# Patient Record
Sex: FEMALE | Race: BLACK OR AFRICAN AMERICAN | NOT HISPANIC OR LATINO | URBAN - METROPOLITAN AREA
[De-identification: names, ages, dates, MRNs, and addresses within clinical notes are randomized per-mention and may not be internally consistent; named-entity substitution may affect disease eponyms.]

---

## 2021-09-21 ENCOUNTER — EMERGENCY (EMERGENCY)
Facility: HOSPITAL | Age: 61
LOS: 0 days | Discharge: HOME | End: 2021-09-21
Attending: STUDENT IN AN ORGANIZED HEALTH CARE EDUCATION/TRAINING PROGRAM | Admitting: STUDENT IN AN ORGANIZED HEALTH CARE EDUCATION/TRAINING PROGRAM
Payer: MEDICARE

## 2021-09-21 VITALS
HEART RATE: 98 BPM | TEMPERATURE: 98 F | RESPIRATION RATE: 18 BRPM | SYSTOLIC BLOOD PRESSURE: 123 MMHG | OXYGEN SATURATION: 100 % | DIASTOLIC BLOOD PRESSURE: 85 MMHG

## 2021-09-21 VITALS
WEIGHT: 151.9 LBS | TEMPERATURE: 98 F | HEART RATE: 110 BPM | SYSTOLIC BLOOD PRESSURE: 113 MMHG | DIASTOLIC BLOOD PRESSURE: 74 MMHG | OXYGEN SATURATION: 100 % | RESPIRATION RATE: 18 BRPM

## 2021-09-21 DIAGNOSIS — M25.561 PAIN IN RIGHT KNEE: ICD-10-CM
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
## 2021-09-21 DIAGNOSIS — R32 UNSPECIFIED URINARY INCONTINENCE: ICD-10-CM
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
## 2021-09-21 DIAGNOSIS — N39.0 URINARY TRACT INFECTION, SITE NOT SPECIFIED: ICD-10-CM

## 2021-09-21 LAB
ALBUMIN SERPL ELPH-MCNC: 3.5 G/DL — SIGNIFICANT CHANGE UP (ref 3.5–5.2)
ALP SERPL-CCNC: 101 U/L — SIGNIFICANT CHANGE UP (ref 30–115)
ALT FLD-CCNC: 19 U/L — SIGNIFICANT CHANGE UP (ref 0–41)
ANION GAP SERPL CALC-SCNC: 13 MMOL/L — SIGNIFICANT CHANGE UP (ref 7–14)
APPEARANCE UR: ABNORMAL
AST SERPL-CCNC: 37 U/L — SIGNIFICANT CHANGE UP (ref 0–41)
BACTERIA # UR AUTO: ABNORMAL
BASOPHILS # BLD AUTO: 0.04 K/UL — SIGNIFICANT CHANGE UP (ref 0–0.2)
BASOPHILS NFR BLD AUTO: 0.9 % — SIGNIFICANT CHANGE UP (ref 0–1)
BILIRUB SERPL-MCNC: 0.7 MG/DL — SIGNIFICANT CHANGE UP (ref 0.2–1.2)
BILIRUB UR-MCNC: NEGATIVE — SIGNIFICANT CHANGE UP
BUN SERPL-MCNC: 5 MG/DL — LOW (ref 10–20)
CALCIUM SERPL-MCNC: 9.4 MG/DL — SIGNIFICANT CHANGE UP (ref 8.5–10.1)
CHLORIDE SERPL-SCNC: 95 MMOL/L — LOW (ref 98–110)
CO2 SERPL-SCNC: 22 MMOL/L — SIGNIFICANT CHANGE UP (ref 17–32)
COLOR SPEC: YELLOW — SIGNIFICANT CHANGE UP
CREAT SERPL-MCNC: 0.8 MG/DL — SIGNIFICANT CHANGE UP (ref 0.7–1.5)
DIFF PNL FLD: ABNORMAL
EOSINOPHIL # BLD AUTO: 0.07 K/UL — SIGNIFICANT CHANGE UP (ref 0–0.7)
EOSINOPHIL NFR BLD AUTO: 1.6 % — SIGNIFICANT CHANGE UP (ref 0–8)
EPI CELLS # UR: 1 /HPF — SIGNIFICANT CHANGE UP (ref 0–5)
GLUCOSE SERPL-MCNC: 124 MG/DL — HIGH (ref 70–99)
GLUCOSE UR QL: NEGATIVE — SIGNIFICANT CHANGE UP
HCT VFR BLD CALC: 37.3 % — SIGNIFICANT CHANGE UP (ref 37–47)
HGB BLD-MCNC: 12.8 G/DL — SIGNIFICANT CHANGE UP (ref 12–16)
HYALINE CASTS # UR AUTO: 3 /LPF — SIGNIFICANT CHANGE UP (ref 0–7)
IMM GRANULOCYTES NFR BLD AUTO: 0.9 % — HIGH (ref 0.1–0.3)
KETONES UR-MCNC: NEGATIVE — SIGNIFICANT CHANGE UP
LEUKOCYTE ESTERASE UR-ACNC: ABNORMAL
LIDOCAIN IGE QN: 32 U/L — SIGNIFICANT CHANGE UP (ref 7–60)
LYMPHOCYTES # BLD AUTO: 0.93 K/UL — LOW (ref 1.2–3.4)
LYMPHOCYTES # BLD AUTO: 21.2 % — SIGNIFICANT CHANGE UP (ref 20.5–51.1)
MCHC RBC-ENTMCNC: 32.1 PG — HIGH (ref 27–31)
MCHC RBC-ENTMCNC: 34.3 G/DL — SIGNIFICANT CHANGE UP (ref 32–37)
MCV RBC AUTO: 93.5 FL — SIGNIFICANT CHANGE UP (ref 81–99)
MONOCYTES # BLD AUTO: 0.34 K/UL — SIGNIFICANT CHANGE UP (ref 0.1–0.6)
MONOCYTES NFR BLD AUTO: 7.8 % — SIGNIFICANT CHANGE UP (ref 1.7–9.3)
NEUTROPHILS # BLD AUTO: 2.96 K/UL — SIGNIFICANT CHANGE UP (ref 1.4–6.5)
NEUTROPHILS NFR BLD AUTO: 67.6 % — SIGNIFICANT CHANGE UP (ref 42.2–75.2)
NITRITE UR-MCNC: NEGATIVE — SIGNIFICANT CHANGE UP
NRBC # BLD: 0 /100 WBCS — SIGNIFICANT CHANGE UP (ref 0–0)
PH UR: 6.5 — SIGNIFICANT CHANGE UP (ref 5–8)
PLATELET # BLD AUTO: 247 K/UL — SIGNIFICANT CHANGE UP (ref 130–400)
POTASSIUM SERPL-MCNC: 4.3 MMOL/L — SIGNIFICANT CHANGE UP (ref 3.5–5)
POTASSIUM SERPL-SCNC: 4.3 MMOL/L — SIGNIFICANT CHANGE UP (ref 3.5–5)
PROT SERPL-MCNC: 7.8 G/DL — SIGNIFICANT CHANGE UP (ref 6–8)
PROT UR-MCNC: NEGATIVE — SIGNIFICANT CHANGE UP
RBC # BLD: 3.99 M/UL — LOW (ref 4.2–5.4)
RBC # FLD: 12.6 % — SIGNIFICANT CHANGE UP (ref 11.5–14.5)
RBC CASTS # UR COMP ASSIST: 20 /HPF — HIGH (ref 0–4)
SODIUM SERPL-SCNC: 130 MMOL/L — LOW (ref 135–146)
SP GR SPEC: 1 — LOW (ref 1.01–1.03)
UROBILINOGEN FLD QL: SIGNIFICANT CHANGE UP
WBC # BLD: 4.38 K/UL — LOW (ref 4.8–10.8)
WBC # FLD AUTO: 4.38 K/UL — LOW (ref 4.8–10.8)
WBC UR QL: 1 /HPF — SIGNIFICANT CHANGE UP (ref 0–5)

## 2021-09-21 PROCEDURE — 71045 X-RAY EXAM CHEST 1 VIEW: CPT | Mod: 26

## 2021-09-21 PROCEDURE — 99285 EMERGENCY DEPT VISIT HI MDM: CPT

## 2021-09-21 PROCEDURE — 73552 X-RAY EXAM OF FEMUR 2/>: CPT | Mod: 26,RT

## 2021-09-21 PROCEDURE — 72170 X-RAY EXAM OF PELVIS: CPT | Mod: 26

## 2021-09-21 PROCEDURE — G1004: CPT

## 2021-09-21 PROCEDURE — 74177 CT ABD & PELVIS W/CONTRAST: CPT | Mod: 26,ME

## 2021-09-21 PROCEDURE — 73560 X-RAY EXAM OF KNEE 1 OR 2: CPT | Mod: 26,RT

## 2021-09-21 RX ORDER — ACETAMINOPHEN 500 MG
650 TABLET ORAL ONCE
Refills: 0 | Status: COMPLETED | OUTPATIENT
Start: 2021-09-21 | End: 2021-09-21

## 2021-09-21 RX ORDER — SODIUM CHLORIDE 9 MG/ML
1000 INJECTION, SOLUTION INTRAVENOUS ONCE
Refills: 0 | Status: COMPLETED | OUTPATIENT
Start: 2021-09-21 | End: 2021-09-21

## 2021-09-21 RX ORDER — CEFPODOXIME PROXETIL 100 MG
1 TABLET ORAL
Qty: 20 | Refills: 0
Start: 2021-09-21 | End: 2021-09-30

## 2021-09-21 RX ADMIN — SODIUM CHLORIDE 1000 MILLILITER(S): 9 INJECTION, SOLUTION INTRAVENOUS at 13:22

## 2021-09-21 RX ADMIN — Medication 650 MILLIGRAM(S): at 10:17

## 2021-09-21 NOTE — ED PROVIDER NOTE - PROGRESS NOTE DETAILS
Discussed results with patient and son. Pt ambulatory in Ed with steady gait. Still complaining of mild R knee pain. ACE wrap applied. Discussed possibility of admission for rehab and social work/case management but patient and son state that she feels safe returning home and lives with her son who will help her to ambulate and perform ADLs. Will give rehab clinic follow up.

## 2021-09-21 NOTE — ED PROVIDER NOTE - PATIENT PORTAL LINK FT
You can access the FollowMyHealth Patient Portal offered by [facility] by registering at the following website: [email or web address]. By joining [facility]’s FollowMyHealth portal, you will also be able to view your health information using other applications (apps) compatible with our system.

## 2021-09-21 NOTE — ED PROVIDER NOTE - CLINICAL SUMMARY MEDICAL DECISION MAKING FREE TEXT BOX
I personally evaluated the patient. I reviewed the Resident’s or Physician Assistant’s note (as assigned above), and agree with the findings and plan except as documented in my note. Patient evaluated for knee pain. Labs, LE xrays, Ct abd/pelvis, UA performed in the ED. Pt ambulatory in ED with steady gait, lives at home with her son who will help perform ADLs and help patient ambulate. Offered patient and son admission for PT/rehab and social work or home health aide but pt refused. Pt and son state patient feels safe at home. Abx prescription sent for uti. I have fully discussed the medical management and delivery of care with the patient. I have discussed any available labs, imaging and treatment options with the patient. Patient confirms understanding and has been given detailed return precautions. Patient instructed to return to the ED should symptoms persist or worsen. Patient has demonstrated capacity and has verbalized understanding. Patient is well appearing upon discharge.

## 2021-09-21 NOTE — ED PROVIDER NOTE - NSFOLLOWUPINSTRUCTIONS_ED_ALL_ED_FT
Urinary Tract Infection    A urinary tract infection (UTI) is an infection of any part of the urinary tract, which includes the kidneys, ureters, bladder, and urethra. Risk factors include ignoring your need to urinate, wiping back to front if female, being an uncircumcised male, and having diabetes or a weak immune system. Symptoms include frequent urination, pain or burning with urination, foul smelling urine, cloudy urine, pain in the lower abdomen, blood in the urine, and fever. If you were prescribed an antibiotic medicine, take it as told by your health care provider. Do not stop taking the antibiotic even if you start to feel better.    SEEK IMMEDIATE MEDICAL CARE IF YOU HAVE ANY OF THE FOLLOWING SYMPTOMS: severe back or abdominal pain, fever, inability to keep fluids or medicine down, dizziness/lightheadedness, or a change in mental status.        Knee Pain    Knee pain is a very common symptom and can have many causes. Knee pain often goes away when you follow your health care provider's instructions for relieving pain and discomfort at home. However, knee pain can develop into a condition that needs treatment. Some conditions may include:     Arthritis caused by wear and tear (osteoarthritis).  Arthritis caused by swelling and irritation (rheumatoid arthritis or gout).  A cyst or growth in your knee.  An infection in your knee joint.  An injury that will not heal.  Damage, swelling, or irritation of the tissues that support your knee (torn ligaments or tendinitis).    If your knee pain continues, additional tests may be ordered to diagnose your condition. Tests may include X-rays or other imaging studies of your knee. You may also need to have fluid removed from your knee. Treatment for ongoing knee pain depends on the cause, but treatment may include:    Medicines to relieve pain or swelling.  Steroid injections in your knee.  Physical therapy.  Surgery.    HOME CARE INSTRUCTIONS  Take medicines only as directed by your health care provider.   Rest your knee and keep it raised (elevated) while you are resting.  Do not do things that cause or worsen pain.  Avoid high-impact activities or exercises, such as running, jumping rope, or doing jumping jacks.  Apply ice to the knee area:  Put ice in a plastic bag.  Place a towel between your skin and the bag.  Leave the ice on for 20 minutes, 2–3 times a day.  Ask your health care provider if you should wear an elastic knee support.  Keep a pillow under your knee when you sleep.  Lose weight if you are overweight. Extra weight can put pressure on your knee.  Do not use any tobacco products, including cigarettes, chewing tobacco, or electronic cigarettes. If you need help quitting, ask your health care provider. Smoking may slow the healing of any bone and joint problems that you may have.    SEEK MEDICAL CARE IF:  Your knee pain continues, changes, or gets worse.  You have a fever along with knee pain.  Your knee dylan or locks up.  Your knee becomes more swollen.    SEEK IMMEDIATE MEDICAL CARE IF:  Your knee joint feels hot to the touch.  You have chest pain or trouble breathing.

## 2021-09-21 NOTE — ED PROVIDER NOTE - NS ED ROS FT
Eyes:  No visual changes, eye pain or discharge.  ENMT:  No hearing changes, pain, discharge or infections. No neck pain or stiffness.  Cardiac:  No chest pain, SOB or edema. No chest pain with exertion.  Respiratory:  No cough or respiratory distress. No hemoptysis. No history of asthma or RAD.  GI:  No nausea, vomiting, diarrhea or abdominal pain.  :  No dysuria, frequency or burning.  MS:  +R knee pain. No myalgia, muscle weakness, or back pain.  Neuro:  No headache or weakness.  No LOC.  Skin:  No skin rash.   Endocrine: No history of thyroid disease or diabetes.

## 2021-09-21 NOTE — ED PROVIDER NOTE - OBJECTIVE STATEMENT
60 yo F no pmh pw knee pain. Mechanical trip and fall 1 week ago, has been c/o R knee pain since then. States that she is also unable to make to the restroom in time due to the pain and slower ambulation and has been occasionally incontinent while trying to walk. No n/v/d, no head trauma, no ac use, no cp, no sob, no back pain, no numbness/tingling, no palpitations, no f/c, no neck pain, no dysuria, no hematuria, no abd pain.

## 2021-09-21 NOTE — ED PROVIDER NOTE - PHYSICAL EXAMINATION
CONSTITUTIONAL: Well-developed; well-nourished; in no acute distress. Sitting up and providing appropriate history and physical examination  SKIN: skin exam is warm and dry, no acute rash.  HEAD: Normocephalic; atraumatic.  EYES: PERRL, 3 mm bilateral, no nystagmus, EOM intact; conjunctiva and sclera clear.  ENT: No nasal discharge; airway clear.  NECK: Supple; non tender. + full passive ROM in all directions. No JVD  CARD: S1, S2 normal; no murmurs, gallops, or rubs. Regular rate and rhythm. + Symmetric Strong Pulses  RESP: No wheezes, rales or rhonchi. Good air movement bilaterally  ABD: soft; non-distended; non-tender. No Rebound, No Guarding, No signs of peritonitis, No CVA tenderness. No pulsatile abdominal mass. + Strong and Symmetric Pulses  EXT: +ttp over R knee. Normal ROM. No clubbing, cyanosis or edema. Dp and Pt Pulses intact. Cap refill less than 3 seconds  NEURO: CN 2-12 intact, normal finger to nose, normal romberg, stable gait, no sensory or motor deficits, Alert, oriented, grossly unremarkable. No Focal deficits. GCS 15. NIH 0  PSYCH: Cooperative, appropriate.

## 2022-02-16 ENCOUNTER — INPATIENT (INPATIENT)
Facility: HOSPITAL | Age: 62
LOS: 37 days | Discharge: NURSING, REHAB OR LONG-TERM CARE | End: 2022-03-26
Attending: HOSPITALIST | Admitting: HOSPITALIST
Payer: MEDICARE

## 2022-02-16 VITALS
HEART RATE: 81 BPM | SYSTOLIC BLOOD PRESSURE: 103 MMHG | DIASTOLIC BLOOD PRESSURE: 66 MMHG | OXYGEN SATURATION: 97 % | TEMPERATURE: 97 F

## 2022-02-16 PROBLEM — Z00.00 ENCOUNTER FOR PREVENTIVE HEALTH EXAMINATION: Status: ACTIVE | Noted: 2022-02-16

## 2022-02-16 NOTE — H&P ADULT - ASSESSMENT
Pt is a 60 yo F with PMH of GIB, Bowel and Bladder incontinence, Smoking (3-4 cigarettes/day), ETOH abuse, hypothyroidism anemia transferred from [facility] for Polypectomy. Pt is from home living with her son and bedbound at baseline. Pt was admitted to [facility] on 01/31 for AMS, weakness and decreased appetite. She was found to have UTI, NSTEMI, and electrolyte imbalance. During her stay at [facility] pt had colonoscopy done which revealed 1 large 3-4 cm sigmoid pedunculated polyp and 2 polyps (7, 14 mm) in descending colon s/p bx. Pt was transferred to [facility] for polyp removal.    Pt is a 62 yo F with PMH of GIB, Bowel and Bladder incontinence, Smoking (3-4 cigarettes/day), ETOH abuse, hypothyroidism anemia transferred from [facility] for Polypectomy. Pt is from home living with her son and bedbound at baseline. Pt was admitted to [facility] on 01/31 for AMS, weakness and decreased appetite. She was found to have UTI, NSTEMI, and electrolyte imbalance. During her stay at [facility] pt had colonoscopy done which revealed 1 large 3-4 cm sigmoid pedunculated polyp and 2 polyps (7, 14 mm) in descending colon s/p bx. Pt was transferred to [facility] for polyp removal.     #Colonic Polyp  #Diarrhea in setting of recent abx use  #Hx of GIB  - Colonoscopy 2/4: 1 large 3-4 cm sigmoid pedunculated polyp and 2 polyps (7, 14 mm) in descending colon s/p bx  - GI consulted (Dr. Daniels)   - Monitor BM for blood  - f/u C diff result    #Hx of UTI  #R lower face cellulitis  - CT maxillofacial with C 2/12: Mild soft tissue inflammation suggesting cellulitis of the right lower face  - pt was on cefepime, vancomycin (end date 2/15)  - Started on Unasyn 2/16  - No documentations of + bcx or ucx on the charts , Obtain culture result from [facility]  - f/u UA, Ucx, Bclx    #Hx L pleural effusion on CXR 2/8  - CT Chest with Cont 2/9 obtained: Moderate b/l pleural effusions resulting in complete atelectasis of both lower lobes  - f/u CXR    #Hx of Elevated Troponin  #low bp  - pt has been on Midodrine 15 mg q8h  - Normal Lexiscan at [facility]    Diet: NPO for possible procedure tomorrow  GI ppx: PPI  DVT ppx: hsq   Pt is a 60 yo F with PMH of GIB, Bowel and Bladder incontinence, Smoking (3-4 cigarettes/day), ETOH abuse, hypothyroidism anemia transferred from [facility] for Polypectomy. Pt is from home living with her son and bedbound at baseline. Pt was admitted to [facility] on 01/31 for AMS, weakness and decreased appetite. She was found to have UTI, Elevated troponin, and electrolyte imbalance. During her stay at [facility] pt had colonoscopy done which revealed 1 large 3-4 cm sigmoid pedunculated polyp and 2 polyps (7, 14 mm) in descending colon s/p bx. Pt was transferred to [facility] for polyp removal.     #Colonic Polyp  #Diarrhea in setting of recent abx use  #Hx of GIB  - Colonoscopy 2/4: 1 large 3-4 cm sigmoid pedunculated polyp and 2 polyps (7, 14 mm) in descending colon s/p bx  - GI consulted (Dr. Daniels)   - Monitor BM for blood  - f/u C diff result  - Pt was on Imodium at [facility], no BM since she came    #Hx of UTI  #R lower face cellulitis  - CT maxillofacial with C 2/12: Mild soft tissue inflammation suggesting cellulitis of the right lower face  - pt was on cefepime, vancomycin (end date 2/15)  - Started on Unasyn 2/16, will continue that  - No documentations of + bcx or ucx on the charts , Obtain culture result from [facility]  - f/u UA, Ucx, Bclx    #Hx L pleural effusion on CXR 2/8  - CT Chest with Cont 2/9 obtained: Moderate b/l pleural effusions resulting in complete atelectasis of both lower lobes  - f/u CXR    #Hx of Elevated Troponin  #low bp  - pt has been on Midodrine 15 mg q8h, will continue that   - Normal Lexiscan at [facility]  - f/u orthostatics    #Hx of ETOH abuse  - last drink (as per nurse) 6 months ago  - c/w Folic acid and Thiamine    #Hx of Abnormal electrolytes  - f/u CMP and correct as needed    Diet: NPO for possible procedure tomorrow- Please confirm with GI  GI ppx: PPI  DVT ppx: CSD

## 2022-02-16 NOTE — H&P ADULT - HISTORY OF PRESENT ILLNESS
Pt is a 62 yo F with PMH of GIB, Bowel and Bladder incontinence, Smoking (3-4 cigarettes/day), ETOH abuse, hypothyroidism anemia transferred from [facility] for Polypectomy. Pt is from home living with her son and bedbound at baseline. Pt was admitted to [facility] on 01/31 for AMS, weakness and decreased appetite. She was found to have UTI, NSTEMI, and electrolyte imbalance. During her stay at [facility] pt had colonoscopy done which revealed 1 large 3-4 cm sigmoid pedunculated polyp and 2 polyps (7, 14 mm) in descending colon s/p bx. Pt was transferred to [facility] for polyp removal.

## 2022-02-16 NOTE — H&P ADULT - NSHPPHYSICALEXAM_GEN_ALL_CORE
ICU Vital Signs Last 24 Hrs  T(C): 36.2 (16 Feb 2022 23:02), Max: 36.2 (16 Feb 2022 23:02)  T(F): 97.2 (16 Feb 2022 23:02), Max: 97.2 (16 Feb 2022 23:02)  HR: 81 (16 Feb 2022 23:02) (81 - 81)  BP: 103/66 (16 Feb 2022 23:02) (103/66 - 103/66)  BP(mean): --  ABP: --  ABP(mean): --  RR: --  SpO2: 97% (16 Feb 2022 23:02) (97% - 97%)    GENERAL: NAD, Resting in bed, frail  HEENT: R lower face swelling   CHEST/LUNG: Clear to auscultation bilaterally; No wheezing or rubs.   HEART: Regular rate and rhythm; No murmurs, rubs, or gallops  ABDOMEN: Bowel sounds present; Soft, Nontender, Nondistended.   EXTREMITIES:  No clubbing, cyanosis, or edema  NERVOUS SYSTEM:  Alert & Oriented X1  SKIN: No rashes or lesions

## 2022-02-17 LAB
ALBUMIN SERPL ELPH-MCNC: 1.6 G/DL — LOW (ref 3.5–5.2)
ALP SERPL-CCNC: 63 U/L — SIGNIFICANT CHANGE UP (ref 30–115)
ALT FLD-CCNC: 12 U/L — SIGNIFICANT CHANGE UP (ref 0–41)
ANION GAP SERPL CALC-SCNC: 11 MMOL/L — SIGNIFICANT CHANGE UP (ref 7–14)
APTT BLD: 44.6 SEC — HIGH (ref 27–39.2)
AST SERPL-CCNC: 14 U/L — SIGNIFICANT CHANGE UP (ref 0–41)
BASOPHILS # BLD AUTO: 0.01 K/UL — SIGNIFICANT CHANGE UP (ref 0–0.2)
BASOPHILS NFR BLD AUTO: 0.1 % — SIGNIFICANT CHANGE UP (ref 0–1)
BILIRUB SERPL-MCNC: 1.3 MG/DL — HIGH (ref 0.2–1.2)
BLD GP AB SCN SERPL QL: SIGNIFICANT CHANGE UP
BUN SERPL-MCNC: 8 MG/DL — LOW (ref 10–20)
C DIFF BY PCR RESULT: NEGATIVE — SIGNIFICANT CHANGE UP
C DIFF TOX GENS STL QL NAA+PROBE: SIGNIFICANT CHANGE UP
CALCIUM SERPL-MCNC: 8 MG/DL — LOW (ref 8.5–10.1)
CEA SERPL-MCNC: 5.6 NG/ML — HIGH (ref 0–3.8)
CHLORIDE SERPL-SCNC: 119 MMOL/L — HIGH (ref 98–110)
CO2 SERPL-SCNC: 16 MMOL/L — LOW (ref 17–32)
CREAT SERPL-MCNC: 0.6 MG/DL — LOW (ref 0.7–1.5)
EOSINOPHIL # BLD AUTO: 0.02 K/UL — SIGNIFICANT CHANGE UP (ref 0–0.7)
EOSINOPHIL NFR BLD AUTO: 0.3 % — SIGNIFICANT CHANGE UP (ref 0–8)
GLUCOSE SERPL-MCNC: 69 MG/DL — LOW (ref 70–99)
HCT VFR BLD CALC: 23.5 % — LOW (ref 37–47)
HGB BLD-MCNC: 8.5 G/DL — LOW (ref 12–16)
IMM GRANULOCYTES NFR BLD AUTO: 0.5 % — HIGH (ref 0.1–0.3)
INR BLD: 1.51 RATIO — HIGH (ref 0.65–1.3)
LYMPHOCYTES # BLD AUTO: 0.98 K/UL — LOW (ref 1.2–3.4)
LYMPHOCYTES # BLD AUTO: 12.5 % — LOW (ref 20.5–51.1)
MAGNESIUM SERPL-MCNC: 1.9 MG/DL — SIGNIFICANT CHANGE UP (ref 1.8–2.4)
MCHC RBC-ENTMCNC: 30.6 PG — SIGNIFICANT CHANGE UP (ref 27–31)
MCHC RBC-ENTMCNC: 36.2 G/DL — SIGNIFICANT CHANGE UP (ref 32–37)
MCV RBC AUTO: 84.5 FL — SIGNIFICANT CHANGE UP (ref 81–99)
MONOCYTES # BLD AUTO: 0.32 K/UL — SIGNIFICANT CHANGE UP (ref 0.1–0.6)
MONOCYTES NFR BLD AUTO: 4.1 % — SIGNIFICANT CHANGE UP (ref 1.7–9.3)
NEUTROPHILS # BLD AUTO: 6.44 K/UL — SIGNIFICANT CHANGE UP (ref 1.4–6.5)
NEUTROPHILS NFR BLD AUTO: 82.5 % — HIGH (ref 42.2–75.2)
NRBC # BLD: 0 /100 WBCS — SIGNIFICANT CHANGE UP (ref 0–0)
PLATELET # BLD AUTO: 148 K/UL — SIGNIFICANT CHANGE UP (ref 130–400)
POTASSIUM SERPL-MCNC: 4.1 MMOL/L — SIGNIFICANT CHANGE UP (ref 3.5–5)
POTASSIUM SERPL-SCNC: 4.1 MMOL/L — SIGNIFICANT CHANGE UP (ref 3.5–5)
PROT SERPL-MCNC: 5.5 G/DL — LOW (ref 6–8)
PROTHROM AB SERPL-ACNC: 17.3 SEC — HIGH (ref 9.95–12.87)
RBC # BLD: 2.78 M/UL — LOW (ref 4.2–5.4)
RBC # FLD: 15.2 % — HIGH (ref 11.5–14.5)
SODIUM SERPL-SCNC: 146 MMOL/L — SIGNIFICANT CHANGE UP (ref 135–146)
WBC # BLD: 7.81 K/UL — SIGNIFICANT CHANGE UP (ref 4.8–10.8)
WBC # FLD AUTO: 7.81 K/UL — SIGNIFICANT CHANGE UP (ref 4.8–10.8)

## 2022-02-17 PROCEDURE — 71045 X-RAY EXAM CHEST 1 VIEW: CPT | Mod: 26

## 2022-02-17 PROCEDURE — 99222 1ST HOSP IP/OBS MODERATE 55: CPT

## 2022-02-17 PROCEDURE — 99223 1ST HOSP IP/OBS HIGH 75: CPT

## 2022-02-17 PROCEDURE — 93010 ELECTROCARDIOGRAM REPORT: CPT

## 2022-02-17 RX ORDER — LEVOTHYROXINE SODIUM 125 MCG
25 TABLET ORAL DAILY
Refills: 0 | Status: DISCONTINUED | OUTPATIENT
Start: 2022-02-17 | End: 2022-02-19

## 2022-02-17 RX ORDER — AMPICILLIN SODIUM AND SULBACTAM SODIUM 250; 125 MG/ML; MG/ML
3 INJECTION, POWDER, FOR SUSPENSION INTRAMUSCULAR; INTRAVENOUS ONCE
Refills: 0 | Status: COMPLETED | OUTPATIENT
Start: 2022-02-17 | End: 2022-02-17

## 2022-02-17 RX ORDER — THIAMINE MONONITRATE (VIT B1) 100 MG
100 TABLET ORAL DAILY
Refills: 0 | Status: DISCONTINUED | OUTPATIENT
Start: 2022-02-17 | End: 2022-02-18

## 2022-02-17 RX ORDER — PANTOPRAZOLE SODIUM 20 MG/1
40 TABLET, DELAYED RELEASE ORAL
Refills: 0 | Status: DISCONTINUED | OUTPATIENT
Start: 2022-02-17 | End: 2022-02-20

## 2022-02-17 RX ORDER — MIDODRINE HYDROCHLORIDE 2.5 MG/1
15 TABLET ORAL THREE TIMES A DAY
Refills: 0 | Status: DISCONTINUED | OUTPATIENT
Start: 2022-02-17 | End: 2022-02-20

## 2022-02-17 RX ORDER — AMPICILLIN SODIUM AND SULBACTAM SODIUM 250; 125 MG/ML; MG/ML
INJECTION, POWDER, FOR SUSPENSION INTRAMUSCULAR; INTRAVENOUS
Refills: 0 | Status: DISCONTINUED | OUTPATIENT
Start: 2022-02-17 | End: 2022-02-20

## 2022-02-17 RX ORDER — SODIUM CHLORIDE 9 MG/ML
1000 INJECTION, SOLUTION INTRAVENOUS
Refills: 0 | Status: DISCONTINUED | OUTPATIENT
Start: 2022-02-17 | End: 2022-02-18

## 2022-02-17 RX ORDER — AMPICILLIN SODIUM AND SULBACTAM SODIUM 250; 125 MG/ML; MG/ML
3 INJECTION, POWDER, FOR SUSPENSION INTRAMUSCULAR; INTRAVENOUS EVERY 6 HOURS
Refills: 0 | Status: DISCONTINUED | OUTPATIENT
Start: 2022-02-17 | End: 2022-02-20

## 2022-02-17 RX ORDER — FOLIC ACID 0.8 MG
1 TABLET ORAL DAILY
Refills: 0 | Status: DISCONTINUED | OUTPATIENT
Start: 2022-02-17 | End: 2022-02-20

## 2022-02-17 RX ADMIN — MIDODRINE HYDROCHLORIDE 15 MILLIGRAM(S): 2.5 TABLET ORAL at 06:46

## 2022-02-17 RX ADMIN — AMPICILLIN SODIUM AND SULBACTAM SODIUM 200 GRAM(S): 250; 125 INJECTION, POWDER, FOR SUSPENSION INTRAMUSCULAR; INTRAVENOUS at 11:50

## 2022-02-17 RX ADMIN — AMPICILLIN SODIUM AND SULBACTAM SODIUM 200 GRAM(S): 250; 125 INJECTION, POWDER, FOR SUSPENSION INTRAMUSCULAR; INTRAVENOUS at 06:46

## 2022-02-17 RX ADMIN — Medication 100 MILLIGRAM(S): at 11:51

## 2022-02-17 RX ADMIN — Medication 1 MILLIGRAM(S): at 11:51

## 2022-02-17 RX ADMIN — AMPICILLIN SODIUM AND SULBACTAM SODIUM 200 GRAM(S): 250; 125 INJECTION, POWDER, FOR SUSPENSION INTRAMUSCULAR; INTRAVENOUS at 17:10

## 2022-02-17 RX ADMIN — MIDODRINE HYDROCHLORIDE 15 MILLIGRAM(S): 2.5 TABLET ORAL at 11:51

## 2022-02-17 RX ADMIN — PANTOPRAZOLE SODIUM 40 MILLIGRAM(S): 20 TABLET, DELAYED RELEASE ORAL at 06:54

## 2022-02-17 RX ADMIN — MIDODRINE HYDROCHLORIDE 15 MILLIGRAM(S): 2.5 TABLET ORAL at 17:11

## 2022-02-17 RX ADMIN — SODIUM CHLORIDE 75 MILLILITER(S): 9 INJECTION, SOLUTION INTRAVENOUS at 08:50

## 2022-02-17 RX ADMIN — Medication 25 MICROGRAM(S): at 06:46

## 2022-02-17 NOTE — CONSULT NOTE ADULT - SUBJECTIVE AND OBJECTIVE BOX
Patient is a 62 y/o female with PMHx of GIB, Bowel and Bladder incontinence, Smoking (3-4 cigarettes/day), ETOH abuse, hypothyroidism, and ongoing anemia transferred from [facility] for Polypectomy. Patient was admitted to [facility] on 01/31 for AMS, weakness and decreased appetite. She was found to have UTI, NSTEMI, and electrolyte imbalance. During her stay at [facility] she had a colonoscopy done which revealed 1 large 3-4 cm sigmoid pedunculated polyp and 2 polyps (7, 14 mm) in descending colon. Patient was transferred as they are not able to perform large Polypectomies there. she was not able to be discharged as ongoing anemia and concern that the cause are these large polyps. Patient currently feels well with no complaints. she is not a great historian and does not know alot of details regarding her recent admission to [facility] or overall health.       PAST MEDICAL & SURGICAL HISTORY:  Hypothyroid  Prior ETOH abuse  Tobacco Use  CAD- Recent NSTEMI    Social History  Admits to Tobacco use  Admits to prior ETOH use  Denies Current Illicit Drug use       Family Hx:  Father: Non Contributory   Mother: Non Contributory      MEDICATIONS  (STANDING):  ampicillin/sulbactam  IVPB      ampicillin/sulbactam  IVPB 3 Gram(s) IV Intermittent every 6 hours  folic acid 1 milliGRAM(s) Oral daily  levothyroxine 25 MICROGram(s) Oral daily  midodrine. 15 milliGRAM(s) Oral three times a day  pantoprazole    Tablet 40 milliGRAM(s) Oral before breakfast  sodium chloride 0.45%. 1000 milliLiter(s) (75 mL/Hr) IV Continuous <Continuous>  thiamine 100 milliGRAM(s) Oral daily    MEDICATIONS  (PRN):      Allergies  No Known Allergies      Review of Systems  General:  Denies Fatigue, Denies Fever, Denies Weakness ,Denies Weight Loss   HEENT: Denies Trouble Swallowing ,Denies  Sore Throat , Denies Change in hearing/vision/speech ,Denies Dizziness    Cardio: Denies  Chest Pain , Palpitations    Respiratory: Denies worsening of SOB, Denies Cough  Abdomen: See detailed HPI  Neuro: Denies Headache Denies Dizziness, Denies Paresthesias  MSK: Denies pain in Bones/Joints/Muscles   Psych: Patient denies depression, denies suicidal or homicidal ideations  Integ: Patient Denies rash, or new skin lesions         Vital Signs   T(F): 97 (17 Feb 2022 05:06), Max: 97.2 (16 Feb 2022 23:02)  HR: 92 (17 Feb 2022 06:01) (81 - 92)  BP: 81/52 (17 Feb 2022 06:01) (81/52 - 106/69)  RR: 20 (17 Feb 2022 06:01) (16 - 20)  SpO2: 97% (17 Feb 2022 05:06) (97% - 97%)  Physical Exam  Gen: NAD  HEENT: NC/AT, Mucosal Membranes Moist  Cardio: S1/S2 No S3/S4, Regular  Resp: CTA B/L  Abdomen: Soft, ND/NT  Neuro: AAOx3  Extremities: FROM x 4  Skin: No jaundice or excoriations                 LFTs:         Coags:                    RADIOLOGY & ADDITIONAL STUDIES:

## 2022-02-17 NOTE — CONSULT NOTE ADULT - SUBJECTIVE AND OBJECTIVE BOX
HPI:  Pt is a 62 yo F with PMH of GIB, Bowel and Bladder incontinence, Smoking (3-4 cigarettes/day), ETOH abuse, hypothyroidism anemia transferred from [facility] for Polypectomy. Pt is from home living with her son and bedbound at baseline. Pt was admitted to [facility] on 01/31 for AMS, weakness and decreased appetite. She was found to have UTI, NSTEMI, and electrolyte imbalance. During her stay at [facility] pt had colonoscopy done which revealed 1 large 3-4 cm sigmoid pedunculated polyp and 2 polyps (7, 14 mm) in descending colon s/p bx. Pt was transferred to [facility] for polyp removal.        PAST MEDICAL & SURGICAL HISTORY  GIB  Bowel and Bladder Incontinence   Smoker  ETOH Abuse  Hypothyroidism   Anemia    FAMILY HISTORY:  FAMILY HISTORY:      SOCIAL HISTORY:  [x]smoker  [x]Alcohol  []Drug    ALLERGIES:  No Known Allergies      MEDICATIONS:  MEDICATIONS  (STANDING):  ampicillin/sulbactam  IVPB      ampicillin/sulbactam  IVPB 3 Gram(s) IV Intermittent every 6 hours  folic acid 1 milliGRAM(s) Oral daily  levothyroxine 25 MICROGram(s) Oral daily  midodrine. 15 milliGRAM(s) Oral three times a day  pantoprazole    Tablet 40 milliGRAM(s) Oral before breakfast  sodium chloride 0.45%. 1000 milliLiter(s) (75 mL/Hr) IV Continuous <Continuous>  thiamine 100 milliGRAM(s) Oral daily    MEDICATIONS  (PRN):      HOME MEDICATIONS:  Home Medications:      VITALS:   T(F): 97 (02-17 @ 05:06), Max: 97.2 (02-16 @ 23:02)  HR: 92 (02-17 @ 06:01) (81 - 92)  BP: 81/52 (02-17 @ 06:01) (81/52 - 106/69)  BP(mean): --  RR: 20 (02-17 @ 06:01) (16 - 20)  SpO2: 97% (02-17 @ 05:06) (97% - 97%)    I&O's Summary      REVIEW OF SYSTEMS:  CONSTITUTIONAL: No weakness, fevers or chills  EYES: No visual changes  ENT: No vertigo or throat pain   NECK: No pain or stiffness  RESPIRATORY: No cough, wheezing, hemoptysis; No shortness of breath  CARDIOVASCULAR: No chest pain or palpitations  GASTROINTESTINAL: No abdominal or epigastric pain. No nausea, vomiting, or hematemesis; No diarrhea or constipation. No melena or hematochezia.  GENITOURINARY: No dysuria, frequency or hematuria  NEUROLOGICAL: No numbness or weakness  SKIN: No itching, no rashes  MSK: no    PHYSICAL EXAM:  NEURO: patient is awake , alert, poor historian   GEN: Not in acute distress  NECK: no thyroid enlargement, no JVD  LUNGS: Clear to auscultation bilaterally   CARDIOVASCULAR: S1/S2 present, RRR , no murmurs or rubs, no carotid bruits,  + PP bilaterally  ABD: Soft, non-tender, non-distended, +BS  EXT: No IRENE. Red bruising over chest wall   SKIN: Intact    LABS:                        8.5    7.81  )-----------( 148      ( 17 Feb 2022 04:30 )             23.5     02-17    146  |  119<H>  |  8<L>  ----------------------------<  69<L>  4.1   |  16<L>  |  0.6<L>    Ca    8.0<L>      17 Feb 2022 04:30  Mg     1.9     02-17    TPro  5.5<L>  /  Alb  1.6<L>  /  TBili  1.3<H>  /  DBili  x   /  AST  14  /  ALT  12  /  AlkPhos  63  02-17              Troponin trend:            RADIOLOGY:  -CXR:    < from: Xray Chest 1 View- PORTABLE-Routine (Xray Chest 1 View- PORTABLE-Routine in AM.) (02.17.22 @ 08:38) >    Impression:    Interval development left midlung zone opacity and blunted left   costophrenic angle (consistent with pleural effusion).. No pneumothorax.    < end of copied text >      ECG:  < from: 12 Lead ECG (02.17.22 @ 12:14) >  entricular Rate 89 BPM    Atrial Rate 89 BPM    P-R Interval 102 ms    QRS Duration 78 ms    Q-T Interval 490 ms    QTC Calculation(Bazett) 596 ms    P Axis 72 degrees    R Axis 72 degrees    T Axis 256 degrees    Diagnosis Line Sinus rhythm with short AK  Septal infarct , age undetermined  T wave abnormality, consider inferior ischemia  T wave abnormality, consider anterolateral ischemia  Prolonged QT  Abnormal ECG      < end of copied text >      TELEMETRY EVENTS:

## 2022-02-17 NOTE — PROGRESS NOTE ADULT - ASSESSMENT
Patient is a 62 y/o female with PMHx of GIB, Bowel and Bladder incontinence, Smoking (3-4 cigarettes/day), ETOH abuse, hypothyroidism, and ongoing anemia transferred from [facility] for Polypectomy. Patient was admitted to [facility] on 01/31 for AMS, weakness and decreased appetite. She was found to have UTI, NSTEMI, and electrolyte imbalance. During her stay at [facility] she had a colonoscopy done which revealed 1 large 3-4 cm sigmoid pedunculated polyp and 2 polyps (7, 14 mm) in descending colon. Patient was transferred as they are not able to perform large Polypectomies there. She was not able to be discharged as ongoing anemia and concern that the cause are these large polyps.    #Colonic Polyp  #Diarrhea in setting of recent abx use  #Hx of GIB  - Colonoscopy 2/4: 1 large 3-4 cm sigmoid pedunculated polyp and 2 polyps (7, 14 mm) in descending colon s/p bx  - GI consulted (Dr. Daniels)   - Monitor H&H, active T+S  - Obtain CBC, CMP, TnS, Coags, Mg  - F/u Cardiology consult for clearance as had reported NSTEMI at [facility]  - Clear liquid diet for now  - F/u C-siff stool ordered for reported C-Diff infection  - Pending Colonoscopy with EMR and Large Polypectomy   - GI is following     #Hx of UTI  #R lower face cellulitis  - CT maxillofacial with C 2/12: Mild soft tissue inflammation suggesting cellulitis of the right lower face  - pt was on cefepime, vancomycin (end date 2/15)  - Started on Unasyn 2/16, will continue that  - No documentations of + bcx or ucx on the charts   - f/u UA, Ucx, Bclx    #Hx L pleural effusion on CXR 2/8  - CT Chest with Cont 2/9 obtained: Moderate b/l pleural effusions resulting in complete atelectasis of both lower lobes  - f/u CXR    #Hx NSTEMI   #Hx of Elevated Troponin  #low bp  - pt has been on Midodrine 15 mg q8h, will continue that   - Normal Lexiscan at [facility]  - f/u orthostatics  - F/u EKG     #Hx of ETOH abuse  - last drink (as per nurse) 6 months ago  - c/w Folic acid and Thiamine    #Possible Thiamine deficiency   #Possible Folic acid deficiency   - C/w Folic acid and Thiamine    #Misc   - DVT prophylaxis: SCD   - GI prophylaxis: PPI  - Diet: clear liq diet   - Activity status: IAT   - Code status: Full code   Disposition: GI for colonoscopy   Documents from [facility] in the chart

## 2022-02-17 NOTE — CONSULT NOTE ADULT - ASSESSMENT
Patient is a 62 y/o female with PMHx of GIB, Bowel and Bladder incontinence, Smoking (3-4 cigarettes/day), ETOH abuse, hypothyroidism, and ongoing anemia transferred from [facility] for Polypectomy. Patient was admitted to [facility] on 01/31 for AMS, weakness and decreased appetite. She was found to have UTI, NSTEMI, and electrolyte imbalance. During her stay at [facility] she had a colonoscopy done which revealed 1 large 3-4 cm sigmoid pedunculated polyp and 2 polyps (7, 14 mm) in descending colon. Patient was transferred as they are not able to perform large Polypectomies there. She was not able to be discharged as ongoing anemia and concern that the cause are these large polyps. Patient currently feels well with no complaints. She is not a great historian and does not know alot of details regarding her recent admission to [facility] or overall health. Obtain full labs, can start clear liquids, obtain Cardiology clearance as had reported recent NSTEMI. Will optimize her for Colonoscopy with EMR and additional large Polypectomy.     Anemia secondary to large Polyps  - Obtain CBC, CMP, TnS, Coags, Mg  - Obtain Cardiology consult for clearance as had reported NSTEMI at [facility]  - Clear liquid diet for now  - C-siff stool ordered for reported C-Diff infection  - On thiamine and folate for ETOH Hx  - Optimize for Colonoscopy with EMR and Large Polypectomy which may require around 1.5 hours fo anesthesia   - Will follow

## 2022-02-17 NOTE — PROGRESS NOTE ADULT - SUBJECTIVE AND OBJECTIVE BOX
----------Daily Progress Note----------    HISTORY OF PRESENT ILLNESS:  Patient is a 61y old Female who presents with a chief complaint of Polypectomy (17 Feb 2022 08:36)    Currently admitted to medicine with the primary diagnosis of      Patient is a 60 y/o female with PMHx of GIB, Bowel and Bladder incontinence, Smoking (3-4 cigarettes/day), ETOH abuse, hypothyroidism, and ongoing anemia transferred from [facility] for Polypectomy. Patient was admitted to [facility] on 01/31 for AMS, weakness and decreased appetite. She was found to have UTI, NSTEMI, and electrolyte imbalance. During her stay at [facility] she had a colonoscopy done which revealed 1 large 3-4 cm sigmoid pedunculated polyp and 2 polyps (7, 14 mm) in descending colon. Patient was transferred as they are not able to perform large Polypectomies there. She was not able to be discharged as ongoing anemia and concern that the cause are these large polyps. Patient currently feels well with no complaints. She is not a great historian and does not know alot of details regarding her recent admission to [facility] or overall health. Obtain full labs, can start clear liquids, obtain Cardiology clearance as had reported recent NSTEMI. Will optimize her for Colonoscopy with EMR and additional large Polypectomy.     Today is hospital day 1d.     INTERVAL HOSPITAL COURSE / OVERNIGHT EVENTS:    Patient was examined and seen at bedside. This morning she is resting comfortably in bed and reports no new issues or overnight events.     Review of Systems: Patient denies any fever, chill, headache, dizziness, SOB. Patient is endorsing dysuria, denies hematuria.     <<<<<PAST MEDICAL & SURGICAL HISTORY>>>>>    ALLERGIES  No Known Allergies      Home Medications:        MEDICATIONS  STANDING MEDICATIONS  ampicillin/sulbactam  IVPB      ampicillin/sulbactam  IVPB 3 Gram(s) IV Intermittent every 6 hours  folic acid 1 milliGRAM(s) Oral daily  levothyroxine 25 MICROGram(s) Oral daily  midodrine. 15 milliGRAM(s) Oral three times a day  pantoprazole    Tablet 40 milliGRAM(s) Oral before breakfast  sodium chloride 0.45%. 1000 milliLiter(s) IV Continuous <Continuous>  thiamine 100 milliGRAM(s) Oral daily    PRN MEDICATIONS    VITALS:  T(F): 97  HR: 92  BP: 81/52  RR: 20  SpO2: 97%    <<<<<LABS>>>>>    02-17    146  |  119<H>  |  8<L>  ----------------------------<  69<L>  4.1   |  16<L>  |  0.6<L>    Ca    8.0<L>      17 Feb 2022 04:30  Mg     1.9     02-17    TPro  5.5<L>  /  Alb  1.6<L>  /  TBili  1.3<H>  /  DBili  x   /  AST  14  /  ALT  12  /  AlkPhos  63  02-17            [id number]        <<<<<RADIOLOGY>>>>>          <<<<<PHYSICAL EXAM>>>>>  GENERAL: Well developed, well nourished and in no acute distress. Resting comfortably in bed..  PULMONARY: Clear to auscultation bilaterally. No rales, rhonchi, or wheezing.  CARDIOVASCULAR: Regular rate and rhythm, S1-S2, no murmurs  GASTROINTESTINAL: Soft, non-tender, non-distended, no guarding.  SKIN/EXTREMITIES: No clubbing or edema  NEUROLOGIC/MUSCULOSKELETAL: AOx2, grossly moving all extremities, no focal deficits.      -----------------------------------------------------------------------------------------------------------------------------------------------------------------------------------------------

## 2022-02-17 NOTE — CONSULT NOTE ADULT - ASSESSMENT
Pt is a 62 yo F with PMH of GIB, Bowel and Bladder incontinence, Smoking (3-4 cigarettes/day), ETOH abuse, hypothyroidism anemia transferred from [facility] for Polypectomy. Pt is from home living with her son and bedbound at baseline. Pt was admitted to [facility] on 01/31 for AMS, weakness and decreased appetite. She was found to have UTI, NSTEMI, and electrolyte imbalance. During her stay at [facility] pt had colonoscopy done which revealed 1 large 3-4 cm sigmoid pedunculated polyp and 2 polyps (7, 14 mm) in descending colon s/p bx. Pt was transferred to [facility] for polyp removal.      Cardiology Team consulted for Pre-Op clearance before colonoscopy.     - Pt poor historian   - Pt is bedbound at baseline, but self reports is able to walk with walker  - Can walk up 1-2 flights of stairs without feeling SOB, and 1 block. When SOB rests for 1-2 seconds   - had an NSTEMI at [facility] (1/31/22)  - Active smoker   - No active CP or SOB   - On Physical Exam lungs are clear b/l, no leg swelling   - EKG:     Plan:  - Echo, TSH, Troponin   - RCRI score    Pt is a 60 yo F with PMH of GIB, Bowel and Bladder incontinence, Smoking (3-4 cigarettes/day), ETOH abuse, hypothyroidism anemia transferred from [facility] for Polypectomy. Pt is from home living with her son and bedbound at baseline. Pt was admitted to [facility] on 01/31 for AMS, weakness and decreased appetite. She was found to have UTI, NSTEMI, and electrolyte imbalance. During her stay at [facility] pt had colonoscopy done which revealed 1 large 3-4 cm sigmoid pedunculated polyp and 2 polyps (7, 14 mm) in descending colon s/p bx. Pt was transferred to [facility] for polyp removal.      Cardiology Team consulted for Pre-Op clearance before colonoscopy.     - Pt poor historian   - Pt is bedbound at baseline, but self reports is able to walk with walker  - Can walk up 1-2 flights of stairs without feeling SOB, and 1 block. When SOB rests for 1-2 seconds   - had an NSTEMI at [facility] (1/31/22)  - Active smoker   - No active CP or SOB   - On Physical Exam lungs are clear b/l, no leg swelling   - No OP cardiologist     Plan:  - Echo, TSH, Troponin   - RCRI score    Pt is a 60 yo F with PMH of GIB, Bowel and Bladder incontinence, Smoking (3-4 cigarettes/day), ETOH abuse, hypothyroidism anemia transferred from [facility] for Polypectomy. Pt is from home living with her son and bedbound at baseline. Pt was admitted to [facility] on 01/31 for AMS, weakness and decreased appetite. She was found to have UTI, NSTEMI, and electrolyte imbalance. During her stay at [facility] pt had colonoscopy done which revealed 1 large 3-4 cm sigmoid pedunculated polyp and 2 polyps (7, 14 mm) in descending colon s/p bx. Pt was transferred to [facility] for polyp removal.      Cardiology Team consulted for Pre-Op clearance before colonoscopy.     - Pt poor historian   - Pt is bedbound at baseline, but self reports is able to walk with walker  - Can walk up 1-2 flights of stairs without feeling SOB, and 1 block. When SOB rests for 1-2 seconds   - had an NSTEMI at [facility] (1/31/22)  - Active smoker   - No active CP or SOB   - On Physical Exam lungs are clear b/l, no leg swelling   - No OP cardiologist     Plan:  - Echo, TSH, Troponin   - RCRI score: Class 2 risk, 6% mortality risk    Pt is a 62 yo F with PMH of GIB, Bowel and Bladder incontinence, Smoking (3-4 cigarettes/day), ETOH abuse, hypothyroidism anemia transferred from [facility] for Polypectomy. Pt is from home living with her son and bedbound at baseline. Pt was admitted to [facility] on 01/31 for AMS, weakness and decreased appetite. She was found to have UTI, NSTEMI, and electrolyte imbalance. During her stay at [facility] pt had colonoscopy done which revealed 1 large 3-4 cm sigmoid pedunculated polyp and 2 polyps (7, 14 mm) in descending colon s/p bx. Pt was transferred to [facility] for polyp removal.      Cardiology Team consulted for Pre-Op clearance before colonoscopy.     - Pt poor historian   - Pt is bedbound at baseline, but self reports is able to walk with walker  - Can walk up 1-2 flights of stairs without feeling SOB, and 1 block. When SOB rests for 1-2 seconds   -- had an NSTEMI at [facility] (1/31/22)  - Negative stress test recently done at [facility] for ischemia     Plan:  - RCRI score: Class 2 risk, 6% mortality risk   - obtain baseline EKG before procedure  -No further cardiac work up     Pt is a 60 yo F with PMH of GIB, Bowel and Bladder incontinence, Smoking (3-4 cigarettes/day), ETOH abuse, hypothyroidism anemia transferred from [facility] for Polypectomy. Pt is from home living with her son and bedbound at baseline. Pt was admitted to [facility] on 01/31 for AMS, weakness and decreased appetite. She was found to have UTI, NSTEMI, and electrolyte imbalance. During her stay at [facility] pt had colonoscopy done which revealed 1 large 3-4 cm sigmoid pedunculated polyp and 2 polyps (7, 14 mm) in descending colon s/p bx. Pt was transferred to [facility] for polyp removal.      Cardiology Team consulted for Pre-Op clearance before colonoscopy.     - Pt poor historian   - Pt is bedbound at baseline, but self reports is able to walk with walker  - Can walk up 1-2 flights of stairs without feeling SOB, and 1 block. When SOB rests for 1-2 seconds   -- had an NSTEMI (trop value unknown) at [facility] (1/31/22)  - Negative stress test recently done at [facility] for ischemia     Plan:  - RCRI score: Class 2 risk, 6% mortality risk   - obtain baseline EKG before procedure  -No further cardiac work up

## 2022-02-17 NOTE — CONSULT NOTE ADULT - ATTENDING COMMENTS
Patient seen and examined during the GI-Advanced endoscopy rounds, case discussed, assessment and plan as above

## 2022-02-17 NOTE — CONSULT NOTE ADULT - ATTENDING COMMENTS
61F with tobacco use, EtOH use, anemia transferred from [facility] for polyp removal. Patient unable to give history, but [facility] records report NSTEMI and subsequent normal stress test. She currently has no chest pain or shortness of breath.     Pharmacologic nuclear stress test 2/7/22 showed no perfusion defects, LVEF 89%, normal LV size/function.     EKG 2/17/22: sinus rhythm, T wave inversions in V3-V6, QTc 490 ms    - Avoid QTc prolonging medications  - No further cardiac testing at this time. She may proceed with colonoscopy

## 2022-02-18 ENCOUNTER — RESULT REVIEW (OUTPATIENT)
Age: 62
End: 2022-02-18

## 2022-02-18 ENCOUNTER — TRANSCRIPTION ENCOUNTER (OUTPATIENT)
Age: 62
End: 2022-02-18

## 2022-02-18 LAB
A1C WITH ESTIMATED AVERAGE GLUCOSE RESULT: 4.4 % — SIGNIFICANT CHANGE UP (ref 4–5.6)
ALBUMIN SERPL ELPH-MCNC: 1.5 G/DL — LOW (ref 3.5–5.2)
ALP SERPL-CCNC: 81 U/L — SIGNIFICANT CHANGE UP (ref 30–115)
ALT FLD-CCNC: 13 U/L — SIGNIFICANT CHANGE UP (ref 0–41)
ANION GAP SERPL CALC-SCNC: 10 MMOL/L — SIGNIFICANT CHANGE UP (ref 7–14)
APPEARANCE UR: ABNORMAL
APTT BLD: 31.5 SEC — SIGNIFICANT CHANGE UP (ref 27–39.2)
AST SERPL-CCNC: 21 U/L — SIGNIFICANT CHANGE UP (ref 0–41)
BASOPHILS # BLD AUTO: 0.01 K/UL — SIGNIFICANT CHANGE UP (ref 0–0.2)
BASOPHILS NFR BLD AUTO: 0.1 % — SIGNIFICANT CHANGE UP (ref 0–1)
BILIRUB SERPL-MCNC: 1 MG/DL — SIGNIFICANT CHANGE UP (ref 0.2–1.2)
BILIRUB UR-MCNC: NEGATIVE — SIGNIFICANT CHANGE UP
BUN SERPL-MCNC: 6 MG/DL — LOW (ref 10–20)
CALCIUM SERPL-MCNC: 7.8 MG/DL — LOW (ref 8.5–10.1)
CHLORIDE SERPL-SCNC: 119 MMOL/L — HIGH (ref 98–110)
CO2 SERPL-SCNC: 18 MMOL/L — SIGNIFICANT CHANGE UP (ref 17–32)
COLOR SPEC: YELLOW — SIGNIFICANT CHANGE UP
CREAT SERPL-MCNC: 0.7 MG/DL — SIGNIFICANT CHANGE UP (ref 0.7–1.5)
DIFF PNL FLD: ABNORMAL
EOSINOPHIL # BLD AUTO: 0.04 K/UL — SIGNIFICANT CHANGE UP (ref 0–0.7)
EOSINOPHIL NFR BLD AUTO: 0.5 % — SIGNIFICANT CHANGE UP (ref 0–8)
ESTIMATED AVERAGE GLUCOSE: 80 MG/DL — SIGNIFICANT CHANGE UP (ref 68–114)
GLUCOSE BLDC GLUCOMTR-MCNC: 87 MG/DL — SIGNIFICANT CHANGE UP (ref 70–99)
GLUCOSE SERPL-MCNC: 61 MG/DL — LOW (ref 70–99)
GLUCOSE UR QL: NEGATIVE — SIGNIFICANT CHANGE UP
HCT VFR BLD CALC: 24.5 % — LOW (ref 37–47)
HCV AB S/CO SERPL IA: 0.06 COI — SIGNIFICANT CHANGE UP
HCV AB SERPL-IMP: SIGNIFICANT CHANGE UP
HGB BLD-MCNC: 8.7 G/DL — LOW (ref 12–16)
IMM GRANULOCYTES NFR BLD AUTO: 0.4 % — HIGH (ref 0.1–0.3)
INR BLD: 1.33 RATIO — HIGH (ref 0.65–1.3)
IRON SATN MFR SERPL: 55 UG/DL — SIGNIFICANT CHANGE UP (ref 35–150)
KETONES UR-MCNC: SIGNIFICANT CHANGE UP
LEUKOCYTE ESTERASE UR-ACNC: ABNORMAL
LYMPHOCYTES # BLD AUTO: 1.07 K/UL — LOW (ref 1.2–3.4)
LYMPHOCYTES # BLD AUTO: 14.2 % — LOW (ref 20.5–51.1)
MAGNESIUM SERPL-MCNC: 1.8 MG/DL — SIGNIFICANT CHANGE UP (ref 1.8–2.4)
MCHC RBC-ENTMCNC: 30.5 PG — SIGNIFICANT CHANGE UP (ref 27–31)
MCHC RBC-ENTMCNC: 35.5 G/DL — SIGNIFICANT CHANGE UP (ref 32–37)
MCV RBC AUTO: 86 FL — SIGNIFICANT CHANGE UP (ref 81–99)
MONOCYTES # BLD AUTO: 0.34 K/UL — SIGNIFICANT CHANGE UP (ref 0.1–0.6)
MONOCYTES NFR BLD AUTO: 4.5 % — SIGNIFICANT CHANGE UP (ref 1.7–9.3)
NEUTROPHILS # BLD AUTO: 6.04 K/UL — SIGNIFICANT CHANGE UP (ref 1.4–6.5)
NEUTROPHILS NFR BLD AUTO: 80.3 % — HIGH (ref 42.2–75.2)
NITRITE UR-MCNC: NEGATIVE — SIGNIFICANT CHANGE UP
NRBC # BLD: 0 /100 WBCS — SIGNIFICANT CHANGE UP (ref 0–0)
PH UR: 6 — SIGNIFICANT CHANGE UP (ref 5–8)
PLATELET # BLD AUTO: 81 K/UL — LOW (ref 130–400)
POTASSIUM SERPL-MCNC: 3.5 MMOL/L — SIGNIFICANT CHANGE UP (ref 3.5–5)
POTASSIUM SERPL-SCNC: 3.5 MMOL/L — SIGNIFICANT CHANGE UP (ref 3.5–5)
PROT SERPL-MCNC: 5.8 G/DL — LOW (ref 6–8)
PROT UR-MCNC: ABNORMAL
PROTHROM AB SERPL-ACNC: 15.3 SEC — HIGH (ref 9.95–12.87)
RBC # BLD: 2.85 M/UL — LOW (ref 4.2–5.4)
RBC # FLD: 15.7 % — HIGH (ref 11.5–14.5)
SODIUM SERPL-SCNC: 147 MMOL/L — HIGH (ref 135–146)
SP GR SPEC: 1.02 — SIGNIFICANT CHANGE UP (ref 1.01–1.03)
TIBC SERPL-MCNC: <72 UG/DL — LOW (ref 220–430)
TRANSFERRIN SERPL-MCNC: 39 MG/DL — LOW (ref 200–360)
TSH SERPL-MCNC: 15.6 UIU/ML — HIGH (ref 0.27–4.2)
UIBC SERPL-MCNC: <17 UG/DL — LOW (ref 110–370)
UROBILINOGEN FLD QL: SIGNIFICANT CHANGE UP
WBC # BLD: 7.53 K/UL — SIGNIFICANT CHANGE UP (ref 4.8–10.8)
WBC # FLD AUTO: 7.53 K/UL — SIGNIFICANT CHANGE UP (ref 4.8–10.8)

## 2022-02-18 PROCEDURE — 99233 SBSQ HOSP IP/OBS HIGH 50: CPT

## 2022-02-18 PROCEDURE — 45390 COLONOSCOPY W/RESECTION: CPT

## 2022-02-18 RX ORDER — SODIUM CHLORIDE 9 MG/ML
1000 INJECTION, SOLUTION INTRAVENOUS
Refills: 0 | Status: DISCONTINUED | OUTPATIENT
Start: 2022-02-18 | End: 2022-02-19

## 2022-02-18 RX ORDER — SOD SULF/SODIUM/NAHCO3/KCL/PEG
4000 SOLUTION, RECONSTITUTED, ORAL ORAL ONCE
Refills: 0 | Status: COMPLETED | OUTPATIENT
Start: 2022-02-18 | End: 2022-02-18

## 2022-02-18 RX ORDER — LOPERAMIDE HCL 2 MG
2 TABLET ORAL EVERY 6 HOURS
Refills: 0 | Status: DISCONTINUED | OUTPATIENT
Start: 2022-02-18 | End: 2022-02-20

## 2022-02-18 RX ADMIN — AMPICILLIN SODIUM AND SULBACTAM SODIUM 200 GRAM(S): 250; 125 INJECTION, POWDER, FOR SUSPENSION INTRAMUSCULAR; INTRAVENOUS at 12:03

## 2022-02-18 RX ADMIN — Medication 25 MICROGRAM(S): at 05:59

## 2022-02-18 RX ADMIN — AMPICILLIN SODIUM AND SULBACTAM SODIUM 200 GRAM(S): 250; 125 INJECTION, POWDER, FOR SUSPENSION INTRAMUSCULAR; INTRAVENOUS at 05:59

## 2022-02-18 RX ADMIN — Medication 1 MILLIGRAM(S): at 12:03

## 2022-02-18 RX ADMIN — SODIUM CHLORIDE 75 MILLILITER(S): 9 INJECTION, SOLUTION INTRAVENOUS at 12:02

## 2022-02-18 RX ADMIN — MIDODRINE HYDROCHLORIDE 15 MILLIGRAM(S): 2.5 TABLET ORAL at 17:14

## 2022-02-18 RX ADMIN — MIDODRINE HYDROCHLORIDE 15 MILLIGRAM(S): 2.5 TABLET ORAL at 06:00

## 2022-02-18 RX ADMIN — Medication 100 MILLIGRAM(S): at 12:03

## 2022-02-18 RX ADMIN — PANTOPRAZOLE SODIUM 40 MILLIGRAM(S): 20 TABLET, DELAYED RELEASE ORAL at 06:00

## 2022-02-18 RX ADMIN — MIDODRINE HYDROCHLORIDE 15 MILLIGRAM(S): 2.5 TABLET ORAL at 12:03

## 2022-02-18 RX ADMIN — Medication 4000 MILLILITER(S): at 14:18

## 2022-02-18 RX ADMIN — Medication 20 MILLIGRAM(S): at 14:18

## 2022-02-18 NOTE — PHYSICAL THERAPY INITIAL EVALUATION ADULT - GENERAL OBSERVATIONS, REHAB EVAL
pt chart reviewed. PT IE [phone]. pt received semi henriquez with +IV lock, bed rails raised, call bell within reach, and in NAD. pt able to answer all Hx questions but appears slightly confused. During Hx taking, pt reported the need to have a bowel movement. RICHARD Ingram was notified and cared for the pt. pt was able to sit EOB with modA from PT. BP taken at /55. pt reported minor dizziness at this time. pt was assisted to stand and was very unsteady and was returned to sitting and then supine. pt was left as found.

## 2022-02-18 NOTE — PROGRESS NOTE ADULT - SUBJECTIVE AND OBJECTIVE BOX
SANTIAGO CALIXTO  61y  Female  ***My note supersedes ALL resident notes that I sign.  My corrections for their notes are in my note.***    I can be reached directly on [phone]. My office number is [phone]. My personal cell number is [phone].    INTERVAL EVENTS: Here for f/u of anemia. Pt awake and alert. Pt was a smoker up to admission. Smoked 1/2ppd. Pt used to drink 1/2 pint alcohol/day. Never did drugs. She has mild pain and swelling in rt check and lower jaw. Pt has been on abx since being in [facility]. Pt lacks dentition on right side. Pt has low BP despite midodrine. Tx'd to [facility] for EMR/polypectomy w/ Adv GI. Pt used to walk w/ walker at home, but has not walked since being admitted to [facility] 22. Pt feels lightheaded when she sits up.    T(F): 97.1 (22 @ 05:21), Max: 97.2 (22 @ 14:16)  HR: 71 (22 @ 08:04) (71 - 90)  BP: 96/54 (22 @ 05:21) (96/54 - 101/58)  RR: 18 (22 @ 05:21) (18 - 18)  SpO2: 94% (22 @ 08:04) (94% - 94%)    Gen: NAD  HEENT: PERRL, EOMI, rt cheek swollen; + tenderness rt cheek; poor oral dentition; nose clr  Neck: no nodes, no JVD, thyroid nl  lungs: clr  hrt: s1 s2 rrr no murmur  abd: soft, NT/ND, no HS megaly  ext: no edema, no c/c  neuro: aa ox3, cn intact, can move all 4 ext, but legs are very weak    LABS:                      8.7     (    86.0   7.53  )-----------( ---------      x        ( 2022 12:43 )             24.5    (    15.7     Hemoglobin: 8.7 g/dL ( @ 12:43)  Hemoglobin: 8.5 g/dL ( @ 04:30)    Platelet Count - Automated: 148 K/uL (22 @ 04:30)    PT/INR - ( 2022 12:43 )   PT: 15.30 sec;   INR: 1.33 ratio    PTT - ( 2022 12:43 )  PTT: 31.5 sec    Alb 1.5  T kenia 1.0   AP 81  AST 21  ALT 13  22 @ 12:43  Alb 1.6  T kenia 1.3   AP 63  AST 14  ALT 12  22 @ 04:30    Urinalysis Basic - ( 2022 05:25 )    Color: Yellow / Appearance: Slightly Turbid / S.023 / pH: x  Gluc: x / Ketone: Trace  / Bili: Negative / Urobili: <2 mg/dL   Blood: x / Protein: 30 mg/dL / Nitrite: Negative   Leuk Esterase: Large / RBC: 3 /HPF / WBC 47 /HPF   Sq Epi: x / Non Sq Epi: 6 /HPF / Bacteria: Negative    Culture - Blood (collected 22 @ 04:30)  Source: .Blood Blood  Preliminary Report (22 @ 13:01):    No growth to date.    RADIOLOGY & ADDITIONAL TESTS:  < from: Xray Chest 1 View- PORTABLE-Routine (Xray Chest 1 View- PORTABLE-Routine in AM.) (22 @ 08:38) >  Impression:    Interval development left midlung zone opacity and blunted left   costophrenic angle (consistent with pleural effusion).. No pneumothorax.    < end of copied text >    MEDICATIONS:  ampicillin/sulbactam  IVPB      ampicillin/sulbactam  IVPB 3 Gram(s) IV Intermittent every 6 hours    dextrose 5% + sodium chloride 0.9%. 1000 milliLiter(s) IV Continuous <Continuous>  folic acid 1 milliGRAM(s) Oral daily  levothyroxine 25 MICROGram(s) Oral daily  midodrine. 15 milliGRAM(s) Oral three times a day  pantoprazole    Tablet 40 milliGRAM(s) Oral before breakfast  sodium chloride 0.45%. 1000 milliLiter(s) IV Continuous <Continuous>  thiamine 100 milliGRAM(s) Oral daily

## 2022-02-18 NOTE — PHYSICAL THERAPY INITIAL EVALUATION ADULT - BED MOBILITY LIMITATIONS, REHAB EVAL
Alert/Cooperative/Awake
decreased ability to use arms for pushing/pulling/decreased ability to use legs for bridging/pushing/impaired ability to control trunk for mobility

## 2022-02-18 NOTE — PHYSICAL THERAPY INITIAL EVALUATION ADULT - RISK REDUCTION/PREVENTION, PT EVAL
Judith- from the scheduling team-    \"This patient is scheduled for an ultrasound at [facility]. The order entered for the ultrasound is incorrect per department protocol. Please put in a new order with the providers signature or awaiting co-signature.   Please use order [id number]   If you have any questions, please call our department at [phone].   Thank you.\"    Nerissa JOHNSTON pended the order, your signature is needed.    risk factors

## 2022-02-18 NOTE — CONSULT NOTE ADULT - ASSESSMENT
61 year old F with multiple medical problems as outlined above is admitted to [facility] for polypectomy. Dental was consulted for mild facial cellulitis of the right lower face. Patient's physical examination revealed poor, periodontally involved dentition without evidence of intra-oral abscess. Labs illustrate normal white blood cell count with additional electrolyte and coagulation abnormalities. Imaging illustrated cellulitis of lower right face and possible abscess around tooth #1 (upper right 3rd molar). Right lower facial cellulitis is unlikely related to patient's dentition. The following is recommended  -No acute surgical intervention at this time.  -Unasyn 3g q6h can be transitioned to amoxicillin on discharge for 5-7 days for cellulitis.   -Outpatient follow up with general dentistry for comprehensive treatment planning.  -Please reconsult dental with any additional questions or concerns.

## 2022-02-18 NOTE — PROGRESS NOTE ADULT - SUBJECTIVE AND OBJECTIVE BOX
----------Daily Progress Note----------    HISTORY OF PRESENT ILLNESS:  Patient is a 61y old Female who presents with a chief complaint of Polypectomy (2022 12:52)    Currently admitted to medicine with the primary diagnosis of      Patient is a 62 y/o female with PMHx of GIB, Bowel and Bladder incontinence, Smoking (3-4 cigarettes/day), ETOH abuse, hypothyroidism, and ongoing anemia transferred from [facility] for Polypectomy. Patient was admitted to [facility] on  for AMS, weakness and decreased appetite. She was found to have UTI, NSTEMI, and electrolyte imbalance. During her stay at [facility] she had a colonoscopy done which revealed 1 large 3-4 cm sigmoid pedunculated polyp and 2 polyps (7, 14 mm) in descending colon. Patient was transferred as they are not able to perform large Polypectomies there. She was not able to be discharged as ongoing anemia and concern that the cause are these large polyps. Patient currently feels well with no complaints. She is not a great historian and does not know alot of details regarding her recent admission to [facility] or overall health. Obtain full labs, can start clear liquids, obtain Cardiology clearance as had reported recent NSTEMI. Will optimize her for Colonoscopy with EMR and additional large Polypectomy.         Today is hospital day 2d.     INTERVAL HOSPITAL COURSE / OVERNIGHT EVENTS:    Patient was examined and seen at bedside. This morning she is resting comfortably in bed and reports 1 episode of loose stool    Review of Systems: Patient denies any fever, chill, headache, dizziness, SOB. Patient is endorsing dysuria, denies hematuria.        <<<<<PAST MEDICAL & SURGICAL HISTORY>>>>>    ALLERGIES  No Known Allergies      Home Medications:        MEDICATIONS  STANDING MEDICATIONS  ampicillin/sulbactam  IVPB      ampicillin/sulbactam  IVPB 3 Gram(s) IV Intermittent every 6 hours  dextrose 5% + sodium chloride 0.9%. 1000 milliLiter(s) IV Continuous <Continuous>  folic acid 1 milliGRAM(s) Oral daily  levothyroxine 25 MICROGram(s) Oral daily  midodrine. 15 milliGRAM(s) Oral three times a day  pantoprazole    Tablet 40 milliGRAM(s) Oral before breakfast  sodium chloride 0.45%. 1000 milliLiter(s) IV Continuous <Continuous>  thiamine 100 milliGRAM(s) Oral daily    PRN MEDICATIONS    VITALS:  T(F): 97.1  HR: 71  BP: 96/54  RR: 18  SpO2: 94%    <<<<<LABS>>>>>                        8.5    7.81  )-----------( 148      ( 2022 04:30 )             23.5         146  |  119<H>  |  8<L>  ----------------------------<  69<L>  4.1   |  16<L>  |  0.6<L>    Ca    8.0<L>      2022 04:30  Mg     1.9         TPro  5.5<L>  /  Alb  1.6<L>  /  TBili  1.3<H>  /  DBili  x   /  AST  14  /  ALT  12  /  AlkPhos  63      PT/INR - ( 2022 16:00 )   PT: 17.30 sec;   INR: 1.51 ratio         PTT - ( 2022 16:00 )  PTT:44.6 sec  Urinalysis Basic - ( 2022 05:25 )    Color: Yellow / Appearance: Slightly Turbid / S.023 / pH: x  Gluc: x / Ketone: Trace  / Bili: Negative / Urobili: <2 mg/dL   Blood: x / Protein: 30 mg/dL / Nitrite: Negative   Leuk Esterase: Large / RBC: 3 /HPF / WBC 47 /HPF   Sq Epi: x / Non Sq Epi: 6 /HPF / Bacteria: Negative          [id number]        <<<<<RADIOLOGY>>>>>  < from: Xray Chest 1 View- PORTABLE-Routine (Xray Chest 1 View- PORTABLE-Routine in AM.) (22 @ 08:38) >  Impression:    Interval development left midlung zone opacity and blunted left   costophrenic angle (consistent with pleural effusion).. No pneumothorax.    --- End of Report ---    < end of copied text >      <<<<<PHYSICAL EXAM>>>>>  GENERAL: Well developed, well nourished and in no acute distress. Resting comfortably in bed..  PULMONARY: Clear to auscultation bilaterally. No rales, rhonchi, or wheezing.  CARDIOVASCULAR: Regular rate and rhythm, S1-S2, no murmurs  GASTROINTESTINAL: Soft, non-tender, non-distended, no guarding.  SKIN/EXTREMITIES: No clubbing or edema  NEUROLOGIC/MUSCULOSKELETAL: AOx2, grossly moving all extremities, no focal deficits.      -----------------------------------------------------------------------------------------------------------------------------------------------------------------------------------------------

## 2022-02-18 NOTE — PHYSICAL THERAPY INITIAL EVALUATION ADULT - LEVEL OF INDEPENDENCE: SUPINE/SIT, REHAB EVAL
pt was able to sit with supervision but required maxA to transfer from supine to sit/maximum assist (25% patients effort)

## 2022-02-18 NOTE — CONSULT NOTE ADULT - SUBJECTIVE AND OBJECTIVE BOX
HPI: Patient is a 61 year old F with PMH of GI bleed, Bowel and Bladder incontinence, smoking (3-4 cigarettes/day), ETOH abuse, hypothyroidism and anemia who was transferred from [facility] for Polypectomy. Pt was admitted to [facility] on 01/31 for AMS, weakness and decreased appetite. She was found to have UTI, NSTEMI, and electrolyte imbalance. During her stay at [facility] pt had colonoscopy done which revealed 1 large 3-4 cm sigmoid pedunculated polyp and 2 polyps (7, 14 mm) in descending colon s/p bx. Pt was transferred to [facility] for polyp removal. Dental was consulted out of concern for facial cellulitis caused by a tooth that was pulled by the patient. Patient is not currently reporting any pain on her teeth, but does report pain at the corners of her mouth. When asked about pulling her own tooth the patient reported that the tooth fell out on its own.    PMH and Meds reviewed.    Physical Examination:    Vital signs reviewed.    Gen: AAOx3, NAD  HEENT: There is a mild right facial swelling that is non-tender to palpation on the lower right.   Intra-oral exam: There are multiple periodontally involved teeth. Poor dentition. Possible retained dental root on the lower right. Class I-II mobility in all teeth. No tenderness to percussion on the dentition. No fluctuance in the vestibule bilaterally.    Labs: WBC normal. Electrolyte abnormalities.    Imaging: Maxillofacial CT. Impression mild soft tissue inflammation suggesting cellulitis of the right lower face. Periodontal disease and possible subperiosteal collection in the proximity to tooth #1.

## 2022-02-18 NOTE — PROGRESS NOTE ADULT - ASSESSMENT
Patient is a 60 y/o female with PMHx of GIB, Bowel and Bladder incontinence, Smoking (3-4 cigarettes/day), ETOH abuse, hypothyroidism, and ongoing anemia transferred from [facility] for Polypectomy. Patient was admitted to [facility] on 01/31 for AMS, weakness and decreased appetite. She was found to have UTI, NSTEMI, and electrolyte imbalance. During her stay at [facility] she had a colonoscopy done which revealed 1 large 3-4 cm sigmoid pedunculated polyp and 2 polyps (7, 14 mm) in descending colon. Patient was transferred as they are not able to perform large Polypectomies there. She was not able to be discharged as ongoing anemia and concern that the cause are these large polyps.    #Colonic Polyp  #Diarrhea in setting of recent abx use  #Hx of GIB   - Colonoscopy 2/4: 1 large 3-4 cm sigmoid pedunculated polyp and 2 polyps (7, 14 mm) in descending colon s/p bx  - GI consulted (Dr. Daniels)   - Monitor H&H, active T+S  - Obtain CBC, CMP, TnS, Coags, Mg  - Patient is cleared for colonoscopy per Cardiology   - Clear liquid diet for now  - Negative for C-Diff infection  - Pending Colonoscopy with EMR and Large Polypectomy   - GI is following   - IVF     #Hx of UTI  #R lower face cellulitis  - CT maxillofacial with C 2/12: Mild soft tissue inflammation suggesting cellulitis of the right lower face  - pt was on cefepime, vancomycin (end date 2/15)  - Started on Unasyn 2/16, will continue that for 10 days end date 2/25  - No documentations of + bcx or ucx on the charts   - f/u Ucx, Bclx  - UA + LE, WBC, and Hylan casts   - F/u Dental eval     #Hx L pleural effusion on CXR 2/8  - CT Chest with Cont 2/9 obtained: Moderate b/l pleural effusions resulting in complete atelectasis of both lower lobes  - CXR noted     #Hx NSTEMI   #Hx of Elevated Troponin  #low bp  - pt has been on Midodrine 15 mg q8h, will continue that   - Normal Lexiscan at [facility]  - f/u orthostatics  - EKG noted     #Hx of ETOH abuse  - last drink (as per nurse) 6 months ago  - c/w Folic acid and Thiamine    #Possible Thiamine deficiency   #Possible Folic acid deficiency   - C/w Folic acid and Thiamine    #Misc   - DVT prophylaxis: SCD   - GI prophylaxis: PPI  - Diet: clear liq diet   - Activity status: IAT   - Code status: Full code   Disposition: GI for colonoscopy   Documents from [facility] in the chart    Patient is a 60 y/o female with PMHx of GIB, Bowel and Bladder incontinence, Smoking (3-4 cigarettes/day), ETOH abuse, hypothyroidism, and ongoing anemia transferred from [facility] for Polypectomy. Patient was admitted to [facility] on 01/31 for AMS, weakness and decreased appetite. She was found to have UTI, NSTEMI, and electrolyte imbalance. During her stay at [facility] she had a colonoscopy done which revealed 1 large 3-4 cm sigmoid pedunculated polyp and 2 polyps (7, 14 mm) in descending colon. Patient was transferred as they are not able to perform large Polypectomies there. She was not able to be discharged as ongoing anemia and concern that the cause are these large polyps.    #Colonic Polyp  #Diarrhea in setting of recent abx use  #Hx of GIB   - Colonoscopy 2/4: 1 large 3-4 cm sigmoid pedunculated polyp and 2 polyps (7, 14 mm) in descending colon s/p bx  - GI consulted (Dr. Daniels)   - Monitor H&H, active T+S  - Obtain CBC, CMP, TnS, Coags, Mg  - Patient is cleared for colonoscopy per Cardiology   - Clear liquid diet for now  - Negative for C-Diff infection  - CEA elevated 5.6  - Pending Colonoscopy with EMR and Large Polypectomy   - GI is following   - IVF     #Hx of UTI  #R lower face cellulitis  - CT maxillofacial with C 2/12: Mild soft tissue inflammation suggesting cellulitis of the right lower face  - pt was on cefepime, vancomycin (end date 2/15)  - Started on Unasyn 2/16, will continue that for 10 days end date 2/25  - No documentations of + bcx or ucx on the charts   - f/u Ucx, Bclx  - UA + LE, WBC, and Hylan casts   - F/u Dental eval     #Hx L pleural effusion on CXR 2/8  - CT Chest with Cont 2/9 obtained: Moderate b/l pleural effusions resulting in complete atelectasis of both lower lobes  - CXR noted     #Hx NSTEMI   #Hx of Elevated Troponin  #low bp  - pt has been on Midodrine 15 mg q8h, will continue that   - Normal Lexiscan at [facility]  - f/u orthostatics  - EKG noted     #Hx of ETOH abuse  - last drink (as per nurse) 6 months ago  - c/w Folic acid and Thiamine    #Possible Thiamine deficiency   #Possible Folic acid deficiency   - C/w Folic acid and Thiamine    #Misc   - DVT prophylaxis: SCD   - GI prophylaxis: PPI  - Diet: clear liq diet   - Activity status: IAT   - Code status: Full code   Disposition: GI for colonoscopy   Documents from [facility] in the chart

## 2022-02-18 NOTE — PHYSICAL THERAPY INITIAL EVALUATION ADULT - TRANSFER SAFETY CONCERNS NOTED: SIT/STAND, REHAB EVAL
pt displayed swaying and unsteadiness during standing. pt was immediately returned to sitting EOB/losing balance/decreased weight-shifting ability

## 2022-02-18 NOTE — CHART NOTE - NSCHARTNOTEFT_GEN_A_CORE
pt s/p colonoscopy and polypectomy    Impression      Polyp (13 mm) in the descending colon. (hot snare Polypectomy).      Polyp (25 mm to 30 mm) in the rectosigmoid junction at 20 cm from the anal verge. (Endoloop, Polypectomy, Endoclip).      Tattoo was noted around the rectosigmoid junction, 2-3 cm distal the rectosigmoid junction polyp.        Plan    -Await pathology results  -Resume diet today  -Repeat colonoscopy in 6 months  -Rest of management per primary team  -Please call back with questions

## 2022-02-19 LAB
ALBUMIN SERPL ELPH-MCNC: 1.2 G/DL — LOW (ref 3.5–5.2)
ALP SERPL-CCNC: 67 U/L — SIGNIFICANT CHANGE UP (ref 30–115)
ALT FLD-CCNC: 11 U/L — SIGNIFICANT CHANGE UP (ref 0–41)
ANION GAP SERPL CALC-SCNC: 11 MMOL/L — SIGNIFICANT CHANGE UP (ref 7–14)
APTT BLD: 69.6 SEC — HIGH (ref 27–39.2)
AST SERPL-CCNC: 20 U/L — SIGNIFICANT CHANGE UP (ref 0–41)
BILIRUB SERPL-MCNC: 0.5 MG/DL — SIGNIFICANT CHANGE UP (ref 0.2–1.2)
BUN SERPL-MCNC: 5 MG/DL — LOW (ref 10–20)
CALCIUM SERPL-MCNC: 7.6 MG/DL — LOW (ref 8.5–10.1)
CHLORIDE SERPL-SCNC: 121 MMOL/L — HIGH (ref 98–110)
CO2 SERPL-SCNC: 15 MMOL/L — LOW (ref 17–32)
CREAT SERPL-MCNC: 0.7 MG/DL — SIGNIFICANT CHANGE UP (ref 0.7–1.5)
FERRITIN SERPL-MCNC: 2494 NG/ML — HIGH (ref 15–150)
FOLATE SERPL-MCNC: 11 NG/ML — SIGNIFICANT CHANGE UP
GLUCOSE BLDC GLUCOMTR-MCNC: 85 MG/DL — SIGNIFICANT CHANGE UP (ref 70–99)
GLUCOSE SERPL-MCNC: 73 MG/DL — SIGNIFICANT CHANGE UP (ref 70–99)
HCT VFR BLD CALC: 22.2 % — LOW (ref 37–47)
HCT VFR BLD CALC: 22.2 % — LOW (ref 37–47)
HCT VFR BLD CALC: 23.3 % — LOW (ref 37–47)
HGB BLD-MCNC: 7.7 G/DL — LOW (ref 12–16)
HGB BLD-MCNC: 8.1 G/DL — LOW (ref 12–16)
HGB BLD-MCNC: 8.2 G/DL — LOW (ref 12–16)
INR BLD: 1.73 RATIO — HIGH (ref 0.65–1.3)
MAGNESIUM SERPL-MCNC: 1.7 MG/DL — LOW (ref 1.8–2.4)
MCHC RBC-ENTMCNC: 30.3 PG — SIGNIFICANT CHANGE UP (ref 27–31)
MCHC RBC-ENTMCNC: 30.7 PG — SIGNIFICANT CHANGE UP (ref 27–31)
MCHC RBC-ENTMCNC: 31.3 PG — HIGH (ref 27–31)
MCHC RBC-ENTMCNC: 34.7 G/DL — SIGNIFICANT CHANGE UP (ref 32–37)
MCHC RBC-ENTMCNC: 35.2 G/DL — SIGNIFICANT CHANGE UP (ref 32–37)
MCHC RBC-ENTMCNC: 36.5 G/DL — SIGNIFICANT CHANGE UP (ref 32–37)
MCV RBC AUTO: 85.7 FL — SIGNIFICANT CHANGE UP (ref 81–99)
MCV RBC AUTO: 86 FL — SIGNIFICANT CHANGE UP (ref 81–99)
MCV RBC AUTO: 88.4 FL — SIGNIFICANT CHANGE UP (ref 81–99)
NRBC # BLD: 0 /100 WBCS — SIGNIFICANT CHANGE UP (ref 0–0)
PLATELET # BLD AUTO: 40 K/UL — LOW (ref 130–400)
PLATELET # BLD AUTO: 41 K/UL — LOW (ref 130–400)
PLATELET # BLD AUTO: 56 K/UL — LOW (ref 130–400)
POTASSIUM SERPL-MCNC: 3.3 MMOL/L — LOW (ref 3.5–5)
POTASSIUM SERPL-SCNC: 3.3 MMOL/L — LOW (ref 3.5–5)
PROT SERPL-MCNC: 4.8 G/DL — LOW (ref 6–8)
PROTHROM AB SERPL-ACNC: 19.8 SEC — HIGH (ref 9.95–12.87)
RBC # BLD: 2.51 M/UL — LOW (ref 4.2–5.4)
RBC # BLD: 2.59 M/UL — LOW (ref 4.2–5.4)
RBC # BLD: 2.71 M/UL — LOW (ref 4.2–5.4)
RBC # FLD: 15.7 % — HIGH (ref 11.5–14.5)
RBC # FLD: 15.9 % — HIGH (ref 11.5–14.5)
RBC # FLD: 16.1 % — HIGH (ref 11.5–14.5)
SODIUM SERPL-SCNC: 147 MMOL/L — HIGH (ref 135–146)
VIT B12 SERPL-MCNC: 1892 PG/ML — HIGH (ref 232–1245)
WBC # BLD: 5.09 K/UL — SIGNIFICANT CHANGE UP (ref 4.8–10.8)
WBC # BLD: 5.5 K/UL — SIGNIFICANT CHANGE UP (ref 4.8–10.8)
WBC # BLD: 5.66 K/UL — SIGNIFICANT CHANGE UP (ref 4.8–10.8)
WBC # FLD AUTO: 5.09 K/UL — SIGNIFICANT CHANGE UP (ref 4.8–10.8)
WBC # FLD AUTO: 5.5 K/UL — SIGNIFICANT CHANGE UP (ref 4.8–10.8)
WBC # FLD AUTO: 5.66 K/UL — SIGNIFICANT CHANGE UP (ref 4.8–10.8)

## 2022-02-19 PROCEDURE — 99232 SBSQ HOSP IP/OBS MODERATE 35: CPT

## 2022-02-19 PROCEDURE — 99233 SBSQ HOSP IP/OBS HIGH 50: CPT

## 2022-02-19 RX ORDER — POTASSIUM CHLORIDE 20 MEQ
20 PACKET (EA) ORAL
Refills: 0 | Status: COMPLETED | OUTPATIENT
Start: 2022-02-19 | End: 2022-02-19

## 2022-02-19 RX ORDER — SODIUM CHLORIDE 9 MG/ML
500 INJECTION, SOLUTION INTRAVENOUS ONCE
Refills: 0 | Status: COMPLETED | OUTPATIENT
Start: 2022-02-19 | End: 2022-02-19

## 2022-02-19 RX ORDER — SODIUM CHLORIDE 9 MG/ML
1000 INJECTION, SOLUTION INTRAVENOUS
Refills: 0 | Status: DISCONTINUED | OUTPATIENT
Start: 2022-02-19 | End: 2022-02-20

## 2022-02-19 RX ORDER — MAGNESIUM SULFATE 500 MG/ML
2 VIAL (ML) INJECTION ONCE
Refills: 0 | Status: COMPLETED | OUTPATIENT
Start: 2022-02-19 | End: 2022-02-19

## 2022-02-19 RX ORDER — SODIUM BICARBONATE 1 MEQ/ML
650 SYRINGE (ML) INTRAVENOUS EVERY 8 HOURS
Refills: 0 | Status: DISCONTINUED | OUTPATIENT
Start: 2022-02-19 | End: 2022-02-20

## 2022-02-19 RX ORDER — LEVOTHYROXINE SODIUM 125 MCG
50 TABLET ORAL DAILY
Refills: 0 | Status: DISCONTINUED | OUTPATIENT
Start: 2022-02-19 | End: 2022-02-20

## 2022-02-19 RX ADMIN — SODIUM CHLORIDE 75 MILLILITER(S): 9 INJECTION, SOLUTION INTRAVENOUS at 05:26

## 2022-02-19 RX ADMIN — Medication 1 MILLIGRAM(S): at 11:02

## 2022-02-19 RX ADMIN — AMPICILLIN SODIUM AND SULBACTAM SODIUM 200 GRAM(S): 250; 125 INJECTION, POWDER, FOR SUSPENSION INTRAMUSCULAR; INTRAVENOUS at 17:37

## 2022-02-19 RX ADMIN — SODIUM CHLORIDE 500 MILLILITER(S): 9 INJECTION, SOLUTION INTRAVENOUS at 23:00

## 2022-02-19 RX ADMIN — AMPICILLIN SODIUM AND SULBACTAM SODIUM 200 GRAM(S): 250; 125 INJECTION, POWDER, FOR SUSPENSION INTRAMUSCULAR; INTRAVENOUS at 11:03

## 2022-02-19 RX ADMIN — MIDODRINE HYDROCHLORIDE 15 MILLIGRAM(S): 2.5 TABLET ORAL at 05:17

## 2022-02-19 RX ADMIN — Medication 25 GRAM(S): at 15:58

## 2022-02-19 RX ADMIN — AMPICILLIN SODIUM AND SULBACTAM SODIUM 200 GRAM(S): 250; 125 INJECTION, POWDER, FOR SUSPENSION INTRAMUSCULAR; INTRAVENOUS at 05:17

## 2022-02-19 RX ADMIN — Medication 25 MICROGRAM(S): at 05:17

## 2022-02-19 RX ADMIN — Medication 20 MILLIEQUIVALENT(S): at 17:36

## 2022-02-19 RX ADMIN — MIDODRINE HYDROCHLORIDE 15 MILLIGRAM(S): 2.5 TABLET ORAL at 11:01

## 2022-02-19 RX ADMIN — Medication 1 TABLET(S): at 11:01

## 2022-02-19 RX ADMIN — PANTOPRAZOLE SODIUM 40 MILLIGRAM(S): 20 TABLET, DELAYED RELEASE ORAL at 05:17

## 2022-02-19 RX ADMIN — Medication 650 MILLIGRAM(S): at 15:58

## 2022-02-19 RX ADMIN — Medication 20 MILLIEQUIVALENT(S): at 15:57

## 2022-02-19 RX ADMIN — AMPICILLIN SODIUM AND SULBACTAM SODIUM 200 GRAM(S): 250; 125 INJECTION, POWDER, FOR SUSPENSION INTRAMUSCULAR; INTRAVENOUS at 00:16

## 2022-02-19 RX ADMIN — MIDODRINE HYDROCHLORIDE 15 MILLIGRAM(S): 2.5 TABLET ORAL at 17:36

## 2022-02-19 RX ADMIN — SODIUM CHLORIDE 75 MILLILITER(S): 9 INJECTION, SOLUTION INTRAVENOUS at 19:34

## 2022-02-19 RX ADMIN — Medication 20 MILLIEQUIVALENT(S): at 21:42

## 2022-02-19 RX ADMIN — Medication 650 MILLIGRAM(S): at 21:42

## 2022-02-19 NOTE — PROGRESS NOTE ADULT - SUBJECTIVE AND OBJECTIVE BOX
Gastroenterology progress note:     Patient is a 61y old  Female who presents with a chief complaint of Polypectomy (19 Feb 2022 13:41)       Admitted on: 02-16-22    We are following the patient for: colon polyps s/p EMR      Interval History: patient is S/P  colonoscopy and polypectomy yesterday .Polyp (13 mm) in the descending colon. (hot snare Polypectomy).  Polyp (25 mm to 30 mm) in the rectosigmoid junction at 20 cm from the anal verge. (Endoloop, Polypectomy, Endoclip).    No GI bleed reported by nurse, patient with watery brown bowel movement, patient denies abdominal pain. Patient is hypothermic , no WBC, slight drop in HGB likely dilutional Patient is being worked up for altered mental status  by primary team.    History obtained from someone other than patient (Y/N): Y, nurse on bed side       PAST MEDICAL & SURGICAL HISTORY:      MEDICATIONS  (STANDING):  ampicillin/sulbactam  IVPB      ampicillin/sulbactam  IVPB 3 Gram(s) IV Intermittent every 6 hours  dextrose 5%. 1000 milliLiter(s) (75 mL/Hr) IV Continuous <Continuous>  folic acid 1 milliGRAM(s) Oral daily  levothyroxine 50 MICROGram(s) Oral daily  midodrine. 15 milliGRAM(s) Oral three times a day  multivitamin 1 Tablet(s) Oral daily  pantoprazole    Tablet 40 milliGRAM(s) Oral before breakfast  potassium chloride    Tablet ER 20 milliEquivalent(s) Oral every 2 hours  sodium bicarbonate 650 milliGRAM(s) Oral every 8 hours    MEDICATIONS  (PRN):  loperamide 2 milliGRAM(s) Oral every 6 hours PRN Diarrhea      Allergies  No Known Allergies      Review of Systems:   Cardiovascular:  No Chest Pain, No Palpitations  Respiratory:  No Cough, No Dyspnea  Gastrointestinal:  As described in HPI    Physical Examination:  T(C): 33.6 (02-19-22 @ 16:06), Max: 35.9 (02-18-22 @ 19:15)  HR: 97 (02-19-22 @ 08:37) (73 - 97)  BP: 105/81 (02-19-22 @ 08:37) (78/51 - 105/81)  RR: 18 (02-19-22 @ 05:32) (18 - 18)  SpO2: 95% (02-19-22 @ 08:37) (95% - 100%)  Weight (kg): 81.6 (02-18-22 @ 17:55)    Constitutional: No acute distress.  Respiratory:  No signs of respiratory distress. Lung sounds are clear bilaterally.  Cardiovascular:  S1 S2, Regular rate and rhythm.  Abdominal: Abdomen is soft, symmetric, and non-tender without distention. . Bowel sounds are present and normoactive in all four quadrants.   Skin: No rashes, No Jaundice.        Data: (reviewed by attending)                        7.7    5.50  )-----------( 56       ( 19 Feb 2022 04:30 )             22.2     Hgb trend:  7.7  02-19-22 @ 04:30  8.7  02-18-22 @ 12:43  8.5  02-17-22 @ 04:30      02-19    147<H>  |  121<H>  |  5<L>  ----------------------------<  73  3.3<L>   |  15<L>  |  0.7    Ca    7.6<L>      19 Feb 2022 04:30  Mg     1.7     02-19    TPro  4.8<L>  /  Alb  1.2<L>  /  TBili  0.5  /  DBili  x   /  AST  20  /  ALT  11  /  AlkPhos  67  02-19    Liver panel trend:  TBili 0.5   /   AST 20   /   ALT 11   /   AlkP 67   /   Tptn 4.8   /   Alb 1.2    /   DBili --      02-19  TBili 1.0   /   AST 21   /   ALT 13   /   AlkP 81   /   Tptn 5.8   /   Alb 1.5    /   DBili --      02-18  TBili 1.3   /   AST 14   /   ALT 12   /   AlkP 63   /   Tptn 5.5   /   Alb 1.6    /   DBili --      02-17      PT/INR - ( 18 Feb 2022 12:43 )   PT: 15.30 sec;   INR: 1.33 ratio         PTT - ( 18 Feb 2022 12:43 )  PTT:31.5 sec    Culture - Blood (collected 17 Feb 2022 04:30)  Source: .Blood Blood  Preliminary Report (18 Feb 2022 13:01):    No growth to date.         Radiology: (reviewed by attending)

## 2022-02-19 NOTE — PROGRESS NOTE ADULT - ASSESSMENT
Pt is a 60 yo F with PMH of GIB, Bowel and Bladder incontinence, Smoking (1/2 ppd), ETOH abuse, hypothyroidism, anemia transferred from [facility] for EMR/Polypectomy. Pt is from home living with her son (41yo). Pt was admitted to [facility] on 01/31 for AMS, weakness and decreased appetite. She was found to have UTI, Elevated troponin, and electrolyte imbalance. During her stay at [facility] pt had colonoscopy done, which revealed 1 large 3-4 cm sigmoid pedunculated polyp (bx=tub adenoma, but was friable) and 2 polyps (7, 14 mm) in descending colon s/p bx.     # altered mental status likely 2/2 metab encephalopathy 2/2 unclear reason vs toxic encephalopathy after getting anesth yesterday for GI procedure vs other delirium  consider CT head NC if not improving   consider neuro eval  consider EEG  fix lytes, temp for now  IVFs (below)  does NOT seem like alcohol w/drawal, given last drink was many months ago    # 3-4 cm sigmoid colonic Polyp; hx GI bleed 2/2 polyp  Colonoscopy 2/4 @ [facility]: 1 large 3-4 cm sigmoid pedunculated polyp and 2 polyps (7, 14 mm) in descending colon s/p bx  s/p bowel prep 2/18  GI consult (Dr. Daniels) s/p EMR/polypectomy on 2/18 (see GI note) - f/u path  diet: reg  CEA 5.6    # loose BMs - pt says she always has loose stool  not enough stool per day to call diarrhea per se  C diff result neg  no laxatives  can use imodium 2mg po q6 prn loose BM    # anemia of chr dz and acute GI blood loss  iron studies: Fe 55; TIBC <72; ferr 2494 - not iron def  B12 1892, Fol 11  check SPEP  pt on folate 1mg po q24 for now  keep hgb >7.5  rpt cbc 4pm today and amando AM    # acute thrombocytopenia - ? etiology  not on heparins  poss dilutional  not sure if related to infection  cbc q24 - if plt < 50K -> h/o eval    # hypothyroidism  TSH 15.6  incr synth to 50 mcg po q24  rpt TSH and FT4 in 6 wks (April)    # Rt jaw swelling - 2/2 facial cellulitis; HypoTN; hypothermia; normal gap metab acidosis (perhaps related to loose BM vs colonic tubular adenoma)  pt was on abx at [facility] - not sure of details  CT maxillofacial with C 2/12 @ [facility]: Mild soft tissue inflammation suggesting cellulitis of the right lower face  OMFS noted  abx: Unasyn 3gm iv q6  consider ID eval  c/w mido 15mg po q8  IVFs: D5 75/hr  Bcx: neg  warming blanket til T > 95F  check cortisol lvl (though sodium up and potassium down)  NaHCO3 650mg po q8    # hypernatremia  give D5 IVFs    # hypokalemia  oral KCL 20 meq po q2 x3 doses    # hypomagnesemia  Mg rider 2gm x1 IV    # Hx tiny L pleural effusion on CXR 2/8  CT Chest with Cont 2/9 obtained: Moderate b/l pleural effusions resulting in complete atelectasis of both lower lobes  current CXR: tiny pl eff Lt  no further w/u    # Hx of Elevated Troponin - prob transient demand isch when she was anemic  Normal Lexiscan at [facility]  cardio eval noted: no further w/u    # Hx of ETOH abuse; severe protein-calorie malnutrition; severe hypoalbuminemia  last drink months ago  MVI po q24  dietician eval  Ensure 3x/day  check U TP:Cr ratio    # GI ppx: PPI    # DVT ppx: SCDs (no pharm -> dropping plt count)    # Activity: pt says she used to walk at home; lives on 2nd fl of apartment w/ son; says she uses a walker; PT eval when MS better    Dispo: rpt cbc today; IVFs; iv abx; dietician eval; supplements; fix lytes; oral bicarb; SPEP; cortisol; daily labs  eventually, pt will likely need SNF upon d/c (vs home w/ home PT) - not ready for d/c yet    Prog is very guarded, zuly w/ acute delirium now.

## 2022-02-19 NOTE — PROGRESS NOTE ADULT - SUBJECTIVE AND OBJECTIVE BOX
SANTIAGO CALIXTO  61y  Female  ***My note supersedes ALL resident notes that I sign.  My corrections for their notes are in my note.***    I can be reached directly on [id number]. My office number is [phone]. My personal cell number is [phone].    INTERVAL EVENTS: Here for f/u of gi bleed. Pt had polypectomy yesterday. Today, she is not her usual MS baseline. She is more confused. Mostly just answering "yes" to questions. Pt not eating/drinking well today. She was als hypothermic. She has been restless in bed, requiring rock and roll belt. Pt does not remember from yesterday. RN does not report bleeding.    T(F): 92.6 (22 @ 08:37), Max: 97.1 (22 @ 16:13)  HR: 97 (22 @ 08:37) (73 - 97)  BP: 105/81 (22 @ 08:37) (78/51 - 105/81)  RR: 18 (22 @ 05:32) (18 - 19)  SpO2: 95% (22 @ 08:37) (95% - 100%) - RA    Gen: restless; not speaking well  HEENT: PERRL, EOMI, rt cheek swollen; + min tenderness to rt cheek; poor oral dentition; nose clr  Neck: no nodes, no JVD, thyroid nl  lungs: clr  hrt: s1 s2 rrr no murmur  abd: soft, NT/ND, no HS megaly  ext: no edema, no c/c  neuro: aa; not calm, but not shouting; not oriented; cn seem intact, can move all 4 ext, but globally very weak  rock and roll belt in place    LABS:                      7.7     (    88.4   5.50  )-----------( ---------      56       ( 2022 04:30 )             22.2    (    16.1     Hemoglobin: 7.7 g/dL ( @ 04:30) - dropped after polypectomy (? bleed vs dilutional)  Hemoglobin: 8.7 g/dL ( @ 12:43)  Hemoglobin: 8.5 g/dL ( @ 04:30)    Platelet Count - Automated: 56 K/uL (22 @ 04:30) - markedly worse (? real vs artificial (clumping))  Platelet Count - Automated: 81 K/uL (22 @ 12:43)  Platelet Count - Automated: 148 K/uL (22 @ 04:30)    147   (   121   (   73      22 @ 04:30  ----------------------               3.3   (   15   (   5     AG = 11                        -----                        0.7  Ca  7.6 = silvio = 9.8  Mg  1.7    P   --     LFT  4.8  (  0.5  (  20       22 @ 04:30  -------------------------  1.2  (  67  (  11    PT/INR - ( 2022 12:43 )   PT: 15.30 sec;   INR: 1.33 ratio    PTT - ( 2022 12:43 )  PTT: 31.5 sec    Urinalysis Basic - ( 2022 05:25 )    Color: Yellow / Appearance: Slightly Turbid / S.023 / pH: x  Gluc: x / Ketone: Trace  / Bili: Negative / Urobili: <2 mg/dL   Blood: x / Protein: 30 mg/dL / Nitrite: Negative   Leuk Esterase: Large / RBC: 3 /HPF / WBC 47 /HPF   Sq Epi: x / Non Sq Epi: 6 /HPF / Bacteria: Negative    CAPILLARY BLOOD GLUCOSE  POCT Blood Glucose.: 85 (22 @ 09:25)  POCT Blood Glucose.: 87 (22 @ 20:40)    Culture - Blood (collected 22 @ 04:30)  Source: .Blood Blood  Preliminary Report (22 @ 13:01):    No growth to date.    RADIOLOGY & ADDITIONAL TESTS:      MEDICATIONS:  ampicillin/sulbactam  IVPB      ampicillin/sulbactam  IVPB 3 Gram(s) IV Intermittent every 6 hours    dextrose 5%. 1000 milliLiter(s) IV Continuous <Continuous>  folic acid 1 milliGRAM(s) Oral daily  levothyroxine 50 MICROGram(s) Oral daily  loperamide 2 milliGRAM(s) Oral every 6 hours PRN  midodrine. 15 milliGRAM(s) Oral three times a day  multivitamin 1 Tablet(s) Oral daily  pantoprazole    Tablet 40 milliGRAM(s) Oral before breakfast

## 2022-02-19 NOTE — PROGRESS NOTE ADULT - ASSESSMENT
60 yo F with PMH of GIB, Bowel and Bladder incontinence, Smoking (1/2 ppd), ETOH abuse, hypothyroidism, anemia transferred from [facility] for EMR/ Polypectomy. . Pt was admitted to [facility] on 01/31 for AMS, weakness and decreased appetite. She was found to have UTI, Elevated troponin, and electrolyte imbalance. During her stay at [facility] pt had colonoscopy done, which revealed 1 large 3-4 cm sigmoid pedunculated polyp (bx=tub adenoma, but was friable) and 2 polyps (7, 14 mm) in descending colon s/p bx.      # patient is S/P  colonoscopy and polypectomy yesterday .  Polyp (13 mm) in the descending colon. ( hot snare Polypectomy).  Polyp (25 mm to 30 mm) in the rectosigmoid junction at 20 cm from the anal verge. (Endoloop, Polypectomy, Endoclip).    No GI bleed  No WBC  abdomen soft     REC:   await pathology results   Repeat Colonoscopy in 6 months   watch for post polypectomy bleed     #hypothermia and altered mental status :  workup by primary team

## 2022-02-20 LAB
ALBUMIN SERPL ELPH-MCNC: 1.4 G/DL — LOW (ref 3.5–5.2)
ALBUMIN SERPL ELPH-MCNC: 1.5 G/DL — LOW (ref 3.5–5.2)
ALP SERPL-CCNC: 68 U/L — SIGNIFICANT CHANGE UP (ref 30–115)
ALP SERPL-CCNC: 69 U/L — SIGNIFICANT CHANGE UP (ref 30–115)
ALT FLD-CCNC: 12 U/L — SIGNIFICANT CHANGE UP (ref 0–41)
ALT FLD-CCNC: 12 U/L — SIGNIFICANT CHANGE UP (ref 0–41)
ANION GAP SERPL CALC-SCNC: 11 MMOL/L — SIGNIFICANT CHANGE UP (ref 7–14)
ANION GAP SERPL CALC-SCNC: 9 MMOL/L — SIGNIFICANT CHANGE UP (ref 7–14)
AST SERPL-CCNC: 18 U/L — SIGNIFICANT CHANGE UP (ref 0–41)
AST SERPL-CCNC: 19 U/L — SIGNIFICANT CHANGE UP (ref 0–41)
BASOPHILS # BLD AUTO: 0 K/UL — SIGNIFICANT CHANGE UP (ref 0–0.2)
BASOPHILS NFR BLD AUTO: 0 % — SIGNIFICANT CHANGE UP (ref 0–1)
BILIRUB SERPL-MCNC: 0.6 MG/DL — SIGNIFICANT CHANGE UP (ref 0.2–1.2)
BILIRUB SERPL-MCNC: 0.7 MG/DL — SIGNIFICANT CHANGE UP (ref 0.2–1.2)
BUN SERPL-MCNC: 5 MG/DL — LOW (ref 10–20)
BUN SERPL-MCNC: 5 MG/DL — LOW (ref 10–20)
CALCIUM SERPL-MCNC: 7.5 MG/DL — LOW (ref 8.5–10.1)
CALCIUM SERPL-MCNC: 7.5 MG/DL — LOW (ref 8.5–10.1)
CHLORIDE SERPL-SCNC: 122 MMOL/L — HIGH (ref 98–110)
CHLORIDE SERPL-SCNC: 123 MMOL/L — HIGH (ref 98–110)
CO2 SERPL-SCNC: 15 MMOL/L — LOW (ref 17–32)
CO2 SERPL-SCNC: 17 MMOL/L — SIGNIFICANT CHANGE UP (ref 17–32)
CREAT ?TM UR-MCNC: 55 MG/DL — SIGNIFICANT CHANGE UP
CREAT SERPL-MCNC: 1 MG/DL — SIGNIFICANT CHANGE UP (ref 0.7–1.5)
CREAT SERPL-MCNC: 1 MG/DL — SIGNIFICANT CHANGE UP (ref 0.7–1.5)
CULTURE RESULTS: SIGNIFICANT CHANGE UP
D DIMER BLD IA.RAPID-MCNC: 792 NG/ML DDU — HIGH (ref 0–230)
D DIMER BLD IA.RAPID-MCNC: 805 NG/ML DDU — HIGH (ref 0–230)
EOSINOPHIL # BLD AUTO: 0.02 K/UL — SIGNIFICANT CHANGE UP (ref 0–0.7)
EOSINOPHIL NFR BLD AUTO: 0.5 % — SIGNIFICANT CHANGE UP (ref 0–8)
FERRITIN SERPL-MCNC: 1977 NG/ML — HIGH (ref 15–150)
FIBRINOGEN PPP-MCNC: 256 MG/DL — SIGNIFICANT CHANGE UP (ref 204.4–570.6)
FOLATE SERPL-MCNC: 10.8 NG/ML — SIGNIFICANT CHANGE UP
GLUCOSE BLDC GLUCOMTR-MCNC: 101 MG/DL — HIGH (ref 70–99)
GLUCOSE BLDC GLUCOMTR-MCNC: 169 MG/DL — HIGH (ref 70–99)
GLUCOSE BLDC GLUCOMTR-MCNC: 190 MG/DL — HIGH (ref 70–99)
GLUCOSE SERPL-MCNC: 69 MG/DL — LOW (ref 70–99)
GLUCOSE SERPL-MCNC: 86 MG/DL — SIGNIFICANT CHANGE UP (ref 70–99)
HCT VFR BLD CALC: 25.4 % — LOW (ref 37–47)
HGB BLD-MCNC: 8.9 G/DL — LOW (ref 12–16)
IMM GRANULOCYTES NFR BLD AUTO: 0.3 % — SIGNIFICANT CHANGE UP (ref 0.1–0.3)
LYMPHOCYTES # BLD AUTO: 0.93 K/UL — LOW (ref 1.2–3.4)
LYMPHOCYTES # BLD AUTO: 24 % — SIGNIFICANT CHANGE UP (ref 20.5–51.1)
MAGNESIUM SERPL-MCNC: 1.8 MG/DL — SIGNIFICANT CHANGE UP (ref 1.8–2.4)
MAGNESIUM SERPL-MCNC: 2.1 MG/DL — SIGNIFICANT CHANGE UP (ref 1.8–2.4)
MCHC RBC-ENTMCNC: 30.7 PG — SIGNIFICANT CHANGE UP (ref 27–31)
MCHC RBC-ENTMCNC: 35 G/DL — SIGNIFICANT CHANGE UP (ref 32–37)
MCV RBC AUTO: 87.6 FL — SIGNIFICANT CHANGE UP (ref 81–99)
MONOCYTES # BLD AUTO: 0.3 K/UL — SIGNIFICANT CHANGE UP (ref 0.1–0.6)
MONOCYTES NFR BLD AUTO: 7.8 % — SIGNIFICANT CHANGE UP (ref 1.7–9.3)
MRSA PCR RESULT.: NEGATIVE — SIGNIFICANT CHANGE UP
NEUTROPHILS # BLD AUTO: 2.61 K/UL — SIGNIFICANT CHANGE UP (ref 1.4–6.5)
NEUTROPHILS NFR BLD AUTO: 67.4 % — SIGNIFICANT CHANGE UP (ref 42.2–75.2)
NRBC # BLD: 1 /100 WBCS — HIGH (ref 0–0)
PLATELET # BLD AUTO: 48 K/UL — LOW (ref 130–400)
POTASSIUM SERPL-MCNC: 3.4 MMOL/L — LOW (ref 3.5–5)
POTASSIUM SERPL-MCNC: 4.2 MMOL/L — SIGNIFICANT CHANGE UP (ref 3.5–5)
POTASSIUM SERPL-SCNC: 3.4 MMOL/L — LOW (ref 3.5–5)
POTASSIUM SERPL-SCNC: 4.2 MMOL/L — SIGNIFICANT CHANGE UP (ref 3.5–5)
PROT ?TM UR-MCNC: 61 MG/DLG/24H — SIGNIFICANT CHANGE UP
PROT SERPL-MCNC: 5.2 G/DL — LOW (ref 6–8)
PROT SERPL-MCNC: 5.5 G/DL — LOW (ref 6–8)
PROT/CREAT UR-RTO: 1.1 RATIO — HIGH (ref 0–0.2)
RBC # BLD: 2.9 M/UL — LOW (ref 4.2–5.4)
RBC # FLD: 16.3 % — HIGH (ref 11.5–14.5)
SARS-COV-2 RNA SPEC QL NAA+PROBE: SIGNIFICANT CHANGE UP
SODIUM SERPL-SCNC: 148 MMOL/L — HIGH (ref 135–146)
SODIUM SERPL-SCNC: 149 MMOL/L — HIGH (ref 135–146)
SPECIMEN SOURCE: SIGNIFICANT CHANGE UP
VIT B12 SERPL-MCNC: 1805 PG/ML — HIGH (ref 232–1245)
WBC # BLD: 3.87 K/UL — LOW (ref 4.8–10.8)
WBC # FLD AUTO: 3.87 K/UL — LOW (ref 4.8–10.8)

## 2022-02-20 PROCEDURE — 93010 ELECTROCARDIOGRAM REPORT: CPT

## 2022-02-20 PROCEDURE — 71045 X-RAY EXAM CHEST 1 VIEW: CPT | Mod: 26

## 2022-02-20 PROCEDURE — 99232 SBSQ HOSP IP/OBS MODERATE 35: CPT

## 2022-02-20 PROCEDURE — 99233 SBSQ HOSP IP/OBS HIGH 50: CPT

## 2022-02-20 PROCEDURE — 70450 CT HEAD/BRAIN W/O DYE: CPT | Mod: 26

## 2022-02-20 PROCEDURE — 99291 CRITICAL CARE FIRST HOUR: CPT

## 2022-02-20 RX ORDER — NOREPINEPHRINE BITARTRATE/D5W 8 MG/250ML
0.05 PLASTIC BAG, INJECTION (ML) INTRAVENOUS
Qty: 16 | Refills: 0 | Status: DISCONTINUED | OUTPATIENT
Start: 2022-02-20 | End: 2022-02-25

## 2022-02-20 RX ORDER — CEFEPIME 1 G/1
INJECTION, POWDER, FOR SOLUTION INTRAMUSCULAR; INTRAVENOUS
Refills: 0 | Status: DISCONTINUED | OUTPATIENT
Start: 2022-02-20 | End: 2022-02-21

## 2022-02-20 RX ORDER — MAGNESIUM SULFATE 500 MG/ML
2 VIAL (ML) INJECTION ONCE
Refills: 0 | Status: COMPLETED | OUTPATIENT
Start: 2022-02-20 | End: 2022-02-20

## 2022-02-20 RX ORDER — LEVOTHYROXINE SODIUM 125 MCG
25 TABLET ORAL AT BEDTIME
Refills: 0 | Status: DISCONTINUED | OUTPATIENT
Start: 2022-02-20 | End: 2022-02-24

## 2022-02-20 RX ORDER — SODIUM CHLORIDE 9 MG/ML
1000 INJECTION, SOLUTION INTRAVENOUS ONCE
Refills: 0 | Status: COMPLETED | OUTPATIENT
Start: 2022-02-20 | End: 2022-02-20

## 2022-02-20 RX ORDER — NOREPINEPHRINE BITARTRATE/D5W 8 MG/250ML
0.08 PLASTIC BAG, INJECTION (ML) INTRAVENOUS
Qty: 8 | Refills: 0 | Status: DISCONTINUED | OUTPATIENT
Start: 2022-02-20 | End: 2022-02-20

## 2022-02-20 RX ORDER — MIDAZOLAM HYDROCHLORIDE 1 MG/ML
2 INJECTION, SOLUTION INTRAMUSCULAR; INTRAVENOUS ONCE
Refills: 0 | Status: DISCONTINUED | OUTPATIENT
Start: 2022-02-20 | End: 2022-02-20

## 2022-02-20 RX ORDER — MIDAZOLAM HYDROCHLORIDE 1 MG/ML
2.5 INJECTION, SOLUTION INTRAMUSCULAR; INTRAVENOUS ONCE
Refills: 0 | Status: DISCONTINUED | OUTPATIENT
Start: 2022-02-20 | End: 2022-02-20

## 2022-02-20 RX ORDER — NOREPINEPHRINE BITARTRATE/D5W 8 MG/250ML
0.25 PLASTIC BAG, INJECTION (ML) INTRAVENOUS
Qty: 8 | Refills: 0 | Status: DISCONTINUED | OUTPATIENT
Start: 2022-02-20 | End: 2022-02-20

## 2022-02-20 RX ORDER — PANTOPRAZOLE SODIUM 20 MG/1
40 TABLET, DELAYED RELEASE ORAL DAILY
Refills: 0 | Status: DISCONTINUED | OUTPATIENT
Start: 2022-02-20 | End: 2022-02-23

## 2022-02-20 RX ORDER — SODIUM CHLORIDE 9 MG/ML
500 INJECTION, SOLUTION INTRAVENOUS ONCE
Refills: 0 | Status: COMPLETED | OUTPATIENT
Start: 2022-02-20 | End: 2022-02-20

## 2022-02-20 RX ORDER — CEFEPIME 1 G/1
1000 INJECTION, POWDER, FOR SOLUTION INTRAMUSCULAR; INTRAVENOUS ONCE
Refills: 0 | Status: COMPLETED | OUTPATIENT
Start: 2022-02-20 | End: 2022-02-20

## 2022-02-20 RX ORDER — ACETAMINOPHEN 500 MG
650 TABLET ORAL EVERY 6 HOURS
Refills: 0 | Status: DISCONTINUED | OUTPATIENT
Start: 2022-02-20 | End: 2022-02-20

## 2022-02-20 RX ORDER — CEFEPIME 1 G/1
1000 INJECTION, POWDER, FOR SOLUTION INTRAMUSCULAR; INTRAVENOUS EVERY 12 HOURS
Refills: 0 | Status: DISCONTINUED | OUTPATIENT
Start: 2022-02-20 | End: 2022-02-21

## 2022-02-20 RX ORDER — VANCOMYCIN HCL 1 G
VIAL (EA) INTRAVENOUS
Refills: 0 | Status: DISCONTINUED | OUTPATIENT
Start: 2022-02-20 | End: 2022-02-22

## 2022-02-20 RX ORDER — POTASSIUM CHLORIDE 20 MEQ
40 PACKET (EA) ORAL ONCE
Refills: 0 | Status: DISCONTINUED | OUTPATIENT
Start: 2022-02-20 | End: 2022-02-20

## 2022-02-20 RX ORDER — VANCOMYCIN HCL 1 G
1000 VIAL (EA) INTRAVENOUS EVERY 12 HOURS
Refills: 0 | Status: DISCONTINUED | OUTPATIENT
Start: 2022-02-20 | End: 2022-02-22

## 2022-02-20 RX ORDER — NOREPINEPHRINE BITARTRATE/D5W 8 MG/250ML
0.05 PLASTIC BAG, INJECTION (ML) INTRAVENOUS
Qty: 8 | Refills: 0 | Status: DISCONTINUED | OUTPATIENT
Start: 2022-02-20 | End: 2022-02-20

## 2022-02-20 RX ORDER — PHYTONADIONE (VIT K1) 5 MG
5 TABLET ORAL DAILY
Refills: 0 | Status: COMPLETED | OUTPATIENT
Start: 2022-02-20 | End: 2022-02-22

## 2022-02-20 RX ORDER — SODIUM CHLORIDE 9 MG/ML
1000 INJECTION, SOLUTION INTRAVENOUS
Refills: 0 | Status: DISCONTINUED | OUTPATIENT
Start: 2022-02-20 | End: 2022-02-20

## 2022-02-20 RX ORDER — NOREPINEPHRINE BITARTRATE/D5W 8 MG/250ML
0.5 PLASTIC BAG, INJECTION (ML) INTRAVENOUS
Qty: 8 | Refills: 0 | Status: DISCONTINUED | OUTPATIENT
Start: 2022-02-20 | End: 2022-02-20

## 2022-02-20 RX ORDER — VANCOMYCIN HCL 1 G
1000 VIAL (EA) INTRAVENOUS ONCE
Refills: 0 | Status: COMPLETED | OUTPATIENT
Start: 2022-02-20 | End: 2022-02-20

## 2022-02-20 RX ORDER — SODIUM CHLORIDE 9 MG/ML
1000 INJECTION, SOLUTION INTRAVENOUS
Refills: 0 | Status: DISCONTINUED | OUTPATIENT
Start: 2022-02-20 | End: 2022-02-21

## 2022-02-20 RX ORDER — VANCOMYCIN HCL 1 G
VIAL (EA) INTRAVENOUS
Refills: 0 | Status: DISCONTINUED | OUTPATIENT
Start: 2022-02-20 | End: 2022-02-20

## 2022-02-20 RX ADMIN — SODIUM CHLORIDE 100 MILLILITER(S): 9 INJECTION, SOLUTION INTRAVENOUS at 06:28

## 2022-02-20 RX ADMIN — SODIUM CHLORIDE 1000 MILLILITER(S): 9 INJECTION, SOLUTION INTRAVENOUS at 10:34

## 2022-02-20 RX ADMIN — Medication 650 MILLIGRAM(S): at 06:13

## 2022-02-20 RX ADMIN — SODIUM CHLORIDE 1000 MILLILITER(S): 9 INJECTION, SOLUTION INTRAVENOUS at 06:14

## 2022-02-20 RX ADMIN — Medication 250 MILLIGRAM(S): at 17:44

## 2022-02-20 RX ADMIN — AMPICILLIN SODIUM AND SULBACTAM SODIUM 200 GRAM(S): 250; 125 INJECTION, POWDER, FOR SUSPENSION INTRAMUSCULAR; INTRAVENOUS at 06:27

## 2022-02-20 RX ADMIN — Medication 56.3 MICROGRAM(S)/KG/MIN: at 17:30

## 2022-02-20 RX ADMIN — Medication 250 MILLIGRAM(S): at 10:33

## 2022-02-20 RX ADMIN — Medication 101 MILLIGRAM(S): at 11:43

## 2022-02-20 RX ADMIN — SODIUM CHLORIDE 100 MILLILITER(S): 9 INJECTION, SOLUTION INTRAVENOUS at 11:43

## 2022-02-20 RX ADMIN — AMPICILLIN SODIUM AND SULBACTAM SODIUM 200 GRAM(S): 250; 125 INJECTION, POWDER, FOR SUSPENSION INTRAMUSCULAR; INTRAVENOUS at 00:29

## 2022-02-20 RX ADMIN — Medication 650 MILLIGRAM(S): at 00:20

## 2022-02-20 RX ADMIN — CEFEPIME 100 MILLIGRAM(S): 1 INJECTION, POWDER, FOR SOLUTION INTRAMUSCULAR; INTRAVENOUS at 10:53

## 2022-02-20 RX ADMIN — Medication 2.81 MICROGRAM(S)/KG/MIN: at 22:31

## 2022-02-20 RX ADMIN — Medication 25 MICROGRAM(S): at 21:48

## 2022-02-20 RX ADMIN — CEFEPIME 100 MILLIGRAM(S): 1 INJECTION, POWDER, FOR SOLUTION INTRAMUSCULAR; INTRAVENOUS at 17:39

## 2022-02-20 RX ADMIN — Medication 28.1 MICROGRAM(S)/KG/MIN: at 06:13

## 2022-02-20 RX ADMIN — Medication 25 GRAM(S): at 18:56

## 2022-02-20 RX ADMIN — PANTOPRAZOLE SODIUM 40 MILLIGRAM(S): 20 TABLET, DELAYED RELEASE ORAL at 11:25

## 2022-02-20 NOTE — CONSULT NOTE ADULT - ASSESSMENT
IMPRESSION:    AMS  Hypothermia + hypotension  Possible aspiration PNA  Colon polyps s/p polypectomy of 2 polyps on 2/18  Anemia  HO GIB  HO EToH abuse  Active smoker    PLAN:    CNS: CTH. Avoid CNS depressants. Thiamine, folate. CIWA protocol    HEENT: Oral care    PULMONARY:  HOB @ 45 degrees. Repeat CXR. Supplemental O2 target Spo2 88-92%    CARDIOVASCULAR: Echo. BNP. s/p 1.5 L LR bolus. Levophed on 0.25, titrate to MAP >60    GI: GI prophylaxis. NPO.    RENAL:  Follow up lytes.  Correct as needed    INFECTIOUS DISEASE: Send pan cultures. COVID swab. Vanc + cefepime. Urine legionella and strep, MRSA nasal swab.    HEMATOLOGICAL:  DVT prophylaxis. LL duplex    ENDOCRINE:  Follow up FS.  Insulin protocol if needed.    MUSCULOSKELETAL:    Dispo awaiting COVID swab.         IMPRESSION:    AMS   Sepsis   Septic shock  Possible aspiration PNA  Acute hypoxemic respiratory failure   Colon polyps s/p polypectomy of 2 polyps on 2/18  Anemia  HO GIB  HO ETOH abuse  Active smoker    PLAN:    CNS: CTH. Avoid CNS depressants. Thiamine, folate. CIWA protocol.  NH4 level.     HEENT: Oral care    PULMONARY:  HOB @ 45 degrees. Repeat CXR noted.  Aspiration precautions.  Supplemental O2 target Spo2 92-94%    CARDIOVASCULAR: Echo. BNP. s/p 1.5 L LR bolus. Levophed on 0.25, titrate to MAP >65    GI: GI prophylaxis. NPO.     RENAL:  Follow up lytes.  Correct as needed    INFECTIOUS DISEASE: Send pan cultures. FU COVID swab. Zosyn for now.  MRSA nasal swab.      HEMATOLOGICAL:  DVT prophylaxis. LE duplex    ENDOCRINE:  Follow up FS.  Insulin protocol if needed.    MUSCULOSKELETAL:  Bed rest     Dispo:  ICU          IMPRESSION:    AMS   Sepsis   Septic shock  Possible aspiration PNA  Acute hypoxemic respiratory failure   Colon polyps s/p polypectomy of 2 polyps on 2/18  Anemia  HO GIB  HO ETOH abuse  Active smoker    PLAN:    CNS: CTH. Avoid CNS depressants. Thiamine, folate. CIWA protocol.  NH4 level.     HEENT: Oral care    PULMONARY:  HOB @ 45 degrees. Repeat CXR noted.  Aspiration precautions.  Supplemental O2 target Spo2 92-94%    CARDIOVASCULAR: Echo. BNP. s/p 1.5 L LR bolus. Levophed on 0.25, titrate to MAP >65    GI: GI prophylaxis. NPO.     RENAL:  Follow up lytes.  Correct as needed    INFECTIOUS DISEASE: Send pan cultures. FU COVID swab. Zosyn for now.  MRSA nasal swab.  HIV    HEMATOLOGICAL:  DVT prophylaxis. LE duplex    ENDOCRINE:  Follow up FS.  Insulin protocol if needed.    MUSCULOSKELETAL:  Bed rest     Dispo:  ICU

## 2022-02-20 NOTE — CONSULT NOTE ADULT - SUBJECTIVE AND OBJECTIVE BOX
Neurology Consult    Patient is a 61y old  Female who presents with a chief complaint of Polypectomy (20 Feb 2022 08:52)      HPI:  Pt is a 60 yo F with PMH of GIB, Bowel and Bladder incontinence, Smoking (3-4 cigarettes/day), ETOH abuse, hypothyroidism anemia transferred from [facility] for Polypectomy. Pt is from home living with her son and bedbound at baseline. Pt was admitted to [facility] on 01/31 for AMS, weakness and decreased appetite. She was found to have UTI, NSTEMI, and electrolyte imbalance. During her stay at [facility] pt had colonoscopy done which revealed 1 large 3-4 cm sigmoid pedunculated polyp and 2 polyps (7, 14 mm) in descending colon s/p bx. Pt was transferred to [facility] for polyp removal.  (16 Feb 2022 23:24)      PAST MEDICAL & SURGICAL HISTORY:      FAMILY HISTORY:      Social History: (-) x 3    Allergies    No Known Allergies    Intolerances        MEDICATIONS  (STANDING):  cefepime   IVPB      cefepime   IVPB 1000 milliGRAM(s) IV Intermittent once  cefepime   IVPB 1000 milliGRAM(s) IV Intermittent every 12 hours  folic acid 1 milliGRAM(s) Oral daily  lactated ringers Bolus 500 milliLiter(s) IV Bolus once  lactated ringers. 1000 milliLiter(s) (100 mL/Hr) IV Continuous <Continuous>  levothyroxine 50 MICROGram(s) Oral daily  midazolam Injectable 2.5 milliGRAM(s) IV Push once  midodrine. 15 milliGRAM(s) Oral three times a day  multivitamin 1 Tablet(s) Oral daily  norepinephrine Infusion 0.25 MICROgram(s)/kG/Min (28.1 mL/Hr) IV Continuous <Continuous>  pantoprazole    Tablet 40 milliGRAM(s) Oral before breakfast  potassium chloride    Tablet ER 40 milliEquivalent(s) Oral once  sodium bicarbonate 650 milliGRAM(s) Oral every 8 hours  vancomycin  IVPB      vancomycin  IVPB 1000 milliGRAM(s) IV Intermittent once  vancomycin  IVPB 1000 milliGRAM(s) IV Intermittent every 12 hours    MEDICATIONS  (PRN):  acetaminophen     Tablet .. 650 milliGRAM(s) Oral every 6 hours PRN Temp greater or equal to 38C (100.4F), Mild Pain (1 - 3)  loperamide 2 milliGRAM(s) Oral every 6 hours PRN Diarrhea      Vital Signs Last 24 Hrs  T(C): 34.7 (20 Feb 2022 08:29), Max: 34.7 (20 Feb 2022 08:29)  T(F): 94.5 (20 Feb 2022 08:29), Max: 94.5 (20 Feb 2022 08:29)  HR: 96 (20 Feb 2022 09:40) (76 - 96)  BP: 119/73 (20 Feb 2022 09:54) (61/39 - 119/73)  BP(mean): 88 (20 Feb 2022 09:54) (64 - 88)  RR: 20 (20 Feb 2022 05:19) (16 - 20)  SpO2: 100% (20 Feb 2022 09:54) (88% - 100%)    Examination:  Awake, lethargic.  Agitated and combative.   No gaze, no facial asymmetry  Verbal output when noxious stimuli given. (speaks sentences)  Moves all four extremities strongly and combative/agitated  Exam limited by ams    Labs:   CBC Full  -  ( 20 Feb 2022 09:30 )  WBC Count : 3.87 K/uL  RBC Count : 2.90 M/uL  Hemoglobin : 8.9 g/dL  Hematocrit : 25.4 %  Platelet Count - Automated : 48 K/uL  Mean Cell Volume : 87.6 fL  Mean Cell Hemoglobin : 30.7 pg  Mean Cell Hemoglobin Concentration : 35.0 g/dL  Auto Neutrophil # : 2.61 K/uL  Auto Lymphocyte # : 0.93 K/uL  Auto Monocyte # : 0.30 K/uL  Auto Eosinophil # : 0.02 K/uL  Auto Basophil # : 0.00 K/uL  Auto Neutrophil % : 67.4 %  Auto Lymphocyte % : 24.0 %  Auto Monocyte % : 7.8 %  Auto Eosinophil % : 0.5 %  Auto Basophil % : 0.0 %    02-20    149<H>  |  123<H>  |  5<L>  ----------------------------<  86  4.2   |  15<L>  |  1.0    Ca    7.5<L>      20 Feb 2022 09:30  Mg     1.8     02-20    TPro  5.5<L>  /  Alb  1.4<L>  /  TBili  0.7  /  DBili  x   /  AST  19  /  ALT  12  /  AlkPhos  69  02-20    LIVER FUNCTIONS - ( 20 Feb 2022 09:30 )  Alb: 1.4 g/dL / Pro: 5.5 g/dL / ALK PHOS: 69 U/L / ALT: 12 U/L / AST: 19 U/L / GGT: x           PT/INR - ( 19 Feb 2022 23:11 )   PT: 19.80 sec;   INR: 1.73 ratio         PTT - ( 19 Feb 2022 23:11 )  PTT:69.6 sec        Neuroimaging:  NCT:     02-20-22 @ 10:15

## 2022-02-20 NOTE — CHART NOTE - NSCHARTNOTEFT_GEN_A_CORE
ICU Transfer Note    Transfer from: 3B  Transfer to: ICU   Accepting physican:      3B COURSE:  Patient is a 62 y/o female with PMHx of GIB, Bowel and Bladder incontinence, Smoking (3-4 cigarettes/day), ETOH abuse, hypothyroidism, and ongoing anemia transferred from [facility] for Polypectomy. Patient was admitted to [facility] on 01/31 for AMS, weakness and decreased appetite. She was found to have UTI, NSTEMI, and electrolyte imbalance. During her stay at [facility] she had a colonoscopy done which revealed 1 large 3-4 cm sigmoid pedunculated polyp and 2 polyps (7, 14 mm) in descending colon. Patient was transferred as they are not able to perform large Polypectomies there. She was not able to be discharged as ongoing anemia and concern that the cause are these large polyps.    Patient had a colonoscopy and polypectomy done on 2/18/2022. Polyp (13 mm) in the descending colon. (hot snare Polypectomy).  Polyp (25 mm to 30 mm) in the rectosigmoid junction at 20 cm from the anal verge. (Endoloop, Polypectomy, Endoclip).   After the colonoscopy patient became hypothermic and hypotensive. Over night patient was started to Levo and warming blanket.   2/20 AM patient had RRT for desaturation and hypotension. Patient was placed on 50% O2, patient had good response. Levophed also started to raise BP. Pt not speaking. Pt is somewhat awake and just makes grunting sounds. Pt not moving either arm well. Pt not following commands. Pt on warming blanket for hypothermia. Pt being upgraded to ICU - I spoke w/ fellow.        ASSESMENT AND PLAN:     #Colonic Polyp  #Diarrhea in setting of recent abx use  #Hx of GIB   - Colonoscopy 2/4: 1 large 3-4 cm sigmoid pedunculated polyp and 2 polyps (7, 14 mm) in descending colon s/p bx  - GI consulted (Dr. Daniels)   - Monitor H&H, active T+S  - Obtain CBC, CMP, TnS, Coags, Mg  - Patient is cleared for colonoscopy per Cardiology   - Clear liquid diet for now  - Negative for C-Diff infection  - CEA elevated 5.6  - S/P  colonoscopy and polypectomy 2/18/2022.Polyp (13 mm) in the descending colon. (hot snare Polypectomy).  Polyp (25 mm to 30 mm) in the       junction at 20 cm from the anal verge. (Endoloop, Polypectomy, Endoclip).   - Repeat Colonoscopy in 6 months   - GI is following   - IVF     #altered mental status likely 2/2 metabolic encephalopathy  #Hypothermia   - Patient became altered after GI procedure   - Found to be hypothermic   - Monitor lytes and temp   - will obtain CT head NC if not improving   - Will consider Neuro eval  - IVFs: D5 75/hr  - warming blanket til T > 95F  - currently on Levo for pressure support     #Hypotension   - F/u cortisol lvl   - C/w midodrine 15mg TID   - NaHCO3 650mg po q8  - On Levo     #Hx of UTI  #R lower face cellulitis  - CT maxillofacial with C 2/12: Mild soft tissue inflammation suggesting cellulitis of the right lower face  - pt was on cefepime, vancomycin (end date 2/15)  - Started on Unasyn 2/16, will continue that for 10 days end date 2/25  - No documentations of + bcx or ucx on the charts   - f/u Ucx   - Bclx 2/17 NGTD, F/u repeat   - UA + LE, WBC, and Hylan casts   - Dental recs appreciated   - F/u SPEP     #Hypernatremia  - D5 IVFs    #Hypothyroidism  - TSH 15.6  - C/w synthroid 50 mcg po q24  - Patient will need to repeat TSH and FT4 in 6 wks (April)    #Hx L pleural effusion on CXR 2/8  - CT Chest with Cont 2/9 obtained: Moderate b/l pleural effusions resulting in complete atelectasis of both lower lobes  - CXR noted     #Hx NSTEMI   #Hx of Elevated Troponin  #low bp  - pt has been on Midodrine 15 mg q8h, will continue that   - Normal Lexiscan at [facility]  - f/u orthostatics  - EKG noted     #Hx of ETOH abuse  - last drink (as per nurse) 6 months ago  - c/w Folic acid and Thiamine  - F/u Dietician eval  - Ensure 3x/day  - F/u U TP:Cr ratio    #Possible Thiamine deficiency   #Possible Folic acid deficiency   - C/w Folic acid and Thiamine    #Misc   - DVT prophylaxis: SCD   - GI prophylaxis: PPI  - Diet: clear liq diet   - Activity status: IAT   - Code status: Full code   Disposition: dietician eval, Monitor lytes, oral bicarb, SPEP, cortisol, Bcx, U tp:cr ratio, Ucx, Cortisol       For Follow-Up:          Vital Signs Last 24 Hrs  T(C): 34.7 (20 Feb 2022 08:29), Max: 34.7 (20 Feb 2022 08:29)  T(F): 94.5 (20 Feb 2022 08:29), Max: 94.5 (20 Feb 2022 08:29)  HR: 90 (20 Feb 2022 10:54) (76 - 96)  BP: 91/57 (20 Feb 2022 10:54) (61/39 - 119/73)  BP(mean): 88 (20 Feb 2022 09:54) (64 - 88)  RR: 20 (20 Feb 2022 05:19) (16 - 20)  SpO2: 100% (20 Feb 2022 10:54) (88% - 100%)  I&O's Summary        MEDICATIONS  (STANDING):  cefepime   IVPB      cefepime   IVPB 1000 milliGRAM(s) IV Intermittent every 12 hours  dextrose 5% 1000 milliLiter(s) (100 mL/Hr) IV Continuous <Continuous>  levothyroxine Injectable 25 MICROGram(s) IV Push at bedtime  LORazepam   Injectable 2 milliGRAM(s) IV Push once  norepinephrine Infusion 0.5 MICROgram(s)/kG/Min (56.3 mL/Hr) IV Continuous <Continuous>  pantoprazole  Injectable 40 milliGRAM(s) IV Push daily  phytonadione  IVPB 5 milliGRAM(s) IV Intermittent daily  vancomycin  IVPB      vancomycin  IVPB 1000 milliGRAM(s) IV Intermittent every 12 hours    MEDICATIONS  (PRN):        LABS                                            8.9                   Neurophils% (auto):   67.4   (02-20 @ 09:30):    3.87 )-----------(48           Lymphocytes% (auto):  24.0                                          25.4                   Eosinphils% (auto):   0.5      Manual%: Neutrophils x    ; Lymphocytes x    ; Eosinophils x    ; Bands%: x    ; Blasts x                                    149    |  123    |  5                   Calcium: 7.5   / iCa: x      (02-20 @ 09:30)    ----------------------------<  86        Magnesium: 1.8                              4.2     |  15     |  1.0              Phosphorous: x        TPro  5.5    /  Alb  1.4    /  TBili  0.7    /  DBili  x      /  AST  19     /  ALT  12     /  AlkPhos  69     20 Feb 2022 09:30    ( 02-19 @ 23:11 )   PT: 19.80 sec;   INR: 1.73 ratio  aPTT: 69.6 sec ICU Transfer Note    Transfer from: 3B  Transfer to: ICU   Accepting physican:  Dr Go      3B COURSE:  Patient is a 62 y/o female with PMHx of GIB, Bowel and Bladder incontinence, Smoking (3-4 cigarettes/day), ETOH abuse, hypothyroidism, and ongoing anemia transferred from [facility] for Polypectomy. Patient was admitted to [facility] on 01/31 for AMS, weakness and decreased appetite. She was found to have UTI, NSTEMI, and electrolyte imbalance. During her stay at [facility] she had a colonoscopy done which revealed 1 large 3-4 cm sigmoid pedunculated polyp and 2 polyps (7, 14 mm) in descending colon. Patient was transferred as they are not able to perform large Polypectomies there. She was not able to be discharged as ongoing anemia and concern that the cause are these large polyps.    Patient had a colonoscopy and polypectomy done on 2/18/2022. Polyp (13 mm) in the descending colon. (hot snare Polypectomy).  Polyp (25 mm to 30 mm) in the rectosigmoid junction at 20 cm from the anal verge. (Endoloop, Polypectomy, Endoclip).   After the colonoscopy patient became hypothermic and hypotensive. Over night patient was started to Levo and warming blanket.   2/20 AM patient had RRT for desaturation and hypotension. Patient was placed on 50% O2, patient had good response. Levophed also started to raise BP. Patient remains altered, not speaking, but remains somewhat awake and just makes grunting sounds. Patient is not following commands. Central line was placed. Spoke to ICU fellow, patient is being upgraded to ICU for closer monitoring.       ASSESMENT AND PLAN:     #AMS with unclear etiology   #sepsis w/ shock/hypothermia vs aspiration after procedure under anesthesia 2/18 vs CVA   - Patient is currently on warming blanket   - F/u CT head NC    - F/u Neuro eval  - EEG  - C/w IVFs: D5 w/ 150 meq bicarb @ 100/hr  - Broad spectrum abx  - F/u Covid swab, MRSA nares   - repeat Bcx, Ucx   - currently on Levo for pressure support     #Acute thrombocytopenia  #Elevated INR/PTT not on a/c  - Vit K 5mg iv q24 x3 doses  - not on heparins  - F/u DIC panel, HIT panel   - F/u Heme/onc eval     #Colonic Polyp  #Diarrhea in setting of recent abx use  #Hx of GIB   - Colonoscopy 2/4: 1 large 3-4 cm sigmoid pedunculated polyp and 2 polyps (7, 14 mm) in descending colon s/p bx  - GI consulted (Dr. Daniels)   - Patient is cleared for colonoscopy per Cardiology   - Negative for C-Diff infection  - CEA elevated 5.6  - S/P  colonoscopy and polypectomy 2/18/2022.Polyp (13 mm) in the descending colon. (hot snare Polypectomy).  Polyp (25 mm to 30 mm) in the junction at 20 cm from the anal verge. (Endoloop, Polypectomy, Endoclip).   - Repeat Colonoscopy in 6 months   - GI is following   - IVF  - CT ab/p once more stable      #Hypotension   - F/u cortisol lvl   - C/w midodrine 15mg TID   - On Levo     #Hx of UTI  #R lower face cellulitis  - CT maxillofacial with C 2/12: Mild soft tissue inflammation suggesting cellulitis of the right lower face  - pt was on cefepime, vancomycin (end date 2/15)  - Started on Unasyn 2/16, d/c today 2/20 started on broad spectrum Abx 2/20  - No documentations of + bcx or ucx on the charts from [facility]   - f/u Ucx   - Bclx 2/17 NGTD, F/u repeat   - UA + LE, WBC, and Hylan casts   - Dental recs appreciated   - F/u SPEP     #Hypernatremia  - D5 IVFs    #Hypothyroidism  - TSH 15.6  - C/w synthroid 50 mcg po q24  - Patient will need to repeat TSH and FT4 in 6 wks (April)    #Hx L pleural effusion on CXR 2/8  - CT Chest with Cont 2/9 obtained: Moderate b/l pleural effusions resulting in complete atelectasis of both lower lobes  - CXR noted     #Hx NSTEMI   #Hx of Elevated Troponin  #low bp  - pt has been on Midodrine 15 mg q8h, will continue that   - Normal Lexiscan at [facility]  - f/u orthostatics  - EKG noted     #Hx of ETOH abuse  - last drink (as per nurse) 6 months ago  - c/w Folic acid and Thiamine  - F/u Dietician eval  - Ensure 3x/day  - F/u U TP:Cr ratio    #Possible Thiamine deficiency   #Possible Folic acid deficiency   - C/w Folic acid and Thiamine    #Misc   - DVT prophylaxis: SCD   - GI prophylaxis: PPI  - Diet: clear liq diet   - Activity status: IAT   - Code status: Full code   Disposition: ICU upgrade     For Follow-Up:  - Chest xray for TLC placement   - Dietician eval, Monitor lytes, oral bicarb, SPEP, cortisol, Bcx, U tp:cr ratio, Ucx, Cortisol, f/u Crit Care, Neuro, Heme; CTH; EEG, S+S for feeds NPO for now            Vital Signs Last 24 Hrs  T(C): 34.7 (20 Feb 2022 08:29), Max: 34.7 (20 Feb 2022 08:29)  T(F): 94.5 (20 Feb 2022 08:29), Max: 94.5 (20 Feb 2022 08:29)  HR: 90 (20 Feb 2022 10:54) (76 - 96)  BP: 91/57 (20 Feb 2022 10:54) (61/39 - 119/73)  BP(mean): 88 (20 Feb 2022 09:54) (64 - 88)  RR: 20 (20 Feb 2022 05:19) (16 - 20)  SpO2: 100% (20 Feb 2022 10:54) (88% - 100%)  I&O's Summary        MEDICATIONS  (STANDING):  cefepime   IVPB      cefepime   IVPB 1000 milliGRAM(s) IV Intermittent every 12 hours  dextrose 5% 1000 milliLiter(s) (100 mL/Hr) IV Continuous <Continuous>  levothyroxine Injectable 25 MICROGram(s) IV Push at bedtime  LORazepam   Injectable 2 milliGRAM(s) IV Push once  norepinephrine Infusion 0.5 MICROgram(s)/kG/Min (56.3 mL/Hr) IV Continuous <Continuous>  pantoprazole  Injectable 40 milliGRAM(s) IV Push daily  phytonadione  IVPB 5 milliGRAM(s) IV Intermittent daily  vancomycin  IVPB      vancomycin  IVPB 1000 milliGRAM(s) IV Intermittent every 12 hours    MEDICATIONS  (PRN):        LABS                                            8.9                   Neurophils% (auto):   67.4   (02-20 @ 09:30):    3.87 )-----------(48           Lymphocytes% (auto):  24.0                                          25.4                   Eosinphils% (auto):   0.5      Manual%: Neutrophils x    ; Lymphocytes x    ; Eosinophils x    ; Bands%: x    ; Blasts x                                    149    |  123    |  5                   Calcium: 7.5   / iCa: x      (02-20 @ 09:30)    ----------------------------<  86        Magnesium: 1.8                              4.2     |  15     |  1.0              Phosphorous: x        TPro  5.5    /  Alb  1.4    /  TBili  0.7    /  DBili  x      /  AST  19     /  ALT  12     /  AlkPhos  69     20 Feb 2022 09:30    ( 02-19 @ 23:11 )   PT: 19.80 sec;   INR: 1.73 ratio  aPTT: 69.6 sec

## 2022-02-20 NOTE — CONSULT NOTE ADULT - ATTENDING COMMENTS
I have personally seen and examined this patient on 2/20 I have fully participated in the care of this patient.  I have reviewed all pertinent clinical information, including history, physical exam, plan and note. Suspect toxic metabolic encephalopathy. Routine EEG and Dustin MRI if mental status did not improve    I have reviewed all pertinent clinical information and reviewed all relevant imaging and diagnostic studies personally.  Recommendations as above.  Agree with above assessment except as noted.

## 2022-02-20 NOTE — PROGRESS NOTE ADULT - ASSESSMENT
Pt is a 62 yo F with PMH of GIB, Bowel and Bladder incontinence, Smoking (1/2 ppd), ETOH abuse, hypothyroidism, anemia transferred from [facility] for EMR/Polypectomy. Pt is from home living with her son (43yo). Pt was admitted to [facility] on 01/31 for AMS, weakness and decreased appetite. She was found to have UTI, Elevated troponin, and electrolyte imbalance. During her stay at [facility] pt had colonoscopy done, which revealed 1 large 3-4 cm sigmoid pedunculated polyp (bx=tub adenoma, but was friable) and 2 polyps (7, 14 mm) in descending colon s/p bx.     # altered mental status likely 2/2 toxic/metab encephalopathy 2/2 unclear reason vs sepsis w/ shock/hypothermia vs aspiration after procedure under anesth 2/18 vs CVA vs other delirium  warming blanket to get body temp > 95F  CT head NC    neuro eval  EEG  ICU upgrade - [facility] I spoke w/ fellow  fix temp for now  IVFs: D5 w/ 150 meq bicarb @ 100/hr  does NOT seem like alcohol w/drawal, given last drink was many months ago  obtain COVID swab (was sent by 3B RN this AM)  consider NGT for feeds, hydration and meds once in ICU    # acute thrombocytopenia - ? etiology: DIC vs sepsis vs ITP vs other  not on heparins  obtain heme eval  obtain d-dimer; fibrinogen    # incr INR/PTT not on a/c  poss VIt K def vs DIC vs other  Vit K 5mg iv q24 x3 doses  hem eval    # Rt jaw swelling - 2/2 facial cellulitis; HypoTN; hypothermia; normal gap metab acidosis (perhaps related to loose BM vs colonic tubular adenoma)  pt was on abx at [facility] - not sure of details  CT maxillofacial with C 2/12 @ [facility]: Mild soft tissue inflammation suggesting cellulitis of the right lower face  OMFS noted  abx: vanco + cefepime now (d/c unasyn)  At [facility], CT Chest with Cont 2/9 obtained: Moderate b/l pleural effusions resulting in complete atelectasis of both lower lobes  CXR (2/20): Lt opac seen (? aspiration event)  consider ID eval  d/c midodrine - use levophed drip  IVFs: D5 + bicarb 100/hr  Bcx: neg  warming blanket til T > 95F  check cortisol lvl (though sodium up and potassium down)    # loose BMs - pt says she always has loose stool  not enough stool per day to call diarrhea per se  C diff result neg  no laxatives  can use imodium 2mg po q6 prn loose BM    # anemia of chr dz and acute GI blood loss  iron studies: Fe 55; TIBC <72; ferr 2494 - not iron def  B12 1892, Fol 11  check SPEP  d/c folate - cannot take PO  keep hgb >7.5  rpt cbc 4pm today and amando AM    # 3-4 cm sigmoid colonic Polyp; hx GI bleed 2/2 polyp  Colonoscopy 2/4 @ [facility]: 1 large 3-4 cm sigmoid pedunculated polyp and 2 polyps (7, 14 mm) in descending colon s/p bx  s/p bowel prep 2/18  GI consult (Dr. Daniels) s/p EMR/polypectomy on 2/18 (see GI note) - f/u path  diet: reg  CEA 5.6    # hypothyroidism  TSH 15.6  change oral synth 50 mcg to 25mcg iv q24   rpt TSH and FT4 in 6 wks (April)    # hypernatremia 2/2 dehydration  give D5 IVFs    # hypokalemia - better    # hypomagnesemia - better    # Hx of Elevated Troponin - prob transient demand isch when she was anemic  Normal Lexiscan at [facility]  cardio eval noted: no further w/u  not on asa w/ recent GI bleed    # Hx of ETOH abuse; severe protein-calorie malnutrition; severe hypoalbuminemia  last drink months ago  d/c MVI po - cannot take oral  dietician eval  check U TP:Cr ratio  consider NGT in ICU    # GI ppx: PPI iv q24    # DVT ppx: SCDs (no pharm -> dropping plt count and recent GI bleed)    # Activity: pt says she used to walk at home; lives on 2nd fl of apartment w/ son; says she uses a walker; PT eval when MS better    # I updated son, Александр, of all events during the RRT; he is aware pt going to ICU/CCU; reviewed suspected dx's and tx plan    Dispo: as above; change abx; f/u Crit Care, Neuro, Heme; CTH; EEG; consider NGT; change oral meds to IV or d/c; Vit K; O2 FM; levophed; daily labs  consider ID eval  eventually, pt will likely need SNF upon d/c - not ready for d/c yet    Prog is very guarded, zuly w/ acute delirium now and upgrade to ICU.

## 2022-02-20 NOTE — SWALLOW BEDSIDE ASSESSMENT ADULT - SLP PERTINENT HISTORY OF CURRENT PROBLEM
Pt is a 62 yo F with PMH of GIB, Bowel and Bladder incontinence, Smoking (3-4 cigarettes/day), ETOH abuse, hypothyroidism anemia transferred from [facility] for Polypectomy. Pt is from home living with her son and bedbound at baseline. Pt was admitted to [facility] on 01/31 for AMS, weakness and decreased appetite. She was found to have UTI, NSTEMI, and electrolyte imbalance. During her stay at [facility] pt had colonoscopy done which revealed 1 large 3-4 cm sigmoid pedunculated polyp and 2 polyps (7, 14 mm) in descending colon s/p bx.

## 2022-02-20 NOTE — PROGRESS NOTE ADULT - SUBJECTIVE AND OBJECTIVE BOX
DURGASANTIAGO  61y  Female  ***My note supersedes ALL resident notes that I sign.  My corrections for their notes are in my note.***    I can be reached directly on [facility]. My office number is [phone]. My personal cell number is [phone].    INTERVAL EVENTS: Here for f/u of altered MS. Pt had RRT this am for desat and HoTN. Pt was placed on 50% O2 w/ good effect. Levophed started to raise BP. Pt not speaking. Pt is somewhat awake and just makes grunting sounds. Pt not moving either arm well. Pt not following commands. Pt on warming blanket for hypothermia. Pt being upgraded to ICU - I spoke w/ fellow.    T(F): 94.5 (02-20-22 @ 08:29), Max: 94.5 (02-20-22 @ 08:29)  HR: 96 (02-20-22 @ 09:40) (76 - 96)  BP: 119/73 (02-20-22 @ 09:54) (61/39 - 119/73)  RR: 20 (02-20-22 @ 05:19) (16 - 20)  SpO2: 100% (02-20-22 @ 09:54) (88% - 100%)    Gen: not speaking well; getting more obtunded  HEENT: PERRL, looks very far inferiorly when I try to exam eyes; rt cheek a little swollen; nose clr; + FM O2 (FiO2 50%, sat 100%)  Neck: no nodes, no JVD, thyroid nl  lungs: clr  hrt: s1 s2 rrr no murmur  abd: soft, NT/ND, no HS megaly  ext: no edema, no c/c  neuro: not fully awake; grunting; apt to sleep if left alone; not at baseline; not moving any extremity well  : + heredia: yel urine, no blood    LABS:                      8.9     (    87.6   3.87  )-----------( ---------      48       ( 20 Feb 2022 09:30 )             25.4    (    16.3     WBC Count: 3.87 K/uL (02-20-22 @ 09:30) - trending down  WBC Count: 5.09 K/uL (02-19-22 @ 23:11)  WBC Count: 5.66 K/uL (02-19-22 @ 16:00)  WBC Count: 5.50 K/uL (02-19-22 @ 04:30)  WBC Count: 7.53 K/uL (02-18-22 @ 12:43)  WBC Count: 7.81 K/uL (02-17-22 @ 04:30)    Hemoglobin: 8.9 g/dL (02-20 @ 09:30) - stable  Hemoglobin: 8.2 g/dL (02-19 @ 23:11)  Hemoglobin: 8.1 g/dL (02-19 @ 16:00)  Hemoglobin: 7.7 g/dL (02-19 @ 04:30)  Hemoglobin: 8.7 g/dL (02-18 @ 12:43)  Hemoglobin: 8.5 g/dL (02-17 @ 04:30)    Platelet Count - Automated: 48 K/uL (02-20-22 @ 09:30) - worse  Platelet Count - Automated: 41 K/uL (02-19-22 @ 23:11)  Platelet Count - Automated: 40 K/uL (02-19-22 @ 16:00)  Platelet Count - Automated: 56 K/uL (02-19-22 @ 04:30)  Platelet Count - Automated: 81 K/uL (02-18-22 @ 12:43)  Platelet Count - Automated: 148 K/uL (02-17-22 @ 04:30)    149   (   123   (   86      02-20-22 @ 09:30  ----------------------               4.2   (   15   (   5     AG = 11                        -----                        1.0  Ca  7.5 = silvio = 9.5  Mg  1.8    P   --     Sodium, Serum: 149 mmol/L (02-20-22 @ 09:30)  Sodium, Serum: 148 mmol/L (02-20-22 @ 01:44)  Sodium, Serum: 147 mmol/L (02-19-22 @ 04:30)  Sodium, Serum: 147 mmol/L (02-18-22 @ 12:43)  Sodium, Serum: 146 mmol/L (02-17-22 @ 04:30)    LFT  5.5  (  0.7  (  19       02-20-22 @ 09:30  -------------------------  1.4  (  69  (  12    Alb 1.4  T kenia 0.7   AP 69  AST 19  ALT 12  02-20-22 @ 09:30  Alb 1.5  T kenia 0.6   AP 68  AST 18  ALT 12  02-20-22 @ 01:44  Alb 1.2  T kenia 0.5   AP 67  AST 20  ALT 11  02-19-22 @ 04:30  Alb 1.5  T kenia 1.0   AP 81  AST 21  ALT 13  02-18-22 @ 12:43  Alb 1.6  T kenia 1.3   AP 63  AST 14  ALT 12  02-17-22 @ 04:30    PT/INR - ( 19 Feb 2022 23:11 )   PT: 19.80 sec;   INR: 1.73 ratio    PTT - ( 19 Feb 2022 23:11 )  PTT: 69.6 sec    Culture - Blood (collected 02-17-22 @ 04:30)  Source: .Blood Blood  Preliminary Report (02-18-22 @ 13:01):    No growth to date.    CAPILLARY BLOOD GLUCOSE  POCT Blood Glucose.: 101 (02-20-22 @ 09:38)  POCT Blood Glucose.: 169 (02-20-22 @ 04:01)  POCT Blood Glucose.: 85 (02-19-22 @ 09:25)  POCT Blood Glucose.: 87 (02-18-22 @ 20:40)    RADIOLOGY & ADDITIONAL TESTS:  < from: Xray Chest 1 View- PORTABLE-Urgent (Xray Chest 1 View- PORTABLE-Urgent .) (02.20.22 @ 10:00) >  IMPRESSION:    Nosignificant change in left mid to lower lung opacity/effusion.    < end of copied text >    MEDICATIONS:  cefepime   IVPB      cefepime   IVPB 1000 milliGRAM(s) IV Intermittent once  cefepime   IVPB 1000 milliGRAM(s) IV Intermittent every 12 hours  vancomycin  IVPB      vancomycin  IVPB 1000 milliGRAM(s) IV Intermittent every 12 hours    acetaminophen     Tablet .. 650 milliGRAM(s) Oral every 6 hours PRN  folic acid 1 milliGRAM(s) Oral daily  lactated ringers. 1000 milliLiter(s) IV Continuous <Continuous>  levothyroxine 50 MICROGram(s) Oral daily  loperamide 2 milliGRAM(s) Oral every 6 hours PRN  midazolam Injectable 2 milliGRAM(s) IV Push once  midodrine. 15 milliGRAM(s) Oral three times a day  multivitamin 1 Tablet(s) Oral daily  norepinephrine Infusion 0.25 MICROgram(s)/kG/Min IV Continuous <Continuous>  pantoprazole    Tablet 40 milliGRAM(s) Oral before breakfast  potassium chloride    Tablet ER 40 milliEquivalent(s) Oral once  sodium bicarbonate 650 milliGRAM(s) Oral every 8 hours

## 2022-02-20 NOTE — CONSULT NOTE ADULT - SUBJECTIVE AND OBJECTIVE BOX
Patient is a 61y old  Female who presents with a chief complaint of Polypectomy (20 Feb 2022 06:57)      HPI:  Pt is a 60 yo F with PMH of GIB, Bowel and Bladder incontinence, Smoking (3-4 cigarettes/day), ETOH abuse, hypothyroidism anemia transferred from [facility] for Polypectomy. Pt is from home living with her son and bedbound at baseline. Pt was admitted to [facility] on 01/31 for AMS, weakness and decreased appetite. She was found to have UTI, NSTEMI, and electrolyte imbalance. During her stay at [facility] pt had colonoscopy done which revealed 1 large 3-4 cm sigmoid pedunculated polyp and 2 polyps (7, 14 mm) in descending colon s/p bx. Pt was transferred to [facility] for polyp removal.  (16 Feb 2022 23:24)      PAST MEDICAL & SURGICAL HISTORY:    Social hx: Active smoker 4 cigarettes per day.     FAMILY HISTORY:  :  No known cardiovacular family hisotry     ROS:  See HPI     Allergies    No Known Allergies    Intolerances          PHYSICAL EXAM    ICU Vital Signs Last 24 Hrs  T(C): 34.7 (20 Feb 2022 08:29), Max: 34.7 (20 Feb 2022 08:29)  T(F): 94.5 (20 Feb 2022 08:29), Max: 94.5 (20 Feb 2022 08:29)  HR: 84 (20 Feb 2022 08:29) (76 - 94)  BP: 87/55 (20 Feb 2022 08:29) (70/40 - 105/59)  BP(mean): 71 (20 Feb 2022 06:28) (64 - 77)  ABP: --  ABP(mean): --  RR: 20 (20 Feb 2022 05:19) (16 - 20)  SpO2: 100% (20 Feb 2022 05:19) (96% - 100%)      CONSTITUTIONAL:  Ill appearing    ENT:   Airway patent,   No thrush    EYES:   Clear bilaterally,   pupils equal,   round and reactive to light.    CARDIAC:   Normal rate,   regular rhythm.    no edema      CAROTID:   normal systolic impulse  no bruits    RESPIRATORY:   No wheezing  Normal chest expansion  Not tachypneic,  No use of accessory muscles    GASTROINTESTINAL:  Abdomen soft,   non-tender,   no guarding,   + BS    MUSCULOSKELETAL:   range of motion is not limited,  no clubbing, cyanosis    NEUROLOGICAL:   Lethargic  no motor deficits.    SKIN:   Skin normal color for race,   No evidence of rash.          LABS:                          8.2    5.09  )-----------( 41       ( 19 Feb 2022 23:11 )             23.3                                               02-20    148<H>  |  122<H>  |  5<L>  ----------------------------<  69<L>  3.4<L>   |  17  |  1.0    Ca    7.5<L>      20 Feb 2022 01:44  Mg     2.1     02-20    TPro  5.2<L>  /  Alb  1.5<L>  /  TBili  0.6  /  DBili  x   /  AST  18  /  ALT  12  /  AlkPhos  68  02-20      PT/INR - ( 19 Feb 2022 23:11 )   PT: 19.80 sec;   INR: 1.73 ratio         PTT - ( 19 Feb 2022 23:11 )  PTT:69.6 sec                                                                                     LIVER FUNCTIONS - ( 20 Feb 2022 01:44 )  Alb: 1.5 g/dL / Pro: 5.2 g/dL / ALK PHOS: 68 U/L / ALT: 12 U/L / AST: 18 U/L / GGT: x                                                                                                                                       X-Rays                                                                                     ECHO    MEDICATIONS  (STANDING):  ampicillin/sulbactam  IVPB      ampicillin/sulbactam  IVPB 3 Gram(s) IV Intermittent every 6 hours  folic acid 1 milliGRAM(s) Oral daily  lactated ringers. 1000 milliLiter(s) (100 mL/Hr) IV Continuous <Continuous>  levothyroxine 50 MICROGram(s) Oral daily  midodrine. 15 milliGRAM(s) Oral three times a day  multivitamin 1 Tablet(s) Oral daily  norepinephrine Infusion 0.25 MICROgram(s)/kG/Min (28.1 mL/Hr) IV Continuous <Continuous>  pantoprazole    Tablet 40 milliGRAM(s) Oral before breakfast  potassium chloride    Tablet ER 40 milliEquivalent(s) Oral once  sodium bicarbonate 650 milliGRAM(s) Oral every 8 hours    MEDICATIONS  (PRN):  acetaminophen     Tablet .. 650 milliGRAM(s) Oral every 6 hours PRN Temp greater or equal to 38C (100.4F), Mild Pain (1 - 3)  loperamide 2 milliGRAM(s) Oral every 6 hours PRN Diarrhea         Patient is a 61y old  Female who presents with a chief complaint of Polypectomy (20 Feb 2022 06:57)      HPI:  Pt is a 60 yo F with PMH of GIB, Bowel and Bladder incontinence, Smoking (3-4 cigarettes/day), ETOH abuse, hypothyroidism anemia transferred from [facility] for Polypectomy. Pt is from home living with her son and bedbound at baseline. Pt was admitted to [facility] on 01/31 for AMS, weakness and decreased appetite. She was found to have UTI, NSTEMI, and electrolyte imbalance. During her stay at [facility] pt had colonoscopy done which revealed 1 large 3-4 cm sigmoid pedunculated polyp and 2 polyps (7, 14 mm) in descending colon s/p bx. Pt was transferred to [facility] for polyp removal.  (16 Feb 2022 23:24)      PAST MEDICAL & SURGICAL HISTORY:    Social hx: Active smoker 4 cigarettes per day.     FAMILY HISTORY:  :  No known cardiovacular family hisotry     ROS:  See HPI     Allergies    No Known Allergies    Intolerances          PHYSICAL EXAM    ICU Vital Signs Last 24 Hrs  T(C): 34.7 (20 Feb 2022 08:29), Max: 34.7 (20 Feb 2022 08:29)  T(F): 94.5 (20 Feb 2022 08:29), Max: 94.5 (20 Feb 2022 08:29)  HR: 84 (20 Feb 2022 08:29) (76 - 94)  BP: 87/55 (20 Feb 2022 08:29) (70/40 - 105/59)  BP(mean): 71 (20 Feb 2022 06:28) (64 - 77)  ABP: --  ABP(mean): --  RR: 20 (20 Feb 2022 05:19) (16 - 20)  SpO2: 100% (20 Feb 2022 05:19) (96% - 100%)      CONSTITUTIONAL:  Ill appearing    ENT:   Airway patent,   No thrush    EYES:   Clear bilaterally,   pupils equal,   round and reactive to light.    CARDIAC:   Normal rate,   regular rhythm.    no edema      CAROTID:   normal systolic impulse  no bruits    RESPIRATORY:   No wheezing  Normal chest expansion  Not tachypneic,  No use of accessory muscles    GASTROINTESTINAL:  Abdomen soft,   non-tender,   no guarding,   + BS    MUSCULOSKELETAL:   range of motion is not limited,  no clubbing, cyanosis    NEUROLOGICAL:   Lethargic  no motor deficits.    SKIN:   Skin normal color for race,   No evidence of rash.          LABS:                          8.2    5.09  )-----------( 41       ( 19 Feb 2022 23:11 )             23.3                                               02-20    148<H>  |  122<H>  |  5<L>  ----------------------------<  69<L>  3.4<L>   |  17  |  1.0    Ca    7.5<L>      20 Feb 2022 01:44  Mg     2.1     02-20    TPro  5.2<L>  /  Alb  1.5<L>  /  TBili  0.6  /  DBili  x   /  AST  18  /  ALT  12  /  AlkPhos  68  02-20      PT/INR - ( 19 Feb 2022 23:11 )   PT: 19.80 sec;   INR: 1.73 ratio         PTT - ( 19 Feb 2022 23:11 )  PTT:69.6 sec                                                                                     LIVER FUNCTIONS - ( 20 Feb 2022 01:44 )  Alb: 1.5 g/dL / Pro: 5.2 g/dL / ALK PHOS: 68 U/L / ALT: 12 U/L / AST: 18 U/L / GGT: x                                                                                                                                       X-Rays           Bibasilar infiltrates L>R                                                                           ECHO    MEDICATIONS  (STANDING):  ampicillin/sulbactam  IVPB      ampicillin/sulbactam  IVPB 3 Gram(s) IV Intermittent every 6 hours  folic acid 1 milliGRAM(s) Oral daily  lactated ringers. 1000 milliLiter(s) (100 mL/Hr) IV Continuous <Continuous>  levothyroxine 50 MICROGram(s) Oral daily  midodrine. 15 milliGRAM(s) Oral three times a day  multivitamin 1 Tablet(s) Oral daily  norepinephrine Infusion 0.25 MICROgram(s)/kG/Min (28.1 mL/Hr) IV Continuous <Continuous>  pantoprazole    Tablet 40 milliGRAM(s) Oral before breakfast  potassium chloride    Tablet ER 40 milliEquivalent(s) Oral once  sodium bicarbonate 650 milliGRAM(s) Oral every 8 hours    MEDICATIONS  (PRN):  acetaminophen     Tablet .. 650 milliGRAM(s) Oral every 6 hours PRN Temp greater or equal to 38C (100.4F), Mild Pain (1 - 3)  loperamide 2 milliGRAM(s) Oral every 6 hours PRN Diarrhea

## 2022-02-20 NOTE — CONSULT NOTE ADULT - ASSESSMENT
Impression:  Pt is a 62 yo F with PMH of GIB, Bowel and Bladder incontinence, Smoking (3-4 cigarettes/day), ETOH abuse, hypothyroidism anemia transferred from [facility] for Polypectomy. Pt is from home living with her son and bedbound at baseline. Pt was admitted to [facility] on 01/31 for AMS, weakness and decreased appetite. She was found to have UTI, NSTEMI, and electrolyte imbalance. Combative and agitated, UTI can be contributing factor in metabolic encephalopathy, it also could lower the seizure threshold.    Suggestion:  Medical management of infectious process.   Routine EEG  Seizure precautions  Keep magnesium >2   Impression:  Pt is a 62 yo F with PMH of GIB, Bowel and Bladder incontinence, Smoking (3-4 cigarettes/day), ETOH abuse, hypothyroidism anemia transferred from [facility] for Polypectomy. Pt is from home living with her son and bedbound at baseline. Pt was admitted to [facility] on 01/31 for AMS, weakness and decreased appetite. She was found to have UTI, NSTEMI, and electrolyte imbalance. Combative and agitated, UTI can be contributing factor in metabolic encephalopathy, it also could lower the seizure threshold.    Suggestion:  Medical management of infectious process.   Routine EEG  Seizure precautions  Keep magnesium >2  Consider Brain MRI if mental status did not improve

## 2022-02-20 NOTE — CONSULT NOTE ADULT - ATTENDING COMMENTS
IMPRESSION:    AMS   Sepsis   Septic shock  Possible aspiration PNA  Acute hypoxemic respiratory failure   Colon polyps s/p polypectomy of 2 polyps on 2/18  Anemia  HO GIB  HO ETOH abuse  Active smoker      Plan as outlined above

## 2022-02-20 NOTE — PROGRESS NOTE ADULT - SUBJECTIVE AND OBJECTIVE BOX
Gastroenterology progress note:     Patient is a 61y old  Female who presents with a chief complaint of Polypectomy (20 Feb 2022 10:40)       Admitted on: 02-16-22    We are following the patient for: colon polyps post polypectomy      Interval History: patient was a rapid response this am for hypoxia and hypotension, was started on pressors and upgraded to ICU, still altered and hypothermic, no GI bleed reported     Patient's medical problems are critical     History obtained from someone other than patient (Y/N): Y, nurse at bedside       PAST MEDICAL & SURGICAL HISTORY:      MEDICATIONS  (STANDING):  cefepime   IVPB      cefepime   IVPB 1000 milliGRAM(s) IV Intermittent every 12 hours  dextrose 5% 1000 milliLiter(s) (100 mL/Hr) IV Continuous <Continuous>  levothyroxine Injectable 25 MICROGram(s) IV Push at bedtime  LORazepam   Injectable 2 milliGRAM(s) IV Push once  norepinephrine Infusion 0.5 MICROgram(s)/kG/Min (56.3 mL/Hr) IV Continuous <Continuous>  pantoprazole  Injectable 40 milliGRAM(s) IV Push daily  phytonadione  IVPB 5 milliGRAM(s) IV Intermittent daily  vancomycin  IVPB      vancomycin  IVPB 1000 milliGRAM(s) IV Intermittent every 12 hours    MEDICATIONS  (PRN):      Allergies  No Known Allergies      Review of Systems:   unable to obtain secondary to patient condition    Physical Examination:  T(C): 34.7 (02-20-22 @ 08:29), Max: 34.7 (02-20-22 @ 08:29)  HR: 90 (02-20-22 @ 10:54) (76 - 96)  BP: 91/57 (02-20-22 @ 10:54) (61/39 - 119/73)  RR: 20 (02-20-22 @ 05:19) (16 - 20)  SpO2: 100% (02-20-22 @ 10:54) (88% - 100%)  Weight (kg): 60 (02-20-22 @ 00:04)    Constitutional: No acute distress, but lethargic and non responsive   Respiratory:  No signs of respiratory distress. Lung sounds are clear bilaterally.  Cardiovascular:  S1 S2, Regular rate and rhythm.  Abdominal: Abdomen is soft, symmetric, and  without distention.  Tenderness at right lower quadrant but patient reactions might  not be  reliable.  Bowel sounds are present and normoactive in all four quadrants.   Skin: No rashes, No Jaundice.        Data: (reviewed by attending)                        8.9    3.87  )-----------( 48       ( 20 Feb 2022 09:30 )             25.4     Hgb trend:  8.9  02-20-22 @ 09:30  8.2  02-19-22 @ 23:11  8.1  02-19-22 @ 16:00  7.7  02-19-22 @ 04:30  8.7  02-18-22 @ 12:43      02-20    149<H>  |  123<H>  |  5<L>  ----------------------------<  86  4.2   |  15<L>  |  1.0    Ca    7.5<L>      20 Feb 2022 09:30  Mg     1.8     02-20    TPro  5.5<L>  /  Alb  1.4<L>  /  TBili  0.7  /  DBili  x   /  AST  19  /  ALT  12  /  AlkPhos  69  02-20    Liver panel trend:  TBili 0.7   /   AST 19   /   ALT 12   /   AlkP 69   /   Tptn 5.5   /   Alb 1.4    /   DBili --      02-20  TBili 0.6   /   AST 18   /   ALT 12   /   AlkP 68   /   Tptn 5.2   /   Alb 1.5    /   DBili --      02-20  TBili 0.5   /   AST 20   /   ALT 11   /   AlkP 67   /   Tptn 4.8   /   Alb 1.2    /   DBili --      02-19  TBili 1.0   /   AST 21   /   ALT 13   /   AlkP 81   /   Tptn 5.8   /   Alb 1.5    /   DBili --      02-18  TBili 1.3   /   AST 14   /   ALT 12   /   AlkP 63   /   Tptn 5.5   /   Alb 1.6    /   DBili --      02-17      PT/INR - ( 19 Feb 2022 23:11 )   PT: 19.80 sec;   INR: 1.73 ratio         PTT - ( 19 Feb 2022 23:11 )  PTT:69.6 sec       Radiology: (reviewed by attending)

## 2022-02-20 NOTE — PROGRESS NOTE ADULT - ASSESSMENT
Patient is a 60 y/o female with PMHx of GIB, Bowel and Bladder incontinence, Smoking (3-4 cigarettes/day), ETOH abuse, hypothyroidism, and ongoing anemia transferred from [facility] for Polypectomy. Patient was admitted to [facility] on 01/31 for AMS, weakness and decreased appetite. She was found to have UTI, NSTEMI, and electrolyte imbalance. During her stay at [facility] she had a colonoscopy done which revealed 1 large 3-4 cm sigmoid pedunculated polyp and 2 polyps (7, 14 mm) in descending colon. Patient was transferred as they are not able to perform large Polypectomies there. She was not able to be discharged as ongoing anemia and concern that the cause are these large polyps.    #Colonic Polyp  #Diarrhea in setting of recent abx use  #Hx of GIB   - Colonoscopy 2/4: 1 large 3-4 cm sigmoid pedunculated polyp and 2 polyps (7, 14 mm) in descending colon s/p bx  - GI consulted (Dr. Daniels)   - Monitor H&H, active T+S  - Obtain CBC, CMP, TnS, Coags, Mg  - Patient is cleared for colonoscopy per Cardiology   - Clear liquid diet for now  - Negative for C-Diff infection  - CEA elevated 5.6  - S/P  colonoscopy and polypectomy 2/18/2022.Polyp (13 mm) in the descending colon. (hot snare Polypectomy).  Polyp (25 mm to 30 mm) in the rectosigmoid junction at 20 cm from the anal verge. (Endoloop, Polypectomy, Endoclip).   - Repeat Colonoscopy in 6 months   - GI is following   - IVF     #altered mental status likely 2/2 metabolic encephalopathy  #Hypothermia   - Patient became altered after GI procedure   - Found to be hypothermic   - Monitor lytes and temp   - will obtain CT head NC if not improving   - Will consider Neuro eval  - IVFs: D5 75/hr  - warming blanket til T > 95F    #Hypotension   - F/u cortisol lvl   - C/w midodrine 15mg TID   - NaHCO3 650mg po q8    #Hx of UTI  #R lower face cellulitis  - CT maxillofacial with C 2/12: Mild soft tissue inflammation suggesting cellulitis of the right lower face  - pt was on cefepime, vancomycin (end date 2/15)  - Started on Unasyn 2/16, will continue that for 10 days end date 2/25  - No documentations of + bcx or ucx on the charts   - f/u Ucx   - Bclx 2/17 NGTD, F/u repeat   - UA + LE, WBC, and Hylan casts   - Dental recs appreciated   - F/u SPEP     #Hypernatremia  - D5 IVFs    #Hypothyroidism  - TSH 15.6  - C/w synthroid 50 mcg po q24  - Patient will need to repeat TSH and FT4 in 6 wks (April)    #Hx L pleural effusion on CXR 2/8  - CT Chest with Cont 2/9 obtained: Moderate b/l pleural effusions resulting in complete atelectasis of both lower lobes  - CXR noted     #Hx NSTEMI   #Hx of Elevated Troponin  #low bp  - pt has been on Midodrine 15 mg q8h, will continue that   - Normal Lexiscan at [facility]  - f/u orthostatics  - EKG noted     #Hx of ETOH abuse  - last drink (as per nurse) 6 months ago  - c/w Folic acid and Thiamine  - F/u Dietician eval  - Ensure 3x/day  - F/u U TP:Cr ratio    #Possible Thiamine deficiency   #Possible Folic acid deficiency   - C/w Folic acid and Thiamine    #Misc   - DVT prophylaxis: SCD   - GI prophylaxis: PPI  - Diet: clear liq diet   - Activity status: IAT   - Code status: Full code   Disposition: dietician eval, Monitor lytes, oral bicarb, SPEP, cortisol, Bcx, U tp:cr ratio, Ucx, Cortisol   Documents from [facility] in the chart      Patient is a 62 y/o female with PMHx of GIB, Bowel and Bladder incontinence, Smoking (3-4 cigarettes/day), ETOH abuse, hypothyroidism, and ongoing anemia transferred from [facility] for Polypectomy. Patient was admitted to [facility] on 01/31 for AMS, weakness and decreased appetite. She was found to have UTI, NSTEMI, and electrolyte imbalance. During her stay at [facility] she had a colonoscopy done which revealed 1 large 3-4 cm sigmoid pedunculated polyp and 2 polyps (7, 14 mm) in descending colon. Patient was transferred as they are not able to perform large Polypectomies there. She was not able to be discharged as ongoing anemia and concern that the cause are these large polyps.    #Colonic Polyp  #Diarrhea in setting of recent abx use  #Hx of GIB   - Colonoscopy 2/4: 1 large 3-4 cm sigmoid pedunculated polyp and 2 polyps (7, 14 mm) in descending colon s/p bx  - GI consulted (Dr. Daniels)   - Monitor H&H, active T+S  - Obtain CBC, CMP, TnS, Coags, Mg  - Patient is cleared for colonoscopy per Cardiology   - Clear liquid diet for now  - Negative for C-Diff infection  - CEA elevated 5.6  - S/P  colonoscopy and polypectomy 2/18/2022.Polyp (13 mm) in the descending colon. (hot snare Polypectomy).  Polyp (25 mm to 30 mm) in the rectosigmoid junction at 20 cm from the anal verge. (Endoloop, Polypectomy, Endoclip).   - Repeat Colonoscopy in 6 months   - GI is following   - IVF     #altered mental status likely 2/2 metabolic encephalopathy  #Hypothermia   - Patient became altered after GI procedure   - Found to be hypothermic   - Monitor lytes and temp   - will obtain CT head NC if not improving   - Will consider Neuro eval  - IVFs: D5 75/hr  - warming blanket til T > 95F  - currently on Levo for pressure support     #Hypotension   - F/u cortisol lvl   - C/w midodrine 15mg TID   - NaHCO3 650mg po q8  - On Levo     #Hx of UTI  #R lower face cellulitis  - CT maxillofacial with C 2/12: Mild soft tissue inflammation suggesting cellulitis of the right lower face  - pt was on cefepime, vancomycin (end date 2/15)  - Started on Unasyn 2/16, will continue that for 10 days end date 2/25  - No documentations of + bcx or ucx on the charts   - f/u Ucx   - Bclx 2/17 NGTD, F/u repeat   - UA + LE, WBC, and Hylan casts   - Dental recs appreciated   - F/u SPEP     #Hypernatremia  - D5 IVFs    #Hypothyroidism  - TSH 15.6  - C/w synthroid 50 mcg po q24  - Patient will need to repeat TSH and FT4 in 6 wks (April)    #Hx L pleural effusion on CXR 2/8  - CT Chest with Cont 2/9 obtained: Moderate b/l pleural effusions resulting in complete atelectasis of both lower lobes  - CXR noted     #Hx NSTEMI   #Hx of Elevated Troponin  #low bp  - pt has been on Midodrine 15 mg q8h, will continue that   - Normal Lexiscan at [facility]  - f/u orthostatics  - EKG noted     #Hx of ETOH abuse  - last drink (as per nurse) 6 months ago  - c/w Folic acid and Thiamine  - F/u Dietician eval  - Ensure 3x/day  - F/u U TP:Cr ratio    #Possible Thiamine deficiency   #Possible Folic acid deficiency   - C/w Folic acid and Thiamine    #Misc   - DVT prophylaxis: SCD   - GI prophylaxis: PPI  - Diet: clear liq diet   - Activity status: IAT   - Code status: Full code   Disposition: dietician eval, Monitor lytes, oral bicarb, SPEP, cortisol, Bcx, U tp:cr ratio, Ucx, Cortisol   Documents from [facility] in the chart      Patient is a 60 y/o female with PMHx of GIB, Bowel and Bladder incontinence, Smoking (3-4 cigarettes/day), ETOH abuse, hypothyroidism, and ongoing anemia transferred from [facility] for Polypectomy. Patient was admitted to [facility] on 01/31 for AMS, weakness and decreased appetite. She was found to have UTI, NSTEMI, and electrolyte imbalance. During her stay at [facility] she had a colonoscopy done which revealed 1 large 3-4 cm sigmoid pedunculated polyp and 2 polyps (7, 14 mm) in descending colon. Patient was transferred as they are not able to perform large Polypectomies there. She was not able to be discharged as ongoing anemia and concern that the cause are these large polyps.    #AMS with unclear etiology   #sepsis w/ shock/hypothermia vs aspiration after procedure under anesthesia 2/18 vs CVA   - Patient is currently on warming blanket   - F/u CT head NC    - F/u Neuro eval  - EEG  - C/w IVFs: D5 w/ 150 meq bicarb @ 100/hr  - Broad spectrum abx  - F/u Covid swab, MRSA nares   - repeat Bcx, Ucx   - currently on Levo for pressure support     #Acute thrombocytopenia  #Elevated INR/PTT not on a/c  - Vit K 5mg iv q24 x3 doses  - not on heparins  - F/u DIC panel, HIT panel   - F/u Heme/onc eval     #Colonic Polyp  #Diarrhea in setting of recent abx use  #Hx of GIB   - Colonoscopy 2/4: 1 large 3-4 cm sigmoid pedunculated polyp and 2 polyps (7, 14 mm) in descending colon s/p bx  - GI consulted (Dr. Daniels)   - Patient is cleared for colonoscopy per Cardiology   - Negative for C-Diff infection  - CEA elevated 5.6  - S/P  colonoscopy and polypectomy 2/18/2022.Polyp (13 mm) in the descending colon. (hot snare Polypectomy).  Polyp (25 mm to 30 mm) in the junction at 20 cm from the anal verge. (Endoloop, Polypectomy, Endoclip).   - Repeat Colonoscopy in 6 months   - GI is following   - IVF  - CT ab/p once more stable      #Hypotension   - F/u cortisol lvl   - C/w midodrine 15mg TID   - On Levo     #Hx of UTI  #R lower face cellulitis  - CT maxillofacial with C 2/12: Mild soft tissue inflammation suggesting cellulitis of the right lower face  - pt was on cefepime, vancomycin (end date 2/15)  - Started on Unasyn 2/16, d/c today 2/20 started on broad spectrum Abx 2/20  - No documentations of + bcx or ucx on the charts from [facility]   - f/u Ucx   - Bclx 2/17 NGTD, F/u repeat   - UA + LE, WBC, and Hylan casts   - Dental recs appreciated   - F/u SPEP     #Hypernatremia  - D5 IVFs    #Hypothyroidism  - TSH 15.6  - C/w synthroid 50 mcg po q24  - Patient will need to repeat TSH and FT4 in 6 wks (April)    #Hx L pleural effusion on CXR 2/8  - CT Chest with Cont 2/9 obtained: Moderate b/l pleural effusions resulting in complete atelectasis of both lower lobes  - CXR noted     #Hx NSTEMI   #Hx of Elevated Troponin  #low bp  - pt has been on Midodrine 15 mg q8h, will continue that   - Normal Lexiscan at [facility]  - f/u orthostatics  - EKG noted     #Hx of ETOH abuse  - last drink (as per nurse) 6 months ago  - c/w Folic acid and Thiamine  - F/u Dietician eval  - Ensure 3x/day  - F/u U TP:Cr ratio    #Possible Thiamine deficiency   #Possible Folic acid deficiency   - C/w Folic acid and Thiamine    #Misc   - DVT prophylaxis: SCD   - GI prophylaxis: PPI  - Diet: clear liq diet   - Activity status: IAT   - Code status: Full code   Disposition: ICU     For Follow-Up:  - Chest xray for TLC placement   - Dietician eval, Monitor lytes, oral bicarb, SPEP, cortisol, Bcx, U tp:cr ratio, Ucx, Cortisol, f/u Crit Care, Neuro, Heme; CTH; EEG, S+S for feeds NPO for now

## 2022-02-20 NOTE — PROGRESS NOTE ADULT - ASSESSMENT
60 yo F with PMH of GIB, Bowel and Bladder incontinence, Smoking (1/2 ppd), ETOH abuse, hypothyroidism, anemia transferred from [facility] for EMR/ Polypectomy. . Pt was admitted to [facility] on 01/31 for AMS, weakness and decreased appetite. She was found to have UTI, Elevated troponin, and electrolyte imbalance. During her stay at [facility] pt had colonoscopy done, which revealed 1 large 3-4 cm sigmoid pedunculated polyp (bx=tub adenoma, but was friable) and 2 polyps (7, 14 mm) in descending colon s/p bx.      # patient is S/P  colonoscopy and polypectomy 2/18 .  Polyp (13 mm) in the descending colon. ( hot snare Polypectomy).  Polyp (25 mm to 30 mm) in the rectosigmoid junction at 20 cm from the anal verge. (Endoloop, Polypectomy, Endoclip).    No GI bleed  No WBC  abdomen soft     #septic shock of unclear source upgraded to ICU     REC:   Broad spectrum antibiotics   Panculture   CT abdomen and pelvis with IV contrast   await pathology results   Repeat Colonoscopy in 6 months   watch for post polypectomy bleed     #hypothermia and altered mental status :  workup by primary team

## 2022-02-20 NOTE — PROGRESS NOTE ADULT - SUBJECTIVE AND OBJECTIVE BOX
----------Daily Progress Note----------    HISTORY OF PRESENT ILLNESS:  Patient is a 61y old Female who presents with a chief complaint of Polypectomy (19 Feb 2022 17:27)    Currently admitted to medicine with the primary diagnosis of      Patient is a 60 y/o female with PMHx of GIB, Bowel and Bladder incontinence, Smoking (3-4 cigarettes/day), ETOH abuse, hypothyroidism, and ongoing anemia transferred from [facility] for Polypectomy. Patient was admitted to [facility] on 01/31 for AMS, weakness and decreased appetite. She was found to have UTI, NSTEMI, and electrolyte imbalance. During her stay at [facility] she had a colonoscopy done which revealed 1 large 3-4 cm sigmoid pedunculated polyp and 2 polyps (7, 14 mm) in descending colon. Patient was transferred as they are not able to perform large Polypectomies there. She was not able to be discharged as ongoing anemia and concern that the cause are these large polyps. Patient currently feels well with no complaints. She is not a great historian and does not know alot of details regarding her recent admission to [facility] or overall health. Obtain full labs, can start clear liquids, obtain Cardiology clearance as had reported recent NSTEMI. Will optimize her for Colonoscopy with EMR and additional large Polypectomy.     Today is hospital day 4d.     INTERVAL HOSPITAL COURSE / OVERNIGHT EVENTS:    Patient was examined and seen at bedside. This morning she is resting comfortably in bed and reports 1 episode of loose stool    Review of Systems: Patient denies any fever, chill, headache, dizziness, SOB. Patient is endorsing dysuria, denies hematuria.     <<<<<PAST MEDICAL & SURGICAL HISTORY>>>>>    ALLERGIES  No Known Allergies      Home Medications:        MEDICATIONS  STANDING MEDICATIONS  ampicillin/sulbactam  IVPB      ampicillin/sulbactam  IVPB 3 Gram(s) IV Intermittent every 6 hours  folic acid 1 milliGRAM(s) Oral daily  lactated ringers. 1000 milliLiter(s) IV Continuous <Continuous>  levothyroxine 50 MICROGram(s) Oral daily  midodrine. 15 milliGRAM(s) Oral three times a day  multivitamin 1 Tablet(s) Oral daily  norepinephrine Infusion 0.25 MICROgram(s)/kG/Min IV Continuous <Continuous>  pantoprazole    Tablet 40 milliGRAM(s) Oral before breakfast  potassium chloride    Tablet ER 40 milliEquivalent(s) Oral once  sodium bicarbonate 650 milliGRAM(s) Oral every 8 hours    PRN MEDICATIONS  acetaminophen     Tablet .. 650 milliGRAM(s) Oral every 6 hours PRN  loperamide 2 milliGRAM(s) Oral every 6 hours PRN    VITALS:  T(F): 94  HR: 94  BP: 92/63  RR: 20  SpO2: 100%    <<<<<LABS>>>>>                        8.2    5.09  )-----------( 41       ( 19 Feb 2022 23:11 )             23.3     02-20    148<H>  |  122<H>  |  5<L>  ----------------------------<  69<L>  3.4<L>   |  17  |  1.0    Ca    7.5<L>      20 Feb 2022 01:44  Mg     2.1     02-20    TPro  5.2<L>  /  Alb  1.5<L>  /  TBili  0.6  /  DBili  x   /  AST  18  /  ALT  12  /  AlkPhos  68  02-20    PT/INR - ( 19 Feb 2022 23:11 )   PT: 19.80 sec;   INR: 1.73 ratio         PTT - ( 19 Feb 2022 23:11 )  PTT:69.6 sec        [id number]        <<<<<RADIOLOGY>>>>>    < from: Xray Chest 1 View- PORTABLE-Routine (Xray Chest 1 View- PORTABLE-Routine in AM.) (02.17.22 @ 08:38) >  Impression:    Interval development left midlung zone opacity and blunted left   costophrenic angle (consistent with pleural effusion).. No pneumothorax.    --- End of Report ---    < end of copied text >      <<<<<PHYSICAL EXAM>>>>>  GENERAL: Well developed, well nourished and in no acute distress. Resting comfortably in bed..  PULMONARY: Clear to auscultation bilaterally. No rales, rhonchi, or wheezing.  CARDIOVASCULAR: Regular rate and rhythm, S1-S2, no murmurs  GASTROINTESTINAL: Soft, non-tender, non-distended, no guarding.  SKIN/EXTREMITIES: No clubbing or edema  NEUROLOGIC/MUSCULOSKELETAL: AOx2, grossly moving all extremities, no focal deficits.      ----------------------------------------------------------------------------------------------------------------------------------------------------------------------------------------------- ----------Daily Progress Note----------    HISTORY OF PRESENT ILLNESS:  Patient is a 61y old Female who presents with a chief complaint of Polypectomy (19 Feb 2022 17:27)    Currently admitted to medicine with the primary diagnosis of      Patient is a 60 y/o female with PMHx of GIB, Bowel and Bladder incontinence, Smoking (3-4 cigarettes/day), ETOH abuse, hypothyroidism, and ongoing anemia transferred from [facility] for Polypectomy. Patient was admitted to [facility] on 01/31 for AMS, weakness and decreased appetite. She was found to have UTI, NSTEMI, and electrolyte imbalance. During her stay at [facility] she had a colonoscopy done which revealed 1 large 3-4 cm sigmoid pedunculated polyp and 2 polyps (7, 14 mm) in descending colon. Patient was transferred as they are not able to perform large Polypectomies there. She was not able to be discharged as ongoing anemia and concern that the cause are these large polyps. Patient currently feels well with no complaints. She is not a great historian and does not know alot of details regarding her recent admission to [facility] or overall health. Obtain full labs, can start clear liquids, obtain Cardiology clearance as had reported recent NSTEMI. Will optimize her for Colonoscopy with EMR and additional large Polypectomy.     Today is hospital day 4d.     INTERVAL HOSPITAL COURSE / OVERNIGHT EVENTS:    Patient was examined and seen at bedside. Overnight patient became hypothermic and hypotensive. Patient is in septic shock 2/2 unknown source. Patient was placed on Levo overnight.     Review of Systems: Patient remains weak and lethargic     <<<<<PAST MEDICAL & SURGICAL HISTORY>>>>>    ALLERGIES  No Known Allergies      Home Medications:        MEDICATIONS  STANDING MEDICATIONS  ampicillin/sulbactam  IVPB      ampicillin/sulbactam  IVPB 3 Gram(s) IV Intermittent every 6 hours  folic acid 1 milliGRAM(s) Oral daily  lactated ringers. 1000 milliLiter(s) IV Continuous <Continuous>  levothyroxine 50 MICROGram(s) Oral daily  midodrine. 15 milliGRAM(s) Oral three times a day  multivitamin 1 Tablet(s) Oral daily  norepinephrine Infusion 0.25 MICROgram(s)/kG/Min IV Continuous <Continuous>  pantoprazole    Tablet 40 milliGRAM(s) Oral before breakfast  potassium chloride    Tablet ER 40 milliEquivalent(s) Oral once  sodium bicarbonate 650 milliGRAM(s) Oral every 8 hours    PRN MEDICATIONS  acetaminophen     Tablet .. 650 milliGRAM(s) Oral every 6 hours PRN  loperamide 2 milliGRAM(s) Oral every 6 hours PRN    VITALS:  T(F): 94  HR: 94  BP: 92/63  RR: 20  SpO2: 100%    <<<<<LABS>>>>>                        8.2    5.09  )-----------( 41       ( 19 Feb 2022 23:11 )             23.3     02-20    148<H>  |  122<H>  |  5<L>  ----------------------------<  69<L>  3.4<L>   |  17  |  1.0    Ca    7.5<L>      20 Feb 2022 01:44  Mg     2.1     02-20    TPro  5.2<L>  /  Alb  1.5<L>  /  TBili  0.6  /  DBili  x   /  AST  18  /  ALT  12  /  AlkPhos  68  02-20    PT/INR - ( 19 Feb 2022 23:11 )   PT: 19.80 sec;   INR: 1.73 ratio         PTT - ( 19 Feb 2022 23:11 )  PTT:69.6 sec        [id number]        <<<<<RADIOLOGY>>>>>    < from: Xray Chest 1 View- PORTABLE-Routine (Xray Chest 1 View- PORTABLE-Routine in AM.) (02.17.22 @ 08:38) >  Impression:    Interval development left midlung zone opacity and blunted left   costophrenic angle (consistent with pleural effusion).. No pneumothorax.    --- End of Report ---    < end of copied text >      <<<<<PHYSICAL EXAM>>>>>  GENERAL: Well developed, well nourished and in no acute distress. Resting comfortably in bed..  PULMONARY: Clear to auscultation bilaterally. No rales, rhonchi, or wheezing.  CARDIOVASCULAR: Regular rate and rhythm, S1-S2, no murmurs  GASTROINTESTINAL: Soft, non-tender, non-distended, no guarding.  SKIN/EXTREMITIES: No clubbing or edema  NEUROLOGIC/MUSCULOSKELETAL: AOx1      -----------------------------------------------------------------------------------------------------------------------------------------------------------------------------------------------

## 2022-02-21 LAB
ALBUMIN SERPL ELPH-MCNC: 1.2 G/DL — LOW (ref 3.5–5.2)
ALBUMIN SERPL ELPH-MCNC: 1.4 G/DL — LOW (ref 3.5–5.2)
ALP SERPL-CCNC: 68 U/L — SIGNIFICANT CHANGE UP (ref 30–115)
ALP SERPL-CCNC: 69 U/L — SIGNIFICANT CHANGE UP (ref 30–115)
ALT FLD-CCNC: 11 U/L — SIGNIFICANT CHANGE UP (ref 0–41)
ALT FLD-CCNC: 12 U/L — SIGNIFICANT CHANGE UP (ref 0–41)
ANION GAP SERPL CALC-SCNC: 8 MMOL/L — SIGNIFICANT CHANGE UP (ref 7–14)
ANION GAP SERPL CALC-SCNC: 8 MMOL/L — SIGNIFICANT CHANGE UP (ref 7–14)
AST SERPL-CCNC: 14 U/L — SIGNIFICANT CHANGE UP (ref 0–41)
AST SERPL-CCNC: 15 U/L — SIGNIFICANT CHANGE UP (ref 0–41)
BILIRUB SERPL-MCNC: 0.7 MG/DL — SIGNIFICANT CHANGE UP (ref 0.2–1.2)
BILIRUB SERPL-MCNC: 0.9 MG/DL — SIGNIFICANT CHANGE UP (ref 0.2–1.2)
BUN SERPL-MCNC: 5 MG/DL — LOW (ref 10–20)
BUN SERPL-MCNC: 5 MG/DL — LOW (ref 10–20)
CALCIUM SERPL-MCNC: 6.7 MG/DL — LOW (ref 8.5–10.1)
CALCIUM SERPL-MCNC: 7.2 MG/DL — LOW (ref 8.5–10.1)
CHLORIDE SERPL-SCNC: 110 MMOL/L — SIGNIFICANT CHANGE UP (ref 98–110)
CHLORIDE SERPL-SCNC: 117 MMOL/L — HIGH (ref 98–110)
CO2 SERPL-SCNC: 22 MMOL/L — SIGNIFICANT CHANGE UP (ref 17–32)
CO2 SERPL-SCNC: 24 MMOL/L — SIGNIFICANT CHANGE UP (ref 17–32)
CREAT SERPL-MCNC: 1 MG/DL — SIGNIFICANT CHANGE UP (ref 0.7–1.5)
CREAT SERPL-MCNC: 1.1 MG/DL — SIGNIFICANT CHANGE UP (ref 0.7–1.5)
GAS PNL BLDA: SIGNIFICANT CHANGE UP
GLUCOSE SERPL-MCNC: 149 MG/DL — HIGH (ref 70–99)
GLUCOSE SERPL-MCNC: 158 MG/DL — HIGH (ref 70–99)
HCT VFR BLD CALC: 22.8 % — LOW (ref 37–47)
HCT VFR BLD CALC: 24.1 % — LOW (ref 37–47)
HGB BLD-MCNC: 8.2 G/DL — LOW (ref 12–16)
HGB BLD-MCNC: 8.5 G/DL — LOW (ref 12–16)
LACTATE SERPL-SCNC: 3.5 MMOL/L — HIGH (ref 0.7–2)
LACTATE SERPL-SCNC: 4.8 MMOL/L — CRITICAL HIGH (ref 0.7–2)
MAGNESIUM SERPL-MCNC: 2.1 MG/DL — SIGNIFICANT CHANGE UP (ref 1.8–2.4)
MCHC RBC-ENTMCNC: 30.1 PG — SIGNIFICANT CHANGE UP (ref 27–31)
MCHC RBC-ENTMCNC: 30.9 PG — SIGNIFICANT CHANGE UP (ref 27–31)
MCHC RBC-ENTMCNC: 35.3 G/DL — SIGNIFICANT CHANGE UP (ref 32–37)
MCHC RBC-ENTMCNC: 36 G/DL — SIGNIFICANT CHANGE UP (ref 32–37)
MCV RBC AUTO: 85.5 FL — SIGNIFICANT CHANGE UP (ref 81–99)
MCV RBC AUTO: 86 FL — SIGNIFICANT CHANGE UP (ref 81–99)
NRBC # BLD: 0 /100 WBCS — SIGNIFICANT CHANGE UP (ref 0–0)
NRBC # BLD: 0 /100 WBCS — SIGNIFICANT CHANGE UP (ref 0–0)
PLATELET # BLD AUTO: 36 K/UL — LOW (ref 130–400)
PLATELET # BLD AUTO: 55 K/UL — LOW (ref 130–400)
POTASSIUM SERPL-MCNC: 2.9 MMOL/L — LOW (ref 3.5–5)
POTASSIUM SERPL-MCNC: 3.3 MMOL/L — LOW (ref 3.5–5)
POTASSIUM SERPL-SCNC: 2.9 MMOL/L — LOW (ref 3.5–5)
POTASSIUM SERPL-SCNC: 3.3 MMOL/L — LOW (ref 3.5–5)
PROT SERPL-MCNC: 4.7 G/DL — LOW (ref 6–8)
PROT SERPL-MCNC: 5.1 G/DL — LOW (ref 6–8)
PROT SERPL-MCNC: 5.1 G/DL — LOW (ref 6–8.3)
PROT SERPL-MCNC: 5.1 G/DL — LOW (ref 6–8.3)
RBC # BLD: 2.65 M/UL — LOW (ref 4.2–5.4)
RBC # BLD: 2.82 M/UL — LOW (ref 4.2–5.4)
RBC # FLD: 15.9 % — HIGH (ref 11.5–14.5)
RBC # FLD: 16 % — HIGH (ref 11.5–14.5)
SODIUM SERPL-SCNC: 142 MMOL/L — SIGNIFICANT CHANGE UP (ref 135–146)
SODIUM SERPL-SCNC: 147 MMOL/L — HIGH (ref 135–146)
WBC # BLD: 6.46 K/UL — SIGNIFICANT CHANGE UP (ref 4.8–10.8)
WBC # BLD: 8.11 K/UL — SIGNIFICANT CHANGE UP (ref 4.8–10.8)
WBC # FLD AUTO: 6.46 K/UL — SIGNIFICANT CHANGE UP (ref 4.8–10.8)
WBC # FLD AUTO: 8.11 K/UL — SIGNIFICANT CHANGE UP (ref 4.8–10.8)

## 2022-02-21 PROCEDURE — 74177 CT ABD & PELVIS W/CONTRAST: CPT | Mod: 26

## 2022-02-21 PROCEDURE — 99223 1ST HOSP IP/OBS HIGH 75: CPT

## 2022-02-21 PROCEDURE — 74018 RADEX ABDOMEN 1 VIEW: CPT | Mod: 26

## 2022-02-21 PROCEDURE — 71045 X-RAY EXAM CHEST 1 VIEW: CPT | Mod: 26,76

## 2022-02-21 PROCEDURE — 99232 SBSQ HOSP IP/OBS MODERATE 35: CPT

## 2022-02-21 PROCEDURE — 93970 EXTREMITY STUDY: CPT | Mod: 26

## 2022-02-21 PROCEDURE — 99233 SBSQ HOSP IP/OBS HIGH 50: CPT

## 2022-02-21 PROCEDURE — 95819 EEG AWAKE AND ASLEEP: CPT | Mod: 26

## 2022-02-21 RX ORDER — CHLORHEXIDINE GLUCONATE 213 G/1000ML
1 SOLUTION TOPICAL DAILY
Refills: 0 | Status: DISCONTINUED | OUTPATIENT
Start: 2022-02-21 | End: 2022-03-26

## 2022-02-21 RX ORDER — POTASSIUM CHLORIDE 20 MEQ
20 PACKET (EA) ORAL ONCE
Refills: 0 | Status: COMPLETED | OUTPATIENT
Start: 2022-02-21 | End: 2022-02-21

## 2022-02-21 RX ORDER — NYSTATIN CREAM 100000 [USP'U]/G
1 CREAM TOPICAL
Refills: 0 | Status: DISCONTINUED | OUTPATIENT
Start: 2022-02-21 | End: 2022-03-18

## 2022-02-21 RX ORDER — FUROSEMIDE 40 MG
20 TABLET ORAL ONCE
Refills: 0 | Status: COMPLETED | OUTPATIENT
Start: 2022-02-21 | End: 2022-02-21

## 2022-02-21 RX ORDER — MULTIVIT-MIN/FERROUS GLUCONATE 9 MG/15 ML
1 LIQUID (ML) ORAL DAILY
Refills: 0 | Status: DISCONTINUED | OUTPATIENT
Start: 2022-02-21 | End: 2022-03-26

## 2022-02-21 RX ORDER — POTASSIUM CHLORIDE 20 MEQ
20 PACKET (EA) ORAL
Refills: 0 | Status: COMPLETED | OUTPATIENT
Start: 2022-02-21 | End: 2022-02-21

## 2022-02-21 RX ORDER — NYSTATIN/TRIAMCINOLONE ACET
1 OINTMENT (GRAM) TOPICAL
Refills: 0 | Status: DISCONTINUED | OUTPATIENT
Start: 2022-02-21 | End: 2022-02-21

## 2022-02-21 RX ORDER — PIPERACILLIN AND TAZOBACTAM 4; .5 G/20ML; G/20ML
3.38 INJECTION, POWDER, LYOPHILIZED, FOR SOLUTION INTRAVENOUS ONCE
Refills: 0 | Status: COMPLETED | OUTPATIENT
Start: 2022-02-21 | End: 2022-02-21

## 2022-02-21 RX ORDER — THIAMINE MONONITRATE (VIT B1) 100 MG
100 TABLET ORAL DAILY
Refills: 0 | Status: DISCONTINUED | OUTPATIENT
Start: 2022-02-21 | End: 2022-03-03

## 2022-02-21 RX ORDER — MORPHINE SULFATE 50 MG/1
2 CAPSULE, EXTENDED RELEASE ORAL ONCE
Refills: 0 | Status: DISCONTINUED | OUTPATIENT
Start: 2022-02-21 | End: 2022-02-21

## 2022-02-21 RX ORDER — SALICYLIC ACID 0.5 %
1 CLEANSER (GRAM) TOPICAL DAILY
Refills: 0 | Status: DISCONTINUED | OUTPATIENT
Start: 2022-02-21 | End: 2022-03-26

## 2022-02-21 RX ORDER — PIPERACILLIN AND TAZOBACTAM 4; .5 G/20ML; G/20ML
4.5 INJECTION, POWDER, LYOPHILIZED, FOR SOLUTION INTRAVENOUS EVERY 6 HOURS
Refills: 0 | Status: DISCONTINUED | OUTPATIENT
Start: 2022-02-21 | End: 2022-02-22

## 2022-02-21 RX ORDER — IOHEXOL 300 MG/ML
30 INJECTION, SOLUTION INTRAVENOUS ONCE
Refills: 0 | Status: COMPLETED | OUTPATIENT
Start: 2022-02-21 | End: 2022-02-21

## 2022-02-21 RX ORDER — SODIUM CHLORIDE 9 MG/ML
1000 INJECTION, SOLUTION INTRAVENOUS
Refills: 0 | Status: DISCONTINUED | OUTPATIENT
Start: 2022-02-21 | End: 2022-02-22

## 2022-02-21 RX ADMIN — IOHEXOL 30 MILLILITER(S): 300 INJECTION, SOLUTION INTRAVENOUS at 12:14

## 2022-02-21 RX ADMIN — Medication 20 MILLIGRAM(S): at 02:08

## 2022-02-21 RX ADMIN — PIPERACILLIN AND TAZOBACTAM 25 GRAM(S): 4; .5 INJECTION, POWDER, LYOPHILIZED, FOR SOLUTION INTRAVENOUS at 23:43

## 2022-02-21 RX ADMIN — MORPHINE SULFATE 2 MILLIGRAM(S): 50 CAPSULE, EXTENDED RELEASE ORAL at 19:50

## 2022-02-21 RX ADMIN — Medication 50 MILLIEQUIVALENT(S): at 11:55

## 2022-02-21 RX ADMIN — PIPERACILLIN AND TAZOBACTAM 25 GRAM(S): 4; .5 INJECTION, POWDER, LYOPHILIZED, FOR SOLUTION INTRAVENOUS at 17:04

## 2022-02-21 RX ADMIN — Medication 101 MILLIGRAM(S): at 16:18

## 2022-02-21 RX ADMIN — Medication 250 MILLIGRAM(S): at 05:15

## 2022-02-21 RX ADMIN — MORPHINE SULFATE 2 MILLIGRAM(S): 50 CAPSULE, EXTENDED RELEASE ORAL at 19:52

## 2022-02-21 RX ADMIN — CEFEPIME 100 MILLIGRAM(S): 1 INJECTION, POWDER, FOR SOLUTION INTRAMUSCULAR; INTRAVENOUS at 05:15

## 2022-02-21 RX ADMIN — Medication 1 APPLICATION(S): at 17:04

## 2022-02-21 RX ADMIN — Medication 25 MICROGRAM(S): at 22:00

## 2022-02-21 RX ADMIN — Medication 50 MILLIEQUIVALENT(S): at 07:00

## 2022-02-21 RX ADMIN — NYSTATIN CREAM 1 APPLICATION(S): 100000 CREAM TOPICAL at 17:04

## 2022-02-21 RX ADMIN — CHLORHEXIDINE GLUCONATE 1 APPLICATION(S): 213 SOLUTION TOPICAL at 12:16

## 2022-02-21 RX ADMIN — PANTOPRAZOLE SODIUM 40 MILLIGRAM(S): 20 TABLET, DELAYED RELEASE ORAL at 12:15

## 2022-02-21 RX ADMIN — Medication 250 MILLIGRAM(S): at 17:03

## 2022-02-21 RX ADMIN — Medication 50 MILLIEQUIVALENT(S): at 10:39

## 2022-02-21 RX ADMIN — PIPERACILLIN AND TAZOBACTAM 200 GRAM(S): 4; .5 INJECTION, POWDER, LYOPHILIZED, FOR SOLUTION INTRAVENOUS at 15:52

## 2022-02-21 RX ADMIN — Medication 100 MILLIEQUIVALENT(S): at 23:46

## 2022-02-21 NOTE — PROGRESS NOTE ADULT - SUBJECTIVE AND OBJECTIVE BOX
Gastroenterology progress note:     Patient is a 61y old  Female who presents with a chief complaint of Polypectomy (21 Feb 2022 06:05)       Admitted on: 02-16-22    We are following the patient for: post emr septic shock       Interval History:    No acute events overnight.   - Diet - NPO  - last BM - 1 day ago brown  - Abdominal pain - none appreciated      PAST MEDICAL & SURGICAL HISTORY:      MEDICATIONS  (STANDING):  cefepime   IVPB      cefepime   IVPB 1000 milliGRAM(s) IV Intermittent every 12 hours  chlorhexidine 4% Liquid 1 Application(s) Topical daily  dextrose 5% 1000 milliLiter(s) (100 mL/Hr) IV Continuous <Continuous>  iohexol 300 mG (iodine)/mL Oral Solution 30 milliLiter(s) Oral once  levothyroxine Injectable 25 MICROGram(s) IV Push at bedtime  LORazepam   Injectable 2 milliGRAM(s) IV Push once  norepinephrine Infusion 0.05 MICROgram(s)/kG/Min (2.81 mL/Hr) IV Continuous <Continuous>  pantoprazole  Injectable 40 milliGRAM(s) IV Push daily  phytonadione  IVPB 5 milliGRAM(s) IV Intermittent daily  potassium chloride  20 mEq/100 mL IVPB 20 milliEquivalent(s) IV Intermittent every 2 hours  vancomycin  IVPB      vancomycin  IVPB 1000 milliGRAM(s) IV Intermittent every 12 hours    MEDICATIONS  (PRN):      Allergies  No Known Allergies      Review of Systems:   not communicating    Physical Examination:  T(C): 36.4 (02-21-22 @ 08:00), Max: 36.7 (02-21-22 @ 00:00)  HR: 101 (02-21-22 @ 08:00) (86 - 118)  BP: 109/65 (02-21-22 @ 08:00) (79/53 - 118/52)  RR: 21 (02-21-22 @ 08:00) (18 - 27)  SpO2: 100% (02-21-22 @ 08:00) (100% - 100%)      02-20-22 @ 07:01  -  02-21-22 @ 07:00  --------------------------------------------------------  IN: 2988.7 mL / OUT: 775 mL / NET: 2213.7 mL    02-21-22 @ 07:01  -  02-21-22 @ 10:19  --------------------------------------------------------  IN: 282 mL / OUT: 30 mL / NET: 252 mL        GENERAL: ANAD  HEAD:  Atraumatic, Normocephalic  EYES: conjunctiva and sclera clear  NECK: Supple, no JVD or thyromegaly  CHEST/LUNG: Clear to auscultation bilaterally; No wheeze, rhonchi, or rales  HEART: Regular rate and rhythm; normal S1, S2, No murmurs.  ABDOMEN: Soft, nontender, nondistended; Bowel sounds present no rigidity or guarding  NEUROLOGY: No asterixis or tremor.   SKIN: Intact, no jaundice     Data:                        8.5    6.46  )-----------( 55       ( 21 Feb 2022 06:03 )             24.1     Hgb trend:  8.5  02-21-22 @ 06:03  8.9  02-20-22 @ 09:30  8.2  02-19-22 @ 23:11  8.1  02-19-22 @ 16:00  7.7  02-19-22 @ 04:30  8.7  02-18-22 @ 12:43        02-21    147<H>  |  117<H>  |  5<L>  ----------------------------<  158<H>  2.9<L>   |  22  |  1.0    Ca    7.2<L>      21 Feb 2022 06:03  Mg     2.1     02-21    TPro  5.1<L>  /  Alb  1.4<L>  /  TBili  0.7  /  DBili  x   /  AST  14  /  ALT  12  /  AlkPhos  69  02-21    Liver panel trend:  TBili 0.7   /   AST 14   /   ALT 12   /   AlkP 69   /   Tptn 5.1   /   Alb 1.4    /   DBili --      02-21  TBili 0.7   /   AST 19   /   ALT 12   /   AlkP 69   /   Tptn 5.5   /   Alb 1.4    /   DBili --      02-20  TBili 0.6   /   AST 18   /   ALT 12   /   AlkP 68   /   Tptn 5.2   /   Alb 1.5    /   DBili --      02-20  TBili 0.5   /   AST 20   /   ALT 11   /   AlkP 67   /   Tptn 4.8   /   Alb 1.2    /   DBili --      02-19  TBili 1.0   /   AST 21   /   ALT 13   /   AlkP 81   /   Tptn 5.8   /   Alb 1.5    /   DBili --      02-18  TBili 1.3   /   AST 14   /   ALT 12   /   AlkP 63   /   Tptn 5.5   /   Alb 1.6    /   DBili --      02-17      PT/INR - ( 19 Feb 2022 23:11 )   PT: 19.80 sec;   INR: 1.73 ratio         PTT - ( 19 Feb 2022 23:11 )  PTT:69.6 sec    Culture - Blood (collected 19 Feb 2022 23:11)  Source: .Blood Blood  Preliminary Report (21 Feb 2022 09:01):    No growth to date.         Radiology:

## 2022-02-21 NOTE — PROGRESS NOTE ADULT - SUBJECTIVE AND OBJECTIVE BOX
EKG transmitted   Patient is a 61y old  Female who presents with a chief complaint of Polypectomy (20 Feb 2022 11:41)        HPI:  Pt is a 62 yo F with PMH of GIB, Bowel and Bladder incontinence, Smoking (3-4 cigarettes/day), ETOH abuse, hypothyroidism anemia transferred from [facility] for Polypectomy. Pt is from home living with her son and bedbound at baseline. Pt was admitted to [facility] on 01/31 for AMS, weakness and decreased appetite. She was found to have UTI, NSTEMI, and electrolyte imbalance. During her stay at [facility] pt had colonoscopy done which revealed 1 large 3-4 cm sigmoid pedunculated polyp and 2 polyps (7, 14 mm) in descending colon s/p bx. Pt was transferred to [facility] for polyp removal.  (16 Feb 2022 23:24)      Pt evaluated on rounds.  I reviewed the radiology tests and hospital record.    I reviewed previous notes on this patient.    Interval Events: No overnight events.      REVIEW OF SYSTEMS:   see HPI      OBJECTIVE:  ICU Vital Signs Last 24 Hrs  T(C): 36.7 (21 Feb 2022 04:00), Max: 36.7 (21 Feb 2022 00:00)  T(F): 98.1 (21 Feb 2022 04:00), Max: 98.1 (21 Feb 2022 00:00)  HR: 118 (21 Feb 2022 05:00) (84 - 118)  BP: 118/52 (21 Feb 2022 05:00) (61/39 - 119/73)  BP(mean): 75 (21 Feb 2022 05:00) (61 - 88)  RR: 27 (21 Feb 2022 05:00) (18 - 27)  SpO2: 100% (21 Feb 2022 05:00) (88% - 100%)        02-20 @ 07:01  -  02-21 @ 06:06  --------------------------------------------------------  IN: 2556.7 mL / OUT: 655 mL / NET: 1901.7 mL      CAPILLARY BLOOD GLUCOSE      POCT Blood Glucose.: 190 mg/dL (20 Feb 2022 17:48)        PHYSICAL EXAM:       · ENMT:   Airway patent,   Nasal mucosa clear.  Mouth with normal mucosa.   No thrush    · EYES:   Clear bilaterally,   pupils equal,   round and reactive to light.    · CARDIAC:   Normal rate,   regular rhythm.    Heart sounds S1, S2.   No murmurs, no rubs or gallops on auscultation  no edema        CAROTID:   normal systolic impulse  no bruits    · RESPIRATORY:   rales  normal chest expansion  no retractions or use of accessory muscles  palpation of chest is normal with no fremitus  percussion of chest demonstrates no hyperresonance or dullness    · GASTROINTESTINAL:  Abdomen soft,   non-tender,   + BS  liver/spleen not palpable    · MUSCULOSKELETAL:   no clubbing, cyanosis      · SKIN:   Skin normal color for race,   warm, dry   No evidence of rash.        · HEME LYMPH:   no splenomegaly.  No cervical  lymphadenopathy.  no inguinal lymphadenopathy    HOSPITAL MEDICATIONS:  MEDICATIONS  (STANDING):  cefepime   IVPB      cefepime   IVPB 1000 milliGRAM(s) IV Intermittent every 12 hours  chlorhexidine 4% Liquid 1 Application(s) Topical daily  dextrose 5% 1000 milliLiter(s) (100 mL/Hr) IV Continuous <Continuous>  levothyroxine Injectable 25 MICROGram(s) IV Push at bedtime  LORazepam   Injectable 2 milliGRAM(s) IV Push once  norepinephrine Infusion 0.05 MICROgram(s)/kG/Min (2.81 mL/Hr) IV Continuous <Continuous>  pantoprazole  Injectable 40 milliGRAM(s) IV Push daily  phytonadione  IVPB 5 milliGRAM(s) IV Intermittent daily  vancomycin  IVPB      vancomycin  IVPB 1000 milliGRAM(s) IV Intermittent every 12 hours    MEDICATIONS  (PRN):    lactated ringers Bolus:   500 milliLiter(s), IV Bolus, once, infuse over 30 Minute(s), Stop After 1 Doses  lactated ringers.: Solution, 1000 milliLiter(s) infuse at 100 mL/Hr  lactated ringers Bolus:   1000 milliLiter(s), IV Bolus, once, infuse over 60 Minute(s), Stop After 1 Doses  lactated ringers Bolus:   500 milliLiter(s), IV Bolus, once, infuse over 60 Minute(s), Stop After 1 Doses  lactated ringers.: Solution, 1000 milliLiter(s) infuse at 75 mL/Hr  Provider's Contact #: [phone]  sodium chloride 0.45%.: Solution, 1000 milliLiter(s) infuse at 75 mL/Hr, Stop After 1 Days      LABS:                        8.9    3.87  )-----------( 48       ( 20 Feb 2022 09:30 )             25.4     02-20    149<H>  |  123<H>  |  5<L>  ----------------------------<  86  4.2   |  15<L>  |  1.0    Ca    7.5<L>      20 Feb 2022 09:30  Mg     1.8     02-20    TPro  5.5<L>  /  Alb  1.4<L>  /  TBili  0.7  /  DBili  x   /  AST  19  /  ALT  12  /  AlkPhos  69  02-20    PT/INR - ( 19 Feb 2022 23:11 )   PT: 19.80 sec;   INR: 1.73 ratio         PTT - ( 19 Feb 2022 23:11 )  PTT:69.6 sec              COVID-19 PCR: NotDetec (20 Feb 2022 09:16)            RADIOLOGY: Today I personally interpreted the latest CXR and other pertinent films.

## 2022-02-21 NOTE — DIETITIAN INITIAL EVALUATION ADULT. - OTHER INFO
Patient is a 61F PMH GIB, Bowel and Bladder incontinence, Smoking (3-4 cigarettes/day), ETOH abuse, hypothyroidism, and ongoing anemia, admitted to [facility] 1/31 for ams found to have UTI. Dev GIB, colonoscopy showing polyps, transferred to [facility] for polypectomy now upgraded to CCU for hypotension, likely septic shock 2/2 aspiration PNA. Currently on pressor.     S/P  colonoscopy and polypectomy 2/18/2022. Found with polyp (13 mm) in the descending colon. (hot snare Polypectomy) polyp (25 mm to 30 mm) in the rectosigmoid junction at 20 cm from the anal verge. (Endoloop, Polypectomy, Endoclip).     2/21: overnight pt had abdominal tenderness, KUB ordered showing paraspinal lucencies concerning for possible pneumoretroperitoneum.    - CTAP with PO contrast ordered to RO obstruction (NGT placed to give contrast as pt altered, unable to take PO), pending result    GI following- no GIB, CTAP w/ PO and IV contrast   Heme/Onc following with dx Normocytic Anemia with recent polypectomy; pending anemia work up    ICU Vital Signs Last 24 Hrs  T(C): 36.7 (21 Feb 2022 12:00), Max: 36.7 (21 Feb 2022 00:00)  T(F): 98 (21 Feb 2022 12:00), Max: 98.1 (21 Feb 2022 00:00)  HR: 96 (21 Feb 2022 16:00) (86 - 118)  BP: 102/61 (21 Feb 2022 16:00) (79/53 - 118/52)  BP(mean): 77 (21 Feb 2022 16:00) (61 - 83)  ABP: --  ABP(mean): --  RR: 19 (21 Feb 2022 16:00) (18 - 33)  SpO2: 100% (21 Feb 2022 16:00) (97% - 100%) Ntr hx cont- Pt eats a regular diet at home. No c/o chewing/swallowing issues, but likes soft foods such as chicken noodle soup. Pt also noted with 3-4x watery/loose bloody BMs daily. Last ETOH intake was May 2021 or before. Ht 5'9". No food allergy/intolerance. No dietary restrictions. Takes certain type of vitamin but son unsure which one. Son states pt did not like Ensure supplement when he offered it to her.    Patient is a 61F PMH GIB, Bowel and Bladder incontinence, Smoking (3-4 cigarettes/day), ETOH abuse, hypothyroidism, and ongoing anemia, admitted to [facility] 1/31 for ams found to have UTI. Dev GIB, colonoscopy showing polyps, transferred to [facility] for polypectomy now upgraded to CCU for hypotension, likely septic shock 2/2 aspiration PNA. Currently on pressor.     S/P  colonoscopy and polypectomy 2/18/2022. Found with polyp (13 mm) in the descending colon. (hot snare Polypectomy) polyp (25 mm to 30 mm) in the rectosigmoid junction at 20 cm from the anal verge. (Endoloop, Polypectomy, Endoclip).     2/21: overnight pt had abdominal tenderness, KUB ordered showing paraspinal lucencies concerning for possible pneumoretroperitoneum.    - CTAP with PO contrast ordered to RO obstruction (NGT placed to give contrast as pt altered, unable to take PO), pending result    GI following- no GIB, CTAP w/ PO and IV contrast   Heme/Onc following with dx Normocytic Anemia with recent polypectomy; pending anemia work up    ICU Vital Signs Last 24 Hrs  T(C): 36.7 (21 Feb 2022 12:00), Max: 36.7 (21 Feb 2022 00:00)  T(F): 98 (21 Feb 2022 12:00), Max: 98.1 (21 Feb 2022 00:00)  HR: 96 (21 Feb 2022 16:00) (86 - 118)  BP: 102/61 (21 Feb 2022 16:00) (79/53 - 118/52)  BP(mean): 77 (21 Feb 2022 16:00) (61 - 83)  ABP: --  ABP(mean): --  RR: 19 (21 Feb 2022 16:00) (18 - 33)  SpO2: 100% (21 Feb 2022 16:00) (97% - 100%)    I&O's Summary  20 Feb 2022 07:01  -  21 Feb 2022 07:00  --------------------------------------------------------  IN: 2988.7 mL / OUT: 775 mL / NET: 2213.7 mL    Assessment using lowest weight taken in-house 57.6 kg (2/20).

## 2022-02-21 NOTE — DIETITIAN INITIAL EVALUATION ADULT. - PERTINENT MEDS FT
:  dextrose 5% 1000 milliLiter(s) (100 mL/Hr) IV Continuous <Continuous>  levothyroxine Injectable 25 MICROGram(s) IV Push at bedtime  norepinephrine Infusion 0.05 MICROgram(s)/kG/Min (2.81 mL/Hr) IV Continuous <Continuous>  pantoprazole  Injectable 40 milliGRAM(s) IV Push daily  phytonadione  IVPB 5 milliGRAM(s) IV Intermittent daily *vit K  piperacillin/tazobactam IVPB.. 4.5 Gram(s) IV Intermittent every 6 hours  vancomycin  IVPB 1000 milliGRAM(s) IV Intermittent every 12 hours    MEDICATIONS  (PRN):

## 2022-02-21 NOTE — PROGRESS NOTE ADULT - SUBJECTIVE AND OBJECTIVE BOX
SANTIAGO CALIXTO 61y Female  MRN#: [id number]   CODE STATUS: FULL     Hospital Day: 5d    Pt is currently admitted with the primary diagnosis of     SUBJECTIVE  Hospital Course: Patient is a 60 y/o female with PMHx of GIB, Bowel and Bladder incontinence, Smoking (3-4 cigarettes/day), ETOH abuse, hypothyroidism, and ongoing anemia transferred from [facility] for Polypectomy. Patient was admitted to [facility] on 01/31 for AMS, weakness and decreased appetite. She was found to have UTI, NSTEMI, and electrolyte imbalance. During her stay at [facility] she had a colonoscopy done which revealed 1 large 3-4 cm sigmoid pedunculated polyp and 2 polyps (7, 14 mm) in descending colon. Patient was transferred as they are not able to perform large Polypectomies there. She was not able to be discharged as ongoing anemia and concern that the cause are these large polyps.    Patient had a colonoscopy and polypectomy done on 2/18/2022. Polyp (13 mm) in the descending colon. (hot snare Polypectomy).  Polyp (25 mm to 30 mm) in the rectosigmoid junction at 20 cm from the anal verge. (Endoloop, Polypectomy, Endoclip).   After the colonoscopy patient became hypothermic and hypotensive. Over night patient was started to Levo and warming blanket.   2/20 AM patient had RRT for desaturation and hypotension. Patient was placed on 50% O2, patient had good response. Levophed also started to raise BP. Patient remains altered, not speaking, but remains somewhat awake and just makes grunting sounds. Patient is not following commands. Central line was placed. Spoke to ICU fellow, patient is being upgraded to ICU for closer monitoring.     Overnight events: none    Interval hx/Subjective complaints: Pt lethargic, not answering questions or following commands. Unable to obtain subjective status however resting comfortably in nad.    ----------------------------------------------------------  OBJECTIVE  PAST MEDICAL & SURGICAL HISTORY                                            -----------------------------------------------------------  ALLERGIES:  No Known Allergies                                            ------------------------------------------------------------    HOME MEDICATIONS  Home Medications:                           MEDICATIONS:  STANDING MEDICATIONS  cefepime   IVPB      cefepime   IVPB 1000 milliGRAM(s) IV Intermittent every 12 hours  chlorhexidine 4% Liquid 1 Application(s) Topical daily  dextrose 5% 1000 milliLiter(s) IV Continuous <Continuous>  iohexol 300 mG (iodine)/mL Oral Solution 30 milliLiter(s) Oral once  levothyroxine Injectable 25 MICROGram(s) IV Push at bedtime  LORazepam   Injectable 2 milliGRAM(s) IV Push once  norepinephrine Infusion 0.05 MICROgram(s)/kG/Min IV Continuous <Continuous>  pantoprazole  Injectable 40 milliGRAM(s) IV Push daily  phytonadione  IVPB 5 milliGRAM(s) IV Intermittent daily  potassium chloride  20 mEq/100 mL IVPB 20 milliEquivalent(s) IV Intermittent every 2 hours  vancomycin  IVPB      vancomycin  IVPB 1000 milliGRAM(s) IV Intermittent every 12 hours    PRN MEDICATIONS                                            ------------------------------------------------------------  VITAL SIGNS: Last 24 Hours  T(C): 36.4 (21 Feb 2022 08:00), Max: 36.7 (21 Feb 2022 00:00)  T(F): 97.5 (21 Feb 2022 08:00), Max: 98.1 (21 Feb 2022 00:00)  HR: 98 (21 Feb 2022 10:00) (86 - 118)  BP: 104/58 (21 Feb 2022 10:00) (79/53 - 118/52)  BP(mean): 75 (21 Feb 2022 10:00) (61 - 81)  RR: 20 (21 Feb 2022 10:00) (18 - 27)  SpO2: 100% (21 Feb 2022 10:00) (100% - 100%)      02-20-22 @ 07:01  -  02-21-22 @ 07:00  --------------------------------------------------------  IN: 2988.7 mL / OUT: 775 mL / NET: 2213.7 mL    02-21-22 @ 07:01  -  02-21-22 @ 11:11  --------------------------------------------------------  IN: 560 mL / OUT: 90 mL / NET: 470 mL                                             --------------------------------------------------------------  LABS:                        8.5    6.46  )-----------( 55       ( 21 Feb 2022 06:03 )             24.1     02-21    147<H>  |  117<H>  |  5<L>  ----------------------------<  158<H>  2.9<L>   |  22  |  1.0    Ca    7.2<L>      21 Feb 2022 06:03  Mg     2.1     02-21    TPro  5.1<L>  /  Alb  1.4<L>  /  TBili  0.7  /  DBili  x   /  AST  14  /  ALT  12  /  AlkPhos  69  02-21    PT/INR - ( 19 Feb 2022 23:11 )   PT: 19.80 sec;   INR: 1.73 ratio         PTT - ( 19 Feb 2022 23:11 )  PTT:69.6 sec            Culture - Blood (collected 19 Feb 2022 23:11)  Source: .Blood Blood  Preliminary Report (21 Feb 2022 09:01):    No growth to date.   -------------------------------------------------------------  RADIOLOGY:  < from: Xray Kidney Ureter Bladder (02.21.22 @ 06:51) >  IMPRESSION:    Hazy paraspinal lucencies, possibly reflecting pneumo-retroperitoneum.   Consider further evaluation with CT abdomen and pelvis as clinically   warranted.    Nonobstructive bowel gas pattern.    Pelvic calcifications, unchanged from referenced CT. Catheter overlies   pelvic midline.    < end of copied text >                                            --------------------------------------------------------------  PHYSICAL EXAM:  General: Ill appearing woman laying in bed wearing venti mask in nad  HEENT: ncat, perrl, mmm  LUNGS: dec bs over left base, otherwise ctab  HEART: s1s2, rrr, no m/r/g  ABDOMEN: soft, ntnd, bs+  EXT: wwp, no cyanosis, clubbing, edema  NEURO: aox0, moves all extremities   SKIN: superficial scabs diffusely over body, most prominant over chest, as well as areas of desquamation over arms, legs, and hips c/w exfoliative dermatitis   Lines: [facility] CV c/d/i   Drips: levo @0.764                                         --------------------------------------------------------------    ASSESSMENT & PLAN  61F PMH GIB, Bowel and Bladder incontinence, Smoking (3-4 cigarettes/day), ETOH abuse, hypothyroidism, and ongoing anemia, admitted to [facility] 1/31 for ams found to have UTI. Dev GIB, colonoscopy showing polyps, transferred to [facility] for polypectomy now upgraded to CCU for hypotension, likely septic shock 2/2 aspiration pna.     #AMS with unclear etiology   #sepsis w/ shock/hypothermia vs aspiration after procedure under anesthesia 2/18 vs CVA   - Pt upgraded from floor on 2/20 after RRT for hypotension and desaturation. RIJ CVC placed, started on levo, venti mask, o2 and hypotension subsequently improved   - CTH 2/20: no ICH, mass effect, or midline shift   - EEG 2/20: generalized slowing    - UCx 2/18 negative   - BCx 2/17, 2/19: NGTD  - COVID negative 2/20  - MRSA nares 2/20   - c/w cefepime 1000mg q12h   - c/w vancomycin 1000mg q12h   - c/w levo @0.764, titrate down as tolerated   - f/u vanc trough   - neuro eval appreciated    #Exfoliative dermatitis   - pt with superficial scabs diffusely over body, most prominent over chest, as well as areas of desquamation over arms, legs, and hips   - Continue to monitor, if any worsening derm consult     #Acute thrombocytopenia  #Elevated INR/PTT not on a/c  - not on heparin this admission, unclear if was on previously at [facility]  - F/u DIC panel, HIT panel   - monitor INR   - F/u Heme/onc eval     #Colonic Polyp  #Diarrhea in setting of recent abx use  #Hx of GIB   #Pneumoretroperitoneum   - Colonoscopy 2/4: 1 large 3-4 cm sigmoid pedunculated polyp and 2 polyps (7, 14 mm) in descending colon s/p bx  - GI consulted (Dr. Daniels)   - Patient is cleared for colonoscopy per Cardiology   - Negative for C-Diff infection  - CEA elevated 5.6  - S/P  colonoscopy and polypectomy 2/18/2022. Polyp (13 mm) in the descending colon. (hot snare Polypectomy).  Polyp (25 mm to 30 mm) in the rectosigmoid junction at 20 cm from the anal verge. (Endoloop, Polypectomy, Endoclip).   - Repeat Colonoscopy in 6 months   - GI recs appreciated    - 2/21: overnight pt had abdominal tenderness, KUB ordered showing paraspinal lucencies concerning for possible pneumoretroperitoneum   - abdomen soft, nontender, nondistended on my exam   - CTAP with PO contrast ordered to RO obstruction (NGT placed to give contrast as pt altered, unable to take PO), f/u results   - surgery consulted, f/u recs     #Hypotension   - F/u cortisol lvl (received)   - C/w midodrine 15mg TID   - On Levo @0.764, downtitrate as tolerated     #Hx of UTI  #R lower face cellulitis  - CT maxillofacial with C 2/12: Mild soft tissue inflammation suggesting cellulitis of the right lower face  - pt was on cefepime, vancomycin (end date 2/15)  - Started on Unasyn 2/16, d/c today 2/20 started on broad spectrum Abx 2/20  - No documentations of + bcx or ucx on the charts from [facility]   - Rpt UCx negative   - Bclx 2/17 NGTD, F/u repeat    - Dental recs appreciated   - F/u SPEP     #Hypernatremia- improving   - c/w d5w+bicarb (2 amps)    #Hypothyroidism  - TSH 15.6  - C/w synthroid 50 mcg po q24  - Patient will need to repeat TSH and FT4 in 6 wks (April)    #Hx L pleural effusion on CXR 2/8  - CT Chest with Cont 2/9 obtained: Moderate b/l pleural effusions resulting in complete atelectasis of both lower lobes  - monitor CXR    #Hx NSTEMI   #Hx of Elevated Troponin  - in setting of hypotension   - c/w Midodrine 15 mg q8h  - Normal Lexiscan at [facility]  - EKG noted     #Hx of ETOH abuse  - last drink (as per nurse) 6 months ago  - c/w Folic acid and Thiamine  - F/u Dietician eval  - Ensure 3x/day    #Possible Thiamine deficiency   #Possible Folic acid deficiency   - C/w Folic acid and Thiamine    #Misc   - DVT prophylaxis: SCD   - GI prophylaxis: PPI  - Diet: NPO pending S&S   - Activity status: IAT   - Code status: Full code   - Disposition: acute     #Handoff   - f/u SPEP, cortisol   - f/u ID recs   - f/u surgery recs   - f/u GI recs   - f/u final BCx  - f/u CTAP to r/o GI perf   - speech and swallow eval for feeds once improvement of mental status

## 2022-02-21 NOTE — CONSULT NOTE ADULT - SUBJECTIVE AND OBJECTIVE BOX
Patient is a 61y old  Female who presents with a chief complaint of Polypectomy (21 Feb 2022 11:10)      HPI:  Pt is a 62 yo F with PMH of GIB, Bowel and Bladder incontinence, Smoking (3-4 cigarettes/day), ETOH abuse, hypothyroidism anemia transferred from [facility] for Polypectomy. Pt is from home living with her son and bedbound at baseline. Pt was admitted to [facility] on 01/31 for AMS, weakness and decreased appetite. She was found to have UTI, NSTEMI, and electrolyte imbalance. During her stay at [facility] pt had colonoscopy done which revealed 1 large 3-4 cm sigmoid pedunculated polyp and 2 polyps (7, 14 mm) in descending colon s/p bx. Pt was transferred to [facility] for polyp removal.  (16 Feb 2022 23:24)    Additional History   Pt is a 62 yo F with PMH of GIB, Bowel and Bladder incontinence, Smoking (1/2 ppd), ETOH abuse, hypothyroidism, anemia transferred from [facility] for EMR/Polypectomy. Pt is from home living with her son (41yo). Pt was admitted to [facility] on 01/31 for AMS, weakness and decreased appetite. She was found to have UTI, Elevated troponin, and electrolyte imbalance. During her stay at [facility] pt had colonoscopy done, which revealed 1 large 3-4 cm sigmoid pedunculated polyp (bx=tub adenoma, but was friable) and 2 polyps (7, 14 mm) in descending colon s/p bx.  She underwent colonoscopy and polypectomy on 2/18/22 and had two polyps removed. Pathology results are pending.    She has normocytic anemia with Hb of 8.5, Hct 23.5, MCV 84.5 and platelets 148K-->81K-->56K-->40-->41-->48    She was a Rapid response on 2/20/22 for AMS, hypotension requiring pressor support, and hypothermia. She was upgraded to ICU.      ROS:  Negative except for:    PAST MEDICAL & SURGICAL HISTORY:      SOCIAL HISTORY:    FAMILY HISTORY:      MEDICATIONS  (STANDING):  cefepime   IVPB      cefepime   IVPB 1000 milliGRAM(s) IV Intermittent every 12 hours  chlorhexidine 4% Liquid 1 Application(s) Topical daily  dextrose 5% 1000 milliLiter(s) (100 mL/Hr) IV Continuous <Continuous>  iohexol 300 mG (iodine)/mL Oral Solution 30 milliLiter(s) Oral once  levothyroxine Injectable 25 MICROGram(s) IV Push at bedtime  LORazepam   Injectable 2 milliGRAM(s) IV Push once  norepinephrine Infusion 0.05 MICROgram(s)/kG/Min (2.81 mL/Hr) IV Continuous <Continuous>  pantoprazole  Injectable 40 milliGRAM(s) IV Push daily  phytonadione  IVPB 5 milliGRAM(s) IV Intermittent daily  vancomycin  IVPB      vancomycin  IVPB 1000 milliGRAM(s) IV Intermittent every 12 hours    MEDICATIONS  (PRN):      Allergies    No Known Allergies    Intolerances        Vital Signs Last 24 Hrs  T(C): 36.4 (21 Feb 2022 08:00), Max: 36.7 (21 Feb 2022 00:00)  T(F): 97.5 (21 Feb 2022 08:00), Max: 98.1 (21 Feb 2022 00:00)  HR: 105 (21 Feb 2022 11:00) (86 - 118)  BP: 88/49 (21 Feb 2022 11:00) (79/53 - 118/52)  BP(mean): 64 (21 Feb 2022 11:00) (61 - 81)  RR: 22 (21 Feb 2022 11:00) (18 - 27)  SpO2: 100% (21 Feb 2022 11:00) (100% - 100%)    PHYSICAL EXAM  General: lethargic appearing on NRB  HEENT: PERRL  CV: normal S1/S2 with no murmur rubs or gallops  Lungs: Decreased breath sounds b/l   Abdomen: soft non-tender non-distended  Ext: no edema  Skin: skin sloughing noted on chest as well as on upper thighs and left lateral thigh   Neuro: lethargic, only opens eyes to pain or sternal rub     LABS:                          8.5    6.46  )-----------( 55       ( 21 Feb 2022 06:03 )             24.1         Mean Cell Volume : 85.5 fL  Mean Cell Hemoglobin : 30.1 pg  Mean Cell Hemoglobin Concentration : 35.3 g/dL  Auto Neutrophil # : x  Auto Lymphocyte # : x  Auto Monocyte # : x  Auto Eosinophil # : x  Auto Basophil # : x  Auto Neutrophil % : x  Auto Lymphocyte % : x  Auto Monocyte % : x  Auto Eosinophil % : x  Auto Basophil % : x      Serial CBC's  02-21 @ 06:03  Hct-24.1 / Hgb-8.5 / Plat-55 / RBC-2.82 / WBC-6.46  Serial CBC's  02-20 @ 09:30  Hct-25.4 / Hgb-8.9 / Plat-48 / RBC-2.90 / WBC-3.87  Serial CBC's  02-19 @ 23:11  Hct-23.3 / Hgb-8.2 / Plat-41 / RBC-2.71 / WBC-5.09  Serial CBC's  02-19 @ 16:00  Hct-22.2 / Hgb-8.1 / Plat-40 / RBC-2.59 / WBC-5.66  Serial CBC's  02-19 @ 04:30  Hct-22.2 / Hgb-7.7 / Plat-56 / RBC-2.51 / WBC-5.50  Serial CBC's  02-18 @ 12:43  Hct-24.5 / Hgb-8.7 / Plat-81 / RBC-2.85 / WBC-7.53      02-21    147<H>  |  117<H>  |  5<L>  ----------------------------<  158<H>  2.9<L>   |  22  |  1.0    Ca    7.2<L>      21 Feb 2022 06:03  Mg     2.1     02-21    TPro  5.1<L>  /  Alb  1.4<L>  /  TBili  0.7  /  DBili  x   /  AST  14  /  ALT  12  /  AlkPhos  69  02-21      PT/INR - ( 19 Feb 2022 23:11 )   PT: 19.80 sec;   INR: 1.73 ratio         PTT - ( 19 Feb 2022 23:11 )  PTT:69.6 sec    Vitamin B12, Serum: 1805 pg/mL (02-19 @ 04:30)  Folate, Serum: 10.8 ng/mL (02-19 @ 04:30)  Ferritin, Serum: 1977 ng/mL (02-19 @ 04:30)  Iron - Total Binding Capacity.: <72 ug/dL (02-18 @ 15:00)  Vitamin B12, Serum: 1892 pg/mL (02-18 @ 15:00)  Folate, Serum: 11.0 ng/mL (02-18 @ 15:00)  Ferritin, Serum: 2494 ng/mL (02-18 @ 15:00)              BLOOD SMEAR INTERPRETATION:       RADIOLOGY & ADDITIONAL STUDIES:     Patient is a 61y old  Female who presented for Polypectomy (21 Feb 2022 11:10)      HPI:  Pt is a 60 yo F with PMH of GIB, Bowel and Bladder incontinence, smoking (3-4 cigarettes/day), ETOH abuse, hypothyroidism anemia transferred from [facility] for Polypectomy. Pt is from home living with her son and bedbound at baseline. Pt was admitted to [facility] on 01/31 for AMS, weakness and decreased appetite. She was found to have UTI, NSTEMI, and electrolyte imbalance. During her stay at [facility] pt had colonoscopy done which revealed 1 large 3-4 cm sigmoid pedunculated polyp and 2 polyps (7, 14 mm) in descending colon s/p bx. Pt was transferred to [facility] for polyp removal.  (16 Feb 2022 23:24)    Additional History:   Pt is a 60 yo F with PMH of GIB, Bowel and Bladder incontinence, smoking (1/2 ppd), ETOH abuse, hypothyroidism, anemia transferred from [facility] for EMR/Polypectomy. Pt is from home living with her son (41yo). Pt was admitted to [facility] on 01/31 for AMS, weakness and decreased appetite. She was found to have UTI, Elevated troponin, and electrolyte imbalance. During her stay at [facility] pt had colonoscopy done, which revealed 1 large 3-4 cm sigmoid pedunculated polyp (bx=tub adenoma, but was friable) and 2 polyps (7, 14 mm) in descending colon s/p bx.  She underwent colonoscopy and polypectomy on 2/18/22 and had two polyps removed. Pathology results are pending.    She has normocytic anemia with Hb of 8.5, Hct 23.5, MCV 84.5 and platelets 148K-->81K-->56K-->40-->41-->48. Hematology was consulted for the anemia and thrombocytopenia. The patient has not had any previous obvious bleeding that we are aware of.    She was a Rapid response on 2/20/22 for AMS, hypotension requiring pressor support, and hypothermia. She was upgraded to ICU.      ROS:  Negative except for:    PAST MEDICAL & SURGICAL HISTORY:      SOCIAL HISTORY:    FAMILY HISTORY:      MEDICATIONS  (STANDING):  cefepime   IVPB      cefepime   IVPB 1000 milliGRAM(s) IV Intermittent every 12 hours  chlorhexidine 4% Liquid 1 Application(s) Topical daily  dextrose 5% 1000 milliLiter(s) (100 mL/Hr) IV Continuous <Continuous>  iohexol 300 mG (iodine)/mL Oral Solution 30 milliLiter(s) Oral once  levothyroxine Injectable 25 MICROGram(s) IV Push at bedtime  LORazepam   Injectable 2 milliGRAM(s) IV Push once  norepinephrine Infusion 0.05 MICROgram(s)/kG/Min (2.81 mL/Hr) IV Continuous <Continuous>  pantoprazole  Injectable 40 milliGRAM(s) IV Push daily  phytonadione  IVPB 5 milliGRAM(s) IV Intermittent daily  vancomycin  IVPB      vancomycin  IVPB 1000 milliGRAM(s) IV Intermittent every 12 hours    MEDICATIONS  (PRN):      Allergies    No Known Allergies    Intolerances        Vital Signs Last 24 Hrs  T(C): 36.4 (21 Feb 2022 08:00), Max: 36.7 (21 Feb 2022 00:00)  T(F): 97.5 (21 Feb 2022 08:00), Max: 98.1 (21 Feb 2022 00:00)  HR: 105 (21 Feb 2022 11:00) (86 - 118)  BP: 88/49 (21 Feb 2022 11:00) (79/53 - 118/52)  BP(mean): 64 (21 Feb 2022 11:00) (61 - 81)  RR: 22 (21 Feb 2022 11:00) (18 - 27)  SpO2: 100% (21 Feb 2022 11:00) (100% - 100%)    PHYSICAL EXAM  General: lethargic appearing on NRB  HEENT: PERRL  CV: normal S1/S2 with no murmur rubs or gallops  Lungs: Decreased breath sounds b/l   Abdomen: soft non-tender non-distended  Ext: no edema  Skin: skin sloughing noted on chest as well as on upper thighs and left lateral thigh   Neuro: lethargic, only opens eyes to pain or sternal rub     LABS:                          8.5    6.46  )-----------( 55       ( 21 Feb 2022 06:03 )             24.1         Mean Cell Volume : 85.5 fL  Mean Cell Hemoglobin : 30.1 pg  Mean Cell Hemoglobin Concentration : 35.3 g/dL  Auto Neutrophil # : x  Auto Lymphocyte # : x  Auto Monocyte # : x  Auto Eosinophil # : x  Auto Basophil # : x  Auto Neutrophil % : x  Auto Lymphocyte % : x  Auto Monocyte % : x  Auto Eosinophil % : x  Auto Basophil % : x      Serial CBC's  02-21 @ 06:03  Hct-24.1 / Hgb-8.5 / Plat-55 / RBC-2.82 / WBC-6.46  Serial CBC's  02-20 @ 09:30  Hct-25.4 / Hgb-8.9 / Plat-48 / RBC-2.90 / WBC-3.87  Serial CBC's  02-19 @ 23:11  Hct-23.3 / Hgb-8.2 / Plat-41 / RBC-2.71 / WBC-5.09  Serial CBC's  02-19 @ 16:00  Hct-22.2 / Hgb-8.1 / Plat-40 / RBC-2.59 / WBC-5.66  Serial CBC's  02-19 @ 04:30  Hct-22.2 / Hgb-7.7 / Plat-56 / RBC-2.51 / WBC-5.50  Serial CBC's  02-18 @ 12:43  Hct-24.5 / Hgb-8.7 / Plat-81 / RBC-2.85 / WBC-7.53      02-21    147<H>  |  117<H>  |  5<L>  ----------------------------<  158<H>  2.9<L>   |  22  |  1.0    Ca    7.2<L>      21 Feb 2022 06:03  Mg     2.1     02-21    TPro  5.1<L>  /  Alb  1.4<L>  /  TBili  0.7  /  DBili  x   /  AST  14  /  ALT  12  /  AlkPhos  69  02-21      PT/INR - ( 19 Feb 2022 23:11 )   PT: 19.80 sec;   INR: 1.73 ratio         PTT - ( 19 Feb 2022 23:11 )  PTT:69.6 sec    Vitamin B12, Serum: 1805 pg/mL (02-19 @ 04:30)  Folate, Serum: 10.8 ng/mL (02-19 @ 04:30)  Ferritin, Serum: 1977 ng/mL (02-19 @ 04:30)  Iron - Total Binding Capacity.: <72 ug/dL (02-18 @ 15:00)  Vitamin B12, Serum: 1892 pg/mL (02-18 @ 15:00)  Folate, Serum: 11.0 ng/mL (02-18 @ 15:00)  Ferritin, Serum: 2494 ng/mL (02-18 @ 15:00)              BLOOD SMEAR INTERPRETATION:       RADIOLOGY & ADDITIONAL STUDIES:

## 2022-02-21 NOTE — CONSULT NOTE ADULT - ASSESSMENT
ASSESSMENT:  Incontinence Dermatitis    RECOMMENDATION:  Wound care - Mycolog Cream /Xeroform to b/l Buttocks, groin and perineum BID  Offloading/ positional changes

## 2022-02-21 NOTE — DIETITIAN INITIAL EVALUATION ADULT. - ORAL INTAKE PTA/DIET HISTORY
Pt was admitted to [facility] on 01/31 for AMS, weakness and decreased appetite. Hx obtained from pt's son at home. Per son, he and his wife are pt's caregiver at home. Son initially reports pt's appetite has been depressed in the past 2 months with reason unclear to him, but states pt also had sx's such as chest pain and weaknesses along with nearly no PO intake. When writer asked about UBW and weight changes, son reports pt was >200 lbs at baseline before May 2021, and started losing weight along with poor PO intake around the same time. Hx obtained from pt's son via phone. Per son, he and his wife are pt's caregiver at home. Son initially reports pt's appetite has been depressed in the past 2 months with reason unclear to him, but states pt also had sx's such as chest pain and weaknesses along with nearly no PO intake. When writer asked about UBW and weight changes, son reports pt was >200 lbs at baseline before May 2021, and started losing weight along with poor PO intake around the same time. Hx obtained from pt's son via phone. Per son, he and his wife are pt's caregivers at home. Son initially reports pt's appetite has been depressed in the past 2 months with reason unclear to him, but states pt also had sx's such as chest pain and weaknesses along with nearly no PO intake. When writer asked about UBW and weight changes, son reports pt was >200 lbs at baseline before May 2021, and started losing weight along with poor PO intake around the same time.

## 2022-02-21 NOTE — CHART NOTE - NSCHARTNOTEFT_GEN_A_CORE
NUTRITION SUPPORT CONSULTATION    HPI:  Pt is a 60 yo F with PMH of GIB, Bowel and Bladder incontinence, Smoking (3-4 cigarettes/day), ETOH abuse, hypothyroidism anemia transferred from [facility] for Polypectomy. Pt is from home living with her son and bedbound at baseline. Pt was admitted to [facility] on 01/31 for AMS, weakness and decreased appetite. She was found to have UTI, NSTEMI, and electrolyte imbalance. During her stay at [facility] pt had colonoscopy done which revealed 1 large 3-4 cm sigmoid pedunculated polyp and 2 polyps (7, 14 mm) in descending colon s/p bx. Pt was transferred to [facility] for polyp removal.  (16 Feb 2022 23:24)      PAST MEDICAL & SURGICAL HISTORY:    Allergies  No Known Allergies    MEDICATIONS  (STANDING):  chlorhexidine 4% Liquid 1 Application(s) Topical daily  dextrose 5% 1000 milliLiter(s) (100 mL/Hr) IV Continuous <Continuous>  levothyroxine Injectable 25 MICROGram(s) IV Push at bedtime  LORazepam   Injectable 2 milliGRAM(s) IV Push once  multivitamin/minerals 1 Tablet(s) Oral daily  norepinephrine Infusion 0.05 MICROgram(s)/kG/Min (2.81 mL/Hr) IV Continuous <Continuous>  nystatin Cream 1 Application(s) Topical two times a day  pantoprazole  Injectable 40 milliGRAM(s) IV Push daily  phytonadione  IVPB 5 milliGRAM(s) IV Intermittent daily  piperacillin/tazobactam IVPB.. 4.5 Gram(s) IV Intermittent every 6 hours  thiamine 100 milliGRAM(s) Oral daily  triamcinolone 0.1% Cream 1 Application(s) Topical two times a day  vancomycin  IVPB      vancomycin  IVPB 1000 milliGRAM(s) IV Intermittent every 12 hours    ICU Vital Signs Last 24 Hrs  T(C): 36.4 (21 Feb 2022 16:00), Max: 36.7 (21 Feb 2022 00:00)  T(F): 97.6 (21 Feb 2022 16:00), Max: 98.1 (21 Feb 2022 00:00)  HR: 100 (21 Feb 2022 17:00) (86 - 118)  BP: 138/79 (21 Feb 2022 17:00) (79/53 - 138/79)  BP(mean): 103 (21 Feb 2022 17:00) (61 - 103)  RR: 24 (21 Feb 2022 17:00) (18 - 33)  SpO2: 100% (21 Feb 2022 17:00) (97% - 100%)    Drug Dosing Weight  Height (cm): 175.3 (21 Feb 2022 17:07)  Weight (kg): 60 (20 Feb 2022 00:04)  BMI (kg/m2): 19.5 (21 Feb 2022 17:07)  BSA (m2): 1.73 (21 Feb 2022 17:07)    EXAM     Abd:     LE:   Enteral access:  IV access:    LABS  02-21    147<H>  |  117<H>  |  5<L>  ----------------------------<  158<H>  2.9<L>   |  22  |  1.0    Ca    7.2<L>      21 Feb 2022 06:03  Mg     2.1     02-21  Vitamin B12, Serum: 1805 pg/mL (02.19.22 @ 04:30)  Folate, Serum: 10.8 ng/mL (02.19.22 @ 04:30)    TPro  5.1<L>  /  Alb  1.4<L>  /  TBili  0.7  /  DBili  x   /  AST  14  /  ALT  12  /  AlkPhos  69  02-21                        8.5    6.46  )-----------( 55       ( 21 Feb 2022 06:03 )             24.1            CAPILLARY BLOOD GLUCOSE  POCT Blood Glucose.: 190 mg/dL (20 Feb 2022 17:48)      Radiology:  < from: CT Abdomen and Pelvis w/ Oral Cont and w/ IV Cont (02.21.22 @ 15:24) >    ACC: [id number] EXAM:  CT ABDOMEN AND PELVIS OC IC                          PROCEDURE DATE:  02/21/2022      INTERPRETATION:  CLINICAL HISTORY / REASON FOR EXAM: Abdominal pain   status post polypectomy.    TECHNIQUE: Contiguous axial CT images were obtained from the lower chest   to the pubic symphysis following the administration of 100 mL Omnipaque   350 intravenous contrast. Oral contrast was administered. Reformatted   images in the coronal and sagittal planes were acquired.    COMPARISON CT: CT abdomen and pelvis from September 21, 2021    OTHER STUDIES USED FOR CORRELATION: None.    FINDINGS:    LOWER CHEST: Bilateral lower lobe atelectasis and small bilateral pleural   effusions.    HEPATOBILIARY: Unremarkable.    SPLEEN: Unremarkable.    PANCREAS: Unremarkable.    ADRENAL GLANDS: Unremarkable.    KIDNEYS: Symmetric enhancement bilaterally. No evidence of   hydronephrosis. Nonobstructing renal calculi.    ABDOMINOPELVIC NODES: Unremarkable.    PELVIC ORGANS: Enlarged uterus with multiple intramural fibroids. Tapia   bulb within urinary bladder.    PERITONEUM/MESENTERY/BOWEL: Oral contrast reaches the distal small bowel.   Enteric catheter within the gastric lumen. No contrast extravasation   seen. Diffuse colonic wall thickening. No intraperitoneal or   retroperitoneal free air. Trace fluid within the pelvis and adjacent to   the liver. Normal caliber appendix.    BONES/SOFT TISSUES: Slight interval increase of compression deformity of   T12 since 2021. Diffuse osteopenia. Subacute/chronic right-sided rib   deformities. Anasarca.    VASCULAR: Calcified atherosclerotic disease within the abdominal aorta.    IMPRESSION:    Diffuse colonic wall thickening, consistent with pancolitis.   Intra-abdominal ascites which may be reactive.    No CT evidence of intraperitoneal or retroperitoneal free air. No   extraluminal contrast extravasation.    Small bilateral pleural effusions.    Interval progression of T12 compression deformity when compared with   available CTfrom 2021. Correlate with physical exam.    --- End of Report ---  < end of copied text >    Diet, NPO with Tube Feed:   Tube Feeding Modality: Nasogastric  Peptamen A.F. Formula  Total Volume for 24 Hours (mL): 1080  Continuous  Starting Tube Feed Rate {mL per Hour}: 20  Increase Tube Feed Rate by (mL): 20     Every 4 hours  Until Goal Tube Feed Rate (mL per Hour): 45  Tube Feed Duration (in Hours): 24  Tube Feed Start Time: 17:30  Free Water Flush   Total Volume per Flush (mL): 50   Frequency: Every 6 Hours (02-21-22 @ 17:10)      ASSESSMENT        PLAN NUTRITION SUPPORT CONSULTATION    HPI:  Pt is a 60 yo F with PMH of GIB, Bowel and Bladder incontinence, Smoking (3-4 cigarettes/day), ETOH abuse, hypothyroidism anemia transferred from [facility] for Polypectomy. Pt is from home living with her son and bedbound at baseline. Pt was admitted to [facility] on 01/31 for AMS, weakness and decreased appetite. She was found to have UTI, NSTEMI, and electrolyte imbalance. During her stay at [facility] pt had colonoscopy done which revealed 1 large 3-4 cm sigmoid pedunculated polyp and 2 polyps (7, 14 mm) in descending colon s/p bx. Pt was transferred to [facility] for polyp removal.  (16 Feb 2022 23:24)    Patient had a colonoscopy and polypectomy done on 2/18/2022. Polyp (13 mm) in the descending colon. (hot snare Polypectomy).  Polyp (25 mm to 30 mm) in the rectosigmoid junction at 20 cm from the anal verge. (Endoloop, Polypectomy, Endoclip).   After the colonoscopy patient became hypothermic and hypotensive. Over night patient was started to Levo and warming blanket.   2/20 AM patient had RRT for desaturation and hypotension. Patient was placed on 50% O2, patient had good response. Levophed also started to raise BP. Patient remains altered, not speaking, but remains somewhat awake and just makes grunting sounds. Patient is not following commands. Upgraded to ICU    PAST MEDICAL & SURGICAL HISTORY:    Allergies  No Known Allergies    MEDICATIONS  (STANDING):  chlorhexidine 4% Liquid 1 Application(s) Topical daily  dextrose 5% 1000 milliLiter(s) (100 mL/Hr) IV Continuous <Continuous>  levothyroxine Injectable 25 MICROGram(s) IV Push at bedtime  LORazepam   Injectable 2 milliGRAM(s) IV Push once  multivitamin/minerals 1 Tablet(s) Oral daily  norepinephrine Infusion 0.05 MICROgram(s)/kG/Min (2.81 mL/Hr) IV Continuous <Continuous>  nystatin Cream 1 Application(s) Topical two times a day  pantoprazole  Injectable 40 milliGRAM(s) IV Push daily  phytonadione  IVPB 5 milliGRAM(s) IV Intermittent daily  piperacillin/tazobactam IVPB.. 4.5 Gram(s) IV Intermittent every 6 hours  thiamine 100 milliGRAM(s) Oral daily  triamcinolone 0.1% Cream 1 Application(s) Topical two times a day  vancomycin  IVPB      vancomycin  IVPB 1000 milliGRAM(s) IV Intermittent every 12 hours    ICU Vital Signs Last 24 Hrs  T(C): 36.4 (21 Feb 2022 16:00), Max: 36.7 (21 Feb 2022 00:00)  T(F): 97.6 (21 Feb 2022 16:00), Max: 98.1 (21 Feb 2022 00:00)  HR: 100 (21 Feb 2022 17:00) (86 - 118)  BP: 138/79 (21 Feb 2022 17:00) (79/53 - 138/79)  BP(mean): 103 (21 Feb 2022 17:00) (61 - 103)  RR: 24 (21 Feb 2022 17:00) (18 - 33)  SpO2: 100% (21 Feb 2022 17:00) (97% - 100%)    Drug Dosing Weight  Height (cm): 175.3 (21 Feb 2022 17:07)  Weight (kg): 60 (20 Feb 2022 00:04)  BMI (kg/m2): 19.5 (21 Feb 2022 17:07)  BSA (m2): 1.73 (21 Feb 2022 17:07)    *Usual wt ~91kg (9 mos ago)    EXAM  Obtunded, on O2 VM   Abd: soft, ND  Skin: partial thickness wound to b/l buttocks with erythematous plaques to b/l groins and perineum with serosang dc    +widespread exfoliative dermatitis   Enteral access: +NGT Rt nostril  IV access: Rt IJ MLC    LABS  02-21    147<H>  |  117<H>  |  5<L>  ----------------------------<  158<H>  2.9<L>   |  22  |  1.0    Ca    7.2<L>      21 Feb 2022 06:03  Mg     2.1     02-21  Vitamin B12, Serum: 1805 pg/mL (02.19.22 @ 04:30)  Folate, Serum: 10.8 ng/mL (02.19.22 @ 04:30)    A1C with Estimated Average Glucose in AM (02.18.22 @ 12:43)    A1C with Estimated Average Glucose Result: 4.4     Estimated Average Glucose: 80:      TPro  5.1<L>  /  Alb  1.4<L>  /  TBili  0.7  /  DBili  x   /  AST  14  /  ALT  12  /  AlkPhos  69  02-21                        8.5    6.46  )-----------( 55       ( 21 Feb 2022 06:03 )             24.1        Lactate, Blood: 4.8:  (02.21.22 @ 13:01)    CAPILLARY BLOOD GLUCOSE  POCT Blood Glucose.: 190 mg/dL (20 Feb 2022 17:48)    Radiology:  < from: CT Abdomen and Pelvis w/ Oral Cont and w/ IV Cont (02.21.22 @ 15:24) >    ACC: [id number] EXAM:  CT ABDOMEN AND PELVIS OC IC                          PROCEDURE DATE:  02/21/2022      INTERPRETATION:  CLINICAL HISTORY / REASON FOR EXAM: Abdominal pain   status post polypectomy.    TECHNIQUE: Contiguous axial CT images were obtained from the lower chest   to the pubic symphysis following the administration of 100 mL Omnipaque   350 intravenous contrast. Oral contrast was administered. Reformatted   images in the coronal and sagittal planes were acquired.    COMPARISON CT: CT abdomen and pelvis from September 21, 2021    OTHER STUDIES USED FOR CORRELATION: None.    FINDINGS:    LOWER CHEST: Bilateral lower lobe atelectasis and small bilateral pleural   effusions.    HEPATOBILIARY: Unremarkable.    SPLEEN: Unremarkable.    PANCREAS: Unremarkable.    ADRENAL GLANDS: Unremarkable.    KIDNEYS: Symmetric enhancement bilaterally. No evidence of   hydronephrosis. Nonobstructing renal calculi.    ABDOMINOPELVIC NODES: Unremarkable.    PELVIC ORGANS: Enlarged uterus with multiple intramural fibroids. Tapia   bulb within urinary bladder.    PERITONEUM/MESENTERY/BOWEL: Oral contrast reaches the distal small bowel.   Enteric catheter within the gastric lumen. No contrast extravasation   seen. Diffuse colonic wall thickening. No intraperitoneal or   retroperitoneal free air. Trace fluid within the pelvis and adjacent to   the liver. Normal caliber appendix.    BONES/SOFT TISSUES: Slight interval increase of compression deformity of   T12 since 2021. Diffuse osteopenia. Subacute/chronic right-sided rib   deformities. Anasarca.    VASCULAR: Calcified atherosclerotic disease within the abdominal aorta.    IMPRESSION:    Diffuse colonic wall thickening, consistent with pancolitis.   Intra-abdominal ascites which may be reactive.    No CT evidence of intraperitoneal or retroperitoneal free air. No   extraluminal contrast extravasation.    Small bilateral pleural effusions.    Interval progression of T12 compression deformity when compared with   available CTfrom 2021. Correlate with physical exam.    --- End of Report ---  < end of copied text >    Diet, NPO with Tube Feed:   Tube Feeding Modality: Nasogastric  Peptamen A.F. Formula  Total Volume for 24 Hours (mL): 1080  Continuous  Starting Tube Feed Rate {mL per Hour}: 20  Increase Tube Feed Rate by (mL): 20     Every 4 hours  Until Goal Tube Feed Rate (mL per Hour): 45  Tube Feed Duration (in Hours): 24  Tube Feed Start Time: 17:30  Free Water Flush   Total Volume per Flush (mL): 50   Frequency: Every 6 Hours (02-21-22 @ 17:10)    ASSESSMENT  61F PMH GIB, Bowel and Bladder incontinence, Smoking (3-4 cigarettes/day), ETOH abuse, hypothyroidism, and ongoing anemia, admitted to [facility] 1/31 for ams found to have UTI. Dev GIB, colonoscopy showing polyps, transferred to [facility] for polypectomy now upgraded to CCU for hypotension, likely septic shock 2/2 aspiration pna.     - AMS, unclear etiology  - sepsis w/ shock/hypothermia vs aspiration after procedure under anesthesia 2/18 vs CVA  - acute thrombocytopenia  - chronic anemia  - Exfoliative dermatitis   - Colonoscopy 2/4: 1 large 3-4 cm sigmoid pedunculated polyp and 2 polyps (7, 14 mm) in descending colon s/p polypectomy 2/18/2022.   - r/o pneumoretroperitoneum (on KUB 2/21)  - pancolitis on CT 2/21  - hypothyroidism  - Hx of ETOH abuse  - hypokalemia, hypernatremia  - severe protein calorie malnutrition  - severe wt loss, ~34% over past 9 months    PLAN  - TF's started late today after CT abd obtained  - agree with starting Peptamen AF but would keep at 20ml/hr and monitor tolerance along with repeat K+, in phos level prior to increasing  - concerned with refeeding syndrome- repeat bmp, with in phos and mg  - if unable to tolerate TF's tonight need to consider starting TPN 2/22  - cont MV with minerals daily  - add vitamin C 500mg daily  - check 25-oh vitamin D and zinc levels  - will follow

## 2022-02-21 NOTE — CONSULT NOTE ADULT - SUBJECTIVE AND OBJECTIVE BOX
61y  Female  HPI:  Pt is a 60 yo F with PMH of GIB, Bowel and Bladder incontinence, Smoking (3-4 cigarettes/day), ETOH abuse, hypothyroidism anemia transferred from [facility] for Polypectomy. Pt is from home living with her son and bedbound at baseline. Pt was admitted to [facility] on 01/31 for AMS, weakness and decreased appetite. She was found to have UTI, NSTEMI, and electrolyte imbalance. During her stay at [facility] pt had colonoscopy done which revealed 1 large 3-4 cm sigmoid pedunculated polyp and 2 polyps (7, 14 mm) in descending colon s/p bx. Pt was transferred to [facility] for polyp removal.  (16 Feb 2022 23:24)    Hospital course***  Allergies    No Known Allergies    Intolerances      PAST MEDICAL & SURGICAL HISTORY:      Labs:                        8.5    6.46  )-----------( 55       ( 21 Feb 2022 06:03 )             24.1       Culture - Blood (collected 19 Feb 2022 23:11)  Source: .Blood Blood  Preliminary Report (21 Feb 2022 09:01):    No growth to date.        PE:  PHYSICAL EXAM: Pt NAD  partial thickness wound to b/l buttocks with erythematous plaques to b/l groins and perineum  serosang dc

## 2022-02-21 NOTE — CONSULT NOTE ADULT - ASSESSMENT
ASSESSMENT:  61yF w/ PMHx of  ***  who presented with ***. Physical exam findings, imaging, and labs as documented above.     PLAN:  - please obtain CT A/P w/ PO and IV contrast -- would recommend CT at least 4 hours after oral contrast given  - NPO, IVF    Above plan discussed with Attending Surgeon  ***  , patient, and Primary team  02-21-22 @ 10:36 ASSESSMENT:  61F w/ PMH of GIB, urinary/fecal incontinence, smoking, alcoholism, hypothyroidism, and anemia presented to the ED on 2/16 from [facility] for polypectomy now s/p colonoscopy with polypectomy on 2/18. Surgery is consulted for possible pneumo-retroperitoneum seen on KUB. Physical exam findings, imaging, and labs as documented above.     PLAN:  - please obtain CT A/P w/ PO and IV contrast -- would recommend CT at least 4 hours after oral contrast given  - NPO, IVF    Above plan discussed with Attending Surgeon Dr. Blanchard and Primary team  02-21-22 @ 10:36 ASSESSMENT:  61F w/ PMH of GIB, urinary/fecal incontinence, smoking, alcoholism, hypothyroidism, and anemia presented to the ED on 2/16 from [facility] for polypectomy now s/p colonoscopy with polypectomy on 2/18. Surgery is consulted for possible pneumo-retroperitoneum seen on KUB. Physical exam findings, imaging, and labs as documented above.     PLAN:  - please obtain CT A/P w/ PO, IV, and rectal contrast  - NPO, IVF  - recommend broadening antibiotics to include anaerobic coverage    Above plan discussed with Attending Surgeon Dr. Blanchard and Primary team  02-21-22 @ 10:36 ASSESSMENT:  61F w/ PMH of GIB, urinary/fecal incontinence, smoking, alcoholism, hypothyroidism, and anemia presented to the ED on 2/16 from [facility] for polypectomy now s/p colonoscopy with polypectomy on 2/18. Surgery is consulted for possible pneumo-retroperitoneum seen on KUB. Physical exam findings, imaging, and labs as documented above.     PLAN:  - please obtain CT A/P w/ PO and IV contrast  - NPO, IVF  - recommend broadening antibiotics to include anaerobic coverage    Above plan discussed with Attending Surgeon Dr. Blanchard and Primary team  02-21-22 @ 10:36

## 2022-02-21 NOTE — CONSULT NOTE ADULT - SUBJECTIVE AND OBJECTIVE BOX
GENERAL SURGERY CONSULT NOTE    Patient: SANTIAGO CALIXTO , 61y ([birth year])Female   MRN: [id number]  Location: [facility]  A  Visit: 02-16-22 Inpatient  Date: 02-21-22 @ 10:36    HPI:  Pt is a 62 yo F with PMH of GIB, Bowel and Bladder incontinence, Smoking (3-4 cigarettes/day), ETOH abuse, hypothyroidism anemia transferred from [facility] for Polypectomy. Pt is from home living with her son and bedbound at baseline. Pt was admitted to [facility] on 01/31 for AMS, weakness and decreased appetite. She was found to have UTI, NSTEMI, and electrolyte imbalance. During her stay at [facility] pt had colonoscopy done which revealed 1 large 3-4 cm sigmoid pedunculated polyp and 2 polyps (7, 14 mm) in descending colon s/p bx. Pt was transferred to [facility] for polyp removal.  (16 Feb 2022 23:24)        PAST MEDICAL & SURGICAL HISTORY:      VITALS:  T(F): 97.5 (02-21-22 @ 08:00), Max: 98.1 (02-21-22 @ 00:00)  HR: 101 (02-21-22 @ 08:00) (86 - 118)  BP: 109/65 (02-21-22 @ 08:00) (79/53 - 118/52)  RR: 21 (02-21-22 @ 08:00) (18 - 27)  SpO2: 100% (02-21-22 @ 08:00) (100% - 100%)    PHYSICAL EXAM:  General: NAD, AAOx3, calm and cooperative  HEENT: NCAT, LORAINE, EOMI, Trachea ML, Neck supple  Cardiac: RRR S1, S2, no Murmurs, rubs or gallops  Respiratory: CTAB, normal respiratory effort, breath sounds equal BL, no wheeze, rhonchi or crackles  Abdomen: Soft, non-distended, non-tender, no rebound, no guarding. +BS.  Rectal: Good tone, +stool, no blood, no hector-anal masses/lesions, no fistulas, fissures, hemorrhoids  Musculoskeletal: Strength 5/5 BL UE/LE, ROM intact, compartments soft  Neuro: Sensation grossly intact and equal throughout, no focal deficits  Vascular: Pulses 2+ throughout, extremities well perfused  Skin: Warm/dry, normal color, no jaundice  Incision/wound: healing well, dressings in place, clean, dry and intact    MEDICATIONS  (STANDING):  cefepime   IVPB      cefepime   IVPB 1000 milliGRAM(s) IV Intermittent every 12 hours  chlorhexidine 4% Liquid 1 Application(s) Topical daily  dextrose 5% 1000 milliLiter(s) (100 mL/Hr) IV Continuous <Continuous>  iohexol 300 mG (iodine)/mL Oral Solution 30 milliLiter(s) Oral once  levothyroxine Injectable 25 MICROGram(s) IV Push at bedtime  LORazepam   Injectable 2 milliGRAM(s) IV Push once  norepinephrine Infusion 0.05 MICROgram(s)/kG/Min (2.81 mL/Hr) IV Continuous <Continuous>  pantoprazole  Injectable 40 milliGRAM(s) IV Push daily  phytonadione  IVPB 5 milliGRAM(s) IV Intermittent daily  potassium chloride  20 mEq/100 mL IVPB 20 milliEquivalent(s) IV Intermittent every 2 hours  vancomycin  IVPB      vancomycin  IVPB 1000 milliGRAM(s) IV Intermittent every 12 hours    LAB/STUDIES:                        8.5    6.46  )-----------( 55       ( 21 Feb 2022 06:03 )             24.1     02-21    147<H>  |  117<H>  |  5<L>  ----------------------------<  158<H>  2.9<L>   |  22  |  1.0    Ca    7.2<L>      21 Feb 2022 06:03  Mg     2.1     02-21    TPro  5.1<L>  /  Alb  1.4<L>  /  TBili  0.7  /  DBili  x   /  AST  14  /  ALT  12  /  AlkPhos  69  02-21    PT/INR - ( 19 Feb 2022 23:11 )   PT: 19.80 sec;   INR: 1.73 ratio      PTT - ( 19 Feb 2022 23:11 )  PTT:69.6 sec  LIVER FUNCTIONS - ( 21 Feb 2022 06:03 )  Alb: 1.4 g/dL / Pro: 5.1 g/dL / ALK PHOS: 69 U/L / ALT: 12 U/L / AST: 14 U/L / GGT: x           Culture - Blood (collected 19 Feb 2022 23:11)  Source: .Blood Blood  Preliminary Report (21 Feb 2022 09:01):    No growth to date.      IMAGING:  < from: Xray Kidney Ureter Bladder (02.21.22 @ 06:51) >  FINDINGS/IMPRESSION:  ·	Hazy paraspinal lucencies, possibly reflecting pneumo-retroperitoneum. Consider further evaluation with CT abdomen and pelvis as clinically warranted.  ·	Nonobstructive bowel gas pattern.  ·	Pelvic calcifications, unchanged from referenced CT. Catheter overlies pelvic midline.  < end of copied text >      ACCESS DEVICES:  [X] Peripheral IV  [X] Central Venous Line	[ ] R	[ ] L	[ ] IJ	[ ] Fem	[ ] SC	Placed:   [ ] Arterial Line		[ ] R	[ ] L	[ ] Fem	[ ] Rad	[ ] Ax	Placed:   [ ] PICC:					[ ] Mediport  [ ] Urinary Catheter, Date Placed:  GENERAL SURGERY CONSULT NOTE    Patient: SANTIAGO CALIXTO , 61y ([birth year])Female   MRN: [id number]  Location: [facility]  A  Visit: 02-16-22 Inpatient  Date: 02-21-22 @ 10:36    HPI:  Pt is a 62 yo F with PMH of GIB, Bowel and Bladder incontinence, Smoking (3-4 cigarettes/day), ETOH abuse, hypothyroidism anemia transferred from [facility] for Polypectomy. Pt is from home living with her son and bedbound at baseline. Pt was admitted to [facility] on 01/31 for AMS, weakness and decreased appetite. She was found to have UTI, NSTEMI, and electrolyte imbalance. During her stay at [facility] pt had colonoscopy done which revealed 1 large 3-4 cm sigmoid pedunculated polyp and 2 polyps (7, 14 mm) in descending colon s/p bx. Pt was transferred to [facility] for polyp removal.  (16 Feb 2022 23:24)  ----------------------------------------------------------------  61F w/ PMH of GIB, urinary/fecal incontinence, smoking, alcoholism, hypothyroidism, and anemia presented to the ED on 2/16 from [facility] for polypectomy. Pt bedbound at baseline. Admitted to [facility] on 1/31/22 for AMS found to have a UTI and NSTEMI. During that admission, she received a colonoscopy which showed a large 3-4 cm sigmoid pedunculated polyp and 2 polyps in the descending colon. At that point, because [facility] can't perform large polypectomies, pt was transferred to [facility]. She underwent colonoscopy and polypectomy with GI on 2/18/22 with findings of a 13mm descending colon polyp (hot snare polypectomy) as well as a 25-30mm rectosigmoid polyp 20cm from the anal verge (endolooped, polypectomy, endoclipped). The following day, pt was noted to be more altered, hypothermic to 92F, and hypotensive to 78/51. She was started on levophed for septic shock of unknown source. On 2/20/22, pt was a rapid response on the floor for desaturation and hypotension. CVC was placed and pt was upgraded to the CCU. This AM KUB was performed showing "hazy paraspinal lucencies, possibly reflecting pneumo-retroperitoneum." Surgery was consulted for recommendations. On exam, pt is altered and non-responsive. No appreciable abdominal pain; pt had normal BM this AM.     PAST MEDICAL & SURGICAL HISTORY:  GIB  urinary/fecal incontinence  smoking  alcoholism  hypothyroidism  anemia    VITALS:  T(F): 97.5 (02-21-22 @ 08:00), Max: 98.1 (02-21-22 @ 00:00)  HR: 101 (02-21-22 @ 08:00) (86 - 118)  BP: 109/65 (02-21-22 @ 08:00) (79/53 - 118/52)  RR: 21 (02-21-22 @ 08:00) (18 - 27)  SpO2: 100% (02-21-22 @ 08:00) (100% - 100%)    PHYSICAL EXAM:  General: NAD, AAOx3, calm and cooperative  HEENT: NCAT, LORAINE, EOMI, Trachea ML, Neck supple  Cardiac: RRR S1, S2, no Murmurs, rubs or gallops  Respiratory: CTAB, normal respiratory effort, breath sounds equal BL, no wheeze, rhonchi or crackles  Abdomen: Soft, non-distended, non-tender, no rebound, no guarding. +BS.  Rectal: Good tone, +stool, no blood, no hector-anal masses/lesions, no fistulas, fissures, hemorrhoids  Musculoskeletal: Strength 5/5 BL UE/LE, ROM intact, compartments soft  Neuro: Sensation grossly intact and equal throughout, no focal deficits  Vascular: Pulses 2+ throughout, extremities well perfused  Skin: Warm/dry, normal color, no jaundice  Incision/wound: healing well, dressings in place, clean, dry and intact    MEDICATIONS  (STANDING):  cefepime   IVPB      cefepime   IVPB 1000 milliGRAM(s) IV Intermittent every 12 hours  chlorhexidine 4% Liquid 1 Application(s) Topical daily  dextrose 5% 1000 milliLiter(s) (100 mL/Hr) IV Continuous <Continuous>  iohexol 300 mG (iodine)/mL Oral Solution 30 milliLiter(s) Oral once  levothyroxine Injectable 25 MICROGram(s) IV Push at bedtime  LORazepam   Injectable 2 milliGRAM(s) IV Push once  norepinephrine Infusion 0.05 MICROgram(s)/kG/Min (2.81 mL/Hr) IV Continuous <Continuous>  pantoprazole  Injectable 40 milliGRAM(s) IV Push daily  phytonadione  IVPB 5 milliGRAM(s) IV Intermittent daily  potassium chloride  20 mEq/100 mL IVPB 20 milliEquivalent(s) IV Intermittent every 2 hours  vancomycin  IVPB      vancomycin  IVPB 1000 milliGRAM(s) IV Intermittent every 12 hours    LAB/STUDIES:                        8.5    6.46  )-----------( 55       ( 21 Feb 2022 06:03 )             24.1     02-21    147<H>  |  117<H>  |  5<L>  ----------------------------<  158<H>  2.9<L>   |  22  |  1.0    Ca    7.2<L>      21 Feb 2022 06:03  Mg     2.1     02-21    TPro  5.1<L>  /  Alb  1.4<L>  /  TBili  0.7  /  DBili  x   /  AST  14  /  ALT  12  /  AlkPhos  69  02-21    PT/INR - ( 19 Feb 2022 23:11 )   PT: 19.80 sec;   INR: 1.73 ratio      PTT - ( 19 Feb 2022 23:11 )  PTT:69.6 sec  LIVER FUNCTIONS - ( 21 Feb 2022 06:03 )  Alb: 1.4 g/dL / Pro: 5.1 g/dL / ALK PHOS: 69 U/L / ALT: 12 U/L / AST: 14 U/L / GGT: x           Culture - Blood (collected 19 Feb 2022 23:11)  Source: .Blood Blood  Preliminary Report (21 Feb 2022 09:01):    No growth to date.      IMAGING:  < from: Xray Kidney Ureter Bladder (02.21.22 @ 06:51) >  FINDINGS/IMPRESSION:  ·	Hazy paraspinal lucencies, possibly reflecting pneumo-retroperitoneum. Consider further evaluation with CT abdomen and pelvis as clinically warranted.  ·	Nonobstructive bowel gas pattern.  ·	Pelvic calcifications, unchanged from referenced CT. Catheter overlies pelvic midline.  < end of copied text >      ACCESS DEVICES:  [X] Peripheral IV  [X] Central Venous Line	[ ] R	[ ] L	[ ] IJ	[ ] Fem	[ ] SC	Placed:   [ ] Arterial Line		[ ] R	[ ] L	[ ] Fem	[ ] Rad	[ ] Ax	Placed:   [ ] PICC:					[ ] Mediport  [ ] Urinary Catheter, Date Placed:

## 2022-02-21 NOTE — DIETITIAN INITIAL EVALUATION ADULT. - OTHER CALCULATIONS
Â  12/10/20 3:21 PM       Per Mark Virk's orders, calcipotriene ointment--in place of cream--same strength, and bethamethasone 0.05%--(instead of .064) verbal order given to pharmacist-Faifal at [facility]. Each ingredient will cost patient $13.00 each--total=26.00. Patient to combine and apply equal amounts of each to scalp twice daily x2 weeks--use during flares only. Spoke with Jaison López and notified her as such.   Â   Routed to Mark Zamarripa for [id number] Low c/f refeeding given pt has been getting D5W. Est kcal needs based MSJ*SF 1.0, which is equivalent to 21kcal/kg and within ASPEN range. RD to follow and increase kcal Rx as tolerated. Est protein based on PCM. Est fluid based on std range for age as above.

## 2022-02-21 NOTE — DIETITIAN INITIAL EVALUATION ADULT. - ENTERAL
Peptamen AF to start at 20ml/hr and advance by 20ml Q4H to goal 45ml/hr. FWF 50ml Q6H for tube patency; OR 270ml bolus Q6H + FWF 50ml before+after each bolus. -- provides 1296kcal, 82g protein, 907ml free water + FWF.  +additional fluids via IV.

## 2022-02-21 NOTE — PROGRESS NOTE ADULT - ASSESSMENT
IMPRESSION:    AMS   Sepsis   Septic shock  Possible aspiration PNA  Acute hypoxemic respiratory failure   Colon polyps s/p polypectomy of 2 polyps on 2/18  Anemia  HO GIB  HO ETOH abuse  Active smoker  NAGMA    PLAN:    CNS: CTH. Avoid CNS depressants. Thiamine, folate. CIWA protocol.  NH4 level.     HEENT: Oral care    PULMONARY:  HOB @ 45 degrees.   Repeat CXR noted.    Aspiration precautions.    Supplemental O2 target Spo2 92-94%    CARDIOVASCULAR: Echo.   BNP.   Levophed  titrate to MAP >65    GI: GI prophylaxis. NPO.     RENAL:  Follow up lytes.  Correct as needed  fluid HCO3 containing hypotonic fluid    INFECTIOUS DISEASE: Send pan cultures.    MRSA nasal swab.  HIV    HEMATOLOGICAL:  DVT prophylaxis. LE duplex    ENDOCRINE:  Follow up FS.  Insulin protocol if needed.    MUSCULOSKELETAL:  Bed rest     Dispo:  ICU

## 2022-02-21 NOTE — DIETITIAN INITIAL EVALUATION ADULT. - PHYSCIAL ASSESSMENT
Cognition: obtunded  Skin: Burn 2/21: partial thickness wound to b/l buttocks with erythematous plaques to b/l groins and perineum serosanguinous discharge. Per MD note- superficial scabs diffusely over body, most prominent over chest, as well as areas of desquamation over arms, legs, and hips c/w exfoliative dermatitis.  GI: abdomen nondistended; LBM 2/21 light brown, mucoid  S+S 2/21: NPO w/alternate means of nutrition/hydration

## 2022-02-21 NOTE — CONSULT NOTE ADULT - ATTENDING COMMENTS
Patient also seen and examined by myself. I agree with Dr. Garcia's (Hem-Onc fellow) note above. Discussed with her and ICU staff. All questions answered.

## 2022-02-21 NOTE — PROGRESS NOTE ADULT - ASSESSMENT
60 yo F with PMH of GIB, Bowel and Bladder incontinence, Smoking (1/2 ppd), ETOH abuse, hypothyroidism, anemia transferred from [facility] for EMR/ Polypectomy. . Pt was admitted to [facility] on 01/31 for AMS, weakness and decreased appetite. She was found to have UTI, Elevated troponin, and electrolyte imbalance. During her stay at [facility] pt had colonoscopy done, which revealed 1 large 3-4 cm sigmoid pedunculated polyp (bx=tub adenoma, but was friable) and 2 polyps (7, 14 mm) in descending colon s/p bx.      # patient is S/P  colonoscopy and polypectomy 2/18 .  #septic shock possibly d/t Aspiration PNA on Levo  Polyp (13 mm) in the descending colon. ( hot snare Polypectomy).  Polyp (25 mm to 30 mm) in the rectosigmoid junction at 20 cm from the anal verge. (Endoloop, Polypectomy, Endoclip).    No GI bleed  No WBC  - abdomen soft no ridigity or guarding  - KUB - questionable trace evidence of retro-pneumoperitonuem?    REC:   - c/w broad spectrum abx  - please obtain CTAP w/ PO and IV contrast   - await pathology results  - repeat CF in 6 months

## 2022-02-21 NOTE — DIETITIAN INITIAL EVALUATION ADULT. - ALTERNATE MEANS OF NUTRITION
incomplete note watery BMs warrant predigested formula. Suggest continuous feeds for tolerance./initiate tube feeding

## 2022-02-21 NOTE — DIETITIAN INITIAL EVALUATION ADULT. - ADD RECOMMEND
thiamine daily for risk of refeeding, harmless, can give high dose 200mg IV daily or more, per team;  mvi+minerals;  cont with levothyroxine via IV (PO will require to hold feeds before and after administrations -> decr TF intake);  wound care;  daily weights in ICU thiamine daily for risk of refeeding, harmless, can give high dose 200-500mg IV daily, per team;  mvi+minerals;  cont with levothyroxine via IV (PO will require to hold feeds before and after administrations -> decr TF intake);  wound care;  daily weights in ICU

## 2022-02-21 NOTE — DIETITIAN INITIAL EVALUATION ADULT. - PERTINENT LABORATORY DATA
:   ( 21 Feb 2022 06:03 )  WBC Count : 6.46 K/uL  Hemoglobin : 8.5 g/dL  Hematocrit : 24.1 %  Mean Cell Volume : 85.5 fL  Mean Cell Hemoglobin : 30.1 pg  Mean Cell Hemoglobin Concentration : 35.3 g/dL    02-21    147<H>  |  117<H>  |  5<L>  ----------------------------<  158<H>  2.9<L>   |  22  |  1.0    Ca    7.2<L>      21 Feb 2022 06:03  Mg     2.1     02-21    TPro  5.1<L>  /  Alb  1.4<L>  /  TBili  0.7  /  DBili  x   /  AST  14  /  ALT  12  /  AlkPhos  69  02-21    CAPILLARY BLOOD GLUCOSE  POCT Blood Glucose.: 190 mg/dL (20 Feb 2022 17:48)    2/21 lactate 4.8    2/19 B12 1805H, folate 10.8WNL  2/18 A1C 4.4

## 2022-02-21 NOTE — DIETITIAN INITIAL EVALUATION ADULT. - MALNUTRITION
Severe malnutrition in the context of social/environmental circumstances as evidenced by <50% est kcal needs met >1 month and 36.5% weight loss over 10 months

## 2022-02-21 NOTE — CONSULT NOTE ADULT - ATTENDING COMMENTS
This is 62 y/o female w/ PMH of GIB, urinary/fecal incontinence, smoking, alcoholism, hypothyroidism, and anemia presented to the ED on  from [facility] for polypectomy. Pt bedbound at baseline. Admitted to [facility] on 22 for AMS found to have a UTI and NSTEMI. During that admission, she received a colonoscopy which showed a large 3-4 cm sigmoid pedunculated polyp and 2 polyps in the descending colon. At that point, because [facility] can't perform large polypectomies, pt was transferred to [facility]. She underwent colonoscopy and polypectomy with GI on 22 with findings of a 13mm descending colon polyp (hot snare polypectomy) as well as a 25-30mm rectosigmoid polyp 20cm from the anal verge (endolooped, polypectomy, endoclipped). The following day, pt was noted to be more altered, hypothermic to 92F, and hypotensive to 78/51. She was started on levophed for septic shock of unknown source. On 22, pt was a rapid response on the floor for desaturation and hypotension. CVC was placed and pt was upgraded to the CCU.     PE:  Arousable but lethargic  Chest: slightly  breath sounds in bilateral lung bases.  CV : rrr  Abdomen: soft, no abdominal pain can be appreciated.    WBC 6  LA 4.7 and later 3.6    CT Abd/Pelvis was reviewed and is consistent with diffuse colonic thickening significant of pancolitis. No abdominal or retroperitoneal air, no signs of perforation.    ASSESSMENT:  62 y/o female, S/P Colonoscopy and polypectomy in descending and sigmoid colon.  AMS.  Hypotension.  Pancolitis.  Lactic acidosis.    PLAN:  - aggressive iv hydration and resuscitation  - follow lactic acid  - intermittent abdominal exams  - keep on Zosyn  - follow cultures  - GI follow up  Will follow up. This is 60 y/o female w/ PMH of GIB, urinary/fecal incontinence, smoking, alcoholism, hypothyroidism, and anemia presented to the ED on  from [facility] for polypectomy. Pt bedbound at baseline. Admitted to [facility] on 22 for AMS found to have a UTI and NSTEMI. During that admission, she received a colonoscopy which showed a large 3-4 cm sigmoid pedunculated polyp and 2 polyps in the descending colon. At that point, because [facility] can't perform large polypectomies, pt was transferred to [facility]. She underwent colonoscopy and polypectomy with GI on 22 with findings of a 13mm descending colon polyp (hot snare polypectomy) as well as a 25-30mm rectosigmoid polyp 20cm from the anal verge (endolooped, polypectomy, endoclipped). The following day, pt was noted to be more altered, hypothermic to 92F, and hypotensive to 78/51. She was started on levophed for septic shock of unknown source. On 22, pt was a rapid response on the floor for desaturation and hypotension. CVC was placed and pt was upgraded to the CCU.     PE:  Arousable but lethargic  Chest: slightly  breath sounds in bilateral lung bases.  CV : rrr  Abdomen: soft, no abdominal pain can be appreciated.    WBC 6  LA 4.7 and later 3.6    CT Abd/Pelvis was reviewed and is consistent with diffuse colonic thickening significant of pancolitis. No abdominal or retroperitoneal air, no signs of perforation.    ASSESSMENT:  60 y/o female, S/P Colonoscopy and polypectomy in descending and sigmoid colon.  AMS.  Hypotension.  Pancolitis.  Lactic acidosis.    PLAN:  - aggressive iv hydration and resuscitation  - follow lactic acid  - intermittent abdominal exams  - keep on Zosyn  - follow KUB to see the po contrast in colon  - follow cultures  - GI follow up  Will follow up.

## 2022-02-21 NOTE — DIETITIAN INITIAL EVALUATION ADULT. - REASON
no overt signs of muscle or fat wasting. Nutrition focus physical exam noted with pale nails with clubbing, ?cheilosis of lips, rashes to body (per RN thought to be d/t negligence/poor care and incontinence). Exam for oral cavity deferred as pt on O2 mask. no overt signs of muscle or fat wasting (pt used to be obese). Nutrition focus physical exam noted with pale nails with clubbing, ?cheilosis of lips, rashes to body (per RN thought to be d/t negligence/poor care and incontinence). Exam for oral cavity deferred as pt on O2 mask. no overt signs of muscle or fat wasting (pt used to be obese). Nutrition focus physical exam for micronutrient deficiencies noted with pale nails with clubbing, ?cheilosis of lips, rashes to body (per RN thought to be d/t negligence/poor care and incontinence). Exam for oral cavity deferred as pt on O2 mask.

## 2022-02-21 NOTE — CONSULT NOTE ADULT - ASSESSMENT
Pt is a 60 yo F with PMH of GIB, Bowel and Bladder incontinence, Smoking (1/2 ppd), ETOH abuse, hypothyroidism, anemia transferred from [facility] for EMR/Polypectomy. She was a Rapid response on 2/20/22 for AMS, hypotension requiring pressor support, and hypothermia. She was upgraded to ICU. Hematology evaluation was requested for evaluation of anemia and thrombocytopenia.    ASSESSMENT  #) Normocytic Anemia with recent polypectomy  iron studies: Fe 55; TIBC <72; ferr 2494  B12 1892, Fol 11  #) Septic Shock requiring pressor support  #) Thrombocytopenia  #) Elevated INR/PTT not on a/c    PLAN  - Please complete anemia work up with LDH/Haptoglobin/ Tai, and Retic count. Can also send SPEP, Immunofixation, Immunoglobulins, and FLC ratio given her anemia is normocytic.  - Her platelet count is improving today, suspect that her thrombocytopenia was likely 2/2 to hypotension as well as sepsis   - Please repeat coagulation profile as suspect that likely elevation of PTT/INR/PT is also secondary to underlying sepsis given she previously had normal PTT with minor elevations of PT/INR (possible Vitamin K deficiency>?)  - Awaiting pathology results from her polypectomies   - Rest of medical management as per critical care team   Pt is a 62 yo F with PMH of GIB, Bowel and Bladder incontinence, Smoking (1/2 ppd), ETOH abuse, hypothyroidism, anemia transferred from [facility] for EMR/Polypectomy. She was a Rapid response on 2/20/22 for AMS, hypotension requiring pressor support, and hypothermia. She was upgraded to ICU. Hematology evaluation was requested for evaluation of anemia and thrombocytopenia.    ASSESSMENT  #) Normocytic Anemia with recent polypectomy  iron studies: Fe 55; TIBC <72; ferr 2494. However, in the setting of acute events and significant hypotension, the elevated ferritin is not surprising and, by itself, is not a sign of iron overload.  B12 1892, Folate 11.  #) Septic Shock requiring pressor support  #) Thrombocytopenia, also most likely secondary to significant hypotensive episode, may be turning the corner and recovering.  #) Elevated INR/PTT not on anticoagulation.    PLAN  - Please complete anemia work up with LDH/Haptoglobin/ Tai, and Reticulocyte count. Can also send SPEP, Immunofixation, Immunoglobulins, and FLC ratio given her anemia is normocytic.  - Her platelet count is improving today, suspect that her thrombocytopenia was likely 2/2 to hypotension as well as sepsis   - Please repeat coagulation profile as suspect that likely elevation of PTT/INR/PT is also secondary to underlying sepsis and hypotension given she previously had normal PTT with minor elevations of PT/INR (possible Vitamin K deficiency>?)  - Awaiting pathology results from her polypectomies   - Rest of medical management as per critical care team    - Will follow as needed.

## 2022-02-21 NOTE — CONSULT NOTE ADULT - TIME BILLING
wound eval and treat
evaluate the patient and reexamine her, review images and labs, discuss the plan of care.
Exam and counseling

## 2022-02-22 LAB
ALBUMIN SERPL ELPH-MCNC: 1.1 G/DL — LOW (ref 3.5–5.2)
ALLERGY+IMMUNOLOGY DIAG STUDY NOTE: SIGNIFICANT CHANGE UP
ALP SERPL-CCNC: 67 U/L — SIGNIFICANT CHANGE UP (ref 30–115)
ALT FLD-CCNC: 10 U/L — SIGNIFICANT CHANGE UP (ref 0–41)
AMMONIA BLD-MCNC: 59 UMOL/L — HIGH (ref 11–55)
ANION GAP SERPL CALC-SCNC: 8 MMOL/L — SIGNIFICANT CHANGE UP (ref 7–14)
APTT BLD: 61.6 SEC — HIGH (ref 27–39.2)
APTT BLD: 66 SEC — HIGH (ref 27–39.2)
AST SERPL-CCNC: 14 U/L — SIGNIFICANT CHANGE UP (ref 0–41)
BASOPHILS # BLD AUTO: 0.01 K/UL — SIGNIFICANT CHANGE UP (ref 0–0.2)
BASOPHILS NFR BLD AUTO: 0.1 % — SIGNIFICANT CHANGE UP (ref 0–1)
BILIRUB SERPL-MCNC: 0.8 MG/DL — SIGNIFICANT CHANGE UP (ref 0.2–1.2)
BLD GP AB SCN SERPL QL: SIGNIFICANT CHANGE UP
BUN SERPL-MCNC: 5 MG/DL — LOW (ref 10–20)
C DIFF BY PCR RESULT: NEGATIVE — SIGNIFICANT CHANGE UP
C DIFF TOX GENS STL QL NAA+PROBE: SIGNIFICANT CHANGE UP
CALCIUM SERPL-MCNC: 6.8 MG/DL — LOW (ref 8.5–10.1)
CHLORIDE SERPL-SCNC: 114 MMOL/L — HIGH (ref 98–110)
CO2 SERPL-SCNC: 24 MMOL/L — SIGNIFICANT CHANGE UP (ref 17–32)
CREAT SERPL-MCNC: 1 MG/DL — SIGNIFICANT CHANGE UP (ref 0.7–1.5)
CULTURE RESULTS: SIGNIFICANT CHANGE UP
DAT IGG-SP REAG RBC-IMP: ABNORMAL
DIR ANTIGLOB POLYSPECIFIC INTERPRETATION: ABNORMAL
EOSINOPHIL # BLD AUTO: 0.02 K/UL — SIGNIFICANT CHANGE UP (ref 0–0.7)
EOSINOPHIL NFR BLD AUTO: 0.3 % — SIGNIFICANT CHANGE UP (ref 0–8)
ERYTHROCYTE [SEDIMENTATION RATE] IN BLOOD: 25 MM/HR — HIGH (ref 0–20)
FSP PPP-MCNC: >=5 <20 UG/ML
GLUCOSE SERPL-MCNC: 160 MG/DL — HIGH (ref 70–99)
HAPTOGLOB SERPL-MCNC: 24 MG/DL — LOW (ref 34–200)
HCT VFR BLD CALC: 20.3 % — LOW (ref 37–47)
HCT VFR BLD CALC: 21.8 % — LOW (ref 37–47)
HGB BLD-MCNC: 7.2 G/DL — LOW (ref 12–16)
HGB BLD-MCNC: 7.8 G/DL — LOW (ref 12–16)
IAT COMP-SP REAG SERPL QL: SIGNIFICANT CHANGE UP
IGA FLD-MCNC: 869 MG/DL — HIGH (ref 84–499)
IGG FLD-MCNC: 1807 MG/DL — HIGH (ref 610–1660)
IGM SERPL-MCNC: 94 MG/DL — SIGNIFICANT CHANGE UP (ref 35–242)
IMM GRANULOCYTES NFR BLD AUTO: 1.3 % — HIGH (ref 0.1–0.3)
INR BLD: 2.13 RATIO — HIGH (ref 0.65–1.3)
INR BLD: 2.21 RATIO — HIGH (ref 0.65–1.3)
KAPPA LC SER QL IFE: 10.72 MG/DL — HIGH (ref 0.33–1.94)
KAPPA LC SER QL IFE: 10.72 MG/DL — HIGH (ref 0.33–1.94)
KAPPA/LAMBDA FREE LIGHT CHAIN RATIO, SERUM: 0.9 RATIO — SIGNIFICANT CHANGE UP (ref 0.26–1.65)
KAPPA/LAMBDA FREE LIGHT CHAIN RATIO, SERUM: 0.9 RATIO — SIGNIFICANT CHANGE UP (ref 0.26–1.65)
LACTATE SERPL-SCNC: 3.6 MMOL/L — HIGH (ref 0.7–2)
LAMBDA LC SER QL IFE: 11.85 MG/DL — HIGH (ref 0.57–2.63)
LAMBDA LC SER QL IFE: 11.85 MG/DL — HIGH (ref 0.57–2.63)
LDH SERPL L TO P-CCNC: 174 — SIGNIFICANT CHANGE UP (ref 50–242)
LYMPHOCYTES # BLD AUTO: 0.99 K/UL — LOW (ref 1.2–3.4)
LYMPHOCYTES # BLD AUTO: 14.8 % — LOW (ref 20.5–51.1)
MAGNESIUM SERPL-MCNC: 1.7 MG/DL — LOW (ref 1.8–2.4)
MCHC RBC-ENTMCNC: 30.5 PG — SIGNIFICANT CHANGE UP (ref 27–31)
MCHC RBC-ENTMCNC: 31 PG — SIGNIFICANT CHANGE UP (ref 27–31)
MCHC RBC-ENTMCNC: 35.5 G/DL — SIGNIFICANT CHANGE UP (ref 32–37)
MCHC RBC-ENTMCNC: 35.8 G/DL — SIGNIFICANT CHANGE UP (ref 32–37)
MCV RBC AUTO: 86 FL — SIGNIFICANT CHANGE UP (ref 81–99)
MCV RBC AUTO: 86.5 FL — SIGNIFICANT CHANGE UP (ref 81–99)
MONOCYTES # BLD AUTO: 0.54 K/UL — SIGNIFICANT CHANGE UP (ref 0.1–0.6)
MONOCYTES NFR BLD AUTO: 8 % — SIGNIFICANT CHANGE UP (ref 1.7–9.3)
NEUTROPHILS # BLD AUTO: 5.06 K/UL — SIGNIFICANT CHANGE UP (ref 1.4–6.5)
NEUTROPHILS NFR BLD AUTO: 75.5 % — HIGH (ref 42.2–75.2)
NRBC # BLD: 0 /100 WBCS — SIGNIFICANT CHANGE UP (ref 0–0)
NRBC # BLD: 0 /100 WBCS — SIGNIFICANT CHANGE UP (ref 0–0)
PLATELET # BLD AUTO: 20 K/UL — LOW (ref 130–400)
PLATELET # BLD AUTO: 51 K/UL — LOW (ref 130–400)
POTASSIUM SERPL-MCNC: 3.8 MMOL/L — SIGNIFICANT CHANGE UP (ref 3.5–5)
POTASSIUM SERPL-SCNC: 3.8 MMOL/L — SIGNIFICANT CHANGE UP (ref 3.5–5)
PROT SERPL-MCNC: 4.4 G/DL — LOW (ref 6–8)
PROTHROM AB SERPL-ACNC: 24.3 SEC — HIGH (ref 9.95–12.87)
PROTHROM AB SERPL-ACNC: 25.2 SEC — HIGH (ref 9.95–12.87)
RBC # BLD: 2.36 M/UL — LOW (ref 4.2–5.4)
RBC # BLD: 2.52 M/UL — LOW (ref 4.2–5.4)
RBC # BLD: 2.52 M/UL — LOW (ref 4.2–5.4)
RBC # FLD: 15.9 % — HIGH (ref 11.5–14.5)
RBC # FLD: 15.9 % — HIGH (ref 11.5–14.5)
RETICS #: 21.7 K/UL — LOW (ref 25–125)
RETICS/RBC NFR: 0.9 % — SIGNIFICANT CHANGE UP (ref 0.5–1.5)
SODIUM SERPL-SCNC: 146 MMOL/L — SIGNIFICANT CHANGE UP (ref 135–146)
SPECIMEN SOURCE: SIGNIFICANT CHANGE UP
WBC # BLD: 6.71 K/UL — SIGNIFICANT CHANGE UP (ref 4.8–10.8)
WBC # BLD: 6.97 K/UL — SIGNIFICANT CHANGE UP (ref 4.8–10.8)
WBC # FLD AUTO: 6.71 K/UL — SIGNIFICANT CHANGE UP (ref 4.8–10.8)
WBC # FLD AUTO: 6.97 K/UL — SIGNIFICANT CHANGE UP (ref 4.8–10.8)

## 2022-02-22 PROCEDURE — 99233 SBSQ HOSP IP/OBS HIGH 50: CPT

## 2022-02-22 PROCEDURE — 74018 RADEX ABDOMEN 1 VIEW: CPT | Mod: 26

## 2022-02-22 PROCEDURE — 99232 SBSQ HOSP IP/OBS MODERATE 35: CPT

## 2022-02-22 PROCEDURE — 86077 PHYS BLOOD BANK SERV XMATCH: CPT

## 2022-02-22 RX ORDER — MORPHINE SULFATE 50 MG/1
2 CAPSULE, EXTENDED RELEASE ORAL ONCE
Refills: 0 | Status: DISCONTINUED | OUTPATIENT
Start: 2022-02-22 | End: 2022-02-22

## 2022-02-22 RX ORDER — MAGNESIUM SULFATE 500 MG/ML
2 VIAL (ML) INJECTION ONCE
Refills: 0 | Status: COMPLETED | OUTPATIENT
Start: 2022-02-22 | End: 2022-02-22

## 2022-02-22 RX ORDER — SODIUM CHLORIDE 9 MG/ML
500 INJECTION, SOLUTION INTRAVENOUS ONCE
Refills: 0 | Status: COMPLETED | OUTPATIENT
Start: 2022-02-22 | End: 2022-02-22

## 2022-02-22 RX ORDER — MAGNESIUM SULFATE 500 MG/ML
2 VIAL (ML) INJECTION ONCE
Refills: 0 | Status: DISCONTINUED | OUTPATIENT
Start: 2022-02-22 | End: 2022-02-23

## 2022-02-22 RX ORDER — SODIUM CHLORIDE 9 MG/ML
1000 INJECTION, SOLUTION INTRAVENOUS
Refills: 0 | Status: DISCONTINUED | OUTPATIENT
Start: 2022-02-22 | End: 2022-02-28

## 2022-02-22 RX ORDER — SODIUM CHLORIDE 9 MG/ML
250 INJECTION INTRAMUSCULAR; INTRAVENOUS; SUBCUTANEOUS ONCE
Refills: 0 | Status: COMPLETED | OUTPATIENT
Start: 2022-02-22 | End: 2022-02-22

## 2022-02-22 RX ORDER — SODIUM CHLORIDE 9 MG/ML
1000 INJECTION, SOLUTION INTRAVENOUS
Refills: 0 | Status: DISCONTINUED | OUTPATIENT
Start: 2022-02-22 | End: 2022-02-22

## 2022-02-22 RX ORDER — LACTULOSE 10 G/15ML
10 SOLUTION ORAL
Refills: 0 | Status: DISCONTINUED | OUTPATIENT
Start: 2022-02-22 | End: 2022-02-25

## 2022-02-22 RX ORDER — PIPERACILLIN AND TAZOBACTAM 4; .5 G/20ML; G/20ML
3.38 INJECTION, POWDER, LYOPHILIZED, FOR SOLUTION INTRAVENOUS EVERY 6 HOURS
Refills: 0 | Status: DISCONTINUED | OUTPATIENT
Start: 2022-02-22 | End: 2022-02-23

## 2022-02-22 RX ORDER — PHYTONADIONE (VIT K1) 5 MG
10 TABLET ORAL ONCE
Refills: 0 | Status: COMPLETED | OUTPATIENT
Start: 2022-02-22 | End: 2022-02-22

## 2022-02-22 RX ADMIN — MORPHINE SULFATE 2 MILLIGRAM(S): 50 CAPSULE, EXTENDED RELEASE ORAL at 02:15

## 2022-02-22 RX ADMIN — LACTULOSE 10 GRAM(S): 10 SOLUTION ORAL at 17:29

## 2022-02-22 RX ADMIN — PIPERACILLIN AND TAZOBACTAM 25 GRAM(S): 4; .5 INJECTION, POWDER, LYOPHILIZED, FOR SOLUTION INTRAVENOUS at 13:01

## 2022-02-22 RX ADMIN — SODIUM CHLORIDE 125 MILLILITER(S): 9 INJECTION, SOLUTION INTRAVENOUS at 12:05

## 2022-02-22 RX ADMIN — PIPERACILLIN AND TAZOBACTAM 25 GRAM(S): 4; .5 INJECTION, POWDER, LYOPHILIZED, FOR SOLUTION INTRAVENOUS at 17:29

## 2022-02-22 RX ADMIN — Medication 25 GRAM(S): at 07:59

## 2022-02-22 RX ADMIN — NYSTATIN CREAM 1 APPLICATION(S): 100000 CREAM TOPICAL at 17:28

## 2022-02-22 RX ADMIN — Medication 101 MILLIGRAM(S): at 13:02

## 2022-02-22 RX ADMIN — Medication 1 APPLICATION(S): at 17:28

## 2022-02-22 RX ADMIN — Medication 1 APPLICATION(S): at 05:34

## 2022-02-22 RX ADMIN — MORPHINE SULFATE 2 MILLIGRAM(S): 50 CAPSULE, EXTENDED RELEASE ORAL at 23:00

## 2022-02-22 RX ADMIN — SODIUM CHLORIDE 1000 MILLILITER(S): 9 INJECTION, SOLUTION INTRAVENOUS at 01:10

## 2022-02-22 RX ADMIN — PIPERACILLIN AND TAZOBACTAM 25 GRAM(S): 4; .5 INJECTION, POWDER, LYOPHILIZED, FOR SOLUTION INTRAVENOUS at 23:59

## 2022-02-22 RX ADMIN — SODIUM CHLORIDE 70 MILLILITER(S): 9 INJECTION, SOLUTION INTRAVENOUS at 20:14

## 2022-02-22 RX ADMIN — MORPHINE SULFATE 2 MILLIGRAM(S): 50 CAPSULE, EXTENDED RELEASE ORAL at 02:00

## 2022-02-22 RX ADMIN — MORPHINE SULFATE 2 MILLIGRAM(S): 50 CAPSULE, EXTENDED RELEASE ORAL at 14:24

## 2022-02-22 RX ADMIN — Medication 25 MICROGRAM(S): at 22:00

## 2022-02-22 RX ADMIN — Medication 1 APPLICATION(S): at 12:24

## 2022-02-22 RX ADMIN — NYSTATIN CREAM 1 APPLICATION(S): 100000 CREAM TOPICAL at 05:38

## 2022-02-22 RX ADMIN — SODIUM CHLORIDE 500 MILLILITER(S): 9 INJECTION INTRAMUSCULAR; INTRAVENOUS; SUBCUTANEOUS at 18:19

## 2022-02-22 RX ADMIN — Medication 100 MILLIGRAM(S): at 12:05

## 2022-02-22 RX ADMIN — PANTOPRAZOLE SODIUM 40 MILLIGRAM(S): 20 TABLET, DELAYED RELEASE ORAL at 12:05

## 2022-02-22 RX ADMIN — Medication 1 TABLET(S): at 12:05

## 2022-02-22 RX ADMIN — Medication 102 MILLIGRAM(S): at 17:28

## 2022-02-22 RX ADMIN — MORPHINE SULFATE 2 MILLIGRAM(S): 50 CAPSULE, EXTENDED RELEASE ORAL at 15:00

## 2022-02-22 RX ADMIN — MORPHINE SULFATE 2 MILLIGRAM(S): 50 CAPSULE, EXTENDED RELEASE ORAL at 22:45

## 2022-02-22 RX ADMIN — Medication 2.81 MICROGRAM(S)/KG/MIN: at 17:29

## 2022-02-22 RX ADMIN — CHLORHEXIDINE GLUCONATE 1 APPLICATION(S): 213 SOLUTION TOPICAL at 12:06

## 2022-02-22 RX ADMIN — PIPERACILLIN AND TAZOBACTAM 25 GRAM(S): 4; .5 INJECTION, POWDER, LYOPHILIZED, FOR SOLUTION INTRAVENOUS at 05:33

## 2022-02-22 RX ADMIN — SODIUM CHLORIDE 125 MILLILITER(S): 9 INJECTION, SOLUTION INTRAVENOUS at 01:37

## 2022-02-22 RX ADMIN — Medication 250 MILLIGRAM(S): at 05:33

## 2022-02-22 RX ADMIN — SODIUM CHLORIDE 1000 MILLILITER(S): 9 INJECTION, SOLUTION INTRAVENOUS at 07:59

## 2022-02-22 NOTE — CHART NOTE - NSCHARTNOTEFT_GEN_A_CORE
On 4p CBC, hgb downtrending (7.2 from 7.8) and plt downtrending (20 from 51). No signs of active bleeding, however given downtrending hgb and plt at 20k decision made to transfuse 1u prbc and 1u plt.     Attempted to call son Александр Mckeon at number listed in chart however unable to reach him and voicemail not set up so could not leave message.     Daughter in law April called back. Explained Александр is at work and unable to take calls, however she provided consent for transfusion, will reach out again to Александр tomorrow when he is off from work (works 1030am-8pm).

## 2022-02-22 NOTE — PROGRESS NOTE ADULT - ASSESSMENT
62 yo F with PMH of GIB, Bowel and Bladder incontinence, Smoking (1/2 ppd), ETOH abuse, hypothyroidism, anemia transferred from [facility] for EMR/ Polypectomy. . Pt was admitted to [facility] on 01/31 for AMS, weakness and decreased appetite. She was found to have UTI, Elevated troponin, and electrolyte imbalance. During her stay at [facility] pt had colonoscopy done, which revealed 1 large 3-4 cm sigmoid pedunculated polyp (bx=tub adenoma, but was friable) and 2 polyps (7, 14 mm) in descending colon s/p bx.      # patient is S/P  colonoscopy and polypectomy 2/18 .  #septic shock possibly d/t Aspiration PNA vs bactrial translocation post polypectomy on Levo  Polyp (13 mm) in the descending colon. ( hot snare Polypectomy).  Polyp (25 mm to 30 mm) in the rectosigmoid junction at 20 cm from the anal verge. (Endoloop, Polypectomy, Endoclip).    No GI bleed  No leukocytosis  abdomen soft no ridigity or guarding  CT: Diffuse colonic wall thickening, consistent with pancolitis. Intra-abdominal ascites which may be reactive.        No CT evidence of intraperitoneal or retroperitoneal free air. No extraluminal contrast extravasation.    REC:   - c/w broad spectrum abx  - await pathology results  - repeat CF in 6 months

## 2022-02-22 NOTE — PROGRESS NOTE ADULT - ASSESSMENT
IMPRESSION:    AMS   Sepsis   Septic shock  Possible aspiration PNA  Acute hypoxemic respiratory failure   Colon polyps s/p polypectomy of 2 polyps on 2/18  Anemia  HO GIB  HO ETOH abuse  Active smoker  NAGMA    PLAN:    CNS: CTH. Avoid CNS depressants. Thiamine, folate. CIWA protocol.  NH4 level.     HEENT: Oral care    PULMONARY:  HOB @ 45 degrees.   Repeat CXR noted.    Aspiration precautions.    Supplemental O2 target Spo2 92-94%    CARDIOVASCULAR:   Echo.   BNP.   Levophed  as needed    GI: GI prophylaxis. NPO.     RENAL:  Follow up lytes.  Correct as needed  fluid HCO3 containing hypotonic fluid    INFECTIOUS DISEASE: Send pan cultures.    MRSA nasal swab.  HIV    HEMATOLOGICAL:  DVT prophylaxis. LE duplex    ENDOCRINE:  Follow up FS.  Insulin protocol if needed.    MUSCULOSKELETAL:  Bed rest     Dispo:  ICU

## 2022-02-22 NOTE — PROGRESS NOTE ADULT - ASSESSMENT
ASSESSMENT:  61F w/ PMH of GIB, urinary/fecal incontinence, smoking, alcoholism, hypothyroidism, and anemia presented to the ED on 2/16 from [facility] for polypectomy now s/p colonoscopy with polypectomy on 2/18. Surgery is consulted for possible pneumo-retroperitoneum seen on KUB.     PLAN:  -  Continue Zosyn   - Aggressive IV hydration and resuscitation   - Serial abd exams   - GI followup   - Trend lactate: 4.8 > 3.5   - f/u cultures        TRAUMA TEAM SPECTRA: [id number]

## 2022-02-22 NOTE — CHART NOTE - NSCHARTNOTEFT_GEN_A_CORE
More awake today  NGT in place and feeds held per surgery  d/w medical team    T(F): 97.1 (22 @ 16:00), Max: 97.7 (22 @ 12:00)  HR: 77 (22 @ 16:00) (77 - 92)  BP: 107/69 (22 @ 16:00) (82/50 - 124/60)  RR: 21 (22 @ 16:00) (15 - 28)  SpO2: 100% (22 @ 16:00) (98% - 100%)    I&O's Detail    2022 07:  -  2022 07:00  --------------------------------------------------------  IN:    dextrose 5% + lactated ringers: 750 mL    dextrose 5% w/ Additives: 1700 mL    IV PiggyBack: 550 mL    Lactated Ringers Bolus: 500 mL    Norepinephrine: 665.8 mL  Total IN: 4165.8 mL    OUT:    Indwelling Catheter - Urethral (mL): 1285 mL  Total OUT: 1285 mL    Total NET: 2880.8 mL    2022 07:01  -  2022 18:18  --------------------------------------------------------  IN:    dextrose 5% + lactated ringers: 1250 mL    IV PiggyBack: 200 mL    Lactated Ringers Bolus: 500 mL    Norepinephrine: 141 mL  Total IN: 2091 mL    OUT:    Indwelling Catheter - Urethral (mL): 655 mL  Total OUT: 655 mL    Total NET: 1436 mL        146  |  114<H>  |  5<L>  ----------------------------<  160<H>  3.8   |  24  |  1.0    Ca    6.8<L>      2022 04:06  Mg     1.7         TPro  4.4<L>  /  Alb  1.1<L>  /  TBili  0.8  /  DBili  x   /  AST  14  /  ALT  10  /  AlkPhos  67                         7.2    6.97  )-----------( 20       ( 2022 16:20 )             20.3     Daily Weight in k.1 (2022 06:00)    Diet, NPO:   Except Medications (22 @ 01:01)    ASSESSMENT  61F PMH GIB, Bowel and Bladder incontinence, Smoking (3-4 cigarettes/day), ETOH abuse, hypothyroidism, and ongoing anemia, admitted to [facility]  for ams found to have UTI. Dev GIB, colonoscopy showing polyps, transferred to [facility] for polypectomy now upgraded to CCU for hypotension, likely septic shock  aspiration pna.     - AMS, unclear etiology  - sepsis w/ shock/hypothermia vs aspiration after procedure under anesthesia  vs CVA  - acute thrombocytopenia  - chronic anemia  - Exfoliative dermatitis   - Colonoscopy : 1 large 3-4 cm sigmoid pedunculated polyp and 2 polyps (7, 14 mm) in descending colon s/p polypectomy 2022.   - r/o pneumoretroperitoneum (on KUB )  - pancolitis on CT   - hypothyroidism  - Hx of ETOH abuse  - hypokalemia, hypernatremia  - severe protein calorie malnutrition  - severe wt loss, ~34% over past 9 months    PLAN  - agree with keeping NPO today and re-eval TF's in 24hrs  - when start feeds give Peptamen AF 120ml infused over ~30min X 4 feeds/day    - concerned with refeeding syndrome- repeat bmp, with in phos and mg  - if unable to tolerate TF's tonight need to consider starting TPN   - cont MV with minerals daily  - add vitamin C 500mg daily  - check 25-oh vitamin D and zinc levels  - will follow

## 2022-02-22 NOTE — PROGRESS NOTE ADULT - SUBJECTIVE AND OBJECTIVE BOX
Patient is a 61y old  Female who presents with a chief complaint of Polypectomy (22 Feb 2022 00:09)        HPI:  Pt is a 60 yo F with PMH of GIB, Bowel and Bladder incontinence, Smoking (3-4 cigarettes/day), ETOH abuse, hypothyroidism anemia transferred from [facility] for Polypectomy. Pt is from home living with her son and bedbound at baseline. Pt was admitted to [facility] on 01/31 for AMS, weakness and decreased appetite. She was found to have UTI, NSTEMI, and electrolyte imbalance. During her stay at [facility] pt had colonoscopy done which revealed 1 large 3-4 cm sigmoid pedunculated polyp and 2 polyps (7, 14 mm) in descending colon s/p bx. Pt was transferred to [facility] for polyp removal.  (16 Feb 2022 23:24)      Pt evaluated on rounds.  I reviewed the radiology tests and hospital record.    I reviewed previous notes on this patient.    Interval Events: No overnight events.      CAM:    SAT:    SBT:      REVIEW OF SYSTEMS:   see HPI      OBJECTIVE:  ICU Vital Signs Last 24 Hrs  T(C): 35.9 (22 Feb 2022 00:00), Max: 36.7 (21 Feb 2022 12:00)  T(F): 96.7 (22 Feb 2022 00:00), Max: 98 (21 Feb 2022 12:00)  HR: 90 (22 Feb 2022 02:10) (80 - 110)  BP: 107/66 (22 Feb 2022 02:10) (88/49 - 138/79)  BP(mean): 81 (22 Feb 2022 02:10) (64 - 103)  ABP: --  ABP(mean): --  RR: 28 (22 Feb 2022 02:10) (15 - 33)  SpO2: 98% (22 Feb 2022 02:10) (97% - 100%)        02-20 @ 07:01  -  02-21 @ 07:00  --------------------------------------------------------  IN: 2988.7 mL / OUT: 775 mL / NET: 2213.7 mL    02-21 @ 07:01 - 02-22 @ 06:10  --------------------------------------------------------  IN: 3190.8 mL / OUT: 1185 mL / NET: 2005.8 mL      CAPILLARY BLOOD GLUCOSE      POCT Blood Glucose.: 190 mg/dL (20 Feb 2022 17:48)        PHYSICAL EXAM:       · ENMT:   Airway patent,   Nasal mucosa clear.  Mouth with normal mucosa.   No thrush    · EYES:   Clear bilaterally,   pupils equal,   round and reactive to light.    · CARDIAC:   Normal rate,   regular rhythm.    Heart sounds S1, S2.   No murmurs, no rubs or gallops on auscultation  no edema        CAROTID:   normal systolic impulse  no bruits    · RESPIRATORY:   rales  normal chest expansion  no retractions or use of accessory muscles  palpation of chest is normal with no fremitus  percussion of chest demonstrates no hyperresonance or dullness    · GASTROINTESTINAL:  Abdomen soft,   non-tender,   + BS  liver/spleen not palpable    · MUSCULOSKELETAL:   no clubbing, cyanosis      · SKIN:   Skin normal color for race,   warm, dry   No evidence of rash.        · HEME LYMPH:   no splenomegaly.  No cervical  lymphadenopathy.  no inguinal lymphadenopathy    HOSPITAL MEDICATIONS:  MEDICATIONS  (STANDING):  chlorhexidine 4% Liquid 1 Application(s) Topical daily  dextrose 5% + lactated ringers. 1000 milliLiter(s) (125 mL/Hr) IV Continuous <Continuous>  lactated ringers Bolus 500 milliLiter(s) IV Bolus once  levothyroxine Injectable 25 MICROGram(s) IV Push at bedtime  LORazepam   Injectable 2 milliGRAM(s) IV Push once  multivitamin/minerals 1 Tablet(s) Oral daily  norepinephrine Infusion 0.05 MICROgram(s)/kG/Min (2.81 mL/Hr) IV Continuous <Continuous>  nystatin Cream 1 Application(s) Topical two times a day  pantoprazole  Injectable 40 milliGRAM(s) IV Push daily  phytonadione  IVPB 5 milliGRAM(s) IV Intermittent daily  piperacillin/tazobactam IVPB.. 4.5 Gram(s) IV Intermittent every 6 hours  thiamine 100 milliGRAM(s) Oral daily  triamcinolone 0.1% Cream 1 Application(s) Topical two times a day  vancomycin  IVPB      vancomycin  IVPB 1000 milliGRAM(s) IV Intermittent every 12 hours  vitamin A &amp; D Ointment 1 Application(s) Topical daily    MEDICATIONS  (PRN):    lactated ringers Bolus:   500 milliLiter(s), IV Bolus, once, infuse over 30 Minute(s), Stop After 1 Doses  lactated ringers Bolus:   500 milliLiter(s), IV Bolus, once, infuse over 30 Minute(s), Stop After 1 Doses  lactated ringers Bolus:   500 milliLiter(s), IV Bolus, once, infuse over 30 Minute(s), Stop After 1 Doses  lactated ringers.: Solution, 1000 milliLiter(s) infuse at 100 mL/Hr  lactated ringers Bolus:   1000 milliLiter(s), IV Bolus, once, infuse over 60 Minute(s), Stop After 1 Doses  lactated ringers Bolus:   500 milliLiter(s), IV Bolus, once, infuse over 60 Minute(s), Stop After 1 Doses  lactated ringers.: Solution, 1000 milliLiter(s) infuse at 75 mL/Hr  Provider's Contact #: [phone]  sodium chloride 0.45%.: Solution, 1000 milliLiter(s) infuse at 75 mL/Hr, Stop After 1 Days      LABS:                        7.8    6.71  )-----------( 51       ( 22 Feb 2022 04:06 )             21.8     02-22    146  |  114<H>  |  5<L>  ----------------------------<  160<H>  3.8   |  24  |  1.0    Ca    6.8<L>      22 Feb 2022 04:06  Mg     1.7     02-22    TPro  4.4<L>  /  Alb  1.1<L>  /  TBili  0.8  /  DBili  x   /  AST  14  /  ALT  10  /  AlkPhos  67  02-22    PT/INR - ( 22 Feb 2022 04:06 )   PT: 24.30 sec;   INR: 2.13 ratio         PTT - ( 22 Feb 2022 04:06 )  PTT:61.6 sec    Arterial Blood Gas:  02-21 @ 14:34  7.42/33/73/21/96.6/-2.5  ABG lactate: --      COVID-19 PCR: NotDetec (20 Feb 2022 09:16)        ABG - ( 21 Feb 2022 14:34 )  pH, Arterial: 7.42  pH, Blood: x     /  pCO2: 33    /  pO2: 73    / HCO3: 21    / Base Excess: -2.5  /  SaO2: 96.6        RADIOLOGY: Today I personally interpreted the latest CXR and other pertinent films.

## 2022-02-22 NOTE — CHART NOTE - NSCHARTNOTEFT_GEN_A_CORE
Spoke to medical resident taking care of Ms. Mckeon at CCU #[id number]  Recommended:  -Continue trending lactate   -NICOM  -Bolus patient due to patient being fluid behind      Trauma Team #[id number]

## 2022-02-22 NOTE — PROGRESS NOTE ADULT - SUBJECTIVE AND OBJECTIVE BOX
Gastroenterology progress note:     Patient is a 61y old  Female who presents with a chief complaint of Polypectomy (22 Feb 2022 06:09)       Admitted on: 02-16-22    We are following the patient for: colon polyps     Interval History:  patient is on low dose pressors, green BM over night      PAST MEDICAL & SURGICAL HISTORY:      MEDICATIONS  (STANDING):  chlorhexidine 4% Liquid 1 Application(s) Topical daily  dextrose 5% + lactated ringers. 1000 milliLiter(s) (125 mL/Hr) IV Continuous <Continuous>  levothyroxine Injectable 25 MICROGram(s) IV Push at bedtime  LORazepam   Injectable 2 milliGRAM(s) IV Push once  magnesium sulfate  IVPB 2 Gram(s) IV Intermittent once  multivitamin/minerals 1 Tablet(s) Oral daily  norepinephrine Infusion 0.05 MICROgram(s)/kG/Min (2.81 mL/Hr) IV Continuous <Continuous>  nystatin Cream 1 Application(s) Topical two times a day  pantoprazole  Injectable 40 milliGRAM(s) IV Push daily  phytonadione  IVPB 5 milliGRAM(s) IV Intermittent daily  piperacillin/tazobactam IVPB.. 4.5 Gram(s) IV Intermittent every 6 hours  thiamine 100 milliGRAM(s) Oral daily  triamcinolone 0.1% Cream 1 Application(s) Topical two times a day  vancomycin  IVPB      vancomycin  IVPB 1000 milliGRAM(s) IV Intermittent every 12 hours  vitamin A &amp; D Ointment 1 Application(s) Topical daily    MEDICATIONS  (PRN):      Allergies  No Known Allergies      Review of Systems:   Constitutional: Ne Fever, No chills, No weakness  ENT: No visual changes, No throat pain  Cardiovascular:  No Chest Pain, No Palpitations  Respiratory:  No Cough,  Dyspnea  Gastrointestinal:  As described in HPI  Neurological: No numbness or weakness  Skin: No rash, no itching    Physical Examination:  T(C): 36.2 (02-22-22 @ 08:00), Max: 36.7 (02-21-22 @ 12:00)  HR: 85 (02-22-22 @ 08:00) (78 - 110)  BP: 113/75 (02-22-22 @ 08:00) (82/50 - 138/79)  RR: 21 (02-22-22 @ 08:00) (15 - 33)  SpO2: 100% (02-22-22 @ 08:00) (97% - 100%)      02-21-22 @ 07:01  -  02-22-22 @ 07:00  --------------------------------------------------------  IN: 4165.8 mL / OUT: 1285 mL / NET: 2880.8 mL    02-22-22 @ 07:01  -  02-22-22 @ 09:06  --------------------------------------------------------  IN: 828.2 mL / OUT: 100 mL / NET: 728.2 mL        Respiratory:  No signs of respiratory distress. Lung sounds are clear bilaterally.  Cardiovascular:  S1 S2, Regular rate and rhythm.  Abdominal: Abdomen is soft, symmetric, and non-tender without distention. There are no visible lesions. Bowel sounds are present and normoactive in all four quadrants. No masses, hepatomegaly, or splenomegaly are noted.   Skin: No rashes, No Jaundice.  Neurology:Non-focal  Vascular: No varicose vein, No cyanosis, No edema        Data: (reviewed by attending)                        7.8    6.71  )-----------( 51       ( 22 Feb 2022 04:06 )             21.8     Hgb trend:  7.8  02-22-22 @ 04:06  8.2  02-21-22 @ 21:43  8.5  02-21-22 @ 06:03  8.9  02-20-22 @ 09:30  8.2  02-19-22 @ 23:11  8.1  02-19-22 @ 16:00        02-22    146  |  114<H>  |  5<L>  ----------------------------<  160<H>  3.8   |  24  |  1.0    Ca    6.8<L>      22 Feb 2022 04:06  Mg     1.7     02-22    TPro  4.4<L>  /  Alb  1.1<L>  /  TBili  0.8  /  DBili  x   /  AST  14  /  ALT  10  /  AlkPhos  67  02-22    Liver panel trend:  TBili 0.8   /   AST 14   /   ALT 10   /   AlkP 67   /   Tptn 4.4   /   Alb 1.1    /   DBili --      02-22  TBili 0.9   /   AST 15   /   ALT 11   /   AlkP 68   /   Tptn 4.7   /   Alb 1.2    /   DBili --      02-21  TBili 0.7   /   AST 14   /   ALT 12   /   AlkP 69   /   Tptn 5.1   /   Alb 1.4    /   DBili --      02-21  TBili 0.7   /   AST 19   /   ALT 12   /   AlkP 69   /   Tptn 5.5   /   Alb 1.4    /   DBili --      02-20  TBili 0.6   /   AST 18   /   ALT 12   /   AlkP 68   /   Tptn 5.2   /   Alb 1.5    /   DBili --      02-20  TBili 0.5   /   AST 20   /   ALT 11   /   AlkP 67   /   Tptn 4.8   /   Alb 1.2    /   DBili --      02-19  TBili 1.0   /   AST 21   /   ALT 13   /   AlkP 81   /   Tptn 5.8   /   Alb 1.5    /   DBili --      02-18  TBili 1.3   /   AST 14   /   ALT 12   /   AlkP 63   /   Tptn 5.5   /   Alb 1.6    /   DBili --      02-17      PT/INR - ( 22 Feb 2022 04:06 )   PT: 24.30 sec;   INR: 2.13 ratio         PTT - ( 22 Feb 2022 04:06 )  PTT:61.6 sec    Culture - Blood (collected 19 Feb 2022 23:11)  Source: .Blood Blood  Preliminary Report (21 Feb 2022 09:01):    No growth to date.         Radiology: (reviewed by attending)  CT Abdomen and Pelvis w/ Oral Cont and w/ IV Cont:   ACC: [id number] EXAM:  CT ABDOMEN AND PELVIS OC IC                          PROCEDURE DATE:  02/21/2022          INTERPRETATION:  CLINICAL HISTORY / REASON FOR EXAM: Abdominal pain   status post polypectomy.    TECHNIQUE: Contiguous axial CT images were obtained from the lower chest   to the pubic symphysis following the administration of 100 mL Omnipaque   350 intravenous contrast. Oral contrast was administered. Reformatted   images in the coronal and sagittal planes were acquired.    COMPARISON CT: CT abdomen and pelvis from September 21, 2021    OTHER STUDIES USED FOR CORRELATION: None.      FINDINGS:    LOWER CHEST: Bilateral lower lobe atelectasis and small bilateral pleural   effusions.    HEPATOBILIARY: Unremarkable.    SPLEEN: Unremarkable.    PANCREAS: Unremarkable.    ADRENAL GLANDS: Unremarkable.    KIDNEYS: Symmetric enhancement bilaterally. No evidence of   hydronephrosis. Nonobstructing renal calculi.    ABDOMINOPELVIC NODES: Unremarkable.    PELVIC ORGANS: Enlarged uterus with multiple intramural fibroids. Tapia   bulb within urinary bladder.    PERITONEUM/MESENTERY/BOWEL: Oral contrast reaches the distal small bowel.   Enteric catheter within the gastric lumen. No contrast extravasation   seen. Diffuse colonic wall thickening. No intraperitoneal or   retroperitoneal free air. Trace fluid within the pelvis and adjacent to   the liver. Normal caliber appendix.    BONES/SOFT TISSUES: Slight interval increase of compression deformity of   T12 since 2021. Diffuse osteopenia. Subacute/chronic right-sided rib   deformities. Anasarca.    VASCULAR: Calcified atherosclerotic disease within the abdominal aorta.      IMPRESSION:    Diffuse colonic wall thickening, consistent with pancolitis.   Intra-abdominal ascites which may be reactive.    No CT evidence of intraperitoneal or retroperitoneal free air. No   extraluminal contrast extravasation.    Small bilateral pleural effusions.    Interval progression of T12 compression deformity when compared with   available CTfrom 2021. Correlate with physical exam.    --- End of Report ---            DEANNA RITCHIE MD; Attending Radiologist  This document has been electronically signed. Feb 21 2022  4:40PM (02-21-22 @ 15:24)

## 2022-02-22 NOTE — CONSULT NOTE ADULT - SUBJECTIVE AND OBJECTIVE BOX
SANTIAGO CALIXTO  61y, Female  Allergy: No Known Allergies      All historical available data reviewed.    HPI:  Pt is a 60 yo F with PMH of GIB, Bowel and Bladder incontinence, Smoking (3-4 cigarettes/day), ETOH abuse, hypothyroidism anemia transferred from [facility] for Polypectomy. Pt is from home living with her son and bedbound at baseline. Pt was admitted to [facility] on 01/31 for AMS, weakness and decreased appetite. She was found to have UTI, NSTEMI, and electrolyte imbalance. During her stay at [facility] pt had colonoscopy done which revealed 1 large 3-4 cm sigmoid pedunculated polyp and 2 polyps (7, 14 mm) in descending colon s/p bx. Pt was transferred to [facility] for polyp removal.  (16 Feb 2022 23:24)     patient is S/P  colonoscopy and polypectomy 2/18 .    FAMILY HISTORY:    PAST MEDICAL & SURGICAL HISTORY:        VITALS:  T(F): 97.2, Max: 98 (02-21-22 @ 12:00)  HR: 90  BP: 110/63  RR: 24Vital Signs Last 24 Hrs  T(C): 36.2 (22 Feb 2022 08:00), Max: 36.7 (21 Feb 2022 12:00)  T(F): 97.2 (22 Feb 2022 08:00), Max: 98 (21 Feb 2022 12:00)  HR: 90 (22 Feb 2022 09:00) (78 - 110)  BP: 110/63 (22 Feb 2022 09:00) (82/50 - 138/79)  BP(mean): 81 (22 Feb 2022 09:00) (61 - 103)  RR: 24 (22 Feb 2022 09:00) (15 - 33)  SpO2: 99% (22 Feb 2022 09:00) (97% - 100%)    TESTS & MEASUREMENTS:                        7.8    6.71  )-----------( 51       ( 22 Feb 2022 04:06 )             21.8     02-22    146  |  114<H>  |  5<L>  ----------------------------<  160<H>  3.8   |  24  |  1.0    Ca    6.8<L>      22 Feb 2022 04:06  Mg     1.7     02-22    TPro  4.4<L>  /  Alb  1.1<L>  /  TBili  0.8  /  DBili  x   /  AST  14  /  ALT  10  /  AlkPhos  67  02-22    LIVER FUNCTIONS - ( 22 Feb 2022 04:06 )  Alb: 1.1 g/dL / Pro: 4.4 g/dL / ALK PHOS: 67 U/L / ALT: 10 U/L / AST: 14 U/L / GGT: x             Culture - Blood (collected 02-19-22 @ 23:11)  Source: .Blood Blood  Preliminary Report (02-21-22 @ 09:01):    No growth to date.    Culture - Urine (collected 02-18-22 @ 05:25)  Source: Clean Catch Clean Catch (Midstream)  Final Report (02-20-22 @ 15:08):    <10,000 CFU/mL Normal Urogenital Meghan    Culture - Blood (collected 02-17-22 @ 04:30)  Source: .Blood Blood  Preliminary Report (02-18-22 @ 13:01):    No growth to date.            RADIOLOGY & ADDITIONAL TESTS:  Personal review of radiological diagnostics performed  Echo and EKG results noted when applicable.     MEDICATIONS:  chlorhexidine 4% Liquid 1 Application(s) Topical daily  dextrose 5% + lactated ringers. 1000 milliLiter(s) IV Continuous <Continuous>  lactulose Syrup 10 Gram(s) Oral two times a day  levothyroxine Injectable 25 MICROGram(s) IV Push at bedtime  LORazepam   Injectable 2 milliGRAM(s) IV Push once  magnesium sulfate  IVPB 2 Gram(s) IV Intermittent once  multivitamin/minerals 1 Tablet(s) Oral daily  norepinephrine Infusion 0.05 MICROgram(s)/kG/Min IV Continuous <Continuous>  nystatin Cream 1 Application(s) Topical two times a day  pantoprazole  Injectable 40 milliGRAM(s) IV Push daily  phytonadione  IVPB 5 milliGRAM(s) IV Intermittent daily  piperacillin/tazobactam IVPB.. 4.5 Gram(s) IV Intermittent every 6 hours  thiamine 100 milliGRAM(s) Oral daily  triamcinolone 0.1% Cream 1 Application(s) Topical two times a day  vancomycin  IVPB      vancomycin  IVPB 1000 milliGRAM(s) IV Intermittent every 12 hours  vitamin A &amp; D Ointment 1 Application(s) Topical daily      ANTIBIOTICS:  piperacillin/tazobactam IVPB.. 4.5 Gram(s) IV Intermittent every 6 hours  vancomycin  IVPB      vancomycin  IVPB 1000 milliGRAM(s) IV Intermittent every 12 hours

## 2022-02-22 NOTE — CONSULT NOTE ADULT - ASSESSMENT
62 yo F with PMH of GIB, Bowel and Bladder incontinence, Smoking (1/2 ppd), ETOH abuse, hypothyroidism, anemia transferred from [facility] for EMR/ Polypectomy. . Pt was admitted to [facility] on 01/31 for AMS, weakness and decreased appetite. She was found to have UTI, Elevated troponin, and electrolyte imbalance. During [facility] at [facility] pt had colonoscopy done, which revealed 1 large 3-4 cm sigmoid pedunculated polyp (bx=tub adenoma, but was friable) and 2 polyps (7, 14 mm) in descending colon s/p bx.    2/18 S/P colonoscopy and polypectomy 2/18 .  CT: Diffuse colonic wall thickening, consistent with pancolitis. Intra-abdominal ascites. No intraperitoneal or retroperitoneal free air. No extraluminal contrast extravasation.    IMPRESSION;  Possible septic shock ( hypothermic, on levo ) secondary to possible bacterial translocation  Clinically no peritonitis  Possible bacterial PNA on the left secondary to aspiration ( CXR has opacity/fluid on the left. Clinically no PNA  . For now will cover as such ) > should not lead to shock  WBC 6.7  Mild pyuria > clinically no pyelonephritis  2/17,19 BCx NG  2/18 UCx NG  2/17,20 CD NG  Nares ORSA NG  COVID-19 NG    RECOMMENDATIONS;  D/c vancomycin  Zosyn 3.375 gm iv q6h  Off loading to prevent pressure sores and preventive measures to avoid aspiration If any questions , please call [phone] or send a message on [employer] teams  Please update ID in real time with any pertinent new laboratory /microbiological/radiographically findings or a change in the clinical characteristics

## 2022-02-22 NOTE — PROGRESS NOTE ADULT - SUBJECTIVE AND OBJECTIVE BOX
GENERAL SURGERY PROGRESS NOTE    Patient: SANTIAGO CALIXTO , 61y ([birth year])Female   MRN: [id number]  Location: [facility] A  Visit: 02-16-22 Inpatient  Date: 02-22-22 @ 00:09    Hospital Day #: 7    Procedure/Dx/Injuries: R/O pneumoretroperitoneum     Events of past 24 hours: No acute events overnight. Patient has no rebound or guarding on serial abd exams.     PAST MEDICAL & SURGICAL HISTORY:      Vitals:   T(F): 97.6 (02-21-22 @ 16:00), Max: 98.1 (02-21-22 @ 04:00)  HR: 82 (02-22-22 @ 00:00)  BP: 113/72 (02-22-22 @ 00:00)  RR: 17 (02-22-22 @ 00:00)  SpO2: 99% (02-22-22 @ 00:00)      Diet, NPO with Tube Feed:   Tube Feeding Modality: Nasogastric  Peptamen A.F. Formula  Total Volume for 24 Hours (mL): 1080  Continuous  Starting Tube Feed Rate mL per Hour: 20  Increase Tube Feed Rate by (mL): 20     Every 4 hours  Until Goal Tube Feed Rate (mL per Hour): 45  Tube Feed Duration (in Hours): 24  Tube Feed Start Time: 17:30  Free Water Flush   Total Volume per Flush (mL): 50   Frequency: Every 6 Hours      Fluids:     I & O's:    02-20-22 @ 07:01  -  02-21-22 @ 07:00  --------------------------------------------------------  IN:    dextrose 5% w/ Additives: 2000 mL    IV PiggyBack: 100 mL    Norepinephrine: 444.7 mL    Norepinephrine: 444 mL  Total IN: 2988.7 mL    OUT:    Indwelling Catheter - Urethral (mL): 775 mL  Total OUT: 775 mL    Total NET: 2213.7 mL    PHYSICAL EXAM:  General: Arousable but lethargic   HEENT: NCAT, Trachea ML, Neck supple  Cardiac: RRR S1, S2, no Murmurs, rubs or gallops  Respiratory: normal respiratory effort  Abdomen: Soft, non-distended, non-tender, no rebound, no guarding.  Skin: Warm/dry, normal color, no jaundice      MEDICATIONS  (STANDING):  chlorhexidine 4% Liquid 1 Application(s) Topical daily  dextrose 5% 1000 milliLiter(s) (100 mL/Hr) IV Continuous <Continuous>  levothyroxine Injectable 25 MICROGram(s) IV Push at bedtime  LORazepam   Injectable 2 milliGRAM(s) IV Push once  multivitamin/minerals 1 Tablet(s) Oral daily  norepinephrine Infusion 0.05 MICROgram(s)/kG/Min (2.81 mL/Hr) IV Continuous <Continuous>  nystatin Cream 1 Application(s) Topical two times a day  pantoprazole  Injectable 40 milliGRAM(s) IV Push daily  phytonadione  IVPB 5 milliGRAM(s) IV Intermittent daily  piperacillin/tazobactam IVPB.. 4.5 Gram(s) IV Intermittent every 6 hours  thiamine 100 milliGRAM(s) Oral daily  triamcinolone 0.1% Cream 1 Application(s) Topical two times a day  vancomycin  IVPB      vancomycin  IVPB 1000 milliGRAM(s) IV Intermittent every 12 hours  vitamin A &amp; D Ointment 1 Application(s) Topical daily    MEDICATIONS  (PRN):      DVT PROPHYLAXIS:   GI PROPHYLAXIS: pantoprazole  Injectable 40 milliGRAM(s) IV Push daily    ANTICOAGULATION:   ANTIBIOTICS:  piperacillin/tazobactam IVPB.. 4.5 Gram(s)  vancomycin  IVPB    vancomycin  IVPB 1000 milliGRAM(s)        Isolation Precautions:     Isolation Type: CONTACT;  Indication for Isolation: Clostridium difficile    Additional Instructions:  Contact Isolation (02-21-22 @ 20:48)      LAB/STUDIES:  Labs:  CAPILLARY BLOOD GLUCOSE                              8.2    8.11  )-----------( 36       ( 21 Feb 2022 21:43 )             22.8         02-21    142  |  110  |  5<L>  ----------------------------<  149<H>  3.3<L>   |  24  |  1.1      Calcium, Total Serum: 6.7 mg/dL (02-21-22 @ 21:43)      LFTs:             4.7  | 0.9  | 15       ------------------[68      ( 21 Feb 2022 21:43 )  1.2  | x    | 11          Lipase:x      Amylase:x         Lactate, Blood: 3.5 mmol/L (02-21-22 @ 21:43)  Blood Gas Arterial, Lactate: 3.70 mmol/L (02-21-22 @ 14:34)  Lactate, Blood: 4.8 mmol/L (02-21-22 @ 13:01)    ABG - ( 21 Feb 2022 14:34 )  pH: 7.42  /  pCO2: 33    /  pO2: 73    / HCO3: 21    / Base Excess: -2.5  /  SaO2: 96.6              Coags:                Culture - Blood (collected 19 Feb 2022 23:11)  Source: .Blood Blood  Preliminary Report (21 Feb 2022 09:01):    No growth to date.              IMAGING:  < from: CT Abdomen and Pelvis w/ Oral Cont and w/ IV Cont (02.21.22 @ 15:24) >  IMPRESSION:    Diffuse colonic wall thickening, consistent with pancolitis.   Intra-abdominal ascites which may be reactive.    No CT evidence of intraperitoneal or retroperitoneal free air. No   extraluminal contrast extravasation.    Small bilateral pleural effusions.    Interval progression of T12 compression deformity when compared with   available CTfrom 2021. Correlate with physical exam.    < end of copied text >

## 2022-02-22 NOTE — PROGRESS NOTE ADULT - SUBJECTIVE AND OBJECTIVE BOX
SANTIAGO CALIXTO 61y Female  MRN#: [id number]   CODE STATUS: FULL     Hospital Day: 6d    Pt is currently admitted with the primary diagnosis of GIB s/p polypectomy, ams, pancolitis     SUBJECTIVE  Hospital Course: Patient is a 60 y/o female with PMHx of GIB, Bowel and Bladder incontinence, Smoking (3-4 cigarettes/day), ETOH abuse, hypothyroidism, and ongoing anemia transferred from [facility] for Polypectomy. Patient was admitted to [facility] on 01/31 for AMS, weakness and decreased appetite. She was found to have UTI, NSTEMI, and electrolyte imbalance. During her stay at [facility] she had a colonoscopy done which revealed 1 large 3-4 cm sigmoid pedunculated polyp and 2 polyps (7, 14 mm) in descending colon. Patient was transferred as they are not able to perform large Polypectomies there. She was not able to be discharged as ongoing anemia and concern that the cause are these large polyps.    Patient had a colonoscopy and polypectomy done on 2/18/2022. Polyp (13 mm) in the descending colon. (hot snare Polypectomy).  Polyp (25 mm to 30 mm) in the rectosigmoid junction at 20 cm from the anal verge. (Endoloop, Polypectomy, Endoclip).   After the colonoscopy patient became hypothermic and hypotensive. Over night patient was started to Levo and warming blanket.   2/20 AM patient had RRT for desaturation and hypotension. Patient was placed on 50% O2, patient had good response. Levophed also started to raise BP. Patient remains altered, not speaking, but remains somewhat awake and just makes grunting sounds. Patient is not following commands. Central line was placed. Spoke to ICU fellow, patient is being upgraded to ICU for closer monitoring.     In CCU pt had abdominal tenderness, KUB ordered, concern for RP free air. CTAP with PO/IV contrast ordered, no perforation or pneumo-peritoneum/retroperitoneum however showing findings c/w pancolitis. Surgery following, ID consulted. Pt on vanc/zosyn     Overnight events: none    Interval hx/Subjective complaints: Pt more awake and alert, less lethargic than yesterday however not answering questions or following commands.                 ----------------------------------------------------------  OBJECTIVE  PAST MEDICAL & SURGICAL HISTORY                                            -----------------------------------------------------------  ALLERGIES:  No Known Allergies                                            ------------------------------------------------------------    HOME MEDICATIONS  Home Medications:                           MEDICATIONS:  STANDING MEDICATIONS  chlorhexidine 4% Liquid 1 Application(s) Topical daily  dextrose 5% + lactated ringers. 1000 milliLiter(s) IV Continuous <Continuous>  lactulose Syrup 10 Gram(s) Oral two times a day  levothyroxine Injectable 25 MICROGram(s) IV Push at bedtime  LORazepam   Injectable 2 milliGRAM(s) IV Push once  magnesium sulfate  IVPB 2 Gram(s) IV Intermittent once  multivitamin/minerals 1 Tablet(s) Oral daily  norepinephrine Infusion 0.05 MICROgram(s)/kG/Min IV Continuous <Continuous>  nystatin Cream 1 Application(s) Topical two times a day  pantoprazole  Injectable 40 milliGRAM(s) IV Push daily  phytonadione  IVPB 5 milliGRAM(s) IV Intermittent daily  piperacillin/tazobactam IVPB.. 4.5 Gram(s) IV Intermittent every 6 hours  thiamine 100 milliGRAM(s) Oral daily  triamcinolone 0.1% Cream 1 Application(s) Topical two times a day  vancomycin  IVPB      vancomycin  IVPB 1000 milliGRAM(s) IV Intermittent every 12 hours  vitamin A &amp; D Ointment 1 Application(s) Topical daily    PRN MEDICATIONS                                            ------------------------------------------------------------  VITAL SIGNS: Last 24 Hours  T(C): 36.2 (22 Feb 2022 08:00), Max: 36.7 (21 Feb 2022 12:00)  T(F): 97.2 (22 Feb 2022 08:00), Max: 98 (21 Feb 2022 12:00)  HR: 85 (22 Feb 2022 08:00) (78 - 110)  BP: 113/75 (22 Feb 2022 08:00) (82/50 - 138/79)  BP(mean): 90 (22 Feb 2022 08:00) (61 - 103)  RR: 21 (22 Feb 2022 08:00) (15 - 33)  SpO2: 100% (22 Feb 2022 08:00) (97% - 100%)      02-21-22 @ 07:01  -  02-22-22 @ 07:00  --------------------------------------------------------  IN: 4165.8 mL / OUT: 1285 mL / NET: 2880.8 mL    02-22-22 @ 07:01  -  02-22-22 @ 09:22  --------------------------------------------------------  IN: 828.2 mL / OUT: 100 mL / NET: 728.2 mL                                             --------------------------------------------------------------  LABS:                        7.8    6.71  )-----------( 51       ( 22 Feb 2022 04:06 )             21.8     02-22    146  |  114<H>  |  5<L>  ----------------------------<  160<H>  3.8   |  24  |  1.0    Ca    6.8<L>      22 Feb 2022 04:06  Mg     1.7     02-22    TPro  4.4<L>  /  Alb  1.1<L>  /  TBili  0.8  /  DBili  x   /  AST  14  /  ALT  10  /  AlkPhos  67  02-22    PT/INR - ( 22 Feb 2022 04:06 )   PT: 24.30 sec;   INR: 2.13 ratio         PTT - ( 22 Feb 2022 04:06 )  PTT:61.6 sec    ABG - ( 21 Feb 2022 14:34 )  pH, Arterial: 7.42  pH, Blood: x     /  pCO2: 33    /  pO2: 73    / HCO3: 21    / Base Excess: -2.5  /  SaO2: 96.6              Sedimentation Rate, Erythrocyte: 25 mm/Hr *H* (02-22-22 @ 04:06)  Lactate, Blood: 3.5 mmol/L *H* (02-21-22 @ 21:43)  Lactate, Blood: 4.8 mmol/L *HH* (02-21-22 @ 13:01)        Culture - Blood (collected 19 Feb 2022 23:11)  Source: .Blood Blood  Preliminary Report (21 Feb 2022 09:01):    No growth to date.                                                    -------------------------------------------------------------  RADIOLOGY:                                            --------------------------------------------------------------  PHYSICAL EXAM:  General:   HEENT:  LUNGS:  HEART:  ABDOMEN:  EXT:  NEURO:  SKIN:                                         --------------------------------------------------------------    ASSESSMENT & PLAN        #Maintainence   - DVT ppx:   - GI ppx:   - Diet:  - Activity:   - Code status:  - Dispo:     #Handoff  SANTIAGO CALIXTO 61y Female  MRN#: [id number]   CODE STATUS: FULL     Hospital Day: 6d    Pt is currently admitted with the primary diagnosis of GIB s/p polypectomy, ams, pancolitis     SUBJECTIVE  Hospital Course: Patient is a 60 y/o female with PMHx of GIB, Bowel and Bladder incontinence, Smoking (3-4 cigarettes/day), ETOH abuse, hypothyroidism, and ongoing anemia transferred from [facility] for Polypectomy. Patient was admitted to [facility] on 01/31 for AMS, weakness and decreased appetite. She was found to have UTI, NSTEMI, and electrolyte imbalance. During her stay at [facility] she had a colonoscopy done which revealed 1 large 3-4 cm sigmoid pedunculated polyp and 2 polyps (7, 14 mm) in descending colon. Patient was transferred as they are not able to perform large Polypectomies there. She was not able to be discharged as ongoing anemia and concern that the cause are these large polyps.    Patient had a colonoscopy and polypectomy done on 2/18/2022. Polyp (13 mm) in the descending colon. (hot snare Polypectomy).  Polyp (25 mm to 30 mm) in the rectosigmoid junction at 20 cm from the anal verge. (Endoloop, Polypectomy, Endoclip).   After the colonoscopy patient became hypothermic and hypotensive. Over night patient was started to Levo and warming blanket.   2/20 AM patient had RRT for desaturation and hypotension. Patient was placed on 50% O2, patient had good response. Levophed also started to raise BP. Patient remains altered, not speaking, but remains somewhat awake and just makes grunting sounds. Patient is not following commands. Central line was placed. Spoke to ICU fellow, patient is being upgraded to ICU for closer monitoring.     In CCU pt had abdominal tenderness, KUB ordered, concern for RP free air. CTAP with PO/IV contrast ordered, no perforation or pneumo-peritoneum/retroperitoneum however showing findings c/w pancolitis. Surgery following, ID consulted. Pt on vanc/zosyn.     Overnight events: none    Interval hx/Subjective complaints: Pt more awake and alert, less lethargic than yesterday however not answering questions or following commands.                 ----------------------------------------------------------  OBJECTIVE  PAST MEDICAL & SURGICAL HISTORY                                            -----------------------------------------------------------  ALLERGIES:  No Known Allergies                                            ------------------------------------------------------------    HOME MEDICATIONS  Home Medications:                           MEDICATIONS:  STANDING MEDICATIONS  chlorhexidine 4% Liquid 1 Application(s) Topical daily  dextrose 5% + lactated ringers. 1000 milliLiter(s) IV Continuous <Continuous>  lactulose Syrup 10 Gram(s) Oral two times a day  levothyroxine Injectable 25 MICROGram(s) IV Push at bedtime  LORazepam   Injectable 2 milliGRAM(s) IV Push once  magnesium sulfate  IVPB 2 Gram(s) IV Intermittent once  multivitamin/minerals 1 Tablet(s) Oral daily  norepinephrine Infusion 0.05 MICROgram(s)/kG/Min IV Continuous <Continuous>  nystatin Cream 1 Application(s) Topical two times a day  pantoprazole  Injectable 40 milliGRAM(s) IV Push daily  phytonadione  IVPB 5 milliGRAM(s) IV Intermittent daily  piperacillin/tazobactam IVPB.. 4.5 Gram(s) IV Intermittent every 6 hours  thiamine 100 milliGRAM(s) Oral daily  triamcinolone 0.1% Cream 1 Application(s) Topical two times a day  vancomycin  IVPB      vancomycin  IVPB 1000 milliGRAM(s) IV Intermittent every 12 hours  vitamin A &amp; D Ointment 1 Application(s) Topical daily    PRN MEDICATIONS                                            ------------------------------------------------------------  VITAL SIGNS: Last 24 Hours  T(C): 36.2 (22 Feb 2022 08:00), Max: 36.7 (21 Feb 2022 12:00)  T(F): 97.2 (22 Feb 2022 08:00), Max: 98 (21 Feb 2022 12:00)  HR: 85 (22 Feb 2022 08:00) (78 - 110)  BP: 113/75 (22 Feb 2022 08:00) (82/50 - 138/79)  BP(mean): 90 (22 Feb 2022 08:00) (61 - 103)  RR: 21 (22 Feb 2022 08:00) (15 - 33)  SpO2: 100% (22 Feb 2022 08:00) (97% - 100%)      02-21-22 @ 07:01  -  02-22-22 @ 07:00  --------------------------------------------------------  IN: 4165.8 mL / OUT: 1285 mL / NET: 2880.8 mL    02-22-22 @ 07:01  -  02-22-22 @ 09:22  --------------------------------------------------------  IN: 828.2 mL / OUT: 100 mL / NET: 728.2 mL                                             --------------------------------------------------------------  LABS:                        7.8    6.71  )-----------( 51       ( 22 Feb 2022 04:06 )             21.8     02-22    146  |  114<H>  |  5<L>  ----------------------------<  160<H>  3.8   |  24  |  1.0    Ca    6.8<L>      22 Feb 2022 04:06  Mg     1.7     02-22    TPro  4.4<L>  /  Alb  1.1<L>  /  TBili  0.8  /  DBili  x   /  AST  14  /  ALT  10  /  AlkPhos  67  02-22    PT/INR - ( 22 Feb 2022 04:06 )   PT: 24.30 sec;   INR: 2.13 ratio         PTT - ( 22 Feb 2022 04:06 )  PTT:61.6 sec    ABG - ( 21 Feb 2022 14:34 )  pH, Arterial: 7.42  pH, Blood: x     /  pCO2: 33    /  pO2: 73    / HCO3: 21    / Base Excess: -2.5  /  SaO2: 96.6              Sedimentation Rate, Erythrocyte: 25 mm/Hr *H* (02-22-22 @ 04:06)  Lactate, Blood: 3.5 mmol/L *H* (02-21-22 @ 21:43)  Lactate, Blood: 4.8 mmol/L *HH* (02-21-22 @ 13:01)        Culture - Blood (collected 19 Feb 2022 23:11)  Source: .Blood Blood  Preliminary Report (21 Feb 2022 09:01):    No growth to date.                                                    -------------------------------------------------------------  RADIOLOGY:  < from: [facility] Duplex Lower Ext Vein Scan, Bilat (02.21.22 @ 20:23) >  Impression:    No evidence of deep venous thrombosis or superficial thrombophlebitis in   the bilateral lower extremities.    Bilateral peroneal vein were not visualized due to patient status    < end of copied text >    < from: CT Abdomen and Pelvis w/ Oral Cont and w/ IV Cont (02.21.22 @ 15:24) >  IMPRESSION:    Diffuse colonic wall thickening, consistent with pancolitis.   Intra-abdominal ascites which may be reactive.    No CT evidence of intraperitoneal or retroperitoneal free air. No   extraluminal contrast extravasation.    Small bilateral pleural effusions.    Interval progression of T12 compression deformity when compared with   available CTfrom 2021. Correlate with physical exam.    < end of copied text >                                            --------------------------------------------------------------  PHYSICAL EXAM:  General: Ill appearing woman with NGT in place   HEENT: ncat, eomi, mmm  LUNGS: dec bs over left base   HEART: s1/s2, rrr  ABDOMEN: soft, ntnd, bs+  EXT: wwp, no cyanosis, clubbing, edema  NEURO: aox0, moves all extremities   SKIN: superficial scabs diffusely over body, most prominant over chest, as well as areas of desquamation over arms, legs, and hips c/w exfoliative dermatitis   Lines: RIJ CVC c/d/i   Drips: levo @0.5                                        --------------------------------------------------------------    ASSESSMENT & PLAN  61F PMH GIB, Bowel and Bladder incontinence, Smoking (3-4 cigarettes/day), ETOH abuse, hypothyroidism, and ongoing anemia, admitted to [facility] 1/31 for ams found to have UTI. Dev GIB, colonoscopy showing polyps, transferred to [facility] for polypectomy now upgraded to CCU for hypotension, likely septic shock 2/2 aspiration pna.     #AMS with unclear etiology   #sepsis w/ shock/hypothermia vs aspiration after procedure under anesthesia 2/18 vs CVA   - Pt upgraded from floor on 2/20 after RRT for hypotension and desaturation. RIJ CVC placed, started on levo, venti mask, o2 and hypotension subsequently improved   - CTH 2/20: no ICH, mass effect, or midline shift   - EEG 2/20: generalized slowing    - UCx 2/18 negative   - BCx 2/17, 2/19: NGTD  - COVID negative 2/20  - MRSA nares 2/20 negative  - LE duplex 2/21: no DVT however bl peroneal veins not visualized   - ammonia elevated to 59 on 2/22, started on lactulose, possibly contributing to ams   - c/w zosyn 3.375 q6h   - d/c'd vanc 2/22  - c/w levo @0.5, titrate down as tolerated   - neuro eval appreciated  - ID recs appreciated      #Colonic Polyp  #Diarrhea in setting of recent abx use  #Hx of GIB   #Pneumoretroperitoneum   - Colonoscopy 2/4: 1 large 3-4 cm sigmoid pedunculated polyp and 2 polyps (7, 14 mm) in descending colon s/p bx  - GI consulted (Dr. Daniels)   - Patient is cleared for colonoscopy per Cardiology   - Negative for C-Diff infection  - CEA elevated 5.6  - S/P  colonoscopy and polypectomy 2/18/2022. Polyp (13 mm) in the descending colon. (hot snare Polypectomy).  Polyp (25 mm to 30 mm) in the rectosigmoid junction at 20 cm from the anal verge. (Endoloop, Polypectomy, Endoclip).   - Repeat Colonoscopy in 6 months   - GI recs appreciated    - 2/21: overnight pt had abdominal tenderness, KUB ordered showing paraspinal lucencies concerning for possible pneumoretroperitoneum   - abdomen soft, nontender, nondistended on my exam  - CTAP PO/IV contrast 2/21: findings c/w pancolitis, no intra or retroperitoneal free air  - c/w zosyn 3.375 q6h   - surgery recs appreciated   - ID recs appreciated     #Exfoliative dermatitis   - pt with superficial scabs diffusely over body, most prominent over chest, as well as areas of desquamation over arms, legs, and hips   - Continue to monitor, if any worsening derm consult   - Burn recs appreciated   - f/u wound care recs     #Acute thrombocytopenia  #Elevated INR/PTT not on a/c  - not on heparin this admission, unclear if was on previously at [facility]  - F/u DIC panel, HIT panel   - monitor INR   - F/u Heme/onc eval     #Hypotension   - F/u cortisol lvl (received)   - C/w midodrine 15mg TID   - On Levo @0.764, downtitrate as tolerated     #Hx of UTI  #R lower face cellulitis  - CT maxillofacial with C 2/12: Mild soft tissue inflammation suggesting cellulitis of the right lower face  - pt was on cefepime, vancomycin (end date 2/15)  - Started on Unasyn 2/16,/c today 2/20 started on broad spectrum Abx 2/20  - No documentations of + bcx or ucx on the charts from [facility]   - Rpt UCx negative   - Bclx 2/17 NGTD, F/u repeat    - Dental recs appreciated   - F/u SPEP     #Hypernatremia- improving   - c/w d5LR    #Hypothyroidism  - TSH 15.6  - C/w synthroid 50 mcg po q24  - Patient will need to repeat TSH and FT4 in 6 wks (April)    #Hx L pleural effusion on CXR 2/8  - CT Chest with Cont 2/9 obtained: Moderate b/l pleural effusions resulting in complete atelectasis of both lower lobes  - monitor CXR    #Hx NSTEMI   #Hx of Elevated Troponin  - in setting of hypotension   - c/w Midodrine 15 mg q8h  - Normal Lexiscan at [facility]  - EKG noted     #Hx of ETOH abuse  - last drink (as per nurse) 6 months ago  - c/w Folic acid and Thiamine  - F/u Dietician eval  - Ensure 3x/day    #Possible Thiamine deficiency   #Possible Folic acid deficiency   - C/w Folic acid and Thiamine    #Misc   - DVT prophylaxis: SCD   - GI prophylaxis: PPI  - Diet: NPO tube feeds    - Activity status: IAT   - Code status: Full code   - Disposition: acute     #Handoff   - f/u SPEP, cortisol, hemolytic anemia panel   - f/u ID recs   - f/u final BCx  - speech and swallow eval for feeds once improvement of mental status

## 2022-02-23 LAB
ACANTHOCYTES BLD QL SMEAR: SLIGHT — SIGNIFICANT CHANGE UP
ALBUMIN SERPL ELPH-MCNC: 1.4 G/DL — LOW (ref 3.5–5.2)
ALP SERPL-CCNC: 71 U/L — SIGNIFICANT CHANGE UP (ref 30–115)
ALT FLD-CCNC: 11 U/L — SIGNIFICANT CHANGE UP (ref 0–41)
ANION GAP SERPL CALC-SCNC: 7 MMOL/L — SIGNIFICANT CHANGE UP (ref 7–14)
ANISOCYTOSIS BLD QL: SIGNIFICANT CHANGE UP
APTT BLD: 58 SEC — HIGH (ref 27–39.2)
AST SERPL-CCNC: 21 U/L — SIGNIFICANT CHANGE UP (ref 0–41)
BASOPHILS # BLD AUTO: 0 K/UL — SIGNIFICANT CHANGE UP (ref 0–0.2)
BASOPHILS NFR BLD AUTO: 0 % — SIGNIFICANT CHANGE UP (ref 0–1)
BILIRUB SERPL-MCNC: 1.1 MG/DL — SIGNIFICANT CHANGE UP (ref 0.2–1.2)
BUN SERPL-MCNC: 5 MG/DL — LOW (ref 10–20)
CALCIUM SERPL-MCNC: 7.3 MG/DL — LOW (ref 8.5–10.1)
CHLORIDE SERPL-SCNC: 115 MMOL/L — HIGH (ref 98–110)
CO2 SERPL-SCNC: 25 MMOL/L — SIGNIFICANT CHANGE UP (ref 17–32)
CREAT SERPL-MCNC: 0.9 MG/DL — SIGNIFICANT CHANGE UP (ref 0.7–1.5)
EOSINOPHIL # BLD AUTO: 0 K/UL — SIGNIFICANT CHANGE UP (ref 0–0.7)
EOSINOPHIL NFR BLD AUTO: 0 % — SIGNIFICANT CHANGE UP (ref 0–8)
GIANT PLATELETS BLD QL SMEAR: PRESENT — SIGNIFICANT CHANGE UP
GLUCOSE SERPL-MCNC: 84 MG/DL — SIGNIFICANT CHANGE UP (ref 70–99)
HCT VFR BLD CALC: 23.4 % — LOW (ref 37–47)
HCT VFR BLD CALC: 24 % — LOW (ref 37–47)
HCT VFR BLD CALC: 25.5 % — LOW (ref 37–47)
HGB BLD-MCNC: 8.2 G/DL — LOW (ref 12–16)
HGB BLD-MCNC: 8.6 G/DL — LOW (ref 12–16)
HGB BLD-MCNC: 9.1 G/DL — LOW (ref 12–16)
INR BLD: 1.83 RATIO — HIGH (ref 0.65–1.3)
LACTATE SERPL-SCNC: 3.2 MMOL/L — HIGH (ref 0.7–2)
LACTATE SERPL-SCNC: 3.5 MMOL/L — HIGH (ref 0.7–2)
LYMPHOCYTES # BLD AUTO: 0.82 K/UL — LOW (ref 1.2–3.4)
LYMPHOCYTES # BLD AUTO: 18 % — LOW (ref 20.5–51.1)
MACROCYTES BLD QL: SIGNIFICANT CHANGE UP
MAGNESIUM SERPL-MCNC: 1.7 MG/DL — LOW (ref 1.8–2.4)
MANUAL SMEAR VERIFICATION: SIGNIFICANT CHANGE UP
MCHC RBC-ENTMCNC: 29.9 PG — SIGNIFICANT CHANGE UP (ref 27–31)
MCHC RBC-ENTMCNC: 29.9 PG — SIGNIFICANT CHANGE UP (ref 27–31)
MCHC RBC-ENTMCNC: 30.2 PG — SIGNIFICANT CHANGE UP (ref 27–31)
MCHC RBC-ENTMCNC: 35 G/DL — SIGNIFICANT CHANGE UP (ref 32–37)
MCHC RBC-ENTMCNC: 35.7 G/DL — SIGNIFICANT CHANGE UP (ref 32–37)
MCHC RBC-ENTMCNC: 35.8 G/DL — SIGNIFICANT CHANGE UP (ref 32–37)
MCV RBC AUTO: 83.9 FL — SIGNIFICANT CHANGE UP (ref 81–99)
MCV RBC AUTO: 84.2 FL — SIGNIFICANT CHANGE UP (ref 81–99)
MCV RBC AUTO: 85.4 FL — SIGNIFICANT CHANGE UP (ref 81–99)
METAMYELOCYTES # FLD: 1.8 % — HIGH (ref 0–0)
MONOCYTES # BLD AUTO: 0 K/UL — LOW (ref 0.1–0.6)
MONOCYTES NFR BLD AUTO: 0 % — LOW (ref 1.7–9.3)
MPO AB + PR3 PNL SER: SIGNIFICANT CHANGE UP
NEUTROPHILS # BLD AUTO: 3.64 K/UL — SIGNIFICANT CHANGE UP (ref 1.4–6.5)
NEUTROPHILS NFR BLD AUTO: 80.2 % — HIGH (ref 42.2–75.2)
NRBC # BLD: 0 /100 WBCS — SIGNIFICANT CHANGE UP (ref 0–0)
NRBC # BLD: 1 /100 WBCS — HIGH (ref 0–0)
PLAT MORPH BLD: ABNORMAL
PLATELET # BLD AUTO: 33 K/UL — LOW (ref 130–400)
PLATELET # BLD AUTO: 34 K/UL — LOW (ref 130–400)
PLATELET # BLD AUTO: 43 K/UL — LOW (ref 130–400)
POIKILOCYTOSIS BLD QL AUTO: SIGNIFICANT CHANGE UP
POTASSIUM SERPL-MCNC: 3.5 MMOL/L — SIGNIFICANT CHANGE UP (ref 3.5–5)
POTASSIUM SERPL-SCNC: 3.5 MMOL/L — SIGNIFICANT CHANGE UP (ref 3.5–5)
PROT SERPL-MCNC: 4.7 G/DL — LOW (ref 6–8)
PROTHROM AB SERPL-ACNC: 20.9 SEC — HIGH (ref 9.95–12.87)
RBC # BLD: 2.74 M/UL — LOW (ref 4.2–5.4)
RBC # BLD: 2.85 M/UL — LOW (ref 4.2–5.4)
RBC # BLD: 3.04 M/UL — LOW (ref 4.2–5.4)
RBC # FLD: 15.2 % — HIGH (ref 11.5–14.5)
RBC # FLD: 15.5 % — HIGH (ref 11.5–14.5)
RBC # FLD: 16.3 % — HIGH (ref 11.5–14.5)
RBC BLD AUTO: ABNORMAL
SMUDGE CELLS # BLD: PRESENT — SIGNIFICANT CHANGE UP
SODIUM SERPL-SCNC: 147 MMOL/L — HIGH (ref 135–146)
TARGETS BLD QL SMEAR: SIGNIFICANT CHANGE UP
WBC # BLD: 4.54 K/UL — LOW (ref 4.8–10.8)
WBC # BLD: 5.23 K/UL — SIGNIFICANT CHANGE UP (ref 4.8–10.8)
WBC # BLD: 5.33 K/UL — SIGNIFICANT CHANGE UP (ref 4.8–10.8)
WBC # FLD AUTO: 4.54 K/UL — LOW (ref 4.8–10.8)
WBC # FLD AUTO: 5.23 K/UL — SIGNIFICANT CHANGE UP (ref 4.8–10.8)
WBC # FLD AUTO: 5.33 K/UL — SIGNIFICANT CHANGE UP (ref 4.8–10.8)

## 2022-02-23 PROCEDURE — 99232 SBSQ HOSP IP/OBS MODERATE 35: CPT

## 2022-02-23 PROCEDURE — 99233 SBSQ HOSP IP/OBS HIGH 50: CPT

## 2022-02-23 RX ORDER — MAGNESIUM SULFATE 500 MG/ML
2 VIAL (ML) INJECTION ONCE
Refills: 0 | Status: COMPLETED | OUTPATIENT
Start: 2022-02-23 | End: 2022-02-23

## 2022-02-23 RX ORDER — POTASSIUM CHLORIDE 20 MEQ
40 PACKET (EA) ORAL ONCE
Refills: 0 | Status: DISCONTINUED | OUTPATIENT
Start: 2022-02-23 | End: 2022-02-23

## 2022-02-23 RX ORDER — PANTOPRAZOLE SODIUM 20 MG/1
40 TABLET, DELAYED RELEASE ORAL EVERY 12 HOURS
Refills: 0 | Status: DISCONTINUED | OUTPATIENT
Start: 2022-02-23 | End: 2022-03-13

## 2022-02-23 RX ORDER — MORPHINE SULFATE 50 MG/1
2 CAPSULE, EXTENDED RELEASE ORAL ONCE
Refills: 0 | Status: DISCONTINUED | OUTPATIENT
Start: 2022-02-23 | End: 2022-02-23

## 2022-02-23 RX ORDER — POTASSIUM CHLORIDE 20 MEQ
40 PACKET (EA) ORAL ONCE
Refills: 0 | Status: COMPLETED | OUTPATIENT
Start: 2022-02-23 | End: 2022-02-23

## 2022-02-23 RX ORDER — MEROPENEM 1 G/30ML
1000 INJECTION INTRAVENOUS EVERY 8 HOURS
Refills: 0 | Status: DISCONTINUED | OUTPATIENT
Start: 2022-02-23 | End: 2022-02-24

## 2022-02-23 RX ORDER — SODIUM CHLORIDE 9 MG/ML
500 INJECTION, SOLUTION INTRAVENOUS ONCE
Refills: 0 | Status: COMPLETED | OUTPATIENT
Start: 2022-02-23 | End: 2022-02-23

## 2022-02-23 RX ADMIN — Medication 25 GRAM(S): at 08:41

## 2022-02-23 RX ADMIN — MORPHINE SULFATE 2 MILLIGRAM(S): 50 CAPSULE, EXTENDED RELEASE ORAL at 04:45

## 2022-02-23 RX ADMIN — Medication 1 TABLET(S): at 11:34

## 2022-02-23 RX ADMIN — CHLORHEXIDINE GLUCONATE 1 APPLICATION(S): 213 SOLUTION TOPICAL at 11:35

## 2022-02-23 RX ADMIN — Medication 40 MILLIEQUIVALENT(S): at 08:41

## 2022-02-23 RX ADMIN — MEROPENEM 100 MILLIGRAM(S): 1 INJECTION INTRAVENOUS at 21:53

## 2022-02-23 RX ADMIN — PANTOPRAZOLE SODIUM 40 MILLIGRAM(S): 20 TABLET, DELAYED RELEASE ORAL at 17:12

## 2022-02-23 RX ADMIN — MORPHINE SULFATE 2 MILLIGRAM(S): 50 CAPSULE, EXTENDED RELEASE ORAL at 12:35

## 2022-02-23 RX ADMIN — NYSTATIN CREAM 1 APPLICATION(S): 100000 CREAM TOPICAL at 05:05

## 2022-02-23 RX ADMIN — PIPERACILLIN AND TAZOBACTAM 25 GRAM(S): 4; .5 INJECTION, POWDER, LYOPHILIZED, FOR SOLUTION INTRAVENOUS at 05:04

## 2022-02-23 RX ADMIN — Medication 25 MICROGRAM(S): at 22:00

## 2022-02-23 RX ADMIN — MORPHINE SULFATE 2 MILLIGRAM(S): 50 CAPSULE, EXTENDED RELEASE ORAL at 04:30

## 2022-02-23 RX ADMIN — Medication 100 MILLIGRAM(S): at 11:34

## 2022-02-23 RX ADMIN — Medication 1 APPLICATION(S): at 11:35

## 2022-02-23 RX ADMIN — SODIUM CHLORIDE 500 MILLILITER(S): 9 INJECTION, SOLUTION INTRAVENOUS at 16:58

## 2022-02-23 RX ADMIN — LACTULOSE 10 GRAM(S): 10 SOLUTION ORAL at 17:10

## 2022-02-23 RX ADMIN — NYSTATIN CREAM 1 APPLICATION(S): 100000 CREAM TOPICAL at 17:11

## 2022-02-23 RX ADMIN — LACTULOSE 10 GRAM(S): 10 SOLUTION ORAL at 05:05

## 2022-02-23 RX ADMIN — MORPHINE SULFATE 2 MILLIGRAM(S): 50 CAPSULE, EXTENDED RELEASE ORAL at 13:25

## 2022-02-23 RX ADMIN — Medication 1 APPLICATION(S): at 05:06

## 2022-02-23 RX ADMIN — PANTOPRAZOLE SODIUM 40 MILLIGRAM(S): 20 TABLET, DELAYED RELEASE ORAL at 11:34

## 2022-02-23 RX ADMIN — MEROPENEM 100 MILLIGRAM(S): 1 INJECTION INTRAVENOUS at 13:41

## 2022-02-23 RX ADMIN — Medication 1 APPLICATION(S): at 17:11

## 2022-02-23 NOTE — PROGRESS NOTE ADULT - ASSESSMENT
ASSESSMENT:  61F w/ PMH of GIB, urinary/fecal incontinence, smoking, alcoholism, hypothyroidism, and anemia presented to the ED on 2/16 from [facility] for polypectomy now s/p colonoscopy with polypectomy on 2/18. Surgery is consulted for possible pneumo-retroperitoneum seen on KUB.     PLAN:  - S/P 1 U PRBC hgb 8.2  - S/P 1 U platelets, now 43 from     -  Continue Zosyn   - Aggressive IV hydration and resuscitation   - Serial abd exams   - GI followup   - Trend lactate: 4.8 > 3.5 > 3.6 > 3.5  - f/u cultures        TRAUMA TEAM SPECTRA: [id number]

## 2022-02-23 NOTE — PROGRESS NOTE ADULT - SUBJECTIVE AND OBJECTIVE BOX
SANTIAGO CALIXTO 61y Female  MRN#: [id number]     SUBJECTIVE  Patient is a 61y old Female who presents with a chief complaint of Polypectomy (23 Feb 2022 10:49)      Today is hospital day 7d, and this morning she is lying in bed without distress.   No acute overnight events.     OBJECTIVE  PAST MEDICAL & SURGICAL HISTORY    ALLERGIES:  No Known Allergies    MEDICATIONS:  STANDING MEDICATIONS  chlorhexidine 4% Liquid 1 Application(s) Topical daily  dextrose 5% + lactated ringers. 1000 milliLiter(s) IV Continuous <Continuous>  lactulose Syrup 10 Gram(s) Oral two times a day  levothyroxine Injectable 25 MICROGram(s) IV Push at bedtime  LORazepam   Injectable 2 milliGRAM(s) IV Push once  meropenem  IVPB 1000 milliGRAM(s) IV Intermittent every 8 hours  morphine  - Injectable 2 milliGRAM(s) IV Push once  multivitamin/minerals 1 Tablet(s) Oral daily  norepinephrine Infusion 0.05 MICROgram(s)/kG/Min IV Continuous <Continuous>  nystatin Cream 1 Application(s) Topical two times a day  pantoprazole  Injectable 40 milliGRAM(s) IV Push daily  thiamine 100 milliGRAM(s) Oral daily  triamcinolone 0.1% Cream 1 Application(s) Topical two times a day  vitamin A &amp; D Ointment 1 Application(s) Topical daily    PRN MEDICATIONS    HOME MEDICATIONS  Home Medications:      VITAL SIGNS: Last 24 Hours  T(C): 36.4 (23 Feb 2022 08:00), Max: 36.5 (22 Feb 2022 12:00)  T(F): 97.6 (23 Feb 2022 08:00), Max: 97.7 (22 Feb 2022 12:00)  HR: 74 (23 Feb 2022 11:00) (65 - 90)  BP: 127/76 (23 Feb 2022 11:00) (70/46 - 153/68)  BP(mean): 97 (23 Feb 2022 11:00) (54 - 111)  RR: 21 (23 Feb 2022 11:00) (14 - 31)  SpO2: 100% (23 Feb 2022 11:00) (98% - 100%)    02-22-22 @ 07:01  -  02-23-22 @ 07:00  --------------------------------------------------------  IN: 4243.4 mL / OUT: 1820 mL / NET: 2423.4 mL    02-23-22 @ 07:01  -  02-23-22 @ 11:18  --------------------------------------------------------  IN: 638.4 mL / OUT: 260 mL / NET: 378.4 mL        LABS:                        8.6    4.54  )-----------( 34       ( 23 Feb 2022 04:40 )             24.0     02-23    147<H>  |  115<H>  |  5<L>  ----------------------------<  84  3.5   |  25  |  0.9    Ca    7.3<L>      23 Feb 2022 04:40  Mg     1.7     02-23    TPro  4.7<L>  /  Alb  1.4<L>  /  TBili  1.1  /  DBili  x   /  AST  21  /  ALT  11  /  AlkPhos  71  02-23    LIVER FUNCTIONS - ( 23 Feb 2022 04:40 )  Alb: 1.4 g/dL / Pro: 4.7 g/dL / ALK PHOS: 71 U/L / ALT: 11 U/L / AST: 21 U/L / GGT: x           PT/INR - ( 23 Feb 2022 04:40 )   PT: 20.90 sec;   INR: 1.83 ratio       PTT - ( 23 Feb 2022 04:40 )  PTT:58.0 sec    ABG - ( 21 Feb 2022 14:34 )  pH, Arterial: 7.42  pH, Blood: x     /  pCO2: 33    /  pO2: 73    / HCO3: 21    / Base Excess: -2.5  /  SaO2: 96.6      Lactate, Blood: 3.2 mmol/L *H* (02-23-22 @ 04:40)  Lactate, Blood: 3.5 mmol/L *H* (02-23-22 @ 00:03)  Lactate, Blood: 3.6 mmol/L *H* (02-22-22 @ 12:00)    Culture - Blood (collected 22 Feb 2022 00:00)  Source: .Blood Blood  Preliminary Report (23 Feb 2022 07:01):    No growth to date.    CAPILLARY BLOOD GLUCOSE      RADIOLOGY:    PHYSICAL EXAM:  GENERAL: NAD, 61y F, NGT noted  HEAD:  Atraumatic, Normocephalic  EYES: EOMI, conjunctiva clear and sclera white  NECK: Supple, No JVD  CHEST/LUNG: Clear to auscultation bilaterally; No wheeze; No crackles; No accessory muscles used  HEART: Regular rate and rhythm; No murmurs;   ABDOMEN: Soft, Nontender, Nondistended; Bowel sounds present; No guarding  EXTREMITIES:  2+ Peripheral Pulses, No cyanosis or edema  NEUROLOGY: non-focal  SKIN: multiple marks on chest     ADMISSION SUMMARY  Patient is a 61y old Female who presents with a chief complaint of Polypectomy (23 Feb 2022 10:49)

## 2022-02-23 NOTE — SWALLOW BEDSIDE ASSESSMENT ADULT - SWALLOW EVAL: DIAGNOSIS
Pt orally defensive, refusing to open oral cavity despite max cues. Pt with dried blood in corners of mouth, SLP and RN attempted oral care however pt refused despite max cues

## 2022-02-23 NOTE — SWALLOW BEDSIDE ASSESSMENT ADULT - SLP GENERAL OBSERVATIONS
Pt awake, uncooperative with SLP despite max cues. Pt refusing oral care, refusing to open oral cavity despite max cues

## 2022-02-23 NOTE — PROGRESS NOTE ADULT - SUBJECTIVE AND OBJECTIVE BOX
SANTIAGO CALIXTO 61y Female  MRN#: [id number]   CODE STATUS: FULL     Hospital Day: 7d    Pt is currently admitted with the primary diagnosis of GIB s/p polypectomy, ams, pancolitis     SUBJECTIVE  Hospital Course: Patient is a 62 y/o female with PMHx of GIB, Bowel and Bladder incontinence, Smoking (3-4 cigarettes/day), ETOH abuse, hypothyroidism, and ongoing anemia transferred from [facility] for Polypectomy. Patient was admitted to [facility] on 01/31 for AMS, weakness and decreased appetite. She was found to have UTI, NSTEMI, and electrolyte imbalance. During her stay at [facility] she had a colonoscopy done which revealed 1 large 3-4 cm sigmoid pedunculated polyp and 2 polyps (7, 14 mm) in descending colon. Patient was transferred as they are not able to perform large Polypectomies there. She was not able to be discharged as ongoing anemia and concern that the cause are these large polyps.    Patient had a colonoscopy and polypectomy done on 2/18/2022. Polyp (13 mm) in the descending colon. (hot snare Polypectomy).  Polyp (25 mm to 30 mm) in the rectosigmoid junction at 20 cm from the anal verge. (Endoloop, Polypectomy, Endoclip).   After the colonoscopy patient became hypothermic and hypotensive. Over night patient was started to Levo and warming blanket.   2/20 AM patient had RRT for desaturation and hypotension. Patient was placed on 50% O2, patient had good response. Levophed also started to raise BP. Patient remains altered, not speaking, but remains somewhat awake and just makes grunting sounds. Patient is not following commands. Central line was placed. Spoke to ICU fellow, patient is being upgraded to ICU for closer monitoring.     In CCU pt had abdominal tenderness, KUB ordered, concern for RP free air. CTAP with PO/IV contrast ordered, no perforation or pneumo-peritoneum/retroperitoneum however showing findings c/w pancolitis. Surgery following, ID consulted. Pt on vanc/zosyn. In CCU, pt developed coagulopathy (low plt, anemia, increasing INR despite not on AC). Treated with pRBC, plt transfusion as well as vitamin K. Heme onc on board, thought to be due to possible DIC 2/2 sepsis.     Overnight events: none    Interval hx/Subjective complaints: Pt with ams, not answering questions however resting in bed comfortably in nad.           ----------------------------------------------------------  OBJECTIVE  PAST MEDICAL & SURGICAL HISTORY                                            -----------------------------------------------------------  ALLERGIES:  No Known Allergies                                            ------------------------------------------------------------    HOME MEDICATIONS  Home Medications:                           MEDICATIONS:  STANDING MEDICATIONS  chlorhexidine 4% Liquid 1 Application(s) Topical daily  dextrose 5% + lactated ringers. 1000 milliLiter(s) IV Continuous <Continuous>  lactulose Syrup 10 Gram(s) Oral two times a day  levothyroxine Injectable 25 MICROGram(s) IV Push at bedtime  LORazepam   Injectable 2 milliGRAM(s) IV Push once  multivitamin/minerals 1 Tablet(s) Oral daily  norepinephrine Infusion 0.05 MICROgram(s)/kG/Min IV Continuous <Continuous>  nystatin Cream 1 Application(s) Topical two times a day  pantoprazole  Injectable 40 milliGRAM(s) IV Push daily  piperacillin/tazobactam IVPB.. 3.375 Gram(s) IV Intermittent every 6 hours  thiamine 100 milliGRAM(s) Oral daily  triamcinolone 0.1% Cream 1 Application(s) Topical two times a day  vitamin A &amp; D Ointment 1 Application(s) Topical daily    PRN MEDICATIONS                                            ------------------------------------------------------------  VITAL SIGNS: Last 24 Hours  T(C): 36.4 (23 Feb 2022 08:00), Max: 36.5 (22 Feb 2022 12:00)  T(F): 97.6 (23 Feb 2022 08:00), Max: 97.7 (22 Feb 2022 12:00)  HR: 72 (23 Feb 2022 09:00) (65 - 90)  BP: 123/84 (23 Feb 2022 09:00) (70/46 - 153/68)  BP(mean): 100 (23 Feb 2022 09:00) (54 - 111)  RR: 19 (23 Feb 2022 09:00) (15 - 31)  SpO2: 100% (23 Feb 2022 09:00) (98% - 100%)      02-22-22 @ 07:01  -  02-23-22 @ 07:00  --------------------------------------------------------  IN: 4243.4 mL / OUT: 1820 mL / NET: 2423.4 mL    02-23-22 @ 07:01  -  02-23-22 @ 10:49  --------------------------------------------------------  IN: 479.2 mL / OUT: 160 mL / NET: 319.2 mL                                             --------------------------------------------------------------  LABS:                        8.6    4.54  )-----------( 34       ( 23 Feb 2022 04:40 )             24.0     02-23    147<H>  |  115<H>  |  5<L>  ----------------------------<  84  3.5   |  25  |  0.9    Ca    7.3<L>      23 Feb 2022 04:40  Mg     1.7     02-23    TPro  4.7<L>  /  Alb  1.4<L>  /  TBili  1.1  /  DBili  x   /  AST  21  /  ALT  11  /  AlkPhos  71  02-23    PT/INR - ( 23 Feb 2022 04:40 )   PT: 20.90 sec;   INR: 1.83 ratio         PTT - ( 23 Feb 2022 04:40 )  PTT:58.0 sec    ABG - ( 21 Feb 2022 14:34 )  pH, Arterial: 7.42  pH, Blood: x     /  pCO2: 33    /  pO2: 73    / HCO3: 21    / Base Excess: -2.5  /  SaO2: 96.6              Lactate, Blood: 3.2 mmol/L *H* (02-23-22 @ 04:40)  Lactate, Blood: 3.5 mmol/L *H* (02-23-22 @ 00:03)  Lactate, Blood: 3.6 mmol/L *H* (02-22-22 @ 12:00)        Culture - Blood (collected 22 Feb 2022 00:00)  Source: .Blood Blood  Preliminary Report (23 Feb 2022 07:01):    No growth to date.                                                    -------------------------------------------------------------  RADIOLOGY:  < from: Xray Kidney Ureter Bladder (02.22.22 @ 09:41) >    IMPRESSION:    No obstruction. No free air.    < end of copied text >                                            --------------------------------------------------------------  PHYSICAL EXAM:  General: Ill appearing woman laying in bed in nad   HEENT: ncat, eomi, mmm  LUNGS: dec bs over left lung base   HEART: s1/s2, rr  ABDOMEN: soft, ntnd, bs+  EXT: wwp, no cyanosis, clubbing, edema  NEURO: aox0, moves all extremities, following simple commands  SKIN: superficial scabs diffusely over body, most prominant over chest, as well as areas of desquamation over arms, legs, and hips   Lines: RIJ CVC c/d/i   Drips: levo @0.5                                         --------------------------------------------------------------    ASSESSMENT & PLAN  61F PMH GIB, Bowel and Bladder incontinence, Smoking (3-4 cigarettes/day), ETOH abuse, hypothyroidism, and ongoing anemia, admitted to [facility] 1/31 for ams found to have UTI. Dev GIB, colonoscopy showing polyps, transferred to [facility] for polypectomy now upgraded to CCU for hypotension, likely septic shock 2/2 aspiration pna.     #AMS with unclear etiology   #sepsis w/ shock/hypothermia vs aspiration after procedure under anesthesia 2/18 vs CVA   - Pt upgraded from floor on 2/20 after RRT for hypotension and desaturation. RIJ CVC placed, started on levo, venti mask, o2 and hypotension subsequently improved   - CTH 2/20: no ICH, mass effect, or midline shift   - EEG 2/20: generalized slowing    - UCx 2/18 negative   - BCx 2/17, 2/19: NGTD  - COVID negative 2/20  - MRSA nares 2/20 negative  - LE duplex 2/21: no DVT however bl peroneal veins not visualized   - ammonia elevated to 59 on 2/22, started on lactulose, possibly contributing to ams   - zosyn changed to meropenum 1g q8h- concern for PCN induced thrombocytopenia   - d/c'd vanc 2/22  - c/w levo @0.5, titrate down as tolerated   - neuro eval appreciated  - ID recs appreciated      #Colonic Polyp  #Diarrhea in setting of recent abx use  #Hx of GIB   #Pneumoretroperitoneum   - Colonoscopy 2/4: 1 large 3-4 cm sigmoid pedunculated polyp and 2 polyps (7, 14 mm) in descending colon s/p bx  - GI consulted (Dr. Daniels)   - Patient is cleared for colonoscopy per Cardiology   - Negative for C-Diff infection  - CEA elevated 5.6  - S/P  colonoscopy and polypectomy 2/18/2022. Polyp (13 mm) in the descending colon. (hot snare Polypectomy).  Polyp (25 mm to 30 mm) in the rectosigmoid junction at 20 cm from the anal verge. (Endoloop, Polypectomy, Endoclip).   - Repeat Colonoscopy in 6 months   - GI recs appreciated    - 2/21: overnight pt had abdominal tenderness, KUB ordered showing paraspinal lucencies concerning for possible pneumoretroperitoneum   - abdomen soft, nontender, nondistended on my exam  - CTAP PO/IV contrast 2/21: findings c/w pancolitis, no intra or retroperitoneal free air  - c/w meropenum 1g q8h   - Will advance feeds to clears today as per surgery recs   - surgery recs appreciated   - ID recs appreciated     #Exfoliative dermatitis   - pt with superficial scabs diffusely over body, most prominent over chest, as well as areas of desquamation over arms, legs, and hips   - Continue to monitor, if any worsening derm consult   - Burn recs appreciated; dx with incontinence dermatitis    - f/u wound care recs     #Acute thrombocytopenia  #Elevated INR/PTT not on a/c  - not on heparin this admission, unclear if was on previously at [facility]  - plt, hgb continued to downtrend so given 1u plt 1u pRBC on 2/23   - INR also uptrending, gave 10mg vitamin K IVPB 2/23   - DIC pannel, HIT pannel sent   - monitor INR   - F/u Heme/onc eval     #Hypotension   - F/u cortisol lvl (received)   - C/w midodrine 15mg TID   - On Levo @0.764, downtitrate as tolerated     #Hx of UTI- resolved   #R lower face cellulitis- resolved  - CT maxillofacial with C 2/12: Mild soft tissue inflammation suggesting cellulitis of the right lower face  - pt was on cefepime, vancomycin (end date 2/15)  - Started on Unasyn 2/16,/c today 2/20 started on broad spectrum Abx 2/20  - No documentations of + bcx or ucx on the charts from [facility]   - Rpt UCx negative   - Bclx 2/17 NGTD, F/u repeat    - Dental recs appreciated   - F/u SPEP     #Hypernatremia- improving   - c/w free water via peg     #Hypothyroidism  - TSH 15.6  - C/w synthroid 50 mcg po q24  - Patient will need to repeat TSH and FT4 in 6 wks (April)    #Hx L pleural effusion on CXR 2/8  - CT Chest with Cont 2/9 obtained: Moderate b/l pleural effusions resulting in complete atelectasis of both lower lobes  - monitor CXR    #Hx NSTEMI   #Hx of Elevated Troponin  - in setting of hypotension   - c/w Midodrine 15 mg q8h  - Normal Lexiscan at [facility]  - EKG noted     #Hx of ETOH abuse  - last drink (as per nurse) 6 months ago  - c/w Folic acid and Thiamine  - F/u Dietician eval  - Ensure 3x/day    #Possible Thiamine deficiency   #Possible Folic acid deficiency   - C/w Folic acid and Thiamine    #Misc   - DVT prophylaxis: SCD   - GI prophylaxis: PPI  - Diet: NPO tube feeds    - Activity status: IAT   - Code status: Full code   - Disposition: acute     #Handoff   - f/u SPEP, cortisol, hemolytic anemia panel   - f/u ID recs   - f/u heme onc recs  - f/u final BCx  - speech and swallow eval for feeds once improvement of mental status   - f/u nutrition recs     SANTIAGO CALIXTO 61y Female  MRN#: [id number]   CODE STATUS: FULL     Hospital Day: 7d    Pt is currently admitted with the primary diagnosis of GIB s/p polypectomy, ams, pancolitis     SUBJECTIVE  Hospital Course: Patient is a 60 y/o female with PMHx of GIB, Bowel and Bladder incontinence, Smoking (3-4 cigarettes/day), ETOH abuse, hypothyroidism, and ongoing anemia transferred from [facility] for Polypectomy. Patient was admitted to [facility] on 01/31 for AMS, weakness and decreased appetite. She was found to have UTI, NSTEMI, and electrolyte imbalance. During her stay at [facility] she had a colonoscopy done which revealed 1 large 3-4 cm sigmoid pedunculated polyp and 2 polyps (7, 14 mm) in descending colon. Patient was transferred as they are not able to perform large Polypectomies there. She was not able to be discharged as ongoing anemia and concern that the cause are these large polyps.    Patient had a colonoscopy and polypectomy done on 2/18/2022. Polyp (13 mm) in the descending colon. (hot snare Polypectomy).  Polyp (25 mm to 30 mm) in the rectosigmoid junction at 20 cm from the anal verge. (Endoloop, Polypectomy, Endoclip).   After the colonoscopy patient became hypothermic and hypotensive. Over night patient was started to Levo and warming blanket.   2/20 AM patient had RRT for desaturation and hypotension. Patient was placed on 50% O2, patient had good response. Levophed also started to raise BP. Patient remains altered, not speaking, but remains somewhat awake and just makes grunting sounds. Patient is not following commands. Central line was placed. Spoke to ICU fellow, patient is being upgraded to ICU for closer monitoring.     In CCU pt had abdominal tenderness, KUB ordered, concern for RP free air. CTAP with PO/IV contrast ordered, no perforation or pneumo-peritoneum/retroperitoneum however showing findings c/w pancolitis. Surgery following, ID consulted. Pt on vanc/zosyn. In CCU, pt developed coagulopathy (low plt, anemia, increasing INR despite not on AC). Treated with pRBC, plt transfusion as well as vitamin K. Heme onc on board, thought to be due to possible DIC 2/2 sepsis.     Overnight events: none    Interval hx/Subjective complaints: Pt with ams, not answering questions however resting in bed comfortably in nad.           ----------------------------------------------------------  OBJECTIVE  PAST MEDICAL & SURGICAL HISTORY                                            -----------------------------------------------------------  ALLERGIES:  No Known Allergies                                            ------------------------------------------------------------    HOME MEDICATIONS  Home Medications:                           MEDICATIONS:  STANDING MEDICATIONS  chlorhexidine 4% Liquid 1 Application(s) Topical daily  dextrose 5% + lactated ringers. 1000 milliLiter(s) IV Continuous <Continuous>  lactulose Syrup 10 Gram(s) Oral two times a day  levothyroxine Injectable 25 MICROGram(s) IV Push at bedtime  LORazepam   Injectable 2 milliGRAM(s) IV Push once  multivitamin/minerals 1 Tablet(s) Oral daily  norepinephrine Infusion 0.05 MICROgram(s)/kG/Min IV Continuous <Continuous>  nystatin Cream 1 Application(s) Topical two times a day  pantoprazole  Injectable 40 milliGRAM(s) IV Push daily  piperacillin/tazobactam IVPB.. 3.375 Gram(s) IV Intermittent every 6 hours  thiamine 100 milliGRAM(s) Oral daily  triamcinolone 0.1% Cream 1 Application(s) Topical two times a day  vitamin A &amp; D Ointment 1 Application(s) Topical daily    PRN MEDICATIONS                                            ------------------------------------------------------------  VITAL SIGNS: Last 24 Hours  T(C): 36.4 (23 Feb 2022 08:00), Max: 36.5 (22 Feb 2022 12:00)  T(F): 97.6 (23 Feb 2022 08:00), Max: 97.7 (22 Feb 2022 12:00)  HR: 72 (23 Feb 2022 09:00) (65 - 90)  BP: 123/84 (23 Feb 2022 09:00) (70/46 - 153/68)  BP(mean): 100 (23 Feb 2022 09:00) (54 - 111)  RR: 19 (23 Feb 2022 09:00) (15 - 31)  SpO2: 100% (23 Feb 2022 09:00) (98% - 100%)      02-22-22 @ 07:01  -  02-23-22 @ 07:00  --------------------------------------------------------  IN: 4243.4 mL / OUT: 1820 mL / NET: 2423.4 mL    02-23-22 @ 07:01  -  02-23-22 @ 10:49  --------------------------------------------------------  IN: 479.2 mL / OUT: 160 mL / NET: 319.2 mL                                             --------------------------------------------------------------  LABS:                        8.6    4.54  )-----------( 34       ( 23 Feb 2022 04:40 )             24.0     02-23    147<H>  |  115<H>  |  5<L>  ----------------------------<  84  3.5   |  25  |  0.9    Ca    7.3<L>      23 Feb 2022 04:40  Mg     1.7     02-23    TPro  4.7<L>  /  Alb  1.4<L>  /  TBili  1.1  /  DBili  x   /  AST  21  /  ALT  11  /  AlkPhos  71  02-23    PT/INR - ( 23 Feb 2022 04:40 )   PT: 20.90 sec;   INR: 1.83 ratio         PTT - ( 23 Feb 2022 04:40 )  PTT:58.0 sec    ABG - ( 21 Feb 2022 14:34 )  pH, Arterial: 7.42  pH, Blood: x     /  pCO2: 33    /  pO2: 73    / HCO3: 21    / Base Excess: -2.5  /  SaO2: 96.6              Lactate, Blood: 3.2 mmol/L *H* (02-23-22 @ 04:40)  Lactate, Blood: 3.5 mmol/L *H* (02-23-22 @ 00:03)  Lactate, Blood: 3.6 mmol/L *H* (02-22-22 @ 12:00)        Culture - Blood (collected 22 Feb 2022 00:00)  Source: .Blood Blood  Preliminary Report (23 Feb 2022 07:01):    No growth to date.                                                    -------------------------------------------------------------  RADIOLOGY:  < from: Xray Kidney Ureter Bladder (02.22.22 @ 09:41) >    IMPRESSION:    No obstruction. No free air.    < end of copied text >                                            --------------------------------------------------------------  PHYSICAL EXAM:  General: Ill appearing woman laying in bed in nad   HEENT: ncat, eomi, mmm  LUNGS: dec bs over left lung base   HEART: s1/s2, rr  ABDOMEN: soft, ntnd, bs+  EXT: wwp, no cyanosis, clubbing, edema  NEURO: aox0, moves all extremities, following simple commands  SKIN: superficial scabs diffusely over body, most prominant over chest, as well as areas of desquamation over arms, legs, and hips   Lines: RIJ CVC c/d/i   Drips: levo @0.5                                         --------------------------------------------------------------    ASSESSMENT & PLAN  61F PMH GIB, Bowel and Bladder incontinence, Smoking (3-4 cigarettes/day), ETOH abuse, hypothyroidism, and ongoing anemia, admitted to [facility] 1/31 for ams found to have UTI. Dev GIB, colonoscopy showing polyps, transferred to [facility] for polypectomy now upgraded to CCU for hypotension, likely septic shock 2/2 aspiration pna.     #AMS with unclear etiology   #sepsis w/ shock/hypothermia vs aspiration after procedure under anesthesia 2/18 vs CVA   - Pt upgraded from floor on 2/20 after RRT for hypotension and desaturation. RIJ CVC placed, started on levo, venti mask, o2 and hypotension subsequently improved   - CTH 2/20: no ICH, mass effect, or midline shift   - EEG 2/20: generalized slowing    - UCx 2/18 negative   - BCx 2/17, 2/19: NGTD  - COVID negative 2/20  - MRSA nares 2/20 negative  - LE duplex 2/21: no DVT however bl peroneal veins not visualized   - ammonia elevated to 59 on 2/22, started on lactulose, possibly contributing to ams   - zosyn changed to meropenum 1g q8h- concern for PCN induced thrombocytopenia   - d/c'd vanc 2/22  - c/w levo @0.5, titrate down as tolerated   - neuro eval appreciated  - ID recs appreciated      #Colonic Polyp  #Diarrhea in setting of recent abx use  #Hx of GIB   #Pneumoretroperitoneum   - Colonoscopy 2/4: 1 large 3-4 cm sigmoid pedunculated polyp and 2 polyps (7, 14 mm) in descending colon s/p bx  - GI consulted (Dr. Daniels)   - Patient is cleared for colonoscopy per Cardiology   - Negative for C-Diff infection  - CEA elevated 5.6  - S/P  colonoscopy and polypectomy 2/18/2022. Polyp (13 mm) in the descending colon. (hot snare Polypectomy).  Polyp (25 mm to 30 mm) in the rectosigmoid junction at 20 cm from the anal verge. (Endoloop, Polypectomy, Endoclip).   - Repeat Colonoscopy in 6 months   - GI recs appreciated    - 2/21: overnight pt had abdominal tenderness, KUB ordered showing paraspinal lucencies concerning for possible pneumoretroperitoneum   - abdomen soft, nontender, nondistended on my exam  - CTAP PO/IV contrast 2/21: findings c/w pancolitis, no intra or retroperitoneal free air  - c/w meropenum 1g q8h   - As per surgery can start feeds with clears today. Pt is NPO with tube feeds, cannot give clears via tube feeds. D/w surgery resident, informed we can advance feeds as tolerated, will start on tube feeds.   - surgery recs appreciated   - ID recs appreciated     #Exfoliative dermatitis   - pt with superficial scabs diffusely over body, most prominent over chest, as well as areas of desquamation over arms, legs, and hips   - Continue to monitor, if any worsening derm consult   - Burn recs appreciated; dx with incontinence dermatitis    - f/u wound care recs     #Acute thrombocytopenia  #Elevated INR/PTT not on a/c  - not on heparin this admission, unclear if was on previously at [facility]  - plt, hgb continued to downtrend so given 1u plt 1u pRBC on 2/23   - INR also uptrending, gave 10mg vitamin K IVPB 2/23   - DIC pannel, HIT pannel sent   - monitor INR   - F/u Heme/onc eval     #Hypotension   - F/u cortisol lvl (received)   - C/w midodrine 15mg TID   - On Levo @0.764, downtitrate as tolerated     #Hx of UTI- resolved   #R lower face cellulitis- resolved  - CT maxillofacial with C 2/12: Mild soft tissue inflammation suggesting cellulitis of the right lower face  - pt was on cefepime, vancomycin (end date 2/15)  - Started on Unasyn 2/16,/c today 2/20 started on broad spectrum Abx 2/20  - No documentations of + bcx or ucx on the charts from [facility]   - Rpt UCx negative   - Bclx 2/17 NGTD, F/u repeat    - Dental recs appreciated   - F/u SPEP     #Hypernatremia- improving   - c/w free water via peg     #Hypothyroidism  - TSH 15.6  - C/w synthroid 50 mcg po q24  - Patient will need to repeat TSH and FT4 in 6 wks (April)    #Hx L pleural effusion on CXR 2/8  - CT Chest with Cont 2/9 obtained: Moderate b/l pleural effusions resulting in complete atelectasis of both lower lobes  - monitor CXR    #Hx NSTEMI   #Hx of Elevated Troponin  - in setting of hypotension   - c/w Midodrine 15 mg q8h  - Normal Lexiscan at [facility]  - EKG noted     #Hx of ETOH abuse  - last drink (as per nurse) 6 months ago  - c/w Folic acid and Thiamine  - F/u Dietician eval  - Ensure 3x/day    #Possible Thiamine deficiency   #Possible Folic acid deficiency   - C/w Folic acid and Thiamine    #Misc   - DVT prophylaxis: SCD   - GI prophylaxis: PPI  - Diet: NPO tube feeds    - Activity status: IAT   - Code status: Full code   - Disposition: acute     #Handoff   - f/u SPEP, cortisol, hemolytic anemia panel   - f/u ID recs   - f/u heme onc recs  - f/u final BCx  - speech and swallow eval for feeds once improvement of mental status   - f/u nutrition recs

## 2022-02-23 NOTE — PROGRESS NOTE ADULT - ASSESSMENT
· Assessment	  60 yo F with PMH of GIB, Bowel and Bladder incontinence, Smoking (1/2 ppd), ETOH abuse, hypothyroidism, anemia transferred from [facility] for EMR/ Polypectomy. . Pt was admitted to [facility] on 01/31 for AMS, weakness and decreased appetite. She was found to have UTI, Elevated troponin, and electrolyte imbalance. During her stay at [facility] pt had colonoscopy done, which revealed 1 large 3-4 cm sigmoid pedunculated polyp (bx > tub adenoma, but was friable) and 2 polyps (7, 14 mm) in descending colon s/p bx.    2/18 S/P colonoscopy and polypectomy 2/18 .  CT: Diffuse colonic wall thickening, consistent with pancolitis. Intra-abdominal ascites. No intraperitoneal or retroperitoneal free air. No extraluminal contrast extravasation.    IMPRESSION;  Resolved possible septic shock secondary to possible bacterial translocation  Clinically no peritonitis  Possible bacterial PNA on the left secondary to aspiration ( CXR has opacity/fluid on the left. Clinically no PNA  . For now will cover as such ) > should not lead to shock  WBC 4.5  Thrombocytopenic since 2/18 > doubt secondary to ABx  Mild pyuria > clinically no pyelonephritis  2/17,19,22  BCx NG  2/18 UCx NG  2/17,20 CD NG  Nares ORSA NG  COVID-19 NG    RECOMMENDATIONS;  Start Meropenem 1 gm iv q8h  d/c Zosyn 3.375 gm iv q6h  Off Consider Lovenox .  -indication: ddimer>1000.   -exclusion: active bleeding, H/o HIT, recent hemorrhagic strokeading to prevent pressure sores and preventive measures to avoid aspiration If any questions , please call [phone] or send a message on [employer] teams  Please update ID in real time with any pertinent new laboratory /microbiological/radiographically findings or a change in the clinical characteristics

## 2022-02-23 NOTE — CHART NOTE - NSCHARTNOTEFT_GEN_A_CORE
Awake, alert  Uncooperative with speech eval this am, remains NPO  NGT in place and plan to start TF's today  Abd CHRISTINA ramsay  d/w medical team    T(F): 97.2 (22 @ 12:00), Max: 97.6 (22 @ 08:00)  HR: 75 (22 @ 13:00) (65 - 81)  BP: 116/78 (22 @ 13:00) (70/46 - 153/68)  RR: 18 (22 @ 13:00) (14 - 31)  SpO2: 99% (22 @ 13:00) (98% - 100%)    I&O's Detail    2022 07:01  -  2022 07:00  --------------------------------------------------------  IN:    dextrose 5% + lactated ringers: 1625 mL    dextrose 5% + lactated ringers: 770 mL    IV PiggyBack: 400 mL    Lactated Ringers Bolus: 500 mL    Norepinephrine: 169.4 mL    Platelets - Single Donor: 209 mL    PRBCs (Packed Red Blood Cells): 320 mL    Sodium Chloride 0.9% Bolus: 250 mL  Total IN: 4243.4 mL    OUT:    Indwelling Catheter - Urethral (mL): 1820 mL  Total OUT: 1820 mL    Total NET: 2423.4 mL    2022 07:01  -  2022 15:28  --------------------------------------------------------  IN:    dextrose 5% + lactated ringers: 420 mL    Free Water: 400 mL    IV PiggyBack: 50 mL    Norepinephrine: 54 mL    Peptamen A.F.: 125 mL  Total IN: 1049 mL    OUT:    Indwelling Catheter - Urethral (mL): 460 mL  Total OUT: 460 mL    Total NET: 589 mL        147<H>  |  115<H>  |  5<L>  ----------------------------<  84  3.5   |  25  |  0.9    Ca    7.3<L>      2022 04:40  Mg     1.7         TPro  4.7<L>  /  Alb  1.4<L>  /  TBili  1.1  /  DBili  x   /  AST  21  /  ALT  11  /  AlkPhos  71                          8.6    4.54  )-----------( 34       ( 2022 04:40 )             24.0      Daily Weight in k (2022 05:00)  Diet, NPO with Tube Feed:   Tube Feeding Modality: Nasogastric  Peptamen A.F. Formula  Total Volume for 24 Hours (mL): 1000  Bolus  Total Volume of Bolus (mL):  250  Total # of Feeds: 4  Tube Feed Frequency: Every 6 hours   Tube Feed Start Time: 18:00  Bolus Feed Rate (mL per Hour): 250   Bolus Feed Duration (in Hours): 1  Free Water Flush  Bolus   Total Volume per Flush (mL): 30   Frequency: Every 6 Hours   Total Daily Volume of Flush (mL): 120  Free Water Flush Instructions:  30 pre/post (22 @ 12:55)    ASSESSMENT  61F PMH GIB, Bowel and Bladder incontinence, Smoking (3-4 cigarettes/day), ETOH abuse, hypothyroidism, and ongoing anemia, admitted to [facility]  for ams found to have UTI. Dev GIB, colonoscopy showing polyps, transferred to [facility] for polypectomy now upgraded to CCU for hypotension, likely septic shock  aspiration pna.     - AMS, unclear etiology  - sepsis w/ shock/hypothermia vs aspiration after procedure under anesthesia  vs CVA  - acute thrombocytopenia  - chronic anemia  - Exfoliative dermatitis   - Colonoscopy : 1 large 3-4 cm sigmoid pedunculated polyp and 2 polyps (7, 14 mm) in descending colon s/p polypectomy 2022.   - r/o pneumoretroperitoneum (on KUB )  - pancolitis on CT   - hypothyroidism  - Hx of ETOH abuse  - hypokalemia, hypernatremia  - severe protein calorie malnutrition  - severe wt loss, ~34% over past 9 months    PLAN  - keep NPO per speech for now  - start NGT feeds with Peptamen AF 125ml infused over ~30min X 4 feeds/day. If tolerated X 1 then advance to 250ml X 4 feeds/day given at meal times and qhs, not on q6hr schedule  - concerned with refeeding syndrome- repeat bmp, with in phos and mg and monitor closely  - cont MV with minerals daily  - add vitamin C 500mg daily  - check 25-oh vitamin D and zinc levels  - will follow

## 2022-02-23 NOTE — PROGRESS NOTE ADULT - ASSESSMENT
Pt is a 60 yo F with PMH of GIB, Bowel and Bladder incontinence, Smoking (1/2 ppd), ETOH abuse, hypothyroidism, anemia transferred from [facility] for EMR/Polypectomy. She was a Rapid response on 2/20/22 for AMS, hypotension requiring pressor support, and hypothermia. She was upgraded to ICU. Hematology evaluation was requested for evaluation of anemia and thrombocytopenia.    # Normocytic Anemia with recent polypectomy, stable  - iron studies: Fe 55; TIBC <72; ferr 2494. However, in the setting of acute events and significant hypotension, the elevated ferritin is not surprising and, by itself, is not a sign of iron overload.  - B12 1892, Folate 11  - Haptoglobin low and Amos +, however LDH WNL, Reticulocyte % low along with normal bilirubin which points against hemolytic anemia  - AMOS can be secondary to recent blood transfusion (not documented but pt likely had PRBC in [facility]  - peripheral blood smear reviewed: occasional schistocytes  - f/u SPEP, Immunofixation, Immunoglobulins, and FLC ratio workup for multiple myeloma     # Thrombocytopenia likely multifactorial due to myelosuppression from infection, DIC, alcohol abuse   # Elevated INR/PTT not on anticoagulation likely low grade DIC from septic shock   - Hep C negative  - check Hep B and HIV   - transfuse platelets if platelets < 30k and bleeding     # S/p polypectomies   - f/u pathology results from her polypectomies     # Septic Shock requiring pressor support   - Rest of medical management as per critical care team   Pt is a 60 yo F with PMH of GIB, Bowel and Bladder incontinence, Smoking (1/2 ppd), ETOH abuse, hypothyroidism, anemia transferred from [facility] for EMR/Polypectomy. She was a Rapid response on 2/20/22 for AMS, hypotension requiring pressor support, and hypothermia. She was upgraded to ICU. Hematology evaluation was requested for evaluation of anemia and thrombocytopenia.    # Normocytic Anemia with recent polypectomy, stable  - iron studies: Fe 55; TIBC <72; ferr 2494. However, in the setting of acute events and significant hypotension, the elevated ferritin is likely reactive and is not a sign of iron overload.  - B12 1892, Folate 11  - Haptoglobin low and Amos +, however LDH WNL, Reticulocyte % low along with normal bilirubin which points against hemolytic anemia  - AMOS can be secondary to recent blood transfusion (not documented but pt likely had PRBC in [facility])  - peripheral blood smear reviewed: occasional schistocytes  - f/u SPEP, Immunofixation, Immunoglobulins, and FLC ratio workup for multiple myeloma     # Thrombocytopenia likely multifactorial due to myelosuppression from infection, DIC, alcohol abuse   # Elevated INR/PTT not on anticoagulation likely low grade DIC from septic shock   - Hep C negative  - check Hep B and HIV   - transfuse platelets if platelets < 30k and bleeding     # S/p polypectomies   - f/u pathology results from her polypectomies     # Septic Shock requiring pressor support   - Rest of medical management as per critical care team

## 2022-02-23 NOTE — PROGRESS NOTE ADULT - SUBJECTIVE AND OBJECTIVE BOX
SANTIAGO CALIXTO  61y, Female    All available historical data reviewed    OVERNIGHT EVENTS:  no fevers  more alert  does resp;ond  no pressors  no diarrhea    ROS:  not reliable  VITALS:  T(F): 97.6, Max: 97.7 (02-22-22 @ 12:00)  HR: 72  BP: 123/84  RR: 19Vital Signs Last 24 Hrs  T(C): 36.4 (23 Feb 2022 08:00), Max: 36.5 (22 Feb 2022 12:00)  T(F): 97.6 (23 Feb 2022 08:00), Max: 97.7 (22 Feb 2022 12:00)  HR: 72 (23 Feb 2022 09:00) (65 - 90)  BP: 123/84 (23 Feb 2022 09:00) (70/46 - 153/68)  BP(mean): 100 (23 Feb 2022 09:00) (54 - 111)  RR: 19 (23 Feb 2022 09:00) (15 - 31)  SpO2: 100% (23 Feb 2022 09:00) (98% - 100%)    TESTS & MEASUREMENTS:                        8.6    4.54  )-----------( 34       ( 23 Feb 2022 04:40 )             24.0     02-23    147<H>  |  115<H>  |  5<L>  ----------------------------<  84  3.5   |  25  |  0.9    Ca    7.3<L>      23 Feb 2022 04:40  Mg     1.7     02-23    TPro  4.7<L>  /  Alb  1.4<L>  /  TBili  1.1  /  DBili  x   /  AST  21  /  ALT  11  /  AlkPhos  71  02-23    LIVER FUNCTIONS - ( 23 Feb 2022 04:40 )  Alb: 1.4 g/dL / Pro: 4.7 g/dL / ALK PHOS: 71 U/L / ALT: 11 U/L / AST: 21 U/L / GGT: x             Culture - Blood (collected 02-22-22 @ 00:00)  Source: .Blood Blood  Preliminary Report (02-23-22 @ 07:01):    No growth to date.    Culture - Blood (collected 02-19-22 @ 23:11)  Source: .Blood Blood  Preliminary Report (02-21-22 @ 09:01):    No growth to date.    Culture - Urine (collected 02-18-22 @ 05:25)  Source: Clean Catch Clean Catch (Midstream)  Final Report (02-20-22 @ 15:08):    <10,000 CFU/mL Normal Urogenital Meghan    Culture - Blood (collected 02-17-22 @ 04:30)  Source: .Blood Blood  Final Report (02-22-22 @ 13:01):    No Growth Final            RADIOLOGY & ADDITIONAL TESTS:  Personal review of radiological diagnostics performed  Echo and EKG results noted when applicable.     MEDICATIONS:  chlorhexidine 4% Liquid 1 Application(s) Topical daily  dextrose 5% + lactated ringers. 1000 milliLiter(s) IV Continuous <Continuous>  lactulose Syrup 10 Gram(s) Oral two times a day  levothyroxine Injectable 25 MICROGram(s) IV Push at bedtime  LORazepam   Injectable 2 milliGRAM(s) IV Push once  multivitamin/minerals 1 Tablet(s) Oral daily  norepinephrine Infusion 0.05 MICROgram(s)/kG/Min IV Continuous <Continuous>  nystatin Cream 1 Application(s) Topical two times a day  pantoprazole  Injectable 40 milliGRAM(s) IV Push daily  piperacillin/tazobactam IVPB.. 3.375 Gram(s) IV Intermittent every 6 hours  thiamine 100 milliGRAM(s) Oral daily  triamcinolone 0.1% Cream 1 Application(s) Topical two times a day  vitamin A &amp; D Ointment 1 Application(s) Topical daily      ANTIBIOTICS:  piperacillin/tazobactam IVPB.. 3.375 Gram(s) IV Intermittent every 6 hours

## 2022-02-23 NOTE — PROGRESS NOTE ADULT - SUBJECTIVE AND OBJECTIVE BOX
GENERAL SURGERY PROGRESS NOTE    Patient: SANTIAGO CALIXTO , 61y ([birth year])Female   MRN: [id number]  Location: [facility] A  Visit: 02-16-22 Inpatient  Date: 02-23-22 @ 01:18    Hospital Day #: 8  Post-Op Day #:    Procedure/Dx/Injuries: pneumoperitoneum    Events of past 24 hours: Patient is not having any pain is non-tender and soft on exam. NiCOM negative, required pressors during the day yesterday but now off, s/p 1 U PRBC and platelet for hg 7.2 and plt 20. Lactate elevated at 3.5 from 3.6.    PAST MEDICAL & SURGICAL HISTORY:      Vitals:   T(F): 95.4 (02-22-22 @ 22:30), Max: 97.7 (02-22-22 @ 12:00)  HR: 76 (02-23-22 @ 00:08)  BP: 86/62 (02-23-22 @ 00:08)  RR: 17 (02-23-22 @ 00:08)  SpO2: 100% (02-23-22 @ 00:08)      Diet, NPO:   Except Medications      Fluids:     I & O's:    02-21-22 @ 07:01  -  02-22-22 @ 07:00  --------------------------------------------------------  IN:    dextrose 5% + lactated ringers: 750 mL    dextrose 5% w/ Additives: 1700 mL    IV PiggyBack: 550 mL    Lactated Ringers Bolus: 500 mL    Norepinephrine: 665.8 mL  Total IN: 4165.8 mL    OUT:    Indwelling Catheter - Urethral (mL): 1285 mL  Total OUT: 1285 mL    Total NET: 2880.8 mL        Bowel Movement: : [] YES [] NO  Flatus: : [] YES [] NO    PHYSICAL EXAM:  General: NAD, AAOx3, calm and cooperative  HEENT: NCAT, LORAINE, EOMI, Trachea ML, Neck supple  Cardiac: RRR S1, S2, no Murmurs, rubs or gallops  Respiratory: CTAB, normal respiratory effort, breath sounds equal BL, no wheeze, rhonchi or crackles  Abdomen: Soft, non-distended, non-tender    MEDICATIONS  (STANDING):  chlorhexidine 4% Liquid 1 Application(s) Topical daily  dextrose 5% + lactated ringers. 1000 milliLiter(s) (70 mL/Hr) IV Continuous <Continuous>  lactulose Syrup 10 Gram(s) Oral two times a day  levothyroxine Injectable 25 MICROGram(s) IV Push at bedtime  LORazepam   Injectable 2 milliGRAM(s) IV Push once  magnesium sulfate  IVPB 2 Gram(s) IV Intermittent once  multivitamin/minerals 1 Tablet(s) Oral daily  norepinephrine Infusion 0.05 MICROgram(s)/kG/Min (2.81 mL/Hr) IV Continuous <Continuous>  nystatin Cream 1 Application(s) Topical two times a day  pantoprazole  Injectable 40 milliGRAM(s) IV Push daily  piperacillin/tazobactam IVPB.. 3.375 Gram(s) IV Intermittent every 6 hours  thiamine 100 milliGRAM(s) Oral daily  triamcinolone 0.1% Cream 1 Application(s) Topical two times a day  vitamin A &amp; D Ointment 1 Application(s) Topical daily    MEDICATIONS  (PRN):      DVT PROPHYLAXIS:   GI PROPHYLAXIS: pantoprazole  Injectable 40 milliGRAM(s) IV Push daily    ANTICOAGULATION:   ANTIBIOTICS:  piperacillin/tazobactam IVPB.. 3.375 Gram(s)            LAB/STUDIES:  Labs:  CAPILLARY BLOOD GLUCOSE                              8.2    5.33  )-----------( 43       ( 23 Feb 2022 00:03 )             23.4       Auto Neutrophil %: 75.5 % (02-22-22 @ 04:06)  Auto Immature Granulocyte %: 1.3 % (02-22-22 @ 04:06)    02-22    146  |  114<H>  |  5<L>  ----------------------------<  160<H>  3.8   |  24  |  1.0      Calcium, Total Serum: 6.8 mg/dL (02-22-22 @ 04:06)      LFTs:             4.4  | 0.8  | 14       ------------------[67      ( 22 Feb 2022 04:06 )  1.1  | x    | 10          Lipase:x      Amylase:x         Lactate, Blood: 3.5 mmol/L (02-23-22 @ 00:03)  Lactate, Blood: 3.6 mmol/L (02-22-22 @ 12:00)  Lactate, Blood: 3.5 mmol/L (02-21-22 @ 21:43)  Blood Gas Arterial, Lactate: 3.70 mmol/L (02-21-22 @ 14:34)  Lactate, Blood: 4.8 mmol/L (02-21-22 @ 13:01)    ABG - ( 21 Feb 2022 14:34 )  pH: 7.42  /  pCO2: 33    /  pO2: 73    / HCO3: 21    / Base Excess: -2.5  /  SaO2: 96.6              Coags:     25.20  ----< 2.21    ( 22 Feb 2022 16:20 )     66.0

## 2022-02-23 NOTE — PROGRESS NOTE ADULT - SUBJECTIVE AND OBJECTIVE BOX
Patient is a 61y old  Female who presents with a chief complaint of Polypectomy (23 Feb 2022 01:17)        HPI:  Pt is a 60 yo F with PMH of GIB, Bowel and Bladder incontinence, Smoking (3-4 cigarettes/day), ETOH abuse, hypothyroidism anemia transferred from [facility] for Polypectomy. Pt is from home living with her son and bedbound at baseline. Pt was admitted to [facility] on 01/31 for AMS, weakness and decreased appetite. She was found to have UTI, NSTEMI, and electrolyte imbalance. During her stay at [facility] pt had colonoscopy done which revealed 1 large 3-4 cm sigmoid pedunculated polyp and 2 polyps (7, 14 mm) in descending colon s/p bx. Pt was transferred to [facility] for polyp removal.  (16 Feb 2022 23:24)      Pt evaluated on rounds.  I reviewed the radiology tests and hospital record.    I reviewed previous notes on this patient.    Interval Events: No overnight events.      CAM:    SAT:    SBT:      REVIEW OF SYSTEMS:   see HPI      OBJECTIVE:  ICU Vital Signs Last 24 Hrs  T(C): 35.2 (22 Feb 2022 22:30), Max: 36.5 (22 Feb 2022 12:00)  T(F): 95.4 (22 Feb 2022 22:30), Max: 97.7 (22 Feb 2022 12:00)  HR: 74 (23 Feb 2022 05:00) (74 - 90)  BP: 79/59 (23 Feb 2022 05:00) (79/59 - 153/68)  BP(mean): 65 (23 Feb 2022 05:00) (61 - 98)  ABP: --  ABP(mean): --  RR: 17 (23 Feb 2022 05:00) (15 - 31)  SpO2: 98% (23 Feb 2022 05:00) (98% - 100%)        02-21 @ 07:01  -  02-22 @ 07:00  --------------------------------------------------------  IN: 4165.8 mL / OUT: 1285 mL / NET: 2880.8 mL    02-22 @ 07:01 - 02-23 @ 06:07  --------------------------------------------------------  IN: 4103.4 mL / OUT: 1685 mL / NET: 2418.4 mL      CAPILLARY BLOOD GLUCOSE            PHYSICAL EXAM:       · ENMT:   Airway patent,   Nasal mucosa clear.  Mouth with normal mucosa.   No thrush    · EYES:   Clear bilaterally,   pupils equal,   round and reactive to light.    · CARDIAC:   Normal rate,   regular rhythm.    Heart sounds S1, S2.   No murmurs, no rubs or gallops on auscultation  no edema        CAROTID:   normal systolic impulse  no bruits    · RESPIRATORY:   rales  normal chest expansion  no retractions or use of accessory muscles  palpation of chest is normal with no fremitus  percussion of chest demonstrates no hyperresonance or dullness    · GASTROINTESTINAL:  Abdomen soft,   non-tender,   + BS  liver/spleen not palpable    · MUSCULOSKELETAL:   no clubbing, cyanosis      · SKIN:   Skin normal color for race,   warm, dry   No evidence of rash.        · HEME LYMPH:   no splenomegaly.  No cervical  lymphadenopathy.  no inguinal lymphadenopathy    HOSPITAL MEDICATIONS:  MEDICATIONS  (STANDING):  chlorhexidine 4% Liquid 1 Application(s) Topical daily  dextrose 5% + lactated ringers. 1000 milliLiter(s) (70 mL/Hr) IV Continuous <Continuous>  lactulose Syrup 10 Gram(s) Oral two times a day  levothyroxine Injectable 25 MICROGram(s) IV Push at bedtime  LORazepam   Injectable 2 milliGRAM(s) IV Push once  magnesium sulfate  IVPB 2 Gram(s) IV Intermittent once  multivitamin/minerals 1 Tablet(s) Oral daily  norepinephrine Infusion 0.05 MICROgram(s)/kG/Min (2.81 mL/Hr) IV Continuous <Continuous>  nystatin Cream 1 Application(s) Topical two times a day  pantoprazole  Injectable 40 milliGRAM(s) IV Push daily  piperacillin/tazobactam IVPB.. 3.375 Gram(s) IV Intermittent every 6 hours  thiamine 100 milliGRAM(s) Oral daily  triamcinolone 0.1% Cream 1 Application(s) Topical two times a day  vitamin A &amp; D Ointment 1 Application(s) Topical daily    MEDICATIONS  (PRN):    sodium chloride 0.9% Bolus:   250 milliLiter(s), IV Bolus, once, infuse over 30 Minute(s), Stop After 1 Doses  lactated ringers Bolus:   500 milliLiter(s), IV Bolus, once, infuse over 30 Minute(s), Stop After 1 Doses  lactated ringers Bolus:   500 milliLiter(s), IV Bolus, once, infuse over 30 Minute(s), Stop After 1 Doses  lactated ringers Bolus:   500 milliLiter(s), IV Bolus, once, infuse over 30 Minute(s), Stop After 1 Doses  lactated ringers.: Solution, 1000 milliLiter(s) infuse at 100 mL/Hr  lactated ringers Bolus:   1000 milliLiter(s), IV Bolus, once, infuse over 60 Minute(s), Stop After 1 Doses  lactated ringers Bolus:   500 milliLiter(s), IV Bolus, once, infuse over 60 Minute(s), Stop After 1 Doses  lactated ringers.: Solution, 1000 milliLiter(s) infuse at 75 mL/Hr  Provider's Contact #: [phone]  sodium chloride 0.45%.: Solution, 1000 milliLiter(s) infuse at 75 mL/Hr, Stop After 1 Days      LABS:                        8.6    4.54  )-----------( 34       ( 23 Feb 2022 04:40 )             24.0     02-23    147<H>  |  115<H>  |  5<L>  ----------------------------<  84  3.5   |  25  |  0.9    Ca    7.3<L>      23 Feb 2022 04:40  Mg     1.7     02-23    TPro  4.7<L>  /  Alb  1.4<L>  /  TBili  1.1  /  DBili  x   /  AST  21  /  ALT  11  /  AlkPhos  71  02-23    PT/INR - ( 23 Feb 2022 04:40 )   PT: 20.90 sec;   INR: 1.83 ratio         PTT - ( 23 Feb 2022 04:40 )  PTT:58.0 sec    Arterial Blood Gas:  02-21 @ 14:34  7.42/33/73/21/96.6/-2.5  ABG lactate: --      COVID-19 PCR: NotDetec (20 Feb 2022 09:16)      ABG - ( 21 Feb 2022 14:34 )  pH, Arterial: 7.42  pH, Blood: x     /  pCO2: 33    /  pO2: 73    / HCO3: 21    / Base Excess: -2.5  /  SaO2: 96.6        RADIOLOGY: Today I personally interpreted the latest CXR and other pertinent films.

## 2022-02-23 NOTE — SWALLOW BEDSIDE ASSESSMENT ADULT - SLP PERTINENT HISTORY OF CURRENT PROBLEM
Pt is a 60 yo F with PMH of GIB, Bowel and Bladder incontinence, Smoking (3-4 cigarettes/day), ETOH abuse, hypothyroidism anemia transferred from [facility] for Polypectomy. Pt is from home living with her son and bedbound at baseline. Pt was admitted to [facility] on 01/31 for AMS, weakness and decreased appetite. She was found to have UTI, NSTEMI, and electrolyte imbalance. During her stay at [facility] pt had colonoscopy done which revealed 1 large 3-4 cm sigmoid pedunculated polyp and 2 polyps (7, 14 mm) in descending colon s/p bx.

## 2022-02-23 NOTE — PROGRESS NOTE ADULT - ASSESSMENT
IMPRESSION:    AMS   Sepsis   Septic shock  Possible aspiration PNA  Acute hypoxemic respiratory failure   Colon polyps s/p polypectomy of 2 polyps on 2/18  Anemia  HO GIB  HO ETOH abuse  Active smoker  NAGMA    PLAN:    CNS: CTH. Avoid CNS depressants. Thiamine, folate. CIWA protocol.  NH4 level.     HEENT: Oral care    PULMONARY:  HOB @ 45 degrees.  Aspiration precautions.    Supplemental O2 target Spo2 92-94%    CARDIOVASCULAR:   Echo.   BNP.   off Levophed      GI: GI prophylaxis. NPO.     RENAL:  Follow up lytes.  Correct as needed  fluid HCO3 containing hypotonic fluid    INFECTIOUS DISEASE:   culture temp spikes      HEMATOLOGICAL:  DVT prophylaxis. LE duplex    ENDOCRINE:  Follow up FS.  Insulin protocol if needed.    MUSCULOSKELETAL:  Bed rest     Dispo:  can downgrade if stays off pressors         O-Z Plasty Text: The defect edges were debeveled with a #15 scalpel blade.  Given the location of the defect, shape of the defect and the proximity to free margins an O-Z plasty (double transposition flap) was deemed most appropriate.  Using a sterile surgical marker, the appropriate transposition flaps were drawn incorporating the defect and placing the expected incisions within the relaxed skin tension lines where possible.    The area thus outlined was incised deep to adipose tissue with a #15 scalpel blade.  The skin margins were undermined to an appropriate distance in all directions utilizing iris scissors.  Hemostasis was achieved with electrocautery.  The flaps were then transposed into place, one clockwise and the other counterclockwise, and anchored with interrupted buried subcutaneous sutures.

## 2022-02-23 NOTE — PROGRESS NOTE ADULT - SUBJECTIVE AND OBJECTIVE BOX
Gastroenterology progress note:     Patient is a 61y old  Female who presents with a chief complaint of Polypectomy (23 Feb 2022 06:07)       Admitted on: 02-16-22    We are following the patient for:  post polypectomy     Interval History:  Patient off pressors, brown bowel movement.       PAST MEDICAL & SURGICAL HISTORY:      MEDICATIONS  (STANDING):  chlorhexidine 4% Liquid 1 Application(s) Topical daily  dextrose 5% + lactated ringers. 1000 milliLiter(s) (70 mL/Hr) IV Continuous <Continuous>  lactulose Syrup 10 Gram(s) Oral two times a day  levothyroxine Injectable 25 MICROGram(s) IV Push at bedtime  LORazepam   Injectable 2 milliGRAM(s) IV Push once  magnesium sulfate  IVPB 2 Gram(s) IV Intermittent once  multivitamin/minerals 1 Tablet(s) Oral daily  norepinephrine Infusion 0.05 MICROgram(s)/kG/Min (2.81 mL/Hr) IV Continuous <Continuous>  nystatin Cream 1 Application(s) Topical two times a day  pantoprazole  Injectable 40 milliGRAM(s) IV Push daily  piperacillin/tazobactam IVPB.. 3.375 Gram(s) IV Intermittent every 6 hours  potassium chloride   Powder 40 milliEquivalent(s) Oral once  thiamine 100 milliGRAM(s) Oral daily  triamcinolone 0.1% Cream 1 Application(s) Topical two times a day  vitamin A &amp; D Ointment 1 Application(s) Topical daily    MEDICATIONS  (PRN):      Allergies  No Known Allergies      Review of Systems:   Constitutional: Ne Fever, No chills, No weakness  ENT: No visual changes, No throat pain  Cardiovascular:  No Chest Pain, No Palpitations  Respiratory:  No Cough, No Dyspnea  Gastrointestinal:  As described in HPI  Neurological: No numbness or weakness  Skin: No rash, no itching    Physical Examination:  T(C): 35.2 (02-22-22 @ 22:30), Max: 36.5 (02-22-22 @ 12:00)  HR: 74 (02-23-22 @ 06:22) (74 - 90)  BP: 105/62 (02-23-22 @ 06:22) (70/46 - 153/68)  RR: 17 (02-23-22 @ 06:22) (15 - 31)  SpO2: 99% (02-23-22 @ 06:22) (98% - 100%)      02-22-22 @ 07:01  -  02-23-22 @ 07:00  --------------------------------------------------------  IN: 4243.4 mL / OUT: 1820 mL / NET: 2423.4 mL      Constitutional: No acute distress.  Respiratory:  No signs of respiratory distress. Lung sounds are clear bilaterally.  Cardiovascular:  S1 S2, Regular rate and rhythm.  Abdominal: Abdomen is soft, symmetric, and non-tender without distention. There are no visible lesions. Bowel sounds are present and normoactive in all four quadrants. No masses, hepatomegaly, or splenomegaly are noted.   Skin: No rashes, No Jaundice.  Neurology: Non-focal  Vascular: No varicose vein, No cyanosis, No edema        Data: (reviewed by attending)                        8.6    4.54  )-----------( 34       ( 23 Feb 2022 04:40 )             24.0     Hgb trend:  8.6  02-23-22 @ 04:40  8.2  02-23-22 @ 00:03  7.2  02-22-22 @ 16:20  7.8  02-22-22 @ 04:06  8.2  02-21-22 @ 21:43  8.5  02-21-22 @ 06:03  8.9  02-20-22 @ 09:30      02-22-22 @ 07:01  -  02-23-22 @ 07:00  --------------------------------------------------------  IN: 529 mL      02-23    147<H>  |  115<H>  |  5<L>  ----------------------------<  84  3.5   |  25  |  0.9    Ca    7.3<L>      23 Feb 2022 04:40  Mg     1.7     02-23    TPro  4.7<L>  /  Alb  1.4<L>  /  TBili  1.1  /  DBili  x   /  AST  21  /  ALT  11  /  AlkPhos  71  02-23    Liver panel trend:  TBili 1.1   /   AST 21   /   ALT 11   /   AlkP 71   /   Tptn 4.7   /   Alb 1.4    /   DBili --      02-23  TBili 0.8   /   AST 14   /   ALT 10   /   AlkP 67   /   Tptn 4.4   /   Alb 1.1    /   DBili --      02-22  TBili 0.9   /   AST 15   /   ALT 11   /   AlkP 68   /   Tptn 4.7   /   Alb 1.2    /   DBili --      02-21  TBili 0.7   /   AST 14   /   ALT 12   /   AlkP 69   /   Tptn 5.1   /   Alb 1.4    /   DBili --      02-21  TBili 0.7   /   AST 19   /   ALT 12   /   AlkP 69   /   Tptn 5.5   /   Alb 1.4    /   DBili --      02-20  TBili 0.6   /   AST 18   /   ALT 12   /   AlkP 68   /   Tptn 5.2   /   Alb 1.5    /   DBili --      02-20  TBili 0.5   /   AST 20   /   ALT 11   /   AlkP 67   /   Tptn 4.8   /   Alb 1.2    /   DBili --      02-19  TBili 1.0   /   AST 21   /   ALT 13   /   AlkP 81   /   Tptn 5.8   /   Alb 1.5    /   DBili --      02-18  TBili 1.3   /   AST 14   /   ALT 12   /   AlkP 63   /   Tptn 5.5   /   Alb 1.6    /   DBili --      02-17      PT/INR - ( 23 Feb 2022 04:40 )   PT: 20.90 sec;   INR: 1.83 ratio         PTT - ( 23 Feb 2022 04:40 )  PTT:58.0 sec    Culture - Blood (collected 22 Feb 2022 00:00)  Source: .Blood Blood  Preliminary Report (23 Feb 2022 07:01):    No growth to date.         Radiology: (reviewed by attending)

## 2022-02-23 NOTE — PROGRESS NOTE ADULT - ASSESSMENT
60 yo F with PMH of GIB, Bowel and Bladder incontinence, Smoking (1/2 ppd), ETOH abuse, hypothyroidism, anemia transferred from [facility] for EMR/ Polypectomy. . Pt was admitted to [facility] on 01/31 for AMS, weakness and decreased appetite. She was found to have UTI, Elevated troponin, and electrolyte imbalance. During her stay at [facility] pt had colonoscopy done, which revealed 1 large 3-4 cm sigmoid pedunculated polyp (bx=tub adenoma, but was friable) and 2 polyps (7, 14 mm) in descending colon s/p bx.      # patient is S/P  colonoscopy and polypectomy 2/18 .  #septic shock possibly d/t Aspiration PNA vs bactrial translocation post polypectomy on Levo  Polyp (13 mm) in the descending colon. ( hot snare Polypectomy).  Polyp (25 mm to 30 mm) in the rectosigmoid junction at 20 cm from the anal verge. (Endoloop, Polypectomy, Endoclip).    No GI bleed  No leukocytosis  abdomen soft no ridigity or guarding  CT: Diffuse colonic wall thickening, consistent with pancolitis. Intra-abdominal ascites which may be reactive.        No CT evidence of intraperitoneal or retroperitoneal free air. No extraluminal contrast extravasation.    REC:   - c/w broad spectrum abx--> f/u ID  - await pathology results  - repeat CF in 6 months 62 yo F with PMH of GIB, Bowel and Bladder incontinence, Smoking (1/2 ppd), ETOH abuse, hypothyroidism, anemia transferred from [facility] for EMR/ Polypectomy. . Pt was admitted to [facility] on 01/31 for AMS, weakness and decreased appetite. She was found to have UTI, Elevated troponin, and electrolyte imbalance. During her stay at [facility] pt had colonoscopy done, which revealed 1 large 3-4 cm sigmoid pedunculated polyp (bx=tub adenoma, but was friable) and 2 polyps (7, 14 mm) in descending colon s/p bx.      # patient is S/P  colonoscopy and polypectomy 2/18 .  #septic shock possibly d/t Aspiration PNA vs bactrial translocation post polypectomy on Levo  Polyp (13 mm) in the descending colon. ( hot snare Polypectomy).  Polyp (25 mm to 30 mm) in the rectosigmoid junction at 20 cm from the anal verge. (Endoloop, Polypectomy, Endoclip).    No GI bleed  No leukocytosis  abdomen soft no ridigity or guarding  CT: Diffuse colonic wall thickening, consistent with pancolitis. Intra-abdominal ascites which may be reactive.        No CT evidence of intraperitoneal or retroperitoneal free air. No extraluminal contrast extravasation.    REC:   - c/w broad spectrum abx--> f/u ID  - f/u hem/onc for pancytopenia  - await pathology results  - repeat CF in 6 months

## 2022-02-24 LAB
% ALBUMIN: 28.8 % — SIGNIFICANT CHANGE UP
% ALBUMIN: 30.1 % — SIGNIFICANT CHANGE UP
% ALPHA 1: 6 % — SIGNIFICANT CHANGE UP
% ALPHA 1: 6.6 % — SIGNIFICANT CHANGE UP
% ALPHA 2: 6.3 % — SIGNIFICANT CHANGE UP
% ALPHA 2: 8.2 % — SIGNIFICANT CHANGE UP
% BETA: 18.2 % — SIGNIFICANT CHANGE UP
% BETA: 18.3 % — SIGNIFICANT CHANGE UP
% GAMMA: 36.8 % — SIGNIFICANT CHANGE UP
% GAMMA: 40.7 % — SIGNIFICANT CHANGE UP
ALBUMIN SERPL ELPH-MCNC: 1.3 G/DL — LOW (ref 3.5–5.2)
ALBUMIN SERPL ELPH-MCNC: 1.4 G/DL — LOW (ref 3.6–5.5)
ALBUMIN SERPL ELPH-MCNC: 1.5 G/DL — LOW (ref 3.6–5.5)
ALBUMIN/GLOB SERPL ELPH: 0.4 RATIO — SIGNIFICANT CHANGE UP
ALBUMIN/GLOB SERPL ELPH: 0.5 RATIO — SIGNIFICANT CHANGE UP
ALP SERPL-CCNC: 85 U/L — SIGNIFICANT CHANGE UP (ref 30–115)
ALPHA1 GLOB SERPL ELPH-MCNC: 0.3 G/DL — SIGNIFICANT CHANGE UP (ref 0.1–0.4)
ALPHA1 GLOB SERPL ELPH-MCNC: 0.3 G/DL — SIGNIFICANT CHANGE UP (ref 0.1–0.4)
ALPHA2 GLOB SERPL ELPH-MCNC: 0.3 G/DL — LOW (ref 0.5–1)
ALPHA2 GLOB SERPL ELPH-MCNC: 0.4 G/DL — LOW (ref 0.5–1)
ALT FLD-CCNC: 13 U/L — SIGNIFICANT CHANGE UP (ref 0–41)
AMMONIA BLD-MCNC: 48 UMOL/L — SIGNIFICANT CHANGE UP (ref 11–55)
ANION GAP SERPL CALC-SCNC: 5 MMOL/L — LOW (ref 7–14)
APPEARANCE UR: ABNORMAL
APTT BLD: 58.4 SEC — HIGH (ref 27–39.2)
AST SERPL-CCNC: 25 U/L — SIGNIFICANT CHANGE UP (ref 0–41)
AUTO DIFF PNL BLD: NEGATIVE — SIGNIFICANT CHANGE UP
B-GLOBULIN SERPL ELPH-MCNC: 0.8 G/DL — SIGNIFICANT CHANGE UP (ref 0.5–1)
B-GLOBULIN SERPL ELPH-MCNC: 0.9 G/DL — SIGNIFICANT CHANGE UP (ref 0.5–1)
BASOPHILS # BLD AUTO: 0 K/UL — SIGNIFICANT CHANGE UP (ref 0–0.2)
BASOPHILS NFR BLD AUTO: 0 % — SIGNIFICANT CHANGE UP (ref 0–1)
BILIRUB SERPL-MCNC: 0.7 MG/DL — SIGNIFICANT CHANGE UP (ref 0.2–1.2)
BILIRUB UR-MCNC: NEGATIVE — SIGNIFICANT CHANGE UP
BUN SERPL-MCNC: 5 MG/DL — LOW (ref 10–20)
C-ANCA SER-ACNC: POSITIVE
C-ANCA TITR SER: ABNORMAL
CALCIUM SERPL-MCNC: 7.1 MG/DL — LOW (ref 8.5–10.1)
CALPROTECTIN STL-MCNT: 419 UG/G — HIGH (ref 0–120)
CHLORIDE SERPL-SCNC: 113 MMOL/L — HIGH (ref 98–110)
CO2 SERPL-SCNC: 25 MMOL/L — SIGNIFICANT CHANGE UP (ref 17–32)
COLOR SPEC: YELLOW — SIGNIFICANT CHANGE UP
CREAT SERPL-MCNC: 0.8 MG/DL — SIGNIFICANT CHANGE UP (ref 0.7–1.5)
CULTURE RESULTS: SIGNIFICANT CHANGE UP
DIFF PNL FLD: ABNORMAL
EOSINOPHIL # BLD AUTO: 0.03 K/UL — SIGNIFICANT CHANGE UP (ref 0–0.7)
EOSINOPHIL NFR BLD AUTO: 0.9 % — SIGNIFICANT CHANGE UP (ref 0–8)
GAMMA GLOBULIN: 1.7 G/DL — HIGH (ref 0.6–1.6)
GAMMA GLOBULIN: 2.1 G/DL — HIGH (ref 0.6–1.6)
GLUCOSE SERPL-MCNC: 131 MG/DL — HIGH (ref 70–99)
GLUCOSE UR QL: NEGATIVE — SIGNIFICANT CHANGE UP
HBV CORE IGM SER-ACNC: SIGNIFICANT CHANGE UP
HBV SURFACE AG SER-ACNC: SIGNIFICANT CHANGE UP
HCT VFR BLD CALC: 24.1 % — LOW (ref 37–47)
HCT VFR BLD CALC: 24.7 % — LOW (ref 37–47)
HCT VFR BLD CALC: 24.8 % — LOW (ref 37–47)
HGB BLD-MCNC: 8.4 G/DL — LOW (ref 12–16)
HGB BLD-MCNC: 8.7 G/DL — LOW (ref 12–16)
HGB BLD-MCNC: 8.8 G/DL — LOW (ref 12–16)
HIV 1+2 AB+HIV1 P24 AG SERPL QL IA: SIGNIFICANT CHANGE UP
IMM GRANULOCYTES NFR BLD AUTO: 0.3 % — SIGNIFICANT CHANGE UP (ref 0.1–0.3)
INR BLD: 1.68 RATIO — HIGH (ref 0.65–1.3)
INTERPRETATION SERPL IFE-IMP: SIGNIFICANT CHANGE UP
INTERPRETATION SERPL IFE-IMP: SIGNIFICANT CHANGE UP
KETONES UR-MCNC: SIGNIFICANT CHANGE UP
LACTATE SERPL-SCNC: 2.7 MMOL/L — HIGH (ref 0.7–2)
LACTATE SERPL-SCNC: 3.5 MMOL/L — HIGH (ref 0.7–2)
LEUKOCYTE ESTERASE UR-ACNC: ABNORMAL
LYMPHOCYTES # BLD AUTO: 0.89 K/UL — LOW (ref 1.2–3.4)
LYMPHOCYTES # BLD AUTO: 27.7 % — SIGNIFICANT CHANGE UP (ref 20.5–51.1)
MAGNESIUM SERPL-MCNC: 2 MG/DL — SIGNIFICANT CHANGE UP (ref 1.8–2.4)
MCHC RBC-ENTMCNC: 29.9 PG — SIGNIFICANT CHANGE UP (ref 27–31)
MCHC RBC-ENTMCNC: 29.9 PG — SIGNIFICANT CHANGE UP (ref 27–31)
MCHC RBC-ENTMCNC: 30.3 PG — SIGNIFICANT CHANGE UP (ref 27–31)
MCHC RBC-ENTMCNC: 34.9 G/DL — SIGNIFICANT CHANGE UP (ref 32–37)
MCHC RBC-ENTMCNC: 35.2 G/DL — SIGNIFICANT CHANGE UP (ref 32–37)
MCHC RBC-ENTMCNC: 35.5 G/DL — SIGNIFICANT CHANGE UP (ref 32–37)
MCV RBC AUTO: 84.9 FL — SIGNIFICANT CHANGE UP (ref 81–99)
MCV RBC AUTO: 85.5 FL — SIGNIFICANT CHANGE UP (ref 81–99)
MCV RBC AUTO: 85.8 FL — SIGNIFICANT CHANGE UP (ref 81–99)
MONOCYTES # BLD AUTO: 0.18 K/UL — SIGNIFICANT CHANGE UP (ref 0.1–0.6)
MONOCYTES NFR BLD AUTO: 5.6 % — SIGNIFICANT CHANGE UP (ref 1.7–9.3)
NEUTROPHILS # BLD AUTO: 2.1 K/UL — SIGNIFICANT CHANGE UP (ref 1.4–6.5)
NEUTROPHILS NFR BLD AUTO: 65.5 % — SIGNIFICANT CHANGE UP (ref 42.2–75.2)
NITRITE UR-MCNC: NEGATIVE — SIGNIFICANT CHANGE UP
NRBC # BLD: 2 /100 WBCS — HIGH (ref 0–0)
P-ANCA SER-ACNC: NEGATIVE — SIGNIFICANT CHANGE UP
PH UR: 6 — SIGNIFICANT CHANGE UP (ref 5–8)
PHOSPHATE SERPL-MCNC: 2.5 MG/DL — SIGNIFICANT CHANGE UP (ref 2.1–4.9)
PLATELET # BLD AUTO: 18 K/UL — CRITICAL LOW (ref 130–400)
PLATELET # BLD AUTO: 23 K/UL — LOW (ref 130–400)
PLATELET # BLD AUTO: 51 K/UL — LOW (ref 130–400)
POTASSIUM SERPL-MCNC: 3.7 MMOL/L — SIGNIFICANT CHANGE UP (ref 3.5–5)
POTASSIUM SERPL-SCNC: 3.7 MMOL/L — SIGNIFICANT CHANGE UP (ref 3.5–5)
PROT PATTERN SERPL ELPH-IMP: SIGNIFICANT CHANGE UP
PROT PATTERN SERPL ELPH-IMP: SIGNIFICANT CHANGE UP
PROT SERPL-MCNC: 4.5 G/DL — LOW (ref 6–8)
PROT SERPL-MCNC: 4.5 G/DL — LOW (ref 6–8.3)
PROT UR-MCNC: ABNORMAL
PROTHROM AB SERPL-ACNC: 19.2 SEC — HIGH (ref 9.95–12.87)
RBC # BLD: 2.81 M/UL — LOW (ref 4.2–5.4)
RBC # BLD: 2.9 M/UL — LOW (ref 4.2–5.4)
RBC # BLD: 2.91 M/UL — LOW (ref 4.2–5.4)
RBC # FLD: 16 % — HIGH (ref 11.5–14.5)
RBC # FLD: 16.2 % — HIGH (ref 11.5–14.5)
RBC # FLD: 16.3 % — HIGH (ref 11.5–14.5)
SODIUM SERPL-SCNC: 143 MMOL/L — SIGNIFICANT CHANGE UP (ref 135–146)
SP GR SPEC: 1.03 — SIGNIFICANT CHANGE UP (ref 1.01–1.03)
SPECIMEN SOURCE: SIGNIFICANT CHANGE UP
UROBILINOGEN FLD QL: SIGNIFICANT CHANGE UP
WBC # BLD: 3.15 K/UL — LOW (ref 4.8–10.8)
WBC # BLD: 3.19 K/UL — LOW (ref 4.8–10.8)
WBC # BLD: 3.21 K/UL — LOW (ref 4.8–10.8)
WBC # FLD AUTO: 3.15 K/UL — LOW (ref 4.8–10.8)
WBC # FLD AUTO: 3.19 K/UL — LOW (ref 4.8–10.8)
WBC # FLD AUTO: 3.21 K/UL — LOW (ref 4.8–10.8)

## 2022-02-24 PROCEDURE — 93010 ELECTROCARDIOGRAM REPORT: CPT

## 2022-02-24 PROCEDURE — 99223 1ST HOSP IP/OBS HIGH 75: CPT

## 2022-02-24 PROCEDURE — 99232 SBSQ HOSP IP/OBS MODERATE 35: CPT

## 2022-02-24 PROCEDURE — 76770 US EXAM ABDO BACK WALL COMP: CPT | Mod: 26

## 2022-02-24 PROCEDURE — 99233 SBSQ HOSP IP/OBS HIGH 50: CPT

## 2022-02-24 PROCEDURE — 93306 TTE W/DOPPLER COMPLETE: CPT | Mod: 26

## 2022-02-24 RX ORDER — LEVOTHYROXINE SODIUM 125 MCG
25 TABLET ORAL AT BEDTIME
Refills: 0 | Status: DISCONTINUED | OUTPATIENT
Start: 2022-02-24 | End: 2022-02-25

## 2022-02-24 RX ORDER — SODIUM CHLORIDE 9 MG/ML
500 INJECTION, SOLUTION INTRAVENOUS ONCE
Refills: 0 | Status: COMPLETED | OUTPATIENT
Start: 2022-02-24 | End: 2022-02-24

## 2022-02-24 RX ORDER — AZTREONAM 2 G
2000 VIAL (EA) INJECTION EVERY 8 HOURS
Refills: 0 | Status: DISCONTINUED | OUTPATIENT
Start: 2022-02-24 | End: 2022-03-04

## 2022-02-24 RX ADMIN — CHLORHEXIDINE GLUCONATE 1 APPLICATION(S): 213 SOLUTION TOPICAL at 13:04

## 2022-02-24 RX ADMIN — Medication 1 APPLICATION(S): at 13:04

## 2022-02-24 RX ADMIN — PANTOPRAZOLE SODIUM 40 MILLIGRAM(S): 20 TABLET, DELAYED RELEASE ORAL at 17:40

## 2022-02-24 RX ADMIN — Medication 1 APPLICATION(S): at 06:25

## 2022-02-24 RX ADMIN — NYSTATIN CREAM 1 APPLICATION(S): 100000 CREAM TOPICAL at 06:25

## 2022-02-24 RX ADMIN — SODIUM CHLORIDE 500 MILLILITER(S): 9 INJECTION, SOLUTION INTRAVENOUS at 12:10

## 2022-02-24 RX ADMIN — Medication 25 MICROGRAM(S): at 22:21

## 2022-02-24 RX ADMIN — Medication 100 MILLIGRAM(S): at 21:17

## 2022-02-24 RX ADMIN — Medication 1 APPLICATION(S): at 17:39

## 2022-02-24 RX ADMIN — MEROPENEM 100 MILLIGRAM(S): 1 INJECTION INTRAVENOUS at 06:24

## 2022-02-24 RX ADMIN — SODIUM CHLORIDE 500 MILLILITER(S): 9 INJECTION, SOLUTION INTRAVENOUS at 09:00

## 2022-02-24 RX ADMIN — LACTULOSE 10 GRAM(S): 10 SOLUTION ORAL at 06:24

## 2022-02-24 RX ADMIN — Medication 100 MILLIGRAM(S): at 13:04

## 2022-02-24 RX ADMIN — Medication 40 MILLIGRAM(S): at 17:38

## 2022-02-24 RX ADMIN — Medication 100 MILLIGRAM(S): at 15:33

## 2022-02-24 RX ADMIN — PANTOPRAZOLE SODIUM 40 MILLIGRAM(S): 20 TABLET, DELAYED RELEASE ORAL at 06:25

## 2022-02-24 RX ADMIN — SODIUM CHLORIDE 1000 MILLILITER(S): 9 INJECTION, SOLUTION INTRAVENOUS at 22:33

## 2022-02-24 RX ADMIN — NYSTATIN CREAM 1 APPLICATION(S): 100000 CREAM TOPICAL at 17:39

## 2022-02-24 RX ADMIN — LACTULOSE 10 GRAM(S): 10 SOLUTION ORAL at 17:39

## 2022-02-24 RX ADMIN — Medication 1 TABLET(S): at 13:06

## 2022-02-24 RX ADMIN — SODIUM CHLORIDE 70 MILLILITER(S): 9 INJECTION, SOLUTION INTRAVENOUS at 10:00

## 2022-02-24 NOTE — PROGRESS NOTE ADULT - ASSESSMENT
IMPRESSION:    AMS   Sepsis   Septic shock  Possible aspiration PNA  Acute hypoxemic respiratory failure   Colon polyps s/p polypectomy of 2 polyps on 2/18  Anemia  HO GIB  HO ETOH abuse  Active smoker  NAGMA    PLAN:    CNS: CTH. Avoid CNS depressants. Thiamine, folate. CIWA protocol.  NH4 level.     HEENT: Oral care    PULMONARY:  HOB @ 45 degrees.  Aspiration precautions.    Supplemental O2 target Spo2 92-94%    CARDIOVASCULAR:   Echo.   BNP.   off Levophed      GI: GI prophylaxis.   diet   free H2O.     RENAL:  Follow up lytes.  Correct as needed      INFECTIOUS DISEASE:   culture temp spikes      HEMATOLOGICAL:  DVT prophylaxis. LE duplex    ENDOCRINE:  Follow up FS.  Insulin protocol if needed.    MUSCULOSKELETAL:  Bed rest     Dispo:  monitor

## 2022-02-24 NOTE — PROGRESS NOTE ADULT - SUBJECTIVE AND OBJECTIVE BOX
GENERAL SURGERY PROGRESS NOTE    Patient: SANTIAGO CALIXTO , 61y ([birth year])Female   MRN: [id number]  Location: [facility] A  Visit: 02-16-22 Inpatient  Date: 02-24-22 @ 01:34    Hospital Day #:9    Procedure/Dx/Injuries: pneumoperitoneum     Events of past 24 hours: Pt seen and examined at bedside. Pain has improved, abdomen is nontender and soft. Pt was uncooperative with SLP yesterday - recommends NPO w/ alternative means of nutrition and hydration. TF started. No acute overnight events. Afebrile     PAST MEDICAL & SURGICAL HISTORY:    Vitals:   T(F): 97 (02-24-22 @ 00:00), Max: 97.6 (02-23-22 @ 08:00)  HR: 67 (02-24-22 @ 00:00)  BP: 119/71 (02-24-22 @ 00:00)  RR: 14 (02-24-22 @ 00:00)  SpO2: 100% (02-24-22 @ 00:00)    Diet, NPO with Tube Feed:   Tube Feeding Modality: Nasogastric  Peptamen A.F. Formula  Total Volume for 24 Hours (mL): 1000  Bolus  Total Volume of Bolus (mL):  250  Total # of Feeds: 4  Tube Feed Frequency: Every 6 hours   Tube Feed Start Time: 18:00  Bolus Feed Rate (mL per Hour): 250   Bolus Feed Duration (in Hours): 1  Free Water Flush  Bolus   Total Volume per Flush (mL): 30   Frequency: Every 6 Hours   Total Daily Volume of Flush (mL): 120  Free Water Flush Instructions:  30 pre/post    I & O's:  02-22-22 @ 07:01  -  02-23-22 @ 07:00  --------------------------------------------------------  IN:    dextrose 5% + lactated ringers: 1625 mL    dextrose 5% + lactated ringers: 770 mL    IV PiggyBack: 400 mL    Lactated Ringers Bolus: 500 mL    Norepinephrine: 169.4 mL    Platelets - Single Donor: 209 mL    PRBCs (Packed Red Blood Cells): 320 mL    Sodium Chloride 0.9% Bolus: 250 mL  Total IN: 4243.4 mL  OUT:    Indwelling Catheter - Urethral (mL): 1820 mL  Total OUT: 1820 mL  Total NET: 2423.4 mL    PHYSICAL EXAM:  General: NAD  HEENT: NCAT, EOMI  Cardiac: S1, S2  Respiratory: normal respiratory effort, breath sounds equal BL  Abdomen: Soft, non-distended, non-tender  Skin: No jaundice    MEDICATIONS  (STANDING):  chlorhexidine 4% Liquid 1 Application(s) Topical daily  dextrose 5% + lactated ringers. 1000 milliLiter(s) (70 mL/Hr) IV Continuous <Continuous>  lactulose Syrup 10 Gram(s) Oral two times a day  levothyroxine Injectable 25 MICROGram(s) IV Push at bedtime  LORazepam   Injectable 2 milliGRAM(s) IV Push once  meropenem  IVPB 1000 milliGRAM(s) IV Intermittent every 8 hours  multivitamin/minerals 1 Tablet(s) Oral daily  norepinephrine Infusion 0.05 MICROgram(s)/kG/Min (2.81 mL/Hr) IV Continuous <Continuous>  nystatin Cream 1 Application(s) Topical two times a day  pantoprazole  Injectable 40 milliGRAM(s) IV Push every 12 hours  thiamine 100 milliGRAM(s) Oral daily  triamcinolone 0.1% Cream 1 Application(s) Topical two times a day  vitamin A &amp; D Ointment 1 Application(s) Topical daily    MEDICATIONS  (PRN):    GI PROPHYLAXIS: pantoprazole  Injectable 40 milliGRAM(s) IV Push every 12 hours  ANTIBIOTICS:  meropenem  IVPB 1000 milliGRAM(s)    LAB/STUDIES:  Labs:                        9.1    5.23  )-----------( 33       ( 23 Feb 2022 16:49 )             25.5       Auto Neutrophil %: 80.2 % (02-23-22 @ 04:40)    02-23    147<H>  |  115<H>  |  5<L>  ----------------------------<  84  3.5   |  25  |  0.9    Calcium, Total Serum: 7.3 mg/dL (02-23-22 @ 04:40)    LFTs:             4.7  | 1.1  | 21       ------------------[71      ( 23 Feb 2022 04:40 )  1.4  | x    | 11          Lipase:x      Amylase:x         Lactate, Blood: 3.2 mmol/L (02-23-22 @ 04:40)  Lactate, Blood: 3.5 mmol/L (02-23-22 @ 00:03)  Lactate, Blood: 3.6 mmol/L (02-22-22 @ 12:00)  Lactate, Blood: 3.5 mmol/L (02-21-22 @ 21:43)  Blood Gas Arterial, Lactate: 3.70 mmol/L (02-21-22 @ 14:34)  Lactate, Blood: 4.8 mmol/L (02-21-22 @ 13:01)    ABG - ( 21 Feb 2022 14:34 )  pH: 7.42  /  pCO2: 33    /  pO2: 73    / HCO3: 21    / Base Excess: -2.5  /  SaO2: 96.6      Coags:     20.90  ----< 1.83    ( 23 Feb 2022 04:40 )     58.0     Culture - Stool (collected 22 Feb 2022 06:40)  Source: .Stool Feces  Preliminary Report (23 Feb 2022 17:10):    Moderate Yeast like cells    No enteric pathogens to date: Final culture pending    No enteric gram negative rods isolated    Culture - Blood (collected 22 Feb 2022 00:00)  Source: .Blood Blood  Preliminary Report (23 Feb 2022 07:01):    No growth to date.    IMAGING:  No new imaging past 24hrs

## 2022-02-24 NOTE — CONSULT NOTE ADULT - SUBJECTIVE AND OBJECTIVE BOX
HPI:  Pt is a 60 yo F with PMH of GIB, Bowel and Bladder incontinence, Smoking (3-4 cigarettes/day), ETOH abuse, hypothyroidism anemia transferred from [facility] for Polypectomy. Pt is from home living with her son and bedbound at baseline. Pt was admitted to [facility] on 01/31 for AMS, weakness and decreased appetite. She was found to have UTI, NSTEMI, and electrolyte imbalance. During her stay at [facility] pt had colonoscopy done which revealed 1 large 3-4 cm sigmoid pedunculated polyp and 2 polyps (7, 14 mm) in descending colon s/p bx. Pt was transferred to [facility] for polyp removal.  (16 Feb 2022 23:24)      PAST MEDICAL & SURGICAL HISTORY:        MEDICATIONS  (STANDING):  chlorhexidine 4% Liquid 1 Application(s) Topical daily  dextrose 5% + lactated ringers. 1000 milliLiter(s) (70 mL/Hr) IV Continuous <Continuous>  lactulose Syrup 10 Gram(s) Oral two times a day  levothyroxine Injectable 25 MICROGram(s) IV Push at bedtime  LORazepam   Injectable 2 milliGRAM(s) IV Push once  meropenem  IVPB 1000 milliGRAM(s) IV Intermittent every 8 hours  multivitamin/minerals 1 Tablet(s) Oral daily  norepinephrine Infusion 0.05 MICROgram(s)/kG/Min (2.81 mL/Hr) IV Continuous <Continuous>  nystatin Cream 1 Application(s) Topical two times a day  pantoprazole  Injectable 40 milliGRAM(s) IV Push every 12 hours  thiamine 100 milliGRAM(s) Oral daily  triamcinolone 0.1% Cream 1 Application(s) Topical two times a day  vitamin A &amp; D Ointment 1 Application(s) Topical daily    MEDICATIONS  (PRN):      Allergies    No Known Allergies    Intolerances        SOCIAL HISTORY:    FAMILY HISTORY:      Vital Signs Last 24 Hrs  T(C): 36.1 (24 Feb 2022 11:00), Max: 36.2 (23 Feb 2022 16:00)  T(F): 96.9 (24 Feb 2022 11:00), Max: 97.2 (23 Feb 2022 16:00)  HR: 66 (24 Feb 2022 11:00) (65 - 74)  BP: 84/52 (24 Feb 2022 11:00) (81/53 - 128/79)  BP(mean): 63 (24 Feb 2022 11:00) (61 - 96)  RR: 25 (24 Feb 2022 11:00) (10 - 25)  SpO2: 100% (24 Feb 2022 11:00) (100% - 100%)      LABS:                        8.7    3.19  )-----------( 18       ( 24 Feb 2022 10:55 )             24.7     02-24    143  |  113<H>  |  5<L>  ----------------------------<  131<H>  3.7   |  25  |  0.8    Ca    7.1<L>      24 Feb 2022 05:40  Phos  2.5     02-24  Mg     2.0     02-24    TPro  4.5<L>  /  Alb  1.3<L>  /  TBili  0.7  /  DBili  x   /  AST  25  /  ALT  13  /  AlkPhos  85  02-24    PT/INR - ( 24 Feb 2022 06:30 )   PT: 19.20 sec;   INR: 1.68 ratio         PTT - ( 24 Feb 2022 06:30 )  PTT:58.4 sec

## 2022-02-24 NOTE — PROGRESS NOTE ADULT - SUBJECTIVE AND OBJECTIVE BOX
SANTIAGO CALIXTO  61y, Female    All available historical data reviewed    OVERNIGHT EVENTS:  no fevers  feels well and has no new complaints  off pressors  soft BMs, no blood     ROS:  not reliable    VITALS:  T(F): 97, Max: 97.2 (02-23-22 @ 12:00)  HR: 65  BP: 88/56  RR: 13Vital Signs Last 24 Hrs  T(C): 36.1 (24 Feb 2022 00:00), Max: 36.2 (23 Feb 2022 12:00)  T(F): 97 (24 Feb 2022 00:00), Max: 97.2 (23 Feb 2022 12:00)  HR: 65 (24 Feb 2022 06:00) (65 - 75)  BP: 88/56 (24 Feb 2022 06:00) (88/56 - 134/95)  BP(mean): 67 (24 Feb 2022 06:00) (67 - 111)  RR: 13 (24 Feb 2022 06:00) (10 - 21)  SpO2: 100% (24 Feb 2022 06:00) (99% - 100%)    TESTS & MEASUREMENTS:                        8.8    3.21  )-----------( 23       ( 24 Feb 2022 05:40 )             24.8     02-24    143  |  113<H>  |  5<L>  ----------------------------<  131<H>  3.7   |  25  |  0.8    Ca    7.1<L>      24 Feb 2022 05:40  Mg     2.0     02-24    TPro  4.5<L>  /  Alb  1.3<L>  /  TBili  0.7  /  DBili  x   /  AST  25  /  ALT  13  /  AlkPhos  85  02-24    LIVER FUNCTIONS - ( 24 Feb 2022 05:40 )  Alb: 1.3 g/dL / Pro: 4.5 g/dL / ALK PHOS: 85 U/L / ALT: 13 U/L / AST: 25 U/L / GGT: x             Culture - Stool (collected 02-22-22 @ 06:40)  Source: .Stool Feces  Preliminary Report (02-23-22 @ 17:10):    Moderate Yeast like cells    No enteric pathogens to date: Final culture pending    No enteric gram negative rods isolated    Culture - Blood (collected 02-22-22 @ 00:00)  Source: .Blood Blood  Preliminary Report (02-23-22 @ 07:01):    No growth to date.    Culture - Blood (collected 02-19-22 @ 23:11)  Source: .Blood Blood  Preliminary Report (02-21-22 @ 09:01):    No growth to date.    Culture - Urine (collected 02-18-22 @ 05:25)  Source: Clean Catch Clean Catch (Midstream)  Final Report (02-20-22 @ 15:08):    <10,000 CFU/mL Normal Urogenital Meghan            RADIOLOGY & ADDITIONAL TESTS:  Personal review of radiological diagnostics performed  Echo and EKG results noted when applicable.     MEDICATIONS:  chlorhexidine 4% Liquid 1 Application(s) Topical daily  dextrose 5% + lactated ringers. 1000 milliLiter(s) IV Continuous <Continuous>  lactulose Syrup 10 Gram(s) Oral two times a day  levothyroxine Injectable 25 MICROGram(s) IV Push at bedtime  LORazepam   Injectable 2 milliGRAM(s) IV Push once  meropenem  IVPB 1000 milliGRAM(s) IV Intermittent every 8 hours  multivitamin/minerals 1 Tablet(s) Oral daily  norepinephrine Infusion 0.05 MICROgram(s)/kG/Min IV Continuous <Continuous>  nystatin Cream 1 Application(s) Topical two times a day  pantoprazole  Injectable 40 milliGRAM(s) IV Push every 12 hours  thiamine 100 milliGRAM(s) Oral daily  triamcinolone 0.1% Cream 1 Application(s) Topical two times a day  vitamin A &amp; D Ointment 1 Application(s) Topical daily      ANTIBIOTICS:  meropenem  IVPB 1000 milliGRAM(s) IV Intermittent every 8 hours

## 2022-02-24 NOTE — CONSULT NOTE ADULT - NSCONSULTADDITIONALINFOA_GEN_ALL_CORE
Attending---  Photos show areas of skin peeling and few intact vesicles on leg.  Hyperpigmented linear patches and superficial erosions on trunk.    Impression--  Linear lesions appear to be externally induced, such as by scratching.  Vesicles and skin peeling, r/o TEN.    Recommend re-consult Burn team to do bx of both types of lesion.  OK to continue topical steroid to itchy areas.

## 2022-02-24 NOTE — CONSULT NOTE ADULT - SUBJECTIVE AND OBJECTIVE BOX
HPI:  Pt is a 62 yo F with PMH of GIB, Bowel and Bladder incontinence, Smoking (3-4 cigarettes/day), ETOH abuse, hypothyroidism anemia transferred from [facility] for Polypectomy. Pt is from home living with her son and bedbound at baseline. Pt was admitted to [facility] on 01/31 for AMS, weakness and decreased appetite. She was found to have UTI, NSTEMI, and electrolyte imbalance. During her stay at [facility] pt had colonoscopy done which revealed 1 large 3-4 cm sigmoid pedunculated polyp and 2 polyps (7, 14 mm) in descending colon s/p bx. Pt was transferred to [facility] for polyp removal.  (16 Feb 2022 23:24)      PAST MEDICAL & SURGICAL HISTORY:        MEDICATIONS  (STANDING):  aztreonam  IVPB 2000 milliGRAM(s) IV Intermittent every 8 hours  chlorhexidine 4% Liquid 1 Application(s) Topical daily  dextrose 5% + lactated ringers. 1000 milliLiter(s) (70 mL/Hr) IV Continuous <Continuous>  lactulose Syrup 10 Gram(s) Oral two times a day  levothyroxine Injectable 25 MICROGram(s) IV Push at bedtime  LORazepam   Injectable 2 milliGRAM(s) IV Push once  methylPREDNISolone sodium succinate Injectable 40 milliGRAM(s) IV Push daily  multivitamin/minerals 1 Tablet(s) Oral daily  norepinephrine Infusion 0.05 MICROgram(s)/kG/Min (2.81 mL/Hr) IV Continuous <Continuous>  nystatin Cream 1 Application(s) Topical two times a day  pantoprazole  Injectable 40 milliGRAM(s) IV Push every 12 hours  thiamine 100 milliGRAM(s) Oral daily  triamcinolone 0.1% Cream 1 Application(s) Topical two times a day  vitamin A &amp; D Ointment 1 Application(s) Topical daily    Vital Signs Last 24 Hrs  T(C): 36.1 (24 Feb 2022 11:00), Max: 36.2 (23 Feb 2022 16:00)  T(F): 96.9 (24 Feb 2022 11:00), Max: 97.2 (23 Feb 2022 16:00)  HR: 67 (24 Feb 2022 14:00) (65 - 72)  BP: 103/60 (24 Feb 2022 14:00) (81/53 - 128/79)  BP(mean): 78 (24 Feb 2022 14:00) (61 - 93)  RR: 21 (24 Feb 2022 14:00) (10 - 25)  SpO2: 98% (24 Feb 2022 14:00) (98% - 100%)      LABS:                        8.7    3.19  )-----------( 18       ( 24 Feb 2022 10:55 )             24.7     02-24    143  |  113<H>  |  5<L>  ----------------------------<  131<H>  3.7   |  25  |  0.8    Ca    7.1<L>      24 Feb 2022 05:40  Phos  2.5     02-24  Mg     2.0     02-24    TPro  4.5<L>  /  Alb  1.3<L>  /  TBili  0.7  /  DBili  x   /  AST  25  /  ALT  13  /  AlkPhos  85  02-24    PT/INR - ( 24 Feb 2022 06:30 )   PT: 19.20 sec;   INR: 1.68 ratio         PTT - ( 24 Feb 2022 06:30 )  PTT:58.4 sec      RADIOLOGY & ADDITIONAL STUDIES: HPI:  Pt is a 62 yo F with PMH of GIB, Bowel and Bladder incontinence, Smoking (3-4 cigarettes/day), ETOH abuse, hypothyroidism anemia transferred from [facility] for Polypectomy. Pt is from home living with her son and bedbound at baseline. Pt was admitted to [facility] on 01/31 for AMS, weakness and decreased appetite. She was found to have UTI, NSTEMI, and electrolyte imbalance. During her stay at [facility] pt had colonoscopy done which revealed 1 large 3-4 cm sigmoid pedunculated polyp and 2 polyps (7, 14 mm) in descending colon s/p bx. Pt was transferred to [facility] for polyp removal.  (16 Feb 2022 23:24)    Patient denies joint pain, mucositis, sinusitis, epistaxis, paresthesias, hearing loss, myalgias, history of kidney disease. Reports rash appeared last week over her body denies pain or pruritis.     PAST MEDICAL & SURGICAL HISTORY:        MEDICATIONS  (STANDING):  aztreonam  IVPB 2000 milliGRAM(s) IV Intermittent every 8 hours  chlorhexidine 4% Liquid 1 Application(s) Topical daily  dextrose 5% + lactated ringers. 1000 milliLiter(s) (70 mL/Hr) IV Continuous <Continuous>  lactulose Syrup 10 Gram(s) Oral two times a day  levothyroxine Injectable 25 MICROGram(s) IV Push at bedtime  LORazepam   Injectable 2 milliGRAM(s) IV Push once  methylPREDNISolone sodium succinate Injectable 40 milliGRAM(s) IV Push daily  multivitamin/minerals 1 Tablet(s) Oral daily  norepinephrine Infusion 0.05 MICROgram(s)/kG/Min (2.81 mL/Hr) IV Continuous <Continuous>  nystatin Cream 1 Application(s) Topical two times a day  pantoprazole  Injectable 40 milliGRAM(s) IV Push every 12 hours  thiamine 100 milliGRAM(s) Oral daily  triamcinolone 0.1% Cream 1 Application(s) Topical two times a day  vitamin A &amp; D Ointment 1 Application(s) Topical daily    Vital Signs Last 24 Hrs  T(C): 36.1 (24 Feb 2022 11:00), Max: 36.2 (23 Feb 2022 16:00)  T(F): 96.9 (24 Feb 2022 11:00), Max: 97.2 (23 Feb 2022 16:00)  HR: 67 (24 Feb 2022 14:00) (65 - 72)  BP: 103/60 (24 Feb 2022 14:00) (81/53 - 128/79)  BP(mean): 78 (24 Feb 2022 14:00) (61 - 93)  RR: 21 (24 Feb 2022 14:00) (10 - 25)  SpO2: 98% (24 Feb 2022 14:00) (98% - 100%)      LABS:                        8.7    3.19  )-----------( 18       ( 24 Feb 2022 10:55 )             24.7     02-24    143  |  113<H>  |  5<L>  ----------------------------<  131<H>  3.7   |  25  |  0.8    Ca    7.1<L>      24 Feb 2022 05:40  Phos  2.5     02-24  Mg     2.0     02-24    TPro  4.5<L>  /  Alb  1.3<L>  /  TBili  0.7  /  DBili  x   /  AST  25  /  ALT  13  /  AlkPhos  85  02-24    PT/INR - ( 24 Feb 2022 06:30 )   PT: 19.20 sec;   INR: 1.68 ratio         PTT - ( 24 Feb 2022 06:30 )  PTT:58.4 sec      RADIOLOGY & ADDITIONAL STUDIES:

## 2022-02-24 NOTE — PROGRESS NOTE ADULT - SUBJECTIVE AND OBJECTIVE BOX
Patient is a 61y old  Female who presents with a chief complaint of Polypectomy (24 Feb 2022 01:33)        HPI:  Pt is a 60 yo F with PMH of GIB, Bowel and Bladder incontinence, Smoking (3-4 cigarettes/day), ETOH abuse, hypothyroidism anemia transferred from [facility] for Polypectomy. Pt is from home living with her son and bedbound at baseline. Pt was admitted to [facility] on 01/31 for AMS, weakness and decreased appetite. She was found to have UTI, NSTEMI, and electrolyte imbalance. During her stay at [facility] pt had colonoscopy done which revealed 1 large 3-4 cm sigmoid pedunculated polyp and 2 polyps (7, 14 mm) in descending colon s/p bx. Pt was transferred to [facility] for polyp removal.  (16 Feb 2022 23:24)      Pt evaluated on rounds.  I reviewed the radiology tests and hospital record.    I reviewed previous notes on this patient.    Interval Events: No overnight events.      CAM:    SAT:    SBT:      REVIEW OF SYSTEMS:   see HPI      OBJECTIVE:  ICU Vital Signs Last 24 Hrs  T(C): 36.1 (24 Feb 2022 00:00), Max: 36.4 (23 Feb 2022 08:00)  T(F): 97 (24 Feb 2022 00:00), Max: 97.6 (23 Feb 2022 08:00)  HR: 66 (24 Feb 2022 04:00) (65 - 75)  BP: 92/55 (24 Feb 2022 04:00) (70/46 - 134/95)  BP(mean): 69 (24 Feb 2022 04:00) (54 - 111)    RR: 13 (24 Feb 2022 04:00) (10 - 21)  SpO2: 100% (24 Feb 2022 04:00) (99% - 100%)        02-22 @ 07:01  -  02-23 @ 07:00  --------------------------------------------------------  IN: 4243.4 mL / OUT: 1820 mL / NET: 2423.4 mL    02-23 @ 07:01  -  02-24 @ 05:54  --------------------------------------------------------  IN: 3712.8 mL / OUT: 865 mL / NET: 2847.8 mL      CAPILLARY BLOOD GLUCOSE      PHYSICAL EXAM:       · ENMT:   Airway patent,   Nasal mucosa clear.  Mouth with normal mucosa.   No thrush    · EYES:   Clear bilaterally,   pupils equal,   round and reactive to light.    · CARDIAC:   Normal rate,   regular rhythm.    Heart sounds S1, S2.   No murmurs, no rubs or gallops on auscultation  no edema        CAROTID:   normal systolic impulse  no bruits    · RESPIRATORY:   rales  normal chest expansion  no retractions or use of accessory muscles  palpation of chest is normal with no fremitus  percussion of chest demonstrates no hyperresonance or dullness    · GASTROINTESTINAL:  Abdomen soft,   non-tender,   + BS  liver/spleen not palpable    · MUSCULOSKELETAL:   no clubbing, cyanosis      · SKIN:   Skin normal color for race,   warm, dry   No evidence of rash.        · HEME LYMPH:   no splenomegaly.  No cervical  lymphadenopathy.  no inguinal lymphadenopathy    HOSPITAL MEDICATIONS:  MEDICATIONS  (STANDING):  chlorhexidine 4% Liquid 1 Application(s) Topical daily  dextrose 5% + lactated ringers. 1000 milliLiter(s) (70 mL/Hr) IV Continuous <Continuous>  lactulose Syrup 10 Gram(s) Oral two times a day  levothyroxine Injectable 25 MICROGram(s) IV Push at bedtime  LORazepam   Injectable 2 milliGRAM(s) IV Push once  meropenem  IVPB 1000 milliGRAM(s) IV Intermittent every 8 hours  multivitamin/minerals 1 Tablet(s) Oral daily  norepinephrine Infusion 0.05 MICROgram(s)/kG/Min (2.81 mL/Hr) IV Continuous <Continuous>  nystatin Cream 1 Application(s) Topical two times a day  pantoprazole  Injectable 40 milliGRAM(s) IV Push every 12 hours  thiamine 100 milliGRAM(s) Oral daily  triamcinolone 0.1% Cream 1 Application(s) Topical two times a day  vitamin A &amp; D Ointment 1 Application(s) Topical daily    MEDICATIONS  (PRN):    lactated ringers Bolus:   500 milliLiter(s), IV Bolus, once, infuse over 60 Minute(s), Stop After 1 Doses  sodium chloride 0.9% Bolus:   250 milliLiter(s), IV Bolus, once, infuse over 30 Minute(s), Stop After 1 Doses  lactated ringers Bolus:   500 milliLiter(s), IV Bolus, once, infuse over 30 Minute(s), Stop After 1 Doses  lactated ringers Bolus:   500 milliLiter(s), IV Bolus, once, infuse over 30 Minute(s), Stop After 1 Doses  lactated ringers Bolus:   500 milliLiter(s), IV Bolus, once, infuse over 30 Minute(s), Stop After 1 Doses  lactated ringers.: Solution, 1000 milliLiter(s) infuse at 100 mL/Hr  lactated ringers Bolus:   1000 milliLiter(s), IV Bolus, once, infuse over 60 Minute(s), Stop After 1 Doses  lactated ringers Bolus:   500 milliLiter(s), IV Bolus, once, infuse over 60 Minute(s), Stop After 1 Doses  lactated ringers.: Solution, 1000 milliLiter(s) infuse at 75 mL/Hr  Provider's Contact #: [phone]  sodium chloride 0.45%.: Solution, 1000 milliLiter(s) infuse at 75 mL/Hr, Stop After 1 Days      LABS:                        9.1    5.23  )-----------( 33       ( 23 Feb 2022 16:49 )             25.5     02-23    147<H>  |  115<H>  |  5<L>  ----------------------------<  84  3.5   |  25  |  0.9    Ca    7.3<L>      23 Feb 2022 04:40  Mg     1.7     02-23    TPro  4.7<L>  /  Alb  1.4<L>  /  TBili  1.1  /  DBili  x   /  AST  21  /  ALT  11  /  AlkPhos  71  02-23    PT/INR - ( 23 Feb 2022 04:40 )   PT: 20.90 sec;   INR: 1.83 ratio         PTT - ( 23 Feb 2022 04:40 )  PTT:58.0 sec              COVID-19 PCR: NotDetec (20 Feb 2022 09:16)            RADIOLOGY: Today I personally interpreted the latest CXR and other pertinent films.    no ptx, no inftr. no free air.  CT scan        .crti    .crit2

## 2022-02-24 NOTE — CHART NOTE - NSCHARTNOTEFT_GEN_A_CORE
More awake today  Afebrile  Tolerating NG feeds well  K+ 3.7 in phos 2.5  Rheum, derm consults noted    T(F): 96.5 (22 @ 16:00), Max: 97 (22 @ 00:00)  HR: 65 (22 @ 17:00) (64 - 69)  BP: 107/64 (22 @ 16:00) (81/53 - 128/79)  RR: 22 (22 @ 17:00) (10 - 25)  SpO2: 98% (22 @ 16:00) (98% - 100%)    I&O's Detail    2022 07:01  -  2022 07:00  --------------------------------------------------------  IN:    dextrose 5% + lactated ringers: 1680 mL    Enteral Tube Flush: 50 mL    Free Water: 1000 mL    IV PiggyBack: 200 mL    Lactated Ringers Bolus: 500 mL    Norepinephrine: 97.8 mL    Peptamen A.F.: 875 mL  Total IN: 4402.8 mL    OUT:    Indwelling Catheter - Urethral (mL): 915 mL  Total OUT: 915 mL    Total NET: 3487.8 mL    2022 07:01  -  2022 17:18  --------------------------------------------------------  IN:    dextrose 5% + lactated ringers: 700 mL    Free Water: 250 mL    Lactated Ringers Bolus: 500 mL    Peptamen A.F.: 250 mL  Total IN: 1700 mL    OUT:    Indwelling Catheter - Urethral (mL): 160 mL  Total OUT: 160 mL    Total NET: 1540 mL        143  |  113<H>  |  5<L>  ----------------------------<  131<H>  3.7   |  25  |  0.8    Ca    7.1<L>      2022 05:40  Phos  2.5       Mg     2.0     -    TPro  4.5<L>  /  Alb  1.3<L>  /  TBili  0.7  /  DBili  x   /  AST  25  /  ALT  13  /  AlkPhos  85                          8.7    3.19  )-----------( 18       ( 2022 10:55 )             24.7     Daily Weight in k.9 (2022 06:00)    Diet, DASH/TLC:   Sodium & Cholesterol Restricted  Pureed (PUREED)  Tube Feeding Modality: Nasogastric  Peptamen A.F. Formula  Total Volume for 24 Hours (mL): 1000  Bolus  Total Volume of Bolus (mL):  250  Tube Feed Frequency: Every 6 hours   Tube Feed Start Time: 12:00  Bolus Feed Rate (mL per Hour): 250   Bolus Feed Duration (in Hours): 1  Free Water Flush  Bolus   Total Volume per Flush (mL): 30   Frequency: Every 6 Hours   Total Daily Volume of Flush (mL): 120    Start Time: 12:00 (22 @ 11:27)    ASSESSMENT  61F PMH GIB, Bowel and Bladder incontinence, Smoking (3-4 cigarettes/day), ETOH abuse, hypothyroidism, and ongoing anemia, admitted to [facility]  for ams found to have UTI. Dev GIB, colonoscopy showing polyps, transferred to [facility] for polypectomy now upgraded to CCU for hypotension, likely septic shock 2/2 aspiration pna.     - AMS, unclear etiology  - sepsis w/ shock/hypothermia vs aspiration after procedure under anesthesia  vs CVA  - acute thrombocytopenia  - chronic anemia  - Exfoliative dermatitis   - Colonoscopy : 1 large 3-4 cm sigmoid pedunculated polyp and 2 polyps (7, 14 mm) in descending colon s/p polypectomy 2022.   - r/o pneumoretroperitoneum (on KUB )  - pancolitis on CT   - hypothyroidism  - Hx of ETOH abuse  - hypokalemia, hypernatremia  - severe protein calorie malnutrition  - severe wt loss, ~34% over past 9 months    PLAN  - keep NPO per speech for now  - increase NGT feeds to Peptamen AF 375ml at 7:30am and 21:00, 250ml at 12:00 and 17:00, infuse feeds over ~30min X 4 feeds/day.    - monitor bmp, in phos and mg closely  - cont MV with minerals daily  - add vitamin C 500mg daily  - check 25-oh vitamin D and zinc levels  - will follow

## 2022-02-24 NOTE — PROGRESS NOTE ADULT - ASSESSMENT
Pt is a 60 yo F with PMH of GIB, Bowel and Bladder incontinence, Smoking (1/2 ppd), ETOH abuse, hypothyroidism, anemia transferred from [facility] for EMR/Polypectomy. She was a Rapid response on 2/20/22 for AMS, hypotension requiring pressor support, and hypothermia. She was upgraded to ICU. Hematology evaluation was requested for evaluation of anemia and thrombocytopenia.    # Normocytic Anemia with recent polypectomy, stable  - iron studies: Fe 55; TIBC <72; ferr 2494. However, in the setting of acute events and significant hypotension, the elevated ferritin is likely reactive and is not a sign of iron overload.  - B12 1892, Folate 11  - Haptoglobin low and Amos +, however LDH WNL, Reticulocyte % low along with normal bilirubin which points against hemolytic anemia  - AMOS can be secondary to recent blood transfusion (not documented but pt likely had PRBC in [facility])  - peripheral blood smear reviewed: occasional schistocytes  - f/u SPEP, Immunofixation, Immunoglobulins, and FLC ratio workup for multiple myeloma   - will give trial of solumedrol 40 mg IV qD (spoke with ID and agree w/ plan)     # Thrombocytopenia likely multifactorial due to myelosuppression from infection, DIC, alcohol abuse   # Elevated INR/PTT not on anticoagulation likely low grade DIC from septic shock   - Hep C negative  - check Hep B and HIV   - transfuse platelets if platelets < 30k and bleeding     # +C-ANCA  # Multiple skin lesions  - unknown why C-ANCA was ordered, would recommend rheumatology consult   - f/u bx with burn team per dermatology    # S/p polypectomies   - f/u pathology results from her polypectomies     # Septic Shock requiring pressor support   - Rest of medical management as per critical care team

## 2022-02-24 NOTE — CONSULT NOTE ADULT - ASSESSMENT
61F PMH GIB, Bowel and Bladder incontinence, Smoking (3-4 cigarettes/day), ETOH abuse, hypothyroidism, and ongoing anemia, admitted to [facility] 1/31 for ams found to have UTI. Dev GIB, colonoscopy showing polyps, transferred to [facility] for polypectomy now upgraded to CCU for hypotension, likely septic shock 2/2 aspiration pna. Rheumatology consulted for positive ANCA abs.    Plan:  Less likely anca vasculitis  Likely anca positive secondary to sepsis  f/u PR3 and MPO abs  f/u skin biopsies results  No indication for immunosuppressant therapies as of now

## 2022-02-24 NOTE — PROGRESS NOTE ADULT - ASSESSMENT
· Assessment	  62 yo F with PMH of GIB, Bowel and Bladder incontinence, Smoking (1/2 ppd), ETOH abuse, hypothyroidism, anemia transferred from [facility] for EMR/ Polypectomy. . Pt was admitted to [facility] on 01/31 for AMS, weakness and decreased appetite. She was found to have UTI, Elevated troponin, and electrolyte imbalance. During her stay at [facility] pt had colonoscopy done, which revealed 1 large 3-4 cm sigmoid pedunculated polyp (bx > tub adenoma, but was friable) and 2 polyps (7, 14 mm) in descending colon s/p bx.    2/18 S/P colonoscopy and polypectomy 2/18 .  CT: Diffuse colonic wall thickening, consistent with pancolitis. Intra-abdominal ascites. No intraperitoneal or retroperitoneal free air. No extraluminal contrast extravasation.    IMPRESSION;  Resolved possible septic shock secondary to possible bacterial translocation  Clinically no peritonitis  Possible bacterial PNA on the left secondary to aspiration ( CXR has opacity/fluid on the left. Clinically no PNA  . For now will cover as such ) > should not lead to shock  WBC 3.2  Thrombocytopenic since 2/18 > doubt secondary to ABx  Mild pyuria > clinically no pyelonephritis  2/17,19,22  BCx NG  2/18 UCx NG  2/17,20 CD NG  Nares ORSA NG  COVID-19 NG    RECOMMENDATIONS;  Meropenem 1 gm iv q8h  Off loading to prevent pressure sores and preventive measures to avoid aspiration   If any questions , please call [phone] or send a message on [employer] teams  Please update ID in real time with any pertinent new laboratory /microbiological/radiographically findings or a change in the clinical characteristics

## 2022-02-24 NOTE — PROGRESS NOTE ADULT - ASSESSMENT
ASSESSMENT:  61F w/ PMH of GIB, urinary/fecal incontinence, smoking, alcoholism, hypothyroidism, and anemia presented to the ED on 2/16 from [facility] for polypectomy now s/p colonoscopy with polypectomy on 2/18. Surgery is consulted for possible pneumo-retroperitoneum seen on KUB.     PLAN:  -Management per primary team   -Trend Hb, transfuse as needed   -Trend lactate - most recent 3.2   -Heme/onc: transfuse platelets <30K and bleeding   -SLP: NPO w/ alternative means of nutrition and hydration  -TF started  -ID: meropenem  -GI following  -Pulmonology: O2 Spo2 target 92-94%    TRAUMA TEAM SPECTRA: [id number]

## 2022-02-24 NOTE — CONSULT NOTE ADULT - ASSESSMENT
61F PMH GIB, Bowel and Bladder incontinence, Smoking (3-4 cigarettes/day), ETOH abuse, hypothyroidism, and ongoing anemia, admitted to [facility] 1/31 for ams found to have UTI. Dev GIB, colonoscopy showing polyps, transferred to [facility] for polypectomy now upgraded to CCU for hypotension, likely septic shock 2/2 aspiration pna. Dermatology consulted for diffuse skin lesions on the trunk and extremities.    Plan:  Etiology and diagnosis are unclear  continue with topical treatment for symptomatic relief  consult burn for skin biopsies, multiple samples to be sent from different regions of the body  No indication for systemic steroids as of now

## 2022-02-24 NOTE — SWALLOW BEDSIDE ASSESSMENT ADULT - SWALLOW EVAL: DIAGNOSIS
mild oral dysphagia; + toleration observed without overt symptoms of penetration/aspiration for puree and thins

## 2022-02-24 NOTE — CONSULT NOTE ADULT - ATTENDING COMMENTS
61 year old female who was admitted for as transfer from [facility] [place] for polypectomy. Rheumatology is asked to see patient for +C-ANCA 1:320    -Admitted at [facility] [place] on 1/31 for AMS, weakness, decreased appetite she was found to have UTI, NSTEMI and electrolyte imbalance. Colonoscopy found 1 large 3-4 cm sigmoid polyp, 2 descending colon polys 7 mm and 14 mm  -Transferred to [facility] for polypectomy then developed AMS, GI bleed, hypothermia, hypotension post op from ?anesthesia. Then had RRT for hypoxia and hypotension she was started on levo for pressure support for septic shock of unknown source. On aztreonam per ID  -Patient is not a great historian, bed bound at baseline. She denied any sinus issues or kidney disease history. She has a recent hyperpigmented skin rash that's not pruritic, tender or blanching; dermatology consulted and possible skin biopsy  -She developed anemia with thrombocytopenia felt to be multifactorial from myelosuppression secondary to infection, DIC, ETOH abuse, ? zosyn; for trial methylprednisolone 40 mg daily per hematology/oncology. Also developed elevated INR/PTT  -Neurology consulted for AMS felt to be from toxic encephalopathy  -KUB raised concern for pneumoperitoneum subsequent CT abdomen showed pancolitis without concern for pneumoperitoneum.   -Possible aspiration pneumonia  -UA 2/18 suggested protein 30 large leukocyte esterase, small blood, 47 WBC with proteinuria ~1.1 g estimated and negative urine culture  -No clear evidence for ANCA associated vasculitis based on her clinical status. Could be elevated in setting of sepsis. Follow up dermatology for possible skin biopsy. Follow up PR3/MPO antibody assay and repeat levels after acute illness is resolved.

## 2022-02-24 NOTE — PROGRESS NOTE ADULT - SUBJECTIVE AND OBJECTIVE BOX
SANTIAGO CALIXTO 61y Female  MRN#: [id number]     SUBJECTIVE  Patient is a 61y old Female who presents with a chief complaint of Polypectomy (24 Feb 2022 13:44)      Today is hospital day 8d, and this morning she is lying in bed without distress.   No acute overnight events.     OBJECTIVE  PAST MEDICAL & SURGICAL HISTORY    ALLERGIES:  No Known Allergies    MEDICATIONS:  STANDING MEDICATIONS  aztreonam  IVPB 2000 milliGRAM(s) IV Intermittent every 8 hours  chlorhexidine 4% Liquid 1 Application(s) Topical daily  dextrose 5% + lactated ringers. 1000 milliLiter(s) IV Continuous <Continuous>  lactulose Syrup 10 Gram(s) Oral two times a day  levothyroxine Injectable 25 MICROGram(s) IV Push at bedtime  LORazepam   Injectable 2 milliGRAM(s) IV Push once  methylPREDNISolone sodium succinate Injectable 40 milliGRAM(s) IV Push daily  multivitamin/minerals 1 Tablet(s) Oral daily  norepinephrine Infusion 0.05 MICROgram(s)/kG/Min IV Continuous <Continuous>  nystatin Cream 1 Application(s) Topical two times a day  pantoprazole  Injectable 40 milliGRAM(s) IV Push every 12 hours  thiamine 100 milliGRAM(s) Oral daily  triamcinolone 0.1% Cream 1 Application(s) Topical two times a day  vitamin A &amp; D Ointment 1 Application(s) Topical daily    PRN MEDICATIONS    HOME MEDICATIONS  Home Medications:      VITAL SIGNS: Last 24 Hours  T(C): 36.1 (24 Feb 2022 11:00), Max: 36.2 (23 Feb 2022 16:00)  T(F): 96.9 (24 Feb 2022 11:00), Max: 97.2 (23 Feb 2022 16:00)  HR: 67 (24 Feb 2022 14:00) (65 - 72)  BP: 103/60 (24 Feb 2022 14:00) (81/53 - 128/79)  BP(mean): 78 (24 Feb 2022 14:00) (61 - 93)  RR: 21 (24 Feb 2022 14:00) (10 - 25)  SpO2: 98% (24 Feb 2022 14:00) (98% - 100%)    02-23-22 @ 07:01  -  02-24-22 @ 07:00  --------------------------------------------------------  IN: 4402.8 mL / OUT: 915 mL / NET: 3487.8 mL    02-24-22 @ 07:01  -  02-24-22 @ 15:15  --------------------------------------------------------  IN: 1350 mL / OUT: 70 mL / NET: 1280 mL        LABS:                        8.7    3.19  )-----------( 18       ( 24 Feb 2022 10:55 )             24.7     02-24    143  |  113<H>  |  5<L>  ----------------------------<  131<H>  3.7   |  25  |  0.8    Ca    7.1<L>      24 Feb 2022 05:40  Phos  2.5     02-24  Mg     2.0     02-24    TPro  4.5<L>  /  Alb  1.3<L>  /  TBili  0.7  /  DBili  x   /  AST  25  /  ALT  13  /  AlkPhos  85  02-24    LIVER FUNCTIONS - ( 24 Feb 2022 05:40 )  Alb: 1.3 g/dL / Pro: 4.5 g/dL / ALK PHOS: 85 U/L / ALT: 13 U/L / AST: 25 U/L / GGT: x           PT/INR - ( 24 Feb 2022 06:30 )   PT: 19.20 sec;   INR: 1.68 ratio         PTT - ( 24 Feb 2022 06:30 )  PTT:58.4 sec      Lactate, Blood: 2.7 mmol/L *H* (02-24-22 @ 10:55)      Culture - Stool (collected 22 Feb 2022 06:40)  Source: .Stool Feces  Preliminary Report (23 Feb 2022 17:10):    Moderate Yeast like cells    No enteric pathogens to date: Final culture pending    No enteric gram negative rods isolated    Culture - Blood (collected 22 Feb 2022 00:00)  Source: .Blood Blood  Preliminary Report (23 Feb 2022 07:01):    No growth to date.          CAPILLARY BLOOD GLUCOSE          RADIOLOGY:    PHYSICAL EXAM:  GENERAL: NAD, 61y F with NGT   HEAD:  Atraumatic, Normocephalic  EYES: EOMI, conjunctiva clear and sclera white  NECK: R CVC, Supple, No JVD  CHEST/LUNG: Clear to auscultation bilaterally; No wheeze; No crackles; No accessory muscles used  HEART: Regular rate and rhythm; No murmurs;   ABDOMEN: Soft, Nontender, Nondistended; Bowel sounds present; No guarding  EXTREMITIES:  2+ Peripheral Pulses, No cyanosis or edema  NEUROLOGY: non-focal  SKIN: multiple skin lesions noted on chest    ADMISSION SUMMARY  Patient is a 61y old Female who presents with a chief complaint of Polypectomy (24 Feb 2022 13:44)

## 2022-02-24 NOTE — PROGRESS NOTE ADULT - SUBJECTIVE AND OBJECTIVE BOX
SANTIAGO CALIXTO 61y Female  MRN#: [id number]   CODE STATUS:     Hospital Day: 8d    Pt is currently admitted with the primary diagnosis of GIB s/p polypectomy, ams, pancolitis    SUBJECTIVE  Hospital Course: Patient is a 60 y/o female with PMHx of GIB, Bowel and Bladder incontinence, Smoking (3-4 cigarettes/day), ETOH abuse, hypothyroidism, and ongoing anemia transferred from [facility] for Polypectomy. Patient was admitted to [facility] on 01/31 for AMS, weakness and decreased appetite. She was found to have UTI, NSTEMI, and electrolyte imbalance. During her stay at [facility] she had a colonoscopy done which revealed 1 large 3-4 cm sigmoid pedunculated polyp and 2 polyps (7, 14 mm) in descending colon. Patient was transferred as they are not able to perform large Polypectomies there. She was not able to be discharged as ongoing anemia and concern that the cause are these large polyps.    Patient had a colonoscopy and polypectomy done on 2/18/2022. Polyp (13 mm) in the descending colon. (hot snare Polypectomy).  Polyp (25 mm to 30 mm) in the rectosigmoid junction at 20 cm from the anal verge. (Endoloop, Polypectomy, Endoclip).   After the colonoscopy patient became hypothermic and hypotensive. Over night patient was started to Levo and warming blanket.   2/20 AM patient had RRT for desaturation and hypotension. Patient was placed on 50% O2, patient had good response. Levophed also started to raise BP. Patient remains altered, not speaking, but remains somewhat awake and just makes grunting sounds. Patient is not following commands. Central line was placed. Spoke to ICU fellow, patient is being upgraded to ICU for closer monitoring.     In CCU pt had abdominal tenderness, KUB ordered, concern for RP free air. CTAP with PO/IV contrast ordered, no perforation or pneumo-peritoneum/retroperitoneum however showing findings c/w pancolitis. Surgery following, ID consulted. Pt on vanc/zosyn. In CCU, pt developed coagulopathy (low plt, anemia, increasing INR despite not on AC). Treated with pRBC, plt transfusion as well as vitamin K. Heme onc on board, thought to be due to possible DIC 2/2 sepsis.     Overnight events: none    Interval hx/Subjective complaints: Pt reports feeling better, no complaints. Mental status improving.     Pt denies any fevers, chills, fatigue, CP, palpitations, cough, SOB, abdominal pain, n/v/d, hematochezia, melena, weakness, numbness, tingling, or other FND.                                           ----------------------------------------------------------  OBJECTIVE  PAST MEDICAL & SURGICAL HISTORY                                            -----------------------------------------------------------  ALLERGIES:  No Known Allergies                                            ------------------------------------------------------------    HOME MEDICATIONS  Home Medications:                           MEDICATIONS:  STANDING MEDICATIONS  chlorhexidine 4% Liquid 1 Application(s) Topical daily  dextrose 5% + lactated ringers. 1000 milliLiter(s) IV Continuous <Continuous>  lactulose Syrup 10 Gram(s) Oral two times a day  levothyroxine Injectable 25 MICROGram(s) IV Push at bedtime  LORazepam   Injectable 2 milliGRAM(s) IV Push once  meropenem  IVPB 1000 milliGRAM(s) IV Intermittent every 8 hours  multivitamin/minerals 1 Tablet(s) Oral daily  norepinephrine Infusion 0.05 MICROgram(s)/kG/Min IV Continuous <Continuous>  nystatin Cream 1 Application(s) Topical two times a day  pantoprazole  Injectable 40 milliGRAM(s) IV Push every 12 hours  thiamine 100 milliGRAM(s) Oral daily  triamcinolone 0.1% Cream 1 Application(s) Topical two times a day  vitamin A &amp; D Ointment 1 Application(s) Topical daily    PRN MEDICATIONS                                            ------------------------------------------------------------  VITAL SIGNS: Last 24 Hours  T(C): 36.1 (24 Feb 2022 00:00), Max: 36.2 (23 Feb 2022 12:00)  T(F): 97 (24 Feb 2022 00:00), Max: 97.2 (23 Feb 2022 12:00)  HR: 65 (24 Feb 2022 06:00) (65 - 75)  BP: 88/56 (24 Feb 2022 06:00) (88/56 - 134/95)  BP(mean): 67 (24 Feb 2022 06:00) (67 - 111)  RR: 13 (24 Feb 2022 06:00) (10 - 21)  SpO2: 100% (24 Feb 2022 06:00) (99% - 100%)      02-23-22 @ 07:01  -  02-24-22 @ 07:00  --------------------------------------------------------  IN: 4332.8 mL / OUT: 895 mL / NET: 3437.8 mL                                             --------------------------------------------------------------  LABS:                        8.8    3.21  )-----------( 23       ( 24 Feb 2022 05:40 )             24.8     02-24    143  |  113<H>  |  5<L>  ----------------------------<  131<H>  3.7   |  25  |  0.8    Ca    7.1<L>      24 Feb 2022 05:40  Mg     2.0     02-24    TPro  4.5<L>  /  Alb  1.3<L>  /  TBili  0.7  /  DBili  x   /  AST  25  /  ALT  13  /  AlkPhos  85  02-24    PT/INR - ( 24 Feb 2022 06:30 )   PT: 19.20 sec;   INR: 1.68 ratio         PTT - ( 24 Feb 2022 06:30 )  PTT:58.4 sec            Culture - Stool (collected 22 Feb 2022 06:40)  Source: .Stool Feces  Preliminary Report (23 Feb 2022 17:10):    Moderate Yeast like cells    No enteric pathogens to date: Final culture pending    No enteric gram negative rods isolated    Culture - Blood (collected 22 Feb 2022 00:00)  Source: .Blood Blood  Preliminary Report (23 Feb 2022 07:01):    No growth to date.                                            -------------------------------------------------------------  RADIOLOGY:    < from: Xray Kidney Ureter Bladder (02.22.22 @ 09:41) >  IMPRESSION:    No obstruction. No free air.    < end of copied text >                                          --------------------------------------------------------------  PHYSICAL EXAM:  General: Ill appearing woman laying in bed in nad  HEENT: ncat, eomi, mmm  LUNGS: ctab  HEART: s1/s2, rrr  ABDOMEN: soft, ntnd, bs+  EXT: wwp, no cyanosis, clubbing, edema  NEURO: aox2, moving all extremities, following commands   SKIN: superficial scabs diffusely over body, most prominant over chest, as well as areas of desquamation over arms, legs, and hips. Stable from previous exams  Lines [facility] CV c/d/i                                          --------------------------------------------------------------    ASSESSMENT & PLAN  61F PMH GIB, Bowel and Bladder incontinence, Smoking (3-4 cigarettes/day), ETOH abuse, hypothyroidism, and ongoing anemia, admitted to [facility] 1/31 for ams found to have UTI. Dev GIB, colonoscopy showing polyps, transferred to [facility] for polypectomy now upgraded to CCU for hypotension, likely septic shock 2/2 aspiration pna.     #AMS with unclear etiology   #sepsis w/ shock/hypothermia vs aspiration after procedure under anesthesia 2/18 vs CVA   #Pancolitis   - Pt upgraded from floor on 2/20 after RRT for hypotension and desaturation. RIJ CVC placed, started on levo, venti mask, o2 and hypotension subsequently improved   - CTH 2/20: no ICH, mass effect, or midline shift   - EEG 2/20: generalized slowing    - UCx 2/18 negative   - BCx 2/17, 2/19: NGTD  - COVID negative 2/20  - MRSA nares 2/20 negative  - LE duplex 2/21: no DVT however bl peroneal veins not visualized   - ammonia elevated to 59 on 2/22, started on lactulose, possibly contributing to ams   - zosyn changed to meropenum 1g q8h- concern for PCN induced thrombocytopenia   - d/c'd vanc 2/22  - currently off levo however slightly hypotensive, may need to restart   - neuro eval appreciated  - ID recs appreciated      #Colonic Polyp  #Diarrhea in setting of recent abx use  #Hx of GIB   #Pneumoretroperitoneum   #Pancolitis   - Colonoscopy 2/4: 1 large 3-4 cm sigmoid pedunculated polyp and 2 polyps (7, 14 mm) in descending colon s/p bx  - GI consulted (Dr. Daniels)   - Patient is cleared for colonoscopy per Cardiology   - Negative for C-Diff infection  - CEA elevated 5.6  - S/P  colonoscopy and polypectomy 2/18/2022. Polyp (13 mm) in the descending colon. (hot snare Polypectomy).  Polyp (25 mm to 30 mm) in the rectosigmoid junction at 20 cm from the anal verge. (Endoloop, Polypectomy, Endoclip).   - Repeat Colonoscopy in 6 months   - GI recs appreciated    - 2/21: overnight pt had abdominal tenderness, KUB ordered showing paraspinal lucencies concerning for possible pneumoretroperitoneum   - abdomen soft, nontender, nondistended on my exam  - CTAP PO/IV contrast 2/21: findings c/w pancolitis, no intra or retroperitoneal free air  - c/w meropenum 1g q8h   - Pt tolerating tube feeds. C/w tube feeds, S&S eval to advance diet to PO   - surgery recs appreciated   - ID recs appreciated     #Exfoliative dermatitis   - pt with superficial scabs diffusely over body, most prominent over chest, as well as areas of desquamation over arms, legs, and hips   - Continue to monitor, if any worsening derm consult   - Burn recs appreciated; dx with incontinence dermatitis    - f/u wound care recs   - f/u derm recs    #Acute thrombocytopenia  #Elevated INR/PTT not on a/c  - not on heparin this admission, unclear if was on previously at [facility]  - plt, hgb continued to downtrend so given 1u plt 1u pRBC on 2/23   - INR also uptrending, gave 10mg vitamin K IVPB 2/23   - plt downtrending again 2/24 am labs, rpt CBC @11, if continues to downtrend will give 1u plt   - retic count 0.9, LDH nl, haptoglobin slightly low. Ddimer elevated, fibrinogen nl, fibrin split products elevated    - f/u HIT panel   - HIV, hep B negative   - monitor INR   - F/u Heme/onc recs    #Hypotension   - F/u cortisol lvl (received)   - C/w midodrine 15mg TID   - On Levo @0.764, downtitrate as tolerated     #Hx of UTI- resolved   #R lower face cellulitis- resolved  - CT maxillofacial with C 2/12: Mild soft tissue inflammation suggesting cellulitis of the right lower face  - pt was on cefepime, vancomycin (end date 2/15)  - Started on Unasyn 2/16,/c today 2/20 started on broad spectrum Abx 2/20  - No documentations of + bcx or ucx on the charts from [facility]   - Rpt UCx negative   - Bclx 2/17 NGTD, F/u repeat    - Dental recs appreciated   - F/u SPEP     #Hypernatremia- improving   - c/w free water via peg     #Hypothyroidism  - TSH 15.6  - C/w synthroid 50 mcg po q24  - Patient will need to repeat TSH and FT4 in 6 wks (April)    #Hx L pleural effusion on CXR 2/8  - CT Chest with Cont 2/9 obtained: Moderate b/l pleural effusions resulting in complete atelectasis of both lower lobes  - monitor CXR    #Hx NSTEMI   #Hx of Elevated Troponin  - in setting of hypotension   - c/w Midodrine 15 mg q8h  - Normal Lexiscan at [facility]  - EKG noted     #Hx of ETOH abuse  - last drink (as per nurse) 6 months ago  - c/w Folic acid and Thiamine  - F/u Dietician eval  - Ensure 3x/day    #Possible Thiamine deficiency   #Possible Folic acid deficiency   - C/w Folic acid and Thiamine    #Misc   - DVT prophylaxis: SCD   - GI prophylaxis: PPI  - Diet: NPO tube feeds    - Activity status: IAT   - Code status: Full code   - Disposition: acute     #Handoff   - f/u SPEP, cortisol, hemolytic anemia panel   - f/u ID recs   - f/u heme onc recs  - speech and swallow eval (mental status improving)   - f/u nutrition recs

## 2022-02-24 NOTE — SWALLOW BEDSIDE ASSESSMENT ADULT - SLP PERTINENT HISTORY OF CURRENT PROBLEM
Pt is a 60 yo F with PMH of GIB, Bowel and Bladder incontinence, Smoking (3-4 cigarettes/day), ETOH abuse, hypothyroidism anemia transferred from [facility] for Polypectomy. Pt is from home living with her son and bedbound at baseline. Pt was admitted to [facility] on 01/31 for AMS, weakness and decreased appetite. She was found to have UTI, NSTEMI, and electrolyte imbalance. During her stay at [facility] pt had colonoscopy done which revealed 1 large 3-4 cm sigmoid pedunculated polyp and 2 polyps (7, 14 mm) in descending colon s/p bx.

## 2022-02-25 LAB
ALBUMIN SERPL ELPH-MCNC: 1.4 G/DL — LOW (ref 3.5–5.2)
ALP SERPL-CCNC: 115 U/L — SIGNIFICANT CHANGE UP (ref 30–115)
ALT FLD-CCNC: 22 U/L — SIGNIFICANT CHANGE UP (ref 0–41)
ANION GAP SERPL CALC-SCNC: 9 MMOL/L — SIGNIFICANT CHANGE UP (ref 7–14)
APTT BLD: 44.6 SEC — HIGH (ref 27–39.2)
AST SERPL-CCNC: 43 U/L — HIGH (ref 0–41)
BACTERIA # UR AUTO: NEGATIVE — SIGNIFICANT CHANGE UP
BASOPHILS # BLD AUTO: 0 K/UL — SIGNIFICANT CHANGE UP (ref 0–0.2)
BASOPHILS NFR BLD AUTO: 0 % — SIGNIFICANT CHANGE UP (ref 0–1)
BILIRUB SERPL-MCNC: 0.5 MG/DL — SIGNIFICANT CHANGE UP (ref 0.2–1.2)
BUN SERPL-MCNC: 5 MG/DL — LOW (ref 10–20)
CALCIUM SERPL-MCNC: 7.2 MG/DL — LOW (ref 8.5–10.1)
CHLORIDE SERPL-SCNC: 114 MMOL/L — HIGH (ref 98–110)
CO2 SERPL-SCNC: 22 MMOL/L — SIGNIFICANT CHANGE UP (ref 17–32)
COMMENT - URINE: SIGNIFICANT CHANGE UP
CREAT SERPL-MCNC: 0.8 MG/DL — SIGNIFICANT CHANGE UP (ref 0.7–1.5)
CULTURE RESULTS: SIGNIFICANT CHANGE UP
EOSINOPHIL # BLD AUTO: 0 K/UL — SIGNIFICANT CHANGE UP (ref 0–0.7)
EOSINOPHIL NFR BLD AUTO: 0 % — SIGNIFICANT CHANGE UP (ref 0–8)
EPI CELLS # UR: 1 /HPF — SIGNIFICANT CHANGE UP (ref 0–5)
GLUCOSE BLDC GLUCOMTR-MCNC: 127 MG/DL — HIGH (ref 70–99)
GLUCOSE BLDC GLUCOMTR-MCNC: 152 MG/DL — HIGH (ref 70–99)
GLUCOSE BLDC GLUCOMTR-MCNC: 168 MG/DL — HIGH (ref 70–99)
GLUCOSE SERPL-MCNC: 131 MG/DL — HIGH (ref 70–99)
HCT VFR BLD CALC: 24.7 % — LOW (ref 37–47)
HGB BLD-MCNC: 8.6 G/DL — LOW (ref 12–16)
HYALINE CASTS # UR AUTO: 7 /LPF — SIGNIFICANT CHANGE UP (ref 0–7)
IMM GRANULOCYTES NFR BLD AUTO: 0.3 % — SIGNIFICANT CHANGE UP (ref 0.1–0.3)
INR BLD: 1.27 RATIO — SIGNIFICANT CHANGE UP (ref 0.65–1.3)
LACTATE SERPL-SCNC: 3.2 MMOL/L — HIGH (ref 0.7–2)
LYMPHOCYTES # BLD AUTO: 0.6 K/UL — LOW (ref 1.2–3.4)
LYMPHOCYTES # BLD AUTO: 17.6 % — LOW (ref 20.5–51.1)
MAGNESIUM SERPL-MCNC: 1.7 MG/DL — LOW (ref 1.8–2.4)
MAGNESIUM SERPL-MCNC: 2 MG/DL — SIGNIFICANT CHANGE UP (ref 1.8–2.4)
MCHC RBC-ENTMCNC: 30.1 PG — SIGNIFICANT CHANGE UP (ref 27–31)
MCHC RBC-ENTMCNC: 34.8 G/DL — SIGNIFICANT CHANGE UP (ref 32–37)
MCV RBC AUTO: 86.4 FL — SIGNIFICANT CHANGE UP (ref 81–99)
MONOCYTES # BLD AUTO: 0.11 K/UL — SIGNIFICANT CHANGE UP (ref 0.1–0.6)
MONOCYTES NFR BLD AUTO: 3.2 % — SIGNIFICANT CHANGE UP (ref 1.7–9.3)
MYELOPEROXIDASE AB SER-ACNC: 45.7 UNITS — HIGH
MYELOPEROXIDASE CELLS FLD QL: POSITIVE
NEUTROPHILS # BLD AUTO: 2.68 K/UL — SIGNIFICANT CHANGE UP (ref 1.4–6.5)
NEUTROPHILS NFR BLD AUTO: 78.9 % — HIGH (ref 42.2–75.2)
NRBC # BLD: 2 /100 WBCS — HIGH (ref 0–0)
PHOSPHATE SERPL-MCNC: 2.2 MG/DL — SIGNIFICANT CHANGE UP (ref 2.1–4.9)
PLATELET # BLD AUTO: 54 K/UL — LOW (ref 130–400)
POTASSIUM SERPL-MCNC: 3.8 MMOL/L — SIGNIFICANT CHANGE UP (ref 3.5–5)
POTASSIUM SERPL-SCNC: 3.8 MMOL/L — SIGNIFICANT CHANGE UP (ref 3.5–5)
PROT SERPL-MCNC: 4.7 G/DL — LOW (ref 6–8)
PROTEINASE3 AB FLD-ACNC: <5 UNITS — SIGNIFICANT CHANGE UP
PROTEINASE3 AB SER-ACNC: NEGATIVE — SIGNIFICANT CHANGE UP
PROTHROM AB SERPL-ACNC: 14.6 SEC — HIGH (ref 9.95–12.87)
RBC # BLD: 2.86 M/UL — LOW (ref 4.2–5.4)
RBC # FLD: 16.1 % — HIGH (ref 11.5–14.5)
RBC CASTS # UR COMP ASSIST: 497 /HPF — HIGH (ref 0–4)
SODIUM SERPL-SCNC: 145 MMOL/L — SIGNIFICANT CHANGE UP (ref 135–146)
SPECIMEN SOURCE: SIGNIFICANT CHANGE UP
T4 AB SER-ACNC: 2.2 UG/DL — LOW (ref 4.6–12)
TSH SERPL-MCNC: 6.58 UIU/ML — HIGH (ref 0.27–4.2)
WBC # BLD: 3.4 K/UL — LOW (ref 4.8–10.8)
WBC # FLD AUTO: 3.4 K/UL — LOW (ref 4.8–10.8)
WBC UR QL: 30 /HPF — HIGH (ref 0–5)

## 2022-02-25 PROCEDURE — 93010 ELECTROCARDIOGRAM REPORT: CPT

## 2022-02-25 PROCEDURE — 99232 SBSQ HOSP IP/OBS MODERATE 35: CPT

## 2022-02-25 RX ORDER — MAGNESIUM SULFATE 500 MG/ML
2 VIAL (ML) INJECTION ONCE
Refills: 0 | Status: COMPLETED | OUTPATIENT
Start: 2022-02-25 | End: 2022-02-25

## 2022-02-25 RX ORDER — NOREPINEPHRINE BITARTRATE/D5W 8 MG/250ML
0.05 PLASTIC BAG, INJECTION (ML) INTRAVENOUS
Qty: 16 | Refills: 0 | Status: DISCONTINUED | OUTPATIENT
Start: 2022-02-25 | End: 2022-03-02

## 2022-02-25 RX ORDER — POTASSIUM CHLORIDE 20 MEQ
40 PACKET (EA) ORAL ONCE
Refills: 0 | Status: COMPLETED | OUTPATIENT
Start: 2022-02-25 | End: 2022-02-25

## 2022-02-25 RX ORDER — SODIUM CHLORIDE 9 MG/ML
500 INJECTION, SOLUTION INTRAVENOUS ONCE
Refills: 0 | Status: COMPLETED | OUTPATIENT
Start: 2022-02-25 | End: 2022-02-25

## 2022-02-25 RX ORDER — LEVOTHYROXINE SODIUM 125 MCG
50 TABLET ORAL AT BEDTIME
Refills: 0 | Status: DISCONTINUED | OUTPATIENT
Start: 2022-02-25 | End: 2022-03-13

## 2022-02-25 RX ADMIN — CHLORHEXIDINE GLUCONATE 1 APPLICATION(S): 213 SOLUTION TOPICAL at 12:00

## 2022-02-25 RX ADMIN — Medication 40 MILLIEQUIVALENT(S): at 10:47

## 2022-02-25 RX ADMIN — Medication 100 MILLIGRAM(S): at 06:25

## 2022-02-25 RX ADMIN — NYSTATIN CREAM 1 APPLICATION(S): 100000 CREAM TOPICAL at 17:06

## 2022-02-25 RX ADMIN — Medication 25 GRAM(S): at 10:47

## 2022-02-25 RX ADMIN — Medication 1 APPLICATION(S): at 06:21

## 2022-02-25 RX ADMIN — Medication 1 APPLICATION(S): at 18:51

## 2022-02-25 RX ADMIN — PANTOPRAZOLE SODIUM 40 MILLIGRAM(S): 20 TABLET, DELAYED RELEASE ORAL at 06:23

## 2022-02-25 RX ADMIN — Medication 2.81 MICROGRAM(S)/KG/MIN: at 02:34

## 2022-02-25 RX ADMIN — Medication 100 MILLIGRAM(S): at 22:10

## 2022-02-25 RX ADMIN — Medication 100 MILLIGRAM(S): at 13:43

## 2022-02-25 RX ADMIN — Medication 1 APPLICATION(S): at 13:48

## 2022-02-25 RX ADMIN — Medication 100 MILLIGRAM(S): at 16:58

## 2022-02-25 RX ADMIN — Medication 100 MILLIGRAM(S): at 22:11

## 2022-02-25 RX ADMIN — Medication 25 GRAM(S): at 14:00

## 2022-02-25 RX ADMIN — SODIUM CHLORIDE 1000 MILLILITER(S): 9 INJECTION, SOLUTION INTRAVENOUS at 01:21

## 2022-02-25 RX ADMIN — Medication 40 MILLIGRAM(S): at 06:56

## 2022-02-25 RX ADMIN — PANTOPRAZOLE SODIUM 40 MILLIGRAM(S): 20 TABLET, DELAYED RELEASE ORAL at 17:05

## 2022-02-25 RX ADMIN — Medication 100 MILLIGRAM(S): at 13:48

## 2022-02-25 RX ADMIN — LACTULOSE 10 GRAM(S): 10 SOLUTION ORAL at 06:22

## 2022-02-25 RX ADMIN — Medication 50 MICROGRAM(S): at 22:12

## 2022-02-25 RX ADMIN — NYSTATIN CREAM 1 APPLICATION(S): 100000 CREAM TOPICAL at 06:24

## 2022-02-25 RX ADMIN — Medication 1 TABLET(S): at 13:42

## 2022-02-25 NOTE — SWALLOW BEDSIDE ASSESSMENT ADULT - SWALLOW EVAL: DIAGNOSIS
Pt behavioral, inconsistently refusing to open oral cavity despite max cues. +toleration for puree and thin liquids, no s/s aspiration/penetration.

## 2022-02-25 NOTE — SWALLOW BEDSIDE ASSESSMENT ADULT - SLP PERTINENT HISTORY OF CURRENT PROBLEM
Pt is a 60 yo F with PMH of GIB, Bowel and Bladder incontinence, Smoking (3-4 cigarettes/day), ETOH abuse, hypothyroidism anemia transferred from [facility] for Polypectomy. Pt is from home living with her son and bedbound at baseline. Pt was admitted to [facility] on 01/31 for AMS, weakness and decreased appetite. She was found to have UTI, NSTEMI, and electrolyte imbalance. During her stay at [facility] pt had colonoscopy done which revealed 1 large 3-4 cm sigmoid pedunculated polyp and 2 polyps (7, 14 mm) in descending colon s/p bx.

## 2022-02-25 NOTE — PROGRESS NOTE ADULT - ASSESSMENT
Pt is a 60 yo F with PMH of GIB, Bowel and Bladder incontinence, Smoking (1/2 ppd), ETOH abuse, hypothyroidism, anemia transferred from [facility] for EMR/Polypectomy. She was a Rapid response on 2/20/22 for AMS, hypotension requiring pressor support, and hypothermia. She was upgraded to ICU. Hematology evaluation was requested for evaluation of anemia and thrombocytopenia.    # Normocytic Anemia with recent polypectomy, stable  - iron studies: Fe 55; TIBC <72; ferr 2494. However, in the setting of acute events and significant hypotension, the elevated ferritin is likely reactive and is not a sign of iron overload.  - B12 1892, Folate 11  - Haptoglobin low and Amos +, however LDH WNL, Reticulocyte % low along with normal bilirubin which points against hemolytic anemia  - AMOS can be secondary to recent blood transfusion (not documented but pt likely had PRBC in [facility])  - peripheral blood smear reviewed: occasional schistocytes  - f/u SPEP, Immunofixation, Immunoglobulins, and FLC ratio workup for multiple myeloma   - c/w solumedrol 40 mg IV qD (started on 2/24/22) (spoke with ID and agree w/ plan)     # Thrombocytopenia likely multifactorial due to myelosuppression from infection, DIC, alcohol abuse   - s/p 2U of platelets transfusion on 2/24/22 and 2/22/22  # Elevated INR/PTT not on anticoagulation likely low grade DIC from septic shock   - Hep C, Hep B and HIV negative  - transfuse platelets if platelets < 30k and bleeding     # +C-ANCA  # Multiple skin lesions  - rheumatology consult appreciated, can be secondary to renal dysfunction  - f/u bx with burn team per dermatology    # S/p polypectomies   - f/u pathology results from her polypectomies     # Septic Shock requiring pressor support   - Rest of medical management as per critical care team

## 2022-02-25 NOTE — PROGRESS NOTE ADULT - SUBJECTIVE AND OBJECTIVE BOX
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 prophylaxis: SCD   - GI prophylaxis: PPI  - Diet: NPO tube feeds    - Activity status: IAT   - Code status: Full code   - Disposition: acute     #Handoff   - f/u SPEP, cortisol, hemolytic anemia panel   - f/u HIT labs   - f/u calorie count   - f/u burn cs

## 2022-02-25 NOTE — PROGRESS NOTE ADULT - SUBJECTIVE AND OBJECTIVE BOX
Patient is a 61y old  Female who presents with a chief complaint of Polypectomy (2022 15:27)        HPI:  Pt is a 62 yo F with PMH of GIB, Bowel and Bladder incontinence, Smoking (3-4 cigarettes/day), ETOH abuse, hypothyroidism anemia transferred from [facility] for Polypectomy. Pt is from home living with her son and bedbound at baseline. Pt was admitted to [facility] on  for AMS, weakness and decreased appetite. She was found to have UTI, NSTEMI, and electrolyte imbalance. During her stay at [facility] pt had colonoscopy done which revealed 1 large 3-4 cm sigmoid pedunculated polyp and 2 polyps (7, 14 mm) in descending colon s/p bx. Pt was transferred to [facility] for polyp removal.  (2022 23:24)      Pt evaluated on rounds.  I reviewed the radiology tests and hospital record.    I reviewed previous notes on this patient.    Interval Events: No overnight events.      CAM:    SAT:    SBT:      REVIEW OF SYSTEMS:   see HPI      OBJECTIVE:  ICU Vital Signs Last 24 Hrs  T(C): 31.6 (2022 03:00), Max: 36.1 (2022 08:00)  T(F): 88.9 (2022 03:00), Max: 96.9 (2022 08:00)  HR: 71 (2022 03:00) (64 - 71)  BP: 116/67 (2022 03:00) (72/52 - 116/67)  BP(mean): 83 (2022 03:00) (43 - 88)  ABP: --  ABP(mean): --  RR: 24 (2022 03:00) (13 - 25)  SpO2: 97% (2022 22:00) (97% - 100%)         @ :  -   @ 07:00  --------------------------------------------------------  IN: 4402.8 mL / OUT: 915 mL / NET: 3487.8 mL     @ 07:  -   @ 05:59  --------------------------------------------------------  IN: 2483 mL / OUT: 165 mL / NET: 2318 mL      CAPILLARY BLOOD GLUCOSE      POCT Blood Glucose.: 127 mg/dL (2022 00:46)        PHYSICAL EXAM:       · ENMT:   Airway patent,   Nasal mucosa clear.  Mouth with normal mucosa.   No thrush    · EYES:   Clear bilaterally,   pupils equal,   round and reactive to light.    · CARDIAC:   Normal rate,   regular rhythm.    Heart sounds S1, S2.   No murmurs, no rubs or gallops on auscultation  no edema        CAROTID:   normal systolic impulse  no bruits    · RESPIRATORY:   rales  normal chest expansion  no retractions or use of accessory muscles  palpation of chest is normal with no fremitus  percussion of chest demonstrates no hyperresonance or dullness    · GASTROINTESTINAL:  Abdomen soft,   non-tender,   + BS  liver/spleen not palpable    · MUSCULOSKELETAL:   no clubbing, cyanosis      · SKIN:   Skin normal color for race,   warm, dry   No evidence of rash.        · HEME LYMPH:   no splenomegaly.  No cervical  lymphadenopathy.  no inguinal lymphadenopathy    HOSPITAL MEDICATIONS:  MEDICATIONS  (STANDING):  aztreonam  IVPB 2000 milliGRAM(s) IV Intermittent every 8 hours  chlorhexidine 4% Liquid 1 Application(s) Topical daily  dextrose 5% + lactated ringers. 1000 milliLiter(s) (70 mL/Hr) IV Continuous <Continuous>  lactulose Syrup 10 Gram(s) Oral two times a day  levothyroxine Injectable 25 MICROGram(s) IV Push at bedtime  LORazepam   Injectable 2 milliGRAM(s) IV Push once  methylPREDNISolone sodium succinate Injectable 40 milliGRAM(s) IV Push daily  multivitamin/minerals 1 Tablet(s) Oral daily  norepinephrine Infusion 0.05 MICROgram(s)/kG/Min (2.81 mL/Hr) IV Continuous <Continuous>  norepinephrine Infusion 0.05 MICROgram(s)/kG/Min (2.81 mL/Hr) IV Continuous <Continuous>  nystatin Cream 1 Application(s) Topical two times a day  pantoprazole  Injectable 40 milliGRAM(s) IV Push every 12 hours  propranolol 30 milliGRAM(s) Oral daily  thiamine 100 milliGRAM(s) Oral daily  triamcinolone 0.1% Cream 1 Application(s) Topical two times a day  vitamin A &amp; D Ointment 1 Application(s) Topical daily    MEDICATIONS  (PRN):    lactated ringers Bolus:   500 milliLiter(s), IV Bolus, once, infuse over 30 Minute(s), Stop After 1 Doses  lactated ringers Bolus:   500 milliLiter(s), IV Bolus, once, infuse over 30 Minute(s), Stop After 1 Doses  lactated ringers Bolus:   500 milliLiter(s), IV Bolus, once, infuse over 60 Minute(s), Stop After 1 Doses  lactated ringers Bolus:   500 milliLiter(s), IV Bolus, once, infuse over 60 Minute(s), Stop After 1 Doses  lactated ringers Bolus:   500 milliLiter(s), IV Bolus, once, infuse over 60 Minute(s), Stop After 1 Doses  sodium chloride 0.9% Bolus:   250 milliLiter(s), IV Bolus, once, infuse over 30 Minute(s), Stop After 1 Doses  lactated ringers Bolus:   500 milliLiter(s), IV Bolus, once, infuse over 30 Minute(s), Stop After 1 Doses  lactated ringers Bolus:   500 milliLiter(s), IV Bolus, once, infuse over 30 Minute(s), Stop After 1 Doses  lactated ringers Bolus:   500 milliLiter(s), IV Bolus, once, infuse over 30 Minute(s), Stop After 1 Doses  lactated ringers.: Solution, 1000 milliLiter(s) infuse at 100 mL/Hr  lactated ringers Bolus:   1000 milliLiter(s), IV Bolus, once, infuse over 60 Minute(s), Stop After 1 Doses  lactated ringers Bolus:   500 milliLiter(s), IV Bolus, once, infuse over 60 Minute(s), Stop After 1 Doses  lactated ringers.: Solution, 1000 milliLiter(s) infuse at 75 mL/Hr  Provider's Contact #: [phone]  sodium chloride 0.45%.: Solution, 1000 milliLiter(s) infuse at 75 mL/Hr, Stop After 1 Days      LABS:                        8.4    3.15  )-----------( 51       ( 2022 20:33 )             24.1     02-    143  |  113<H>  |  5<L>  ----------------------------<  131<H>  3.7   |  25  |  0.8    Ca    7.1<L>      2022 05:40  Phos  2.5       Mg     2.0         TPro  4.5<L>  /  Alb  1.3<L>  /  TBili  0.7  /  DBili  x   /  AST  25  /  ALT  13  /  AlkPhos  85  -24    PT/INR - ( 2022 06:30 )   PT: 19.20 sec;   INR: 1.68 ratio         PTT - ( 2022 06:30 )  PTT:58.4 sec  Urinalysis Basic - ( 2022 22:56 )    Color: Yellow / Appearance: Slightly Turbid / S.028 / pH: x  Gluc: x / Ketone: Trace  / Bili: Negative / Urobili: <2 mg/dL   Blood: x / Protein: 30 mg/dL / Nitrite: Negative   Leuk Esterase: Large / RBC: 497 /HPF / WBC 30 /HPF   Sq Epi: x / Non Sq Epi: 1 /HPF / Bacteria: Negative                COVID-19 PCR: NotDetec (2022 09:16)            RADIOLOGY: Today I personally interpreted the latest CXR and other pertinent films.

## 2022-02-25 NOTE — PROGRESS NOTE ADULT - SUBJECTIVE AND OBJECTIVE BOX
SANTIAGO CALIXTO  61y, Female    All available historical data reviewed    OVERNIGHT EVENTS:  Hypothermic  alert  On lactulose with diarrhea  does respond    ROS:  General: Denies rigors, nightsweats  HEENT: Denies headache, rhinorrhea, sore throat, eye pain  CV: Denies CP, palpitations  PULM: Denies wheezing, hemoptysis  GI: Denies hematemesis, hematochezia, melena  : Denies discharge, hematuria  MSK: Denies arthralgias, myalgias  SKIN: Denies rash, lesions  NEURO: Denies paresthesias, weakness  PSYCH: Denies depression, anxiety    VITALS:  T(F): 90.5, Max: 96.9 (22 @ 11:00)  HR: 72  BP: 93/61  RR: 16Vital Signs Last 24 Hrs  T(C): 32.5 (2022 06:00), Max: 36.1 (2022 11:00)  T(F): 90.5 (2022 06:00), Max: 96.9 (2022 11:00)  HR: 72 (2022 06:00) (64 - 74)  BP: 93/61 (2022 06:00) (72/52 - 116/67)  BP(mean): 73 (2022 06:00) (43 - 88)  RR: 16 (2022 06:00) (16 - 25)  SpO2: 97% (2022 22:00) (97% - 100%)    TESTS & MEASUREMENTS:                        8.6    3.40  )-----------( 54       ( 2022 05:50 )             24.7         145  |  114<H>  |  5<L>  ----------------------------<  131<H>  3.8   |  22  |  0.8    Ca    7.2<L>      2022 05:50  Phos  2.2       Mg     1.7         TPro  4.7<L>  /  Alb  1.4<L>  /  TBili  0.5  /  DBili  x   /  AST  43<H>  /  ALT  22  /  AlkPhos  115      LIVER FUNCTIONS - ( 2022 05:50 )  Alb: 1.4 g/dL / Pro: 4.7 g/dL / ALK PHOS: 115 U/L / ALT: 22 U/L / AST: 43 U/L / GGT: x             Culture - Stool (collected 22 @ 06:40)  Source: .Stool Feces  Final Report (22 @ 15:59):    No enteric pathogens isolated.    (Stool culture examined for Salmonella,    Shigella, Campylobacter, Aeromonas, Plesiomonas,    Vibrio, E.coli O157 and Yersinia)    Moderate Yeast like cells    Culture - Blood (collected 22 @ 00:00)  Source: .Blood Blood  Preliminary Report (22 @ 07:01):    No growth to date.    Culture - Blood (collected 22 @ 23:11)  Source: .Blood Blood  Preliminary Report (22 @ 09:01):    No growth to date.      Urinalysis Basic - ( 2022 22:56 )    Color: Yellow / Appearance: Slightly Turbid / S.028 / pH: x  Gluc: x / Ketone: Trace  / Bili: Negative / Urobili: <2 mg/dL   Blood: x / Protein: 30 mg/dL / Nitrite: Negative   Leuk Esterase: Large / RBC: 497 /HPF / WBC 30 /HPF   Sq Epi: x / Non Sq Epi: 1 /HPF / Bacteria: Negative          RADIOLOGY & ADDITIONAL TESTS:  Personal review of radiological diagnostics performed  Echo and EKG results noted when applicable.     MEDICATIONS:  aztreonam  IVPB 2000 milliGRAM(s) IV Intermittent every 8 hours  chlorhexidine 4% Liquid 1 Application(s) Topical daily  dextrose 5% + lactated ringers. 1000 milliLiter(s) IV Continuous <Continuous>  lactulose Syrup 10 Gram(s) Oral two times a day  levothyroxine Injectable 25 MICROGram(s) IV Push at bedtime  magnesium sulfate  IVPB 2 Gram(s) IV Intermittent once  methylPREDNISolone sodium succinate Injectable 40 milliGRAM(s) IV Push daily  multivitamin/minerals 1 Tablet(s) Oral daily  norepinephrine Infusion 0.05 MICROgram(s)/kG/Min IV Continuous <Continuous>  nystatin Cream 1 Application(s) Topical two times a day  pantoprazole  Injectable 40 milliGRAM(s) IV Push every 12 hours  potassium chloride   Powder 40 milliEquivalent(s) Oral once  thiamine 100 milliGRAM(s) Oral daily  triamcinolone 0.1% Cream 1 Application(s) Topical two times a day  vitamin A &amp; D Ointment 1 Application(s) Topical daily      ANTIBIOTICS:  aztreonam  IVPB 2000 milliGRAM(s) IV Intermittent every 8 hours

## 2022-02-25 NOTE — PROGRESS NOTE ADULT - ASSESSMENT
62 yo F with PMH of GIB, Bowel and Bladder incontinence, Smoking (1/2 ppd), ETOH abuse, hypothyroidism, anemia transferred from [facility] for EMR/ Polypectomy. . Pt was admitted to [facility] on 01/31 for AMS, weakness and decreased appetite. She was found to have UTI, Elevated troponin, and electrolyte imbalance. During her stay at [facility] pt had colonoscopy done, which revealed 1 large 3-4 cm sigmoid pedunculated polyp (bx > tub adenoma, but was friable) and 2 polyps (7, 14 mm) in descending colon s/p bx.    2/18 S/P colonoscopy and polypectomy 2/18 .  CT: Diffuse colonic wall thickening, consistent with pancolitis. Intra-abdominal ascites. No intraperitoneal or retroperitoneal free air. No extraluminal contrast extravasation.    IMPRESSION;  Ongoing cryptogenic septic shock (on pressors, T 88.9 )  R/o metabolic etiology   Clinically no peritonitis  Possible bacterial PNA on the left secondary to aspiration ( CXR has opacity/fluid on the left. Clinically no PNA  . For now will cover as such ) > should not lead to shock  WBC 3.6  Thrombocytopenic since 2/18 > resolving slowly   Mild pyuria > clinically no pyelonephritis  2/17,19,22  BCx NG  2/18 UCx NG  2/17,20 CD NG  Nares ORSA NG  COVID-19 NG    RECOMMENDATIONS;  Aztreonam 2 gm iv q8h since 2/24  Add Clindamycin 600 mg iv q8h  Off loading to prevent pressure sores and preventive measures to avoid aspiration   If any questions , please call [phone] or send a message on [employer] teams  Please update ID in real time with any pertinent new laboratory /microbiological/radiographically findings or a change in the clinical characteristics

## 2022-02-25 NOTE — PROGRESS NOTE ADULT - SUBJECTIVE AND OBJECTIVE BOX
SANTIAGO CALIXTO 61y Female  MRN#: [id number]     SUBJECTIVE  Patient is a 61y old Female who presents with a chief complaint of Polypectomy (2022 08:52)  Today is hospital day 9d, and this morning she is lying in bed without distress.   No acute overnight events.     OBJECTIVE  PAST MEDICAL & SURGICAL HISTORY    ALLERGIES:  No Known Allergies    MEDICATIONS:  STANDING MEDICATIONS  aztreonam  IVPB 2000 milliGRAM(s) IV Intermittent every 8 hours  chlorhexidine 4% Liquid 1 Application(s) Topical daily  clindamycin IVPB 600 milliGRAM(s) IV Intermittent once  clindamycin IVPB 600 milliGRAM(s) IV Intermittent every 8 hours  clindamycin IVPB      dextrose 5% + lactated ringers. 1000 milliLiter(s) IV Continuous <Continuous>  lactulose Syrup 10 Gram(s) Oral two times a day  levothyroxine Injectable 25 MICROGram(s) IV Push at bedtime  methylPREDNISolone sodium succinate Injectable 40 milliGRAM(s) IV Push daily  multivitamin/minerals 1 Tablet(s) Oral daily  norepinephrine Infusion 0.05 MICROgram(s)/kG/Min IV Continuous <Continuous>  nystatin Cream 1 Application(s) Topical two times a day  pantoprazole  Injectable 40 milliGRAM(s) IV Push every 12 hours  thiamine 100 milliGRAM(s) Oral daily  triamcinolone 0.1% Cream 1 Application(s) Topical two times a day  vitamin A &amp; D Ointment 1 Application(s) Topical daily    PRN MEDICATIONS    HOME MEDICATIONS  Home Medications:      VITAL SIGNS: Last 24 Hours  T(C): 33.2 (2022 09:00), Max: 35.8 (2022 16:00)  T(F): 91.7 (2022 09:00), Max: 96.5 (2022 16:00)  HR: 69 (2022 10:14) (64 - 74)  BP: 89/55 (2022 10:14) (70/45 - 116/67)  BP(mean): 68 (2022 10:14) (43 - 83)  RR: 21 (2022 10:14) (13 - 25)  SpO2: 97% (2022 22:00) (97% - 98%)    02-24-22 @ 07:01  -  22 @ 07:00  --------------------------------------------------------  IN: 2483 mL / OUT: 165 mL / NET: 2318 mL    22 @ 07:01  -  22 @ 12:45  --------------------------------------------------------  IN: 510 mL / OUT: 25 mL / NET: 485 mL        LABS:                        8.6    3.40  )-----------( 54   <- s/p 1U of platelets   ( 2022 05:50 )             24.7         145  |  114<H>  |  5<L>  ----------------------------<  131<H>  3.8   |  22  |  0.8    Ca    7.2<L>      2022 05:50  Phos  2.2       Mg     1.7         TPro  4.7<L>  /  Alb  1.4<L>  /  TBili  0.5  /  DBili  x   /  AST  43<H>  /  ALT  22  /  AlkPhos  115      LIVER FUNCTIONS - ( 2022 05:50 )  Alb: 1.4 g/dL / Pro: 4.7 g/dL / ALK PHOS: 115 U/L / ALT: 22 U/L / AST: 43 U/L / GGT: x           PT/INR - ( 2022 05:50 )   PT: 14.60 sec;   INR: 1.27 ratio         PTT - ( 2022 05:50 )  PTT:44.6 sec  Urinalysis Basic - ( 2022 22:56 )    Color: Yellow / Appearance: Slightly Turbid / S.028 / pH: x  Gluc: x / Ketone: Trace  / Bili: Negative / Urobili: <2 mg/dL   Blood: x / Protein: 30 mg/dL / Nitrite: Negative   Leuk Esterase: Large / RBC: 497 /HPF / WBC 30 /HPF   Sq Epi: x / Non Sq Epi: 1 /HPF / Bacteria: Negative        Lactate, Blood: 3.2 mmol/L *H* (22 @ 05:50)  Lactate, Blood: 3.5 mmol/L *H* (22 @ 20:33)          CAPILLARY BLOOD GLUCOS    POCT Blood Glucose.: 152 mg/dL (2022 06:38)      RADIOLOGY:    PHYSICAL EXAM:  GENERAL: NAD, AAO, 61y F  HEAD:  Atraumatic, Normocephalic  EYES: EOMI, conjunctiva clear and sclera white  NECK: Supple, No JVD  CHEST/LUNG: Clear to auscultation bilaterally; No wheeze; No crackles; No accessory muscles used  HEART: Regular rate and rhythm; No murmurs;   ABDOMEN: Soft, Nontender, Nondistended; Bowel sounds present; No guarding  EXTREMITIES:  2+ Peripheral Pulses, No cyanosis or edema  NEUROLOGY: non-focal    ADMISSION SUMMARY  Patient is a 61y old Female who presents with a chief complaint of Polypectomy (2022 08:52)

## 2022-02-26 LAB
ALBUMIN SERPL ELPH-MCNC: 1.5 G/DL — LOW (ref 3.5–5.2)
ALP SERPL-CCNC: 126 U/L — HIGH (ref 30–115)
ALT FLD-CCNC: 24 U/L — SIGNIFICANT CHANGE UP (ref 0–41)
AMMONIA BLD-MCNC: 50 UMOL/L — SIGNIFICANT CHANGE UP (ref 11–55)
ANION GAP SERPL CALC-SCNC: 6 MMOL/L — LOW (ref 7–14)
APTT BLD: 36.2 SEC — SIGNIFICANT CHANGE UP (ref 27–39.2)
AST SERPL-CCNC: 35 U/L — SIGNIFICANT CHANGE UP (ref 0–41)
BASOPHILS # BLD AUTO: 0.01 K/UL — SIGNIFICANT CHANGE UP (ref 0–0.2)
BASOPHILS NFR BLD AUTO: 0.2 % — SIGNIFICANT CHANGE UP (ref 0–1)
BILIRUB SERPL-MCNC: 0.4 MG/DL — SIGNIFICANT CHANGE UP (ref 0.2–1.2)
BUN SERPL-MCNC: 6 MG/DL — LOW (ref 10–20)
CALCIUM SERPL-MCNC: 7.3 MG/DL — LOW (ref 8.5–10.1)
CHLORIDE SERPL-SCNC: 113 MMOL/L — HIGH (ref 98–110)
CO2 SERPL-SCNC: 24 MMOL/L — SIGNIFICANT CHANGE UP (ref 17–32)
CORT UR RND CORT/CREAT RATIO: 59 MCG/G CR — SIGNIFICANT CHANGE UP (ref 0.7–85)
CREAT SERPL-MCNC: 0.8 MG/DL — SIGNIFICANT CHANGE UP (ref 0.7–1.5)
CREAT UR-MCNC: 56 MG/DL — SIGNIFICANT CHANGE UP (ref 16–326)
EOSINOPHIL # BLD AUTO: 0 K/UL — SIGNIFICANT CHANGE UP (ref 0–0.7)
EOSINOPHIL NFR BLD AUTO: 0 % — SIGNIFICANT CHANGE UP (ref 0–8)
GLUCOSE BLDC GLUCOMTR-MCNC: 157 MG/DL — HIGH (ref 70–99)
GLUCOSE SERPL-MCNC: 154 MG/DL — HIGH (ref 70–99)
HCT VFR BLD CALC: 24.1 % — LOW (ref 37–47)
HGB BLD-MCNC: 8.3 G/DL — LOW (ref 12–16)
IMM GRANULOCYTES NFR BLD AUTO: 0.3 % — SIGNIFICANT CHANGE UP (ref 0.1–0.3)
INR BLD: 1.07 RATIO — SIGNIFICANT CHANGE UP (ref 0.65–1.3)
LYMPHOCYTES # BLD AUTO: 1.06 K/UL — LOW (ref 1.2–3.4)
LYMPHOCYTES # BLD AUTO: 17.3 % — LOW (ref 20.5–51.1)
MAGNESIUM SERPL-MCNC: 2.6 MG/DL — HIGH (ref 1.8–2.4)
MCHC RBC-ENTMCNC: 29.6 PG — SIGNIFICANT CHANGE UP (ref 27–31)
MCHC RBC-ENTMCNC: 34.4 G/DL — SIGNIFICANT CHANGE UP (ref 32–37)
MCV RBC AUTO: 86.1 FL — SIGNIFICANT CHANGE UP (ref 81–99)
MONOCYTES # BLD AUTO: 0.21 K/UL — SIGNIFICANT CHANGE UP (ref 0.1–0.6)
MONOCYTES NFR BLD AUTO: 3.4 % — SIGNIFICANT CHANGE UP (ref 1.7–9.3)
NEUTROPHILS # BLD AUTO: 4.84 K/UL — SIGNIFICANT CHANGE UP (ref 1.4–6.5)
NEUTROPHILS NFR BLD AUTO: 78.8 % — HIGH (ref 42.2–75.2)
NRBC # BLD: 2 /100 WBCS — HIGH (ref 0–0)
PLATELET # BLD AUTO: 49 K/UL — LOW (ref 130–400)
POTASSIUM SERPL-MCNC: 3.8 MMOL/L — SIGNIFICANT CHANGE UP (ref 3.5–5)
POTASSIUM SERPL-SCNC: 3.8 MMOL/L — SIGNIFICANT CHANGE UP (ref 3.5–5)
PROT SERPL-MCNC: 4.7 G/DL — LOW (ref 6–8)
PROTHROM AB SERPL-ACNC: 12.3 SEC — SIGNIFICANT CHANGE UP (ref 9.95–12.87)
RBC # BLD: 2.8 M/UL — LOW (ref 4.2–5.4)
RBC # FLD: 16.1 % — HIGH (ref 11.5–14.5)
SODIUM SERPL-SCNC: 143 MMOL/L — SIGNIFICANT CHANGE UP (ref 135–146)
TSH SERPL-MCNC: 6.44 UIU/ML — HIGH (ref 0.27–4.2)
VIT D25+D1,25 OH+D1,25 PNL SERPL-MCNC: 13.6 PG/ML — LOW (ref 19.9–79.3)
WBC # BLD: 6.14 K/UL — SIGNIFICANT CHANGE UP (ref 4.8–10.8)
WBC # FLD AUTO: 6.14 K/UL — SIGNIFICANT CHANGE UP (ref 4.8–10.8)

## 2022-02-26 PROCEDURE — 93010 ELECTROCARDIOGRAM REPORT: CPT

## 2022-02-26 PROCEDURE — 99232 SBSQ HOSP IP/OBS MODERATE 35: CPT

## 2022-02-26 RX ORDER — FUROSEMIDE 40 MG
40 TABLET ORAL ONCE
Refills: 0 | Status: COMPLETED | OUTPATIENT
Start: 2022-02-26 | End: 2022-02-26

## 2022-02-26 RX ADMIN — Medication 100 MILLIGRAM(S): at 12:08

## 2022-02-26 RX ADMIN — Medication 2.81 MICROGRAM(S)/KG/MIN: at 06:56

## 2022-02-26 RX ADMIN — NYSTATIN CREAM 1 APPLICATION(S): 100000 CREAM TOPICAL at 17:16

## 2022-02-26 RX ADMIN — CHLORHEXIDINE GLUCONATE 1 APPLICATION(S): 213 SOLUTION TOPICAL at 12:06

## 2022-02-26 RX ADMIN — Medication 1 APPLICATION(S): at 17:17

## 2022-02-26 RX ADMIN — Medication 40 MILLIGRAM(S): at 12:31

## 2022-02-26 RX ADMIN — Medication 100 MILLIGRAM(S): at 21:05

## 2022-02-26 RX ADMIN — PANTOPRAZOLE SODIUM 40 MILLIGRAM(S): 20 TABLET, DELAYED RELEASE ORAL at 05:33

## 2022-02-26 RX ADMIN — Medication 100 MILLIGRAM(S): at 05:33

## 2022-02-26 RX ADMIN — Medication 40 MILLIGRAM(S): at 05:36

## 2022-02-26 RX ADMIN — Medication 100 MILLIGRAM(S): at 06:20

## 2022-02-26 RX ADMIN — Medication 1 TABLET(S): at 12:06

## 2022-02-26 RX ADMIN — Medication 50 MICROGRAM(S): at 21:06

## 2022-02-26 RX ADMIN — Medication 100 MILLIGRAM(S): at 13:54

## 2022-02-26 RX ADMIN — NYSTATIN CREAM 1 APPLICATION(S): 100000 CREAM TOPICAL at 05:34

## 2022-02-26 RX ADMIN — Medication 1 APPLICATION(S): at 05:35

## 2022-02-26 RX ADMIN — SODIUM CHLORIDE 70 MILLILITER(S): 9 INJECTION, SOLUTION INTRAVENOUS at 06:56

## 2022-02-26 RX ADMIN — Medication 1 APPLICATION(S): at 12:08

## 2022-02-26 RX ADMIN — PANTOPRAZOLE SODIUM 40 MILLIGRAM(S): 20 TABLET, DELAYED RELEASE ORAL at 17:17

## 2022-02-26 NOTE — PROGRESS NOTE ADULT - SUBJECTIVE AND OBJECTIVE BOX
Patient is a 61y old  Female who presents with a chief complaint of Polypectomy (2022 12:55)        HPI:  Pt is a 60 yo F with PMH of GIB, Bowel and Bladder incontinence, Smoking (3-4 cigarettes/day), ETOH abuse, hypothyroidism anemia transferred from [facility] for Polypectomy. Pt is from home living with her son and bedbound at baseline. Pt was admitted to [facility] on  for AMS, weakness and decreased appetite. She was found to have UTI, NSTEMI, and electrolyte imbalance. During her stay at [facility] pt had colonoscopy done which revealed 1 large 3-4 cm sigmoid pedunculated polyp and 2 polyps (7, 14 mm) in descending colon s/p bx. Pt was transferred to [facility] for polyp removal.  (2022 23:24)      Pt evaluated on rounds.  I reviewed the radiology tests and hospital record.    I reviewed previous notes on this patient.    Interval Events: No overnight events.      CAM:    SAT:    SBT:      REVIEW OF SYSTEMS:   see HPI      OBJECTIVE:  ICU Vital Signs Last 24 Hrs  T(C): 33.9 (2022 04:00), Max: 33.9 (2022 04:00)  T(F): 93 (2022 04:00), Max: 93 (2022 04:00)  HR: 72 (2022 07:00) (65 - 75)  BP: 91/55 (2022 07:00) (70/45 - 122/75)  BP(mean): 68 (2022 07:00) (53 - 92)    RR: 20 (2022 07:00) (13 - 24)  SpO2: 100% (2022 07:00) (100% - 100%)         @ 07:01  -  -26 @ 07:00  --------------------------------------------------------  IN: 3098.8 mL / OUT: 573 mL / NET: 2525.8 mL      CAPILLARY BLOOD GLUCOSE      POCT Blood Glucose.: 157 mg/dL (2022 07:08)        PHYSICAL EXAM:       · ENMT:   Airway patent,   Nasal mucosa clear.  Mouth with normal mucosa.   No thrush    · EYES:   Clear bilaterally,   pupils equal,   round and reactive to light.    · CARDIAC:   Normal rate,   regular rhythm.    Heart sounds S1, S2.   No murmurs, no rubs or gallops on auscultation  no edema        CAROTID:   normal systolic impulse  no bruits    · RESPIRATORY:   rales  normal chest expansion  no retractions or use of accessory muscles  palpation of chest is normal with no fremitus  percussion of chest demonstrates no hyperresonance or dullness    · GASTROINTESTINAL:  Abdomen soft,   non-tender,   + BS  liver/spleen not palpable    · MUSCULOSKELETAL:   no clubbing, cyanosis      · SKIN:   Skin normal color for race,   warm, dry   No evidence of rash.        · HEME LYMPH:   no splenomegaly.  No cervical  lymphadenopathy.  no inguinal lymphadenopathy    HOSPITAL MEDICATIONS:  MEDICATIONS  (STANDING):  aztreonam  IVPB 2000 milliGRAM(s) IV Intermittent every 8 hours  chlorhexidine 4% Liquid 1 Application(s) Topical daily  clindamycin IVPB 600 milliGRAM(s) IV Intermittent every 8 hours  clindamycin IVPB      dextrose 5% + lactated ringers. 1000 milliLiter(s) (70 mL/Hr) IV Continuous <Continuous>  levothyroxine Injectable 50 MICROGram(s) IV Push at bedtime  methylPREDNISolone sodium succinate Injectable 40 milliGRAM(s) IV Push daily  multivitamin/minerals 1 Tablet(s) Oral daily  norepinephrine Infusion 0.05 MICROgram(s)/kG/Min (2.81 mL/Hr) IV Continuous <Continuous>  nystatin Cream 1 Application(s) Topical two times a day  pantoprazole  Injectable 40 milliGRAM(s) IV Push every 12 hours  thiamine 100 milliGRAM(s) Oral daily  triamcinolone 0.1% Cream 1 Application(s) Topical two times a day  vitamin A &amp; D Ointment 1 Application(s) Topical daily    MEDICATIONS  (PRN):    lactated ringers Bolus:   500 milliLiter(s), IV Bolus, once, infuse over 30 Minute(s), Stop After 1 Doses  lactated ringers Bolus:   500 milliLiter(s), IV Bolus, once, infuse over 30 Minute(s), Stop After 1 Doses  lactated ringers Bolus:   500 milliLiter(s), IV Bolus, once, infuse over 60 Minute(s), Stop After 1 Doses  lactated ringers Bolus:   500 milliLiter(s), IV Bolus, once, infuse over 60 Minute(s), Stop After 1 Doses  lactated ringers Bolus:   500 milliLiter(s), IV Bolus, once, infuse over 60 Minute(s), Stop After 1 Doses  sodium chloride 0.9% Bolus:   250 milliLiter(s), IV Bolus, once, infuse over 30 Minute(s), Stop After 1 Doses  lactated ringers Bolus:   500 milliLiter(s), IV Bolus, once, infuse over 30 Minute(s), Stop After 1 Doses  lactated ringers Bolus:   500 milliLiter(s), IV Bolus, once, infuse over 30 Minute(s), Stop After 1 Doses  lactated ringers Bolus:   500 milliLiter(s), IV Bolus, once, infuse over 30 Minute(s), Stop After 1 Doses  lactated ringers.: Solution, 1000 milliLiter(s) infuse at 100 mL/Hr  lactated ringers Bolus:   1000 milliLiter(s), IV Bolus, once, infuse over 60 Minute(s), Stop After 1 Doses  lactated ringers Bolus:   500 milliLiter(s), IV Bolus, once, infuse over 60 Minute(s), Stop After 1 Doses  lactated ringers.: Solution, 1000 milliLiter(s) infuse at 75 mL/Hr  Provider's Contact #: [phone]  sodium chloride 0.45%.: Solution, 1000 milliLiter(s) infuse at 75 mL/Hr, Stop After 1 Days      LABS:                        8.3    6.14  )-----------( 49       ( 2022 04:30 )             24.1         143  |  113<H>  |  6<L>  ----------------------------<  154<H>  3.8   |  24  |  0.8    Ca    7.3<L>      2022 04:30  Phos  2.2       Mg     2.6         TPro  4.7<L>  /  Alb  1.5<L>  /  TBili  0.4  /  DBili  x   /  AST  35  /  ALT  24  /  AlkPhos  126<H>      PT/INR - ( 2022 04:30 )   PT: 12.30 sec;   INR: 1.07 ratio         PTT - ( 2022 04:30 )  PTT:36.2 sec  Urinalysis Basic - ( 2022 22:56 )    Color: Yellow / Appearance: Slightly Turbid / S.028 / pH: x  Gluc: x / Ketone: Trace  / Bili: Negative / Urobili: <2 mg/dL   Blood: x / Protein: 30 mg/dL / Nitrite: Negative   Leuk Esterase: Large / RBC: 497 /HPF / WBC 30 /HPF   Sq Epi: x / Non Sq Epi: 1 /HPF / Bacteria: Negative        COVID-19 PCR: NotDetec (2022 09:16)    RADIOLOGY: Today I personally interpreted the latest CXR and other pertinent films.

## 2022-02-27 LAB
ALBUMIN SERPL ELPH-MCNC: 1.5 G/DL — LOW (ref 3.5–5.2)
ALP SERPL-CCNC: 133 U/L — HIGH (ref 30–115)
ALT FLD-CCNC: 22 U/L — SIGNIFICANT CHANGE UP (ref 0–41)
ANION GAP SERPL CALC-SCNC: 4 MMOL/L — LOW (ref 7–14)
APTT BLD: 35.1 SEC — SIGNIFICANT CHANGE UP (ref 27–39.2)
AST SERPL-CCNC: 28 U/L — SIGNIFICANT CHANGE UP (ref 0–41)
BASOPHILS # BLD AUTO: 0.01 K/UL — SIGNIFICANT CHANGE UP (ref 0–0.2)
BASOPHILS NFR BLD AUTO: 0.1 % — SIGNIFICANT CHANGE UP (ref 0–1)
BILIRUB SERPL-MCNC: 0.3 MG/DL — SIGNIFICANT CHANGE UP (ref 0.2–1.2)
BUN SERPL-MCNC: 11 MG/DL — SIGNIFICANT CHANGE UP (ref 10–20)
CALCIUM SERPL-MCNC: 7.3 MG/DL — LOW (ref 8.5–10.1)
CHLORIDE SERPL-SCNC: 115 MMOL/L — HIGH (ref 98–110)
CO2 SERPL-SCNC: 26 MMOL/L — SIGNIFICANT CHANGE UP (ref 17–32)
CORTIS AM PEAK SERPL-MCNC: 5.7 UG/DL — LOW (ref 6–18.4)
CREAT SERPL-MCNC: 0.9 MG/DL — SIGNIFICANT CHANGE UP (ref 0.7–1.5)
CULTURE RESULTS: SIGNIFICANT CHANGE UP
EOSINOPHIL # BLD AUTO: 0.01 K/UL — SIGNIFICANT CHANGE UP (ref 0–0.7)
EOSINOPHIL NFR BLD AUTO: 0.1 % — SIGNIFICANT CHANGE UP (ref 0–8)
GLUCOSE SERPL-MCNC: 124 MG/DL — HIGH (ref 70–99)
HCT VFR BLD CALC: 23.4 % — LOW (ref 37–47)
HGB BLD-MCNC: 7.8 G/DL — LOW (ref 12–16)
IMM GRANULOCYTES NFR BLD AUTO: 0.5 % — HIGH (ref 0.1–0.3)
INR BLD: 1.06 RATIO — SIGNIFICANT CHANGE UP (ref 0.65–1.3)
LYMPHOCYTES # BLD AUTO: 1.31 K/UL — SIGNIFICANT CHANGE UP (ref 1.2–3.4)
LYMPHOCYTES # BLD AUTO: 15.9 % — LOW (ref 20.5–51.1)
MAGNESIUM SERPL-MCNC: 2.1 MG/DL — SIGNIFICANT CHANGE UP (ref 1.8–2.4)
MCHC RBC-ENTMCNC: 29.3 PG — SIGNIFICANT CHANGE UP (ref 27–31)
MCHC RBC-ENTMCNC: 33.3 G/DL — SIGNIFICANT CHANGE UP (ref 32–37)
MCV RBC AUTO: 88 FL — SIGNIFICANT CHANGE UP (ref 81–99)
MONOCYTES # BLD AUTO: 0.37 K/UL — SIGNIFICANT CHANGE UP (ref 0.1–0.6)
MONOCYTES NFR BLD AUTO: 4.5 % — SIGNIFICANT CHANGE UP (ref 1.7–9.3)
NEUTROPHILS # BLD AUTO: 6.51 K/UL — HIGH (ref 1.4–6.5)
NEUTROPHILS NFR BLD AUTO: 78.9 % — HIGH (ref 42.2–75.2)
NRBC # BLD: 2 /100 WBCS — HIGH (ref 0–0)
PHOSPHATE SERPL-MCNC: 1.1 MG/DL — LOW (ref 2.1–4.9)
PLATELET # BLD AUTO: 44 K/UL — LOW (ref 130–400)
POTASSIUM SERPL-MCNC: 3.9 MMOL/L — SIGNIFICANT CHANGE UP (ref 3.5–5)
POTASSIUM SERPL-SCNC: 3.9 MMOL/L — SIGNIFICANT CHANGE UP (ref 3.5–5)
PROT SERPL-MCNC: 4.8 G/DL — LOW (ref 6–8)
PROTHROM AB SERPL-ACNC: 12.2 SEC — SIGNIFICANT CHANGE UP (ref 9.95–12.87)
RBC # BLD: 2.66 M/UL — LOW (ref 4.2–5.4)
RBC # FLD: 16.6 % — HIGH (ref 11.5–14.5)
SARS-COV-2 RNA SPEC QL NAA+PROBE: SIGNIFICANT CHANGE UP
SODIUM SERPL-SCNC: 145 MMOL/L — SIGNIFICANT CHANGE UP (ref 135–146)
SPECIMEN SOURCE: SIGNIFICANT CHANGE UP
WBC # BLD: 8.25 K/UL — SIGNIFICANT CHANGE UP (ref 4.8–10.8)
WBC # FLD AUTO: 8.25 K/UL — SIGNIFICANT CHANGE UP (ref 4.8–10.8)

## 2022-02-27 PROCEDURE — 71045 X-RAY EXAM CHEST 1 VIEW: CPT | Mod: 26

## 2022-02-27 PROCEDURE — 99233 SBSQ HOSP IP/OBS HIGH 50: CPT

## 2022-02-27 PROCEDURE — 99232 SBSQ HOSP IP/OBS MODERATE 35: CPT

## 2022-02-27 PROCEDURE — 93010 ELECTROCARDIOGRAM REPORT: CPT

## 2022-02-27 RX ORDER — CHOLECALCIFEROL (VITAMIN D3) 125 MCG
2000 CAPSULE ORAL DAILY
Refills: 0 | Status: DISCONTINUED | OUTPATIENT
Start: 2022-02-27 | End: 2022-03-26

## 2022-02-27 RX ORDER — POTASSIUM PHOSPHATE, MONOBASIC POTASSIUM PHOSPHATE, DIBASIC 236; 224 MG/ML; MG/ML
30 INJECTION, SOLUTION INTRAVENOUS ONCE
Refills: 0 | Status: COMPLETED | OUTPATIENT
Start: 2022-02-27 | End: 2022-02-27

## 2022-02-27 RX ADMIN — Medication 100 MILLIGRAM(S): at 05:44

## 2022-02-27 RX ADMIN — Medication 1 APPLICATION(S): at 17:09

## 2022-02-27 RX ADMIN — CHLORHEXIDINE GLUCONATE 1 APPLICATION(S): 213 SOLUTION TOPICAL at 11:27

## 2022-02-27 RX ADMIN — PANTOPRAZOLE SODIUM 40 MILLIGRAM(S): 20 TABLET, DELAYED RELEASE ORAL at 17:08

## 2022-02-27 RX ADMIN — Medication 100 MILLIGRAM(S): at 11:26

## 2022-02-27 RX ADMIN — Medication 100 MILLIGRAM(S): at 13:03

## 2022-02-27 RX ADMIN — Medication 1 APPLICATION(S): at 11:26

## 2022-02-27 RX ADMIN — POTASSIUM PHOSPHATE, MONOBASIC POTASSIUM PHOSPHATE, DIBASIC 83.33 MILLIMOLE(S): 236; 224 INJECTION, SOLUTION INTRAVENOUS at 09:20

## 2022-02-27 RX ADMIN — Medication 1 TABLET(S): at 11:27

## 2022-02-27 RX ADMIN — Medication 40 MILLIGRAM(S): at 05:44

## 2022-02-27 RX ADMIN — NYSTATIN CREAM 1 APPLICATION(S): 100000 CREAM TOPICAL at 17:09

## 2022-02-27 RX ADMIN — Medication 2000 UNIT(S): at 11:26

## 2022-02-27 RX ADMIN — NYSTATIN CREAM 1 APPLICATION(S): 100000 CREAM TOPICAL at 06:21

## 2022-02-27 RX ADMIN — Medication 1 APPLICATION(S): at 06:21

## 2022-02-27 RX ADMIN — Medication 100 MILLIGRAM(S): at 21:08

## 2022-02-27 RX ADMIN — PANTOPRAZOLE SODIUM 40 MILLIGRAM(S): 20 TABLET, DELAYED RELEASE ORAL at 05:44

## 2022-02-27 RX ADMIN — Medication 50 MICROGRAM(S): at 21:08

## 2022-02-27 NOTE — PROGRESS NOTE ADULT - ASSESSMENT
Pt is a 60 yo F with PMH of GIB, Bowel and Bladder incontinence, Smoking (1/2 ppd), ETOH abuse, hypothyroidism, anemia transferred from [facility] for EMR/Polypectomy. She was a Rapid response on 2/20/22 for AMS, hypotension requiring pressor support, and hypothermia. She was upgraded to ICU. Hematology evaluation was requested for evaluation of anemia and thrombocytopenia.    # Normocytic Anemia with recent polypectomy, stable  - iron studies: Fe 55; TIBC <72; ferr 2494. However, in the setting of acute events and significant hypotension, the elevated ferritin is likely reactive and is not a sign of iron overload.  - B12 1892, Folate 11  - Haptoglobin low and Amos +, however LDH WNL, Reticulocyte % low along with normal bilirubin which points against hemolytic anemia  - AMOS can be secondary to recent blood transfusion (not documented but pt likely had PRBC in [facility])  - peripheral blood smear reviewed: occasional schistocytes  - f/u SPEP for multiple myeloma   - d/c solumedrol 40 mg IV qD (started on 2/24/22) given no improvement in pt's anemia and thrombocytopenia, making hemolytic anemia and ITP less likely     # Thrombocytopenia likely multifactorial due to myelosuppression from infection, DIC, alcohol abuse   # Elevated INR/PTT not on anticoagulation likely low grade DIC from septic shock   - s/p 2U of platelets transfusion on 2/24/22 and 2/22/22  - Hep C, Hep B and HIV negative  - transfuse platelets if platelets < 30k and bleeding     # +C-ANCA  # Multiple skin lesions  - rheumatology consult appreciated, can be secondary to renal dysfunction  - MPO also positive  - f/u bx with burn team per dermatology    # Elevated TSH  - workup for hypothyroidism per primary team    # S/p polypectomies   - f/u pathology results from her polypectomies     # Septic Shock requiring pressor support   - Rest of medical management as per critical care team   Pt is a 60 yo F with PMH of GIB, Bowel and Bladder incontinence, Smoking (1/2 ppd), ETOH abuse, hypothyroidism, anemia transferred from [facility] for EMR/Polypectomy. She was a Rapid response on 2/20/22 for AMS, hypotension requiring pressor support, and hypothermia. She was upgraded to ICU. Hematology evaluation was requested for evaluation of anemia and thrombocytopenia.    # Normocytic Anemia with recent polypectomy, stable  - iron studies: Fe 55; TIBC <72; ferr 2494. However, in the setting of acute events and significant hypotension, the elevated ferritin is likely reactive and is not a sign of iron overload.  - B12 1892, Folate 11  - Haptoglobin low and Amos +, however LDH WNL, Reticulocyte % low along with normal bilirubin which points against hemolytic anemia  - AMOS can be secondary to recent blood transfusion (not documented but pt likely had PRBC in [facility])  - peripheral blood smear reviewed: occasional schistocytes  - f/u SPEP for multiple myeloma   - taper solumedrol starting today with solumedrol (or equivalent) 30 mg x 3 days, then 20 mg x 3 days and then 10 mg x 3 days given no improvement in pt's anemia and thrombocytopenia. This also makes hemolytic anemia and ITP less likely     # Thrombocytopenia likely multifactorial due to myelosuppression from infection, DIC, alcohol abuse   # Elevated INR/PTT not on anticoagulation likely low grade DIC from septic shock   - s/p 2U of platelets transfusion on 2/24/22 and 2/22/22  - Hep C, Hep B and HIV negative  - transfuse platelets if platelets < 30k and bleeding     # +C-ANCA  # Multiple skin lesions  - rheumatology consult appreciated, can be secondary to renal dysfunction  - MPO also positive  - f/u bx with burn team per dermatology    # Elevated TSH  - workup for hypothyroidism per primary team    # S/p polypectomies   - f/u pathology results from her polypectomies     # Septic Shock requiring pressor support   - Rest of medical management as per critical care team

## 2022-02-27 NOTE — PROGRESS NOTE ADULT - SUBJECTIVE AND OBJECTIVE BOX
SANTIAGO CALIXTO 61y Female  MRN#: [id number]     SUBJECTIVE  Patient is a 61y old Female who presents with a chief complaint of Polypectomy (27 Feb 2022 07:13)  Today is hospital day 11d, and this morning she is lying in bed without distress.   No acute overnight events.     OBJECTIVE  PAST MEDICAL & SURGICAL HISTORY    ALLERGIES:  No Known Allergies    MEDICATIONS:  STANDING MEDICATIONS  aztreonam  IVPB 2000 milliGRAM(s) IV Intermittent every 8 hours  chlorhexidine 4% Liquid 1 Application(s) Topical daily  cholecalciferol 2000 Unit(s) Oral daily  clindamycin IVPB 600 milliGRAM(s) IV Intermittent every 8 hours  clindamycin IVPB      dextrose 5% + lactated ringers. 1000 milliLiter(s) IV Continuous <Continuous>  levothyroxine Injectable 50 MICROGram(s) IV Push at bedtime  methylPREDNISolone sodium succinate Injectable 40 milliGRAM(s) IV Push daily  multivitamin/minerals 1 Tablet(s) Oral daily  norepinephrine Infusion 0.05 MICROgram(s)/kG/Min IV Continuous <Continuous>  nystatin Cream 1 Application(s) Topical two times a day  pantoprazole  Injectable 40 milliGRAM(s) IV Push every 12 hours  potassium phosphate IVPB 30 milliMole(s) IV Intermittent once  thiamine 100 milliGRAM(s) Oral daily  triamcinolone 0.1% Cream 1 Application(s) Topical two times a day  vitamin A &amp; D Ointment 1 Application(s) Topical daily    PRN MEDICATIONS    HOME MEDICATIONS  Home Medications:      VITAL SIGNS: Last 24 Hours  T(C): 36.1 (27 Feb 2022 04:00), Max: 36.6 (26 Feb 2022 20:00)  T(F): 96.9 (27 Feb 2022 04:00), Max: 97.8 (26 Feb 2022 20:00)  HR: 80 (27 Feb 2022 06:00) (79 - 92)  BP: 82/52 (27 Feb 2022 06:00) (82/51 - 102/70)  BP(mean): 62 (27 Feb 2022 06:00) (61 - 77)  RR: 17 (27 Feb 2022 06:00) (12 - 28)  SpO2: 99% (27 Feb 2022 06:00) (92% - 100%)    02-26-22 @ 07:01 - 02-27-22 @ 07:00  --------------------------------------------------------  IN: 3158 mL / OUT: 755 mL / NET: 2403 mL        LABS:                        7.8    8.25  )-----------( 44       ( 27 Feb 2022 05:12 )             23.4     02-27    145  |  115<H>  |  11  ----------------------------<  124<H>  3.9   |  26  |  0.9    Ca    7.3<L>      27 Feb 2022 05:12  Phos  1.1     02-27  Mg     2.1     02-27    TPro  4.8<L>  /  Alb  1.5<L>  /  TBili  0.3  /  DBili  x   /  AST  28  /  ALT  22  /  AlkPhos  133<H>  02-27    LIVER FUNCTIONS - ( 27 Feb 2022 05:12 )  Alb: 1.5 g/dL / Pro: 4.8 g/dL / ALK PHOS: 133 U/L / ALT: 22 U/L / AST: 28 U/L / GGT: x           PT/INR - ( 27 Feb 2022 05:12 )   PT: 12.20 sec;   INR: 1.06 ratio     PTT - ( 27 Feb 2022 05:12 )  PTT:35.1 sec    CAPILLARY BLOOD GLUCOSE  POCT Blood Glucose.: 157 mg/dL (26 Feb 2022 07:08)      RADIOLOGY:    PHYSICAL EXAM:  GENERAL: NAD, AAO x 1, 61y F w/ NGT and R CVC noted  HEAD:  Atraumatic, Normocephalic  EYES: EOMI, conjunctiva clear and sclera white  NECK: Supple, No JVD  CHEST/LUNG: Lesions across her chest and abdomen as well. Clear to auscultation bilaterally; No wheeze; No crackles; No accessory muscles used  HEART: Regular rate and rhythm; No murmurs;   ABDOMEN: Soft, Nontender, Nondistended; Bowel sounds present; No guarding  EXTREMITIES:  2+ Peripheral Pulses, No cyanosis or edema  NEUROLOGY: non-focal    ADMISSION SUMMARY  Patient is a 61y old Female who presents with a chief complaint of Polypectomy (27 Feb 2022 07:13)

## 2022-02-27 NOTE — CHART NOTE - NSCHARTNOTEFT_GEN_A_CORE
Kcal count ordered for 2/25-26.     Kcal count form reviewed. Pt only had half of an oatmeal in two days. Kcal count ordered for 2/25-26.     Kcal count form reviewed. Pt only had half of an oatmeal from breakfast tray on 2/26. 0% intake at other meals. Kcal count ordered for 2/25-26.     Kcal count form reviewed. Pt only had half of an oatmeal from breakfast tray on 2/25. 0% intake at other meals. Kcal count ordered for 2/25-26.     Kcal count form reviewed. Pt only had half of an oatmeal from breakfast tray on 2/25. 0% intake at other meals.    All nutrition interventions deferred to Nutrition Support Team.

## 2022-02-27 NOTE — PROGRESS NOTE ADULT - SUBJECTIVE AND OBJECTIVE BOX
SANTIAGO CALIXTO 61y Female  MRN#: [id number]   CODE STATUS: FULL     Hospital Day: 11d    Pt is currently admitted with the primary diagnosis of GIB s/p polypectomy, ams, pancolitis    SUBJECTIVE  Hospital Course: Patient is a 60 y/o female with PMHx of GIB, Bowel and Bladder incontinence, Smoking (3-4 cigarettes/day), ETOH abuse, hypothyroidism, and ongoing anemia transferred from [facility] for Polypectomy. Patient was admitted to [facility] on 01/31 for AMS, weakness and decreased appetite. She was found to have UTI, NSTEMI, and electrolyte imbalance. During her stay at [facility] she had a colonoscopy done which revealed 1 large 3-4 cm sigmoid pedunculated polyp and 2 polyps (7, 14 mm) in descending colon. Patient was transferred as they are not able to perform large Polypectomies there. She was not able to be discharged as ongoing anemia and concern that the cause are these large polyps.    Patient had a colonoscopy and polypectomy done on 2/18/2022. Polyp (13 mm) in the descending colon. (hot snare Polypectomy).  Polyp (25 mm to 30 mm) in the rectosigmoid junction at 20 cm from the anal verge. (Endoloop, Polypectomy, Endoclip).   After the colonoscopy patient became hypothermic and hypotensive. Over night patient was started to Levo and warming blanket.   2/20 AM patient had RRT for desaturation and hypotension. Patient was placed on 50% O2, patient had good response. Levophed also started to raise BP. Patient remains altered, not speaking, but remains somewhat awake and just makes grunting sounds. Patient is not following commands. Central line was placed. Spoke to ICU fellow, patient is being upgraded to ICU for closer monitoring.     In CCU pt had abdominal tenderness, KUB ordered, concern for RP free air. CTAP with PO/IV contrast ordered, no perforation or pneumo-peritoneum/retroperitoneum however showing findings c/w pancolitis. Surgery following, ID consulted. Pt on vanc/zosyn. In CCU, pt developed coagulopathy (low plt, anemia, increasing INR despite not on AC). Treated with pRBC, plt transfusion as well as vitamin K. Heme onc on board, thought to be due to possible DIC 2/2 sepsis. Started on solumedrol 40mg for positive cindy however possibly positive due to transfusion at [facility]. Plt stabilizing so steroids tapered. Derm consulted for diffuse skin rash- bullous lesions with desquamation and areas of healing with hyperpigmentation. Burn consulted for skin bx. C-anca also positive, rhuem consulted, presentation unlikely due to vasculitis, will rpt cindy once acute illness resolves.     Overnight events: none    Interval hx/Subjective complaints: Pt feeling well this morning, no complaints.     Pt denies any fevers, chills, fatigue, CP, palpitations, cough, SOB, abdominal pain, n/v/d, hematochezia, melena, weakness, numbness, tingling, or other FND.                                             ----------------------------------------------------------  OBJECTIVE  PAST MEDICAL & SURGICAL HISTORY                                            -----------------------------------------------------------  ALLERGIES:  No Known Allergies                                            ------------------------------------------------------------    HOME MEDICATIONS  Home Medications:                           MEDICATIONS:  STANDING MEDICATIONS  aztreonam  IVPB 2000 milliGRAM(s) IV Intermittent every 8 hours  chlorhexidine 4% Liquid 1 Application(s) Topical daily  cholecalciferol 2000 Unit(s) Oral daily  clindamycin IVPB 600 milliGRAM(s) IV Intermittent every 8 hours  clindamycin IVPB      dextrose 5% + lactated ringers. 1000 milliLiter(s) IV Continuous <Continuous>  levothyroxine Injectable 50 MICROGram(s) IV Push at bedtime  methylPREDNISolone sodium succinate Injectable 40 milliGRAM(s) IV Push daily  multivitamin/minerals 1 Tablet(s) Oral daily  norepinephrine Infusion 0.05 MICROgram(s)/kG/Min IV Continuous <Continuous>  nystatin Cream 1 Application(s) Topical two times a day  pantoprazole  Injectable 40 milliGRAM(s) IV Push every 12 hours  thiamine 100 milliGRAM(s) Oral daily  triamcinolone 0.1% Cream 1 Application(s) Topical two times a day  vitamin A &amp; D Ointment 1 Application(s) Topical daily    PRN MEDICATIONS                                            ------------------------------------------------------------  VITAL SIGNS: Last 24 Hours  T(C): 35.8 (27 Feb 2022 12:00), Max: 36.6 (26 Feb 2022 20:00)  T(F): 96.4 (27 Feb 2022 12:00), Max: 97.8 (26 Feb 2022 20:00)  HR: 81 (27 Feb 2022 12:00) (79 - 92)  BP: 109/67 (27 Feb 2022 12:00) (82/51 - 120/73)  BP(mean): 84 (27 Feb 2022 12:00) (61 - 90)  RR: 19 (27 Feb 2022 12:00) (12 - 28)  SpO2: 92% (27 Feb 2022 12:00) (92% - 100%)      02-26-22 @ 07:01  -  02-27-22 @ 07:00  --------------------------------------------------------  IN: 3158 mL / OUT: 755 mL / NET: 2403 mL    02-27-22 @ 07:01  -  02-27-22 @ 12:11  --------------------------------------------------------  IN: 300 mL / OUT: 120 mL / NET: 180 mL                                             --------------------------------------------------------------  LABS:                        7.8    8.25  )-----------( 44       ( 27 Feb 2022 05:12 )             23.4     02-27    145  |  115<H>  |  11  ----------------------------<  124<H>  3.9   |  26  |  0.9    Ca    7.3<L>      27 Feb 2022 05:12  Phos  1.1     02-27  Mg     2.1     02-27    TPro  4.8<L>  /  Alb  1.5<L>  /  TBili  0.3  /  DBili  x   /  AST  28  /  ALT  22  /  AlkPhos  133<H>  02-27    PT/INR - ( 27 Feb 2022 05:12 )   PT: 12.20 sec;   INR: 1.06 ratio         PTT - ( 27 Feb 2022 05:12 )  PTT:35.1 sec                                            -------------------------------------------------------------  RADIOLOGY:                                            --------------------------------------------------------------  PHYSICAL EXAM:  General: Ill appearing woman laying in bed in nad  HEENT: ncat, eomi, mmm  LUNGS: ctab  HEART: s1/s2, rrr  ABDOMEN: soft, ntnd, bs+  EXT: wwp, no cyanosis, clubbing, edema  NEURO: aox2, moves all extremities   SKIN: superficial scabs diffusely over body, most prominant over chest, as well as areas of desquamation over arms, legs, and hips. Stable from previous exams  Lines: [facility] c/d/i   Drips: levo @ 0.05                                         --------------------------------------------------------------    ASSESSMENT & PLAN  61F PMH GIB, Bowel and Bladder incontinence, Smoking (3-4 cigarettes/day), ETOH abuse, hypothyroidism, and ongoing anemia, admitted to [facility] 1/31 for ams found to have UTI. Dev GIB, colonoscopy showing polyps, transferred to [facility] for polypectomy now upgraded to CCU for hypotension, likely septic shock 2/2 aspiration pna.     #AMS with unclear etiology   #sepsis w/ shock/hypothermia vs aspiration after procedure under anesthesia 2/18 vs CVA   #Pancolitis   - Pt upgraded from floor on 2/20 after RRT for hypotension and desaturation. RIJ CVC placed, started on levo, venti mask, o2 and hypotension subsequently improved   - CTH 2/20: no ICH, mass effect, or midline shift   - EEG 2/20: generalized slowing    - UCx 2/18 negative   - BCx 2/17, 2/19: NGTD  - COVID negative 2/20  - MRSA nares 2/20 negative  - LE duplex 2/21: no DVT however bl peroneal veins not visualized   - ammonia elevated to 59 on 2/22, started on lactulose, possibly contributing to ams   - ammonia 50 on 2/26   - d/c'd vanc 2/22  - c/w aztreonam 2g q8h (started 2/24- switched from eduardo due to concern for pcn induced thrombocytopenia)   - c/w clinda 600mg q8h   - on levo @0.05  - neuro eval appreciated  - ID recs appreciated      #Colonic Polyp  #Diarrhea in setting of recent abx use  #Hx of GIB   #Pneumoretroperitoneum   #Pancolitis   - Colonoscopy 2/4: 1 large 3-4 cm sigmoid pedunculated polyp and 2 polyps (7, 14 mm) in descending colon s/p bx  - GI consulted (Dr. Daniels)   - Patient is cleared for colonoscopy per Cardiology   - Negative for C-Diff infection  - CEA elevated 5.6  - S/P  colonoscopy and polypectomy 2/18/2022. Polyp (13 mm) in the descending colon. (hot snare Polypectomy).  Polyp (25 mm to 30 mm) in the rectosigmoid junction at 20 cm from the anal verge. (Endoloop, Polypectomy, Endoclip).   - Repeat Colonoscopy in 6 months   - GI recs appreciated    - 2/21: overnight pt had abdominal tenderness, KUB ordered showing paraspinal lucencies concerning for possible pneumoretroperitoneum   - abdomen soft, nontender, nondistended on my exam  - CTAP PO/IV contrast 2/21: findings c/w pancolitis, no intra or retroperitoneal free air  - c/w aztreonam, clinda   - Pt tolerating tube feeds. Passed S&S- pureed diet however PO intake minimal. Will c/w tube feeds and calorie count   - surgery recs appreciated   - ID recs appreciated     #Exfoliative skin lesions- possibly externally induced (e.g scratching), r/o TEN given vesicles and desquamation   #+C-ANCA  - pt with superficial scabs diffusely over body, most prominent over chest, as well as areas of desquamation over arms, legs, and hips   - Continue to monitor, if any worsening derm consult   - Burn recs appreciated; dx with incontinence dermatitis    - f/u wound care recs   - derm recs appreciated, recalled burn for skin bx, f/u burn c/s   - rheum recs appreciated, no clear evidence for ANCA associated vasulitis, could be elevated due to sepsis. Agree with skin bx. Sent PR4/MPO antibody assay, will rpt c-anca once acute illness resolves.  - f/u niacin levels     #Prolonged qtc    - qtc 613   - repleating mg today, keep Mg>2, K>4.   - on no qtc prolonging medications   - f/u arsenic levels   - daily ekg     #Normocytic anemia   #Acute thrombocytopenia  #Elevated INR/PTT not on a/c  - not on heparin this admission, unclear if was on previously at [facility]  - plt, hgb continued to downtrend so given 1u plt 1u pRBC on 2/23. Addnl unit plt given 2/24, platelets appear to be stabilizing will follow    - INR also uptrending, gave 10mg vitamin K IVPB 2/23   - cindy positive, possibly due to transfusion (not documented but likely received in [facility])  - retic count 0.9, LDH nl, haptoglobin slightly low. Ddimer elevated, fibrinogen nl, fibrin split products elevated    - f/u HIT panel   - HIV, hep B negative   - monitor INR   - started on trial of solumedrol 40mg IV 2/24, plt stabilized, will begin taper 2/28 (prednisone 30 x3d, 20 x3d, 10 x3d.   - heme onc recs appreciated    #Hypotension   - am cortisol 5.7   - C/w midodrine 15mg TID   - On Levo, downtitrate as tolerated     #Hx of UTI- resolved   #R lower face cellulitis- resolved  - CT maxillofacial with C 2/12: Mild soft tissue inflammation suggesting cellulitis of the right lower face  - pt was on cefepime, vancomycin (end date 2/15)  - Started on Unasyn 2/16,/c today 2/20 started on broad spectrum Abx 2/20  - No documentations of + bcx or ucx on the charts from [facility]   - Rpt UCx negative   - Bclx 2/17 NGTD, F/u repeat    - Dental recs appreciated   - F/u SPEP     #Hypernatremia- improving   - c/w free water via peg     #Hypothyroidism  - TSH 15.6  - C/w synthroid 50 mcg po q24  - Patient will need to repeat TSH and FT4 in 6 wks (April)    #Hx L pleural effusion on CXR 2/8  - CT Chest with Cont 2/9 obtained: Moderate b/l pleural effusions resulting in complete atelectasis of both lower lobes  - f/u rpt cxr today     #Hx NSTEMI   #Hx of Elevated Troponin  - in setting of hypotension   - c/w Midodrine 15 mg q8h  - Normal Lexiscan at [facility]  - EKG noted     #Hx of ETOH abuse  - last drink (as per nurse) 6 months ago  - c/w Folic acid and Thiamine  - F/u Dietician eval  - Ensure 3x/day    #Possible Thiamine deficiency   #Possible Folic acid deficiency   - C/w Folic acid and Thiamine    #Misc   - DVT prophylaxis: SCD   - GI prophylaxis: PPI  - Diet: NPO tube feeds    - Activity status: IAT   - Code status: Full code   - Disposition: acute     #Handoff   - f/u HIT labs   - f/u calorie count   - f/u burn cs

## 2022-02-27 NOTE — PROGRESS NOTE ADULT - SUBJECTIVE AND OBJECTIVE BOX
Patient is a 61y old  Female who presents with a chief complaint of Polypectomy (26 Feb 2022 08:41)        HPI:  Pt is a 60 yo F with PMH of GIB, Bowel and Bladder incontinence, Smoking (3-4 cigarettes/day), ETOH abuse, hypothyroidism anemia transferred from [facility] for Polypectomy. Pt is from home living with her son and bedbound at baseline. Pt was admitted to [facility] on 01/31 for AMS, weakness and decreased appetite. She was found to have UTI, NSTEMI, and electrolyte imbalance. During her stay at [facility] pt had colonoscopy done which revealed 1 large 3-4 cm sigmoid pedunculated polyp and 2 polyps (7, 14 mm) in descending colon s/p bx. Pt was transferred to [facility] for polyp removal.  (16 Feb 2022 23:24)      Pt evaluated on rounds.  I reviewed the radiology tests and hospital record.    I reviewed previous notes on this patient.    Interval Events: No overnight events.      REVIEW OF SYSTEMS:   see HPI      OBJECTIVE:  ICU Vital Signs Last 24 Hrs  T(C): 36.1 (27 Feb 2022 04:00), Max: 36.6 (26 Feb 2022 20:00)  T(F): 96.9 (27 Feb 2022 04:00), Max: 97.8 (26 Feb 2022 20:00)  HR: 80 (27 Feb 2022 06:00) (73 - 92)  BP: 82/52 (27 Feb 2022 06:00) (82/51 - 102/70)  BP(mean): 62 (27 Feb 2022 06:00) (61 - 77)  ABP: --  ABP(mean): --  RR: 17 (27 Feb 2022 06:00) (12 - 28)  SpO2: 99% (27 Feb 2022 06:00) (92% - 100%)        02-26 @ 07:01  -  02-27 @ 07:00  --------------------------------------------------------  IN: 3158 mL / OUT: 755 mL / NET: 2403 mL      CAPILLARY BLOOD GLUCOSE      POCT Blood Glucose.: 157 mg/dL (26 Feb 2022 07:08)        PHYSICAL EXAM:       · ENMT:   Airway patent,   Nasal mucosa clear.  Mouth with normal mucosa.   No thrush    · EYES:   Clear bilaterally,   pupils equal,   round and reactive to light.    · CARDIAC:   Normal rate,   regular rhythm.    Heart sounds S1, S2.   No murmurs, no rubs or gallops on auscultation  no edema        CAROTID:   normal systolic impulse  no bruits    · RESPIRATORY:   rales  normal chest expansion  no retractions or use of accessory muscles  palpation of chest is normal with no fremitus  percussion of chest demonstrates no hyperresonance or dullness    · GASTROINTESTINAL:  Abdomen soft,   non-tender,   + BS  liver/spleen not palpable    · MUSCULOSKELETAL:   no clubbing, cyanosis      · SKIN:   Skin normal color for race,   warm, dry   No evidence of rash.        · HEME LYMPH:   no splenomegaly.  No cervical  lymphadenopathy.  no inguinal lymphadenopathy    HOSPITAL MEDICATIONS:  MEDICATIONS  (STANDING):  aztreonam  IVPB 2000 milliGRAM(s) IV Intermittent every 8 hours  chlorhexidine 4% Liquid 1 Application(s) Topical daily  cholecalciferol 2000 Unit(s) Oral daily  clindamycin IVPB 600 milliGRAM(s) IV Intermittent every 8 hours  clindamycin IVPB      dextrose 5% + lactated ringers. 1000 milliLiter(s) (70 mL/Hr) IV Continuous <Continuous>  levothyroxine Injectable 50 MICROGram(s) IV Push at bedtime  methylPREDNISolone sodium succinate Injectable 40 milliGRAM(s) IV Push daily  multivitamin/minerals 1 Tablet(s) Oral daily  norepinephrine Infusion 0.05 MICROgram(s)/kG/Min (2.81 mL/Hr) IV Continuous <Continuous>  nystatin Cream 1 Application(s) Topical two times a day  pantoprazole  Injectable 40 milliGRAM(s) IV Push every 12 hours  potassium phosphate IVPB 30 milliMole(s) IV Intermittent once  thiamine 100 milliGRAM(s) Oral daily  triamcinolone 0.1% Cream 1 Application(s) Topical two times a day  vitamin A &amp; D Ointment 1 Application(s) Topical daily    MEDICATIONS  (PRN):    lactated ringers Bolus:   500 milliLiter(s), IV Bolus, once, infuse over 30 Minute(s), Stop After 1 Doses  lactated ringers Bolus:   500 milliLiter(s), IV Bolus, once, infuse over 30 Minute(s), Stop After 1 Doses  lactated ringers Bolus:   500 milliLiter(s), IV Bolus, once, infuse over 60 Minute(s), Stop After 1 Doses  lactated ringers Bolus:   500 milliLiter(s), IV Bolus, once, infuse over 60 Minute(s), Stop After 1 Doses  lactated ringers Bolus:   500 milliLiter(s), IV Bolus, once, infuse over 60 Minute(s), Stop After 1 Doses  sodium chloride 0.9% Bolus:   250 milliLiter(s), IV Bolus, once, infuse over 30 Minute(s), Stop After 1 Doses  lactated ringers Bolus:   500 milliLiter(s), IV Bolus, once, infuse over 30 Minute(s), Stop After 1 Doses  lactated ringers Bolus:   500 milliLiter(s), IV Bolus, once, infuse over 30 Minute(s), Stop After 1 Doses  lactated ringers Bolus:   500 milliLiter(s), IV Bolus, once, infuse over 30 Minute(s), Stop After 1 Doses  lactated ringers.: Solution, 1000 milliLiter(s) infuse at 100 mL/Hr  lactated ringers Bolus:   1000 milliLiter(s), IV Bolus, once, infuse over 60 Minute(s), Stop After 1 Doses  lactated ringers Bolus:   500 milliLiter(s), IV Bolus, once, infuse over 60 Minute(s), Stop After 1 Doses  lactated ringers.: Solution, 1000 milliLiter(s) infuse at 75 mL/Hr  Provider's Contact #: [phone]  sodium chloride 0.45%.: Solution, 1000 milliLiter(s) infuse at 75 mL/Hr, Stop After 1 Days      LABS:                        7.8    8.25  )-----------( 44       ( 27 Feb 2022 05:12 )             23.4     02-27    145  |  115<H>  |  11  ----------------------------<  124<H>  3.9   |  26  |  0.9    Ca    7.3<L>      27 Feb 2022 05:12  Phos  1.1     02-27  Mg     2.1     02-27    TPro  4.8<L>  /  Alb  1.5<L>  /  TBili  0.3  /  DBili  x   /  AST  28  /  ALT  22  /  AlkPhos  133<H>  02-27    PT/INR - ( 27 Feb 2022 05:12 )   PT: 12.20 sec;   INR: 1.06 ratio         PTT - ( 27 Feb 2022 05:12 )  PTT:35.1 sec        COVID-19 PCR: NotDetec (20 Feb 2022 09:16)            RADIOLOGY: Today I personally interpreted the latest CXR and other pertinent films.    no ptx, no inftr. no free air.  CT scan        .crti    .crit2

## 2022-02-27 NOTE — PROGRESS NOTE ADULT - ASSESSMENT
IMPRESSION:  AMS   Sepsis   Septic shock  Possible aspiration PNA  Acute hypoxemic respiratory failure   Colon polyps s/p polypectomy of 2 polyps on 2/18  Anemia  HO GIB  HO ETOH abuse  Active smoker  NAGMA    PLAN:    CNS: CTH. Avoid CNS depressants.   Thiamine, folate.   CIWA protocol.    NH4 level.     HEENT: Oral care    PULMONARY:  HOB @ 45 degrees.  Aspiration precautions.    Supplemental O2 target Spo2 92-94%  CXR    CARDIOVASCULAR:   Echo.   BNP.   off Levophed      GI: GI prophylaxis.   diet   free H2O.     RENAL:  Follow up lytes.  Correct as needed      INFECTIOUS DISEASE:   culture temp spikes      HEMATOLOGICAL:  DVT prophylaxis. LE duplex    ENDOCRINE:  Follow up FS.  Insulin protocol if needed.    MUSCULOSKELETAL:  Bed rest     Dispo:  can go to F if stays off pressors

## 2022-02-28 ENCOUNTER — RESULT REVIEW (OUTPATIENT)
Age: 62
End: 2022-02-28

## 2022-02-28 LAB
ALBUMIN SERPL ELPH-MCNC: 1.5 G/DL — LOW (ref 3.5–5.2)
ALP SERPL-CCNC: 263 U/L — HIGH (ref 30–115)
ALT FLD-CCNC: 34 U/L — SIGNIFICANT CHANGE UP (ref 0–41)
AMMONIA BLD-MCNC: 69 UMOL/L — HIGH (ref 11–55)
ANION GAP SERPL CALC-SCNC: 5 MMOL/L — LOW (ref 7–14)
APTT BLD: 32.3 SEC — SIGNIFICANT CHANGE UP (ref 27–39.2)
ARSENIC UR-MCNC: <10 MCG/L — SIGNIFICANT CHANGE UP
AST SERPL-CCNC: 49 U/L — HIGH (ref 0–41)
BASOPHILS # BLD AUTO: 0.01 K/UL — SIGNIFICANT CHANGE UP (ref 0–0.2)
BASOPHILS NFR BLD AUTO: 0.1 % — SIGNIFICANT CHANGE UP (ref 0–1)
BILIRUB SERPL-MCNC: 0.4 MG/DL — SIGNIFICANT CHANGE UP (ref 0.2–1.2)
BUN SERPL-MCNC: 16 MG/DL — SIGNIFICANT CHANGE UP (ref 10–20)
CALCIUM SERPL-MCNC: 7.2 MG/DL — LOW (ref 8.5–10.1)
CHLORIDE SERPL-SCNC: 114 MMOL/L — HIGH (ref 98–110)
CO2 SERPL-SCNC: 25 MMOL/L — SIGNIFICANT CHANGE UP (ref 17–32)
CREAT SERPL-MCNC: 0.9 MG/DL — SIGNIFICANT CHANGE UP (ref 0.7–1.5)
EOSINOPHIL # BLD AUTO: 0.02 K/UL — SIGNIFICANT CHANGE UP (ref 0–0.7)
EOSINOPHIL NFR BLD AUTO: 0.2 % — SIGNIFICANT CHANGE UP (ref 0–8)
GLUCOSE SERPL-MCNC: 80 MG/DL — SIGNIFICANT CHANGE UP (ref 70–99)
HCT VFR BLD CALC: 23.3 % — LOW (ref 37–47)
HGB BLD-MCNC: 7.8 G/DL — LOW (ref 12–16)
IMM GRANULOCYTES NFR BLD AUTO: 0.6 % — HIGH (ref 0.1–0.3)
INR BLD: 1.11 RATIO — SIGNIFICANT CHANGE UP (ref 0.65–1.3)
LYMPHOCYTES # BLD AUTO: 1.57 K/UL — SIGNIFICANT CHANGE UP (ref 1.2–3.4)
LYMPHOCYTES # BLD AUTO: 18.3 % — LOW (ref 20.5–51.1)
MAGNESIUM SERPL-MCNC: 2 MG/DL — SIGNIFICANT CHANGE UP (ref 1.8–2.4)
MCHC RBC-ENTMCNC: 29.4 PG — SIGNIFICANT CHANGE UP (ref 27–31)
MCHC RBC-ENTMCNC: 33.5 G/DL — SIGNIFICANT CHANGE UP (ref 32–37)
MCV RBC AUTO: 87.9 FL — SIGNIFICANT CHANGE UP (ref 81–99)
MONOCYTES # BLD AUTO: 0.42 K/UL — SIGNIFICANT CHANGE UP (ref 0.1–0.6)
MONOCYTES NFR BLD AUTO: 4.9 % — SIGNIFICANT CHANGE UP (ref 1.7–9.3)
NEUTROPHILS # BLD AUTO: 6.52 K/UL — HIGH (ref 1.4–6.5)
NEUTROPHILS NFR BLD AUTO: 75.9 % — HIGH (ref 42.2–75.2)
NRBC # BLD: 2 /100 WBCS — HIGH (ref 0–0)
PHOSPHATE SERPL-MCNC: 2.7 MG/DL — SIGNIFICANT CHANGE UP (ref 2.1–4.9)
PLATELET # BLD AUTO: 41 K/UL — LOW (ref 130–400)
POTASSIUM SERPL-MCNC: 4.6 MMOL/L — SIGNIFICANT CHANGE UP (ref 3.5–5)
POTASSIUM SERPL-SCNC: 4.6 MMOL/L — SIGNIFICANT CHANGE UP (ref 3.5–5)
PROT SERPL-MCNC: 4.8 G/DL — LOW (ref 6–8)
PROTHROM AB SERPL-ACNC: 12.7 SEC — SIGNIFICANT CHANGE UP (ref 9.95–12.87)
RBC # BLD: 2.65 M/UL — LOW (ref 4.2–5.4)
RBC # FLD: 16.8 % — HIGH (ref 11.5–14.5)
SODIUM SERPL-SCNC: 144 MMOL/L — SIGNIFICANT CHANGE UP (ref 135–146)
SURGICAL PATHOLOGY STUDY: SIGNIFICANT CHANGE UP
WBC # BLD: 8.59 K/UL — SIGNIFICANT CHANGE UP (ref 4.8–10.8)
WBC # FLD AUTO: 8.59 K/UL — SIGNIFICANT CHANGE UP (ref 4.8–10.8)

## 2022-02-28 PROCEDURE — 71045 X-RAY EXAM CHEST 1 VIEW: CPT | Mod: 26,77

## 2022-02-28 PROCEDURE — 99233 SBSQ HOSP IP/OBS HIGH 50: CPT

## 2022-02-28 PROCEDURE — 11106 INCAL BX SKN SINGLE LES: CPT

## 2022-02-28 PROCEDURE — 71045 X-RAY EXAM CHEST 1 VIEW: CPT | Mod: 26

## 2022-02-28 PROCEDURE — 93010 ELECTROCARDIOGRAM REPORT: CPT

## 2022-02-28 RX ORDER — LACTULOSE 10 G/15ML
10 SOLUTION ORAL
Refills: 0 | Status: DISCONTINUED | OUTPATIENT
Start: 2022-02-28 | End: 2022-03-05

## 2022-02-28 RX ORDER — SODIUM CHLORIDE 9 MG/ML
1000 INJECTION, SOLUTION INTRAVENOUS ONCE
Refills: 0 | Status: COMPLETED | OUTPATIENT
Start: 2022-02-28 | End: 2022-02-28

## 2022-02-28 RX ORDER — SODIUM CHLORIDE 9 MG/ML
250 INJECTION, SOLUTION INTRAVENOUS ONCE
Refills: 0 | Status: COMPLETED | OUTPATIENT
Start: 2022-02-28 | End: 2022-02-28

## 2022-02-28 RX ORDER — SODIUM CHLORIDE 9 MG/ML
1000 INJECTION, SOLUTION INTRAVENOUS
Refills: 0 | Status: DISCONTINUED | OUTPATIENT
Start: 2022-02-28 | End: 2022-03-02

## 2022-02-28 RX ADMIN — PANTOPRAZOLE SODIUM 40 MILLIGRAM(S): 20 TABLET, DELAYED RELEASE ORAL at 05:02

## 2022-02-28 RX ADMIN — SODIUM CHLORIDE 75 MILLILITER(S): 9 INJECTION, SOLUTION INTRAVENOUS at 17:49

## 2022-02-28 RX ADMIN — PANTOPRAZOLE SODIUM 40 MILLIGRAM(S): 20 TABLET, DELAYED RELEASE ORAL at 17:48

## 2022-02-28 RX ADMIN — SODIUM CHLORIDE 1000 MILLILITER(S): 9 INJECTION, SOLUTION INTRAVENOUS at 11:00

## 2022-02-28 RX ADMIN — Medication 30 MILLIGRAM(S): at 05:02

## 2022-02-28 RX ADMIN — NYSTATIN CREAM 1 APPLICATION(S): 100000 CREAM TOPICAL at 05:02

## 2022-02-28 RX ADMIN — Medication 1 APPLICATION(S): at 05:02

## 2022-02-28 RX ADMIN — Medication 50 MICROGRAM(S): at 21:10

## 2022-02-28 RX ADMIN — SODIUM CHLORIDE 500 MILLILITER(S): 9 INJECTION, SOLUTION INTRAVENOUS at 10:00

## 2022-02-28 RX ADMIN — Medication 100 MILLIGRAM(S): at 14:26

## 2022-02-28 RX ADMIN — NYSTATIN CREAM 1 APPLICATION(S): 100000 CREAM TOPICAL at 17:48

## 2022-02-28 RX ADMIN — Medication 100 MILLIGRAM(S): at 05:01

## 2022-02-28 RX ADMIN — Medication 100 MILLIGRAM(S): at 21:10

## 2022-02-28 RX ADMIN — Medication 100 MILLIGRAM(S): at 11:44

## 2022-02-28 RX ADMIN — Medication 2000 UNIT(S): at 11:44

## 2022-02-28 RX ADMIN — Medication 1 APPLICATION(S): at 11:45

## 2022-02-28 RX ADMIN — Medication 1 APPLICATION(S): at 17:48

## 2022-02-28 RX ADMIN — CHLORHEXIDINE GLUCONATE 1 APPLICATION(S): 213 SOLUTION TOPICAL at 11:45

## 2022-02-28 RX ADMIN — Medication 1 TABLET(S): at 11:45

## 2022-02-28 NOTE — PROGRESS NOTE ADULT - SUBJECTIVE AND OBJECTIVE BOX
Patient is a 61y old  Female who presents with a chief complaint of Polypectomy (27 Feb 2022 12:10)      Over Night Events:  Patient seen and examined.   on levophid     ROS:  See HPI    PHYSICAL EXAM    ICU Vital Signs Last 24 Hrs  T(C): 36 (28 Feb 2022 04:00), Max: 36 (27 Feb 2022 08:00)  T(F): 96.8 (28 Feb 2022 04:00), Max: 96.8 (27 Feb 2022 08:00)  HR: 82 (28 Feb 2022 06:00) (79 - 99)  BP: 97/62 (28 Feb 2022 06:00) (85/56 - 120/73)  BP(mean): 73 (28 Feb 2022 06:00) (64 - 90)  ABP: --  ABP(mean): --  RR: 19 (28 Feb 2022 06:00) (13 - 35)  SpO2: 95% (28 Feb 2022 06:00) (91% - 100%)      General: Awake   HEENT:    khadijah             Lymph Nodes: NO cervical LN   Lungs: Bilateral BS  Cardiovascular: Regular   Abdomen: Soft, Positive BS  Extremities: No clubbing   Skin: warm multiple lesion   Neurological: no focal   Musculoskeletal: move all ext     I&O's Detail    27 Feb 2022 07:01  -  28 Feb 2022 07:00  --------------------------------------------------------  IN:    dextrose 5% + lactated ringers: 1680 mL    Enteral Tube Flush: 60 mL    Free Water: 60 mL    Norepinephrine: 109 mL    Peptamen A.F.: 875 mL  Total IN: 2784 mL    OUT:    Indwelling Catheter - Urethral (mL): 595 mL  Total OUT: 595 mL    Total NET: 2189 mL          LABS:                          7.8    8.59  )-----------( 41       ( 28 Feb 2022 04:50 )             23.3         28 Feb 2022 04:50    144    |  114    |  16     ----------------------------<  80     4.6     |  25     |  0.9      Ca    7.2        28 Feb 2022 04:50  Phos  2.7       28 Feb 2022 04:50  Mg     2.0       28 Feb 2022 04:50    TPro  4.8    /  Alb  1.5    /  TBili  0.4    /  DBili  x      /  AST  49     /  ALT  34     /  AlkPhos  263    28 Feb 2022 04:50  Amylase x     lipase x                                                 PT/INR - ( 28 Feb 2022 04:50 )   PT: 12.70 sec;   INR: 1.11 ratio         PTT - ( 28 Feb 2022 04:50 )  PTT:32.3 sec                                                                                                                                                                                        MEDICATIONS  (STANDING):  aztreonam  IVPB 2000 milliGRAM(s) IV Intermittent every 8 hours  chlorhexidine 4% Liquid 1 Application(s) Topical daily  cholecalciferol 2000 Unit(s) Oral daily  clindamycin IVPB 600 milliGRAM(s) IV Intermittent every 8 hours  clindamycin IVPB      dextrose 5% + lactated ringers. 1000 milliLiter(s) (70 mL/Hr) IV Continuous <Continuous>  lactulose Syrup 10 Gram(s) Oral two times a day  levothyroxine Injectable 50 MICROGram(s) IV Push at bedtime  multivitamin/minerals 1 Tablet(s) Oral daily  norepinephrine Infusion 0.05 MICROgram(s)/kG/Min (2.81 mL/Hr) IV Continuous <Continuous>  nystatin Cream 1 Application(s) Topical two times a day  pantoprazole  Injectable 40 milliGRAM(s) IV Push every 12 hours  predniSONE   Tablet 30 milliGRAM(s) Oral daily  thiamine 100 milliGRAM(s) Oral daily  triamcinolone 0.1% Cream 1 Application(s) Topical two times a day  vitamin A &amp; D Ointment 1 Application(s) Topical daily    MEDICATIONS  (PRN):          Xrays:  TLC:  OG:  ET tube:                                                                                       ECHO:  CAM ICU:

## 2022-02-28 NOTE — PROGRESS NOTE ADULT - ASSESSMENT
IMPRESSION:  AMS   Sepsis   Septic shock  Possible aspiration PNA  Acute hypoxemic respiratory failure   Colon polyps s/p polypectomy of 2 polyps on 2/18  Anemia  HO GIB  HO ETOH abuse  Active smoker  NAGMA    PLAN:    CNS: CTH. Avoid CNS depressants.   Thiamine, folate.   check ammonia level     NH4 level.     HEENT: Oral care    PULMONARY:  HOB @ 45 degrees.  Aspiration precautions.    Supplemental O2 target Spo2 92-94%  CXR    CARDIOVASCULAR:  do Cheetah     BNP.   off Levophed      GI: GI prophylaxis. follow GI   diet   free H2O.   speech and swallow eval   RENAL:  Follow up lytes.  Correct as needed      INFECTIOUS DISEASE:   culture temp spikes  follow id   burn for skin lesion   repeat procal     HEMATOLOGICAL:  DVT prophylaxis.  follow h/h . ply   follow hematology   on prednisone     ENDOCRINE:  Follow up FS.  Insulin protocol if needed.    MUSCULOSKELETAL:  Bed rest

## 2022-02-28 NOTE — BRIEF OPERATIVE NOTE - NSICDXBRIEFPROCEDURE_GEN_ALL_CORE_FT
PROCEDURES:  Incisional biopsy, lesion, skin 28-Feb-2022 20:26:53 abdomen and right thigh Fred Teresa  Incisional biopsy, lesion, skin 28-Feb-2022 20:26:58  Fred Teresa

## 2022-02-28 NOTE — PROGRESS NOTE ADULT - SUBJECTIVE AND OBJECTIVE BOX
SANTIAGO CALIXTO 61y Female  MRN#: [id number]   CODE STATUS: FULL     Hospital Day: 12d    Pt is currently admitted with the primary diagnosis of GIB s/p polypectomy, ams, pancolitis    SUBJECTIVE  Hospital Course: Patient is a 60 y/o female with PMHx of GIB, Bowel and Bladder incontinence, Smoking (3-4 cigarettes/day), ETOH abuse, hypothyroidism, and ongoing anemia transferred from [facility] for Polypectomy. Patient was admitted to [facility] on 01/31 for AMS, weakness and decreased appetite. She was found to have UTI, NSTEMI, and electrolyte imbalance. During her stay at [facility] she had a colonoscopy done which revealed 1 large 3-4 cm sigmoid pedunculated polyp and 2 polyps (7, 14 mm) in descending colon. Patient was transferred as they are not able to perform large Polypectomies there. She was not able to be discharged as ongoing anemia and concern that the cause are these large polyps.    Patient had a colonoscopy and polypectomy done on 2/18/2022. Polyp (13 mm) in the descending colon. (hot snare Polypectomy).  Polyp (25 mm to 30 mm) in the rectosigmoid junction at 20 cm from the anal verge. (Endoloop, Polypectomy, Endoclip).   After the colonoscopy patient became hypothermic and hypotensive. Over night patient was started to Levo and warming blanket.   2/20 AM patient had RRT for desaturation and hypotension. Patient was placed on 50% O2, patient had good response. Levophed also started to raise BP. Patient remains altered, not speaking, but remains somewhat awake and just makes grunting sounds. Patient is not following commands. Central line was placed. Spoke to ICU fellow, patient is being upgraded to ICU for closer monitoring.     In CCU pt had abdominal tenderness, KUB ordered, concern for RP free air. CTAP with PO/IV contrast ordered, no perforation or pneumo-peritoneum/retroperitoneum however showing findings c/w pancolitis. Surgery following, ID consulted. Pt on vanc/zosyn. In CCU, pt developed coagulopathy (low plt, anemia, increasing INR despite not on AC). Treated with pRBC, plt transfusion as well as vitamin K. Heme onc on board, thought to be due to possible DIC 2/2 sepsis. Started on solumedrol 40mg for positive cindy however possibly positive due to transfusion at [facility]. Plt stabilizing so steroids tapered. Derm consulted for diffuse skin rash- bullous lesions with desquamation and areas of healing with hyperpigmentation. Burn consulted for skin bx. C-anca also positive, rhuem consulted, presentation unlikely due to vasculitis, will rpt cindy once acute illness resolves.     Overnight events: none     Interval hx/Subjective complaints: Pt feeling well this morning, no complaints.     Pt denies any fevers, chills, fatigue, CP, palpitations, cough, SOB, abdominal pain, n/v/d, hematochezia, melena, weakness, numbness, tingling, or other FND.                                           ----------------------------------------------------------  OBJECTIVE  PAST MEDICAL & SURGICAL HISTORY                                            -----------------------------------------------------------  ALLERGIES:  No Known Allergies                                            ------------------------------------------------------------    HOME MEDICATIONS  Home Medications:                           MEDICATIONS:  STANDING MEDICATIONS  aztreonam  IVPB 2000 milliGRAM(s) IV Intermittent every 8 hours  chlorhexidine 4% Liquid 1 Application(s) Topical daily  cholecalciferol 2000 Unit(s) Oral daily  clindamycin IVPB 600 milliGRAM(s) IV Intermittent every 8 hours  clindamycin IVPB      lactulose Syrup 10 Gram(s) Oral two times a day  levothyroxine Injectable 50 MICROGram(s) IV Push at bedtime  multivitamin/minerals 1 Tablet(s) Oral daily  norepinephrine Infusion 0.05 MICROgram(s)/kG/Min IV Continuous <Continuous>  nystatin Cream 1 Application(s) Topical two times a day  pantoprazole  Injectable 40 milliGRAM(s) IV Push every 12 hours  predniSONE   Tablet 30 milliGRAM(s) Oral daily  thiamine 100 milliGRAM(s) Oral daily  triamcinolone 0.1% Cream 1 Application(s) Topical two times a day  vitamin A &amp; D Ointment 1 Application(s) Topical daily    PRN MEDICATIONS                                            ------------------------------------------------------------  VITAL SIGNS: Last 24 Hours  T(C): 35.9 (28 Feb 2022 16:00), Max: 36 (28 Feb 2022 04:00)  T(F): 96.6 (28 Feb 2022 16:00), Max: 96.8 (28 Feb 2022 04:00)  HR: 88 (28 Feb 2022 16:00) (75 - 92)  BP: 97/57 (28 Feb 2022 16:00) (73/52 - 139/78)  BP(mean): 72 (28 Feb 2022 16:00) (58 - 103)  RR: 26 (28 Feb 2022 16:00) (14 - 35)  SpO2: 99% (28 Feb 2022 16:00) (84% - 100%)      02-27-22 @ 07:01 - 02-28-22 @ 07:00  --------------------------------------------------------  IN: 2784 mL / OUT: 595 mL / NET: 2189 mL    02-28-22 @ 07:01 - 02-28-22 @ 16:43  --------------------------------------------------------  IN: 2539 mL / OUT: 140 mL / NET: 2399 mL                                             --------------------------------------------------------------  LABS:                        7.8    8.59  )-----------( 41       ( 28 Feb 2022 04:50 )             23.3     02-28    144  |  114<H>  |  16  ----------------------------<  80  4.6   |  25  |  0.9    Ca    7.2<L>      28 Feb 2022 04:50  Phos  2.7     02-28  Mg     2.0     02-28    TPro  4.8<L>  /  Alb  1.5<L>  /  TBili  0.4  /  DBili  x   /  AST  49<H>  /  ALT  34  /  AlkPhos  263<H>  02-28    PT/INR - ( 28 Feb 2022 04:50 )   PT: 12.70 sec;   INR: 1.11 ratio         PTT - ( 28 Feb 2022 04:50 )  PTT:32.3 sec                                              -------------------------------------------------------------  RADIOLOGY:  < from: Xray Chest 1 View- PORTABLE-Urgent (Xray Chest 1 View- PORTABLE-Urgent .) (02.28.22 @ 11:50) >  Impression:    Bilateral opacifications and effusions. Support devices as described.    Without change.    < end of copied text >                                          --------------------------------------------------------------  PHYSICAL EXAM:  General: woman laying in bed in nad  HEENT: ncat, eomi, mmm  LUNGS: ctab  HEART: s1/s2, rrr  ABDOMEN: soft, ntnd, bs+  EXT: wwp, no cyanosis, clubbing, edema  NEURO: aox2, moves all extremities   SKIN: superficial scabs diffusely over body, most prominant over chest, as well as areas of desquamation over arms, legs, and hips. Slightly improved from previous exams   Lines: RIJ CVC c/d/i                                          --------------------------------------------------------------    ASSESSMENT & PLAN  61F PMH GIB, Bowel and Bladder incontinence, Smoking (3-4 cigarettes/day), ETOH abuse, hypothyroidism, and ongoing anemia, admitted to [facility] 1/31 for ams found to have UTI. Dev GIB, colonoscopy showing polyps, transferred to [facility] for polypectomy now upgraded to CCU for hypotension, likely septic shock 2/2 aspiration pna.     #AMS with unclear etiology   #sepsis w/ shock/hypothermia vs aspiration after procedure under anesthesia 2/18 vs CVA   #Pancolitis   - Pt upgraded from floor on 2/20 after RRT for hypotension and desaturation. RIJ CVC placed, started on levo, venti mask, o2 and hypotension subsequently improved   - CTH 2/20: no ICH, mass effect, or midline shift   - EEG 2/20: generalized slowing    - UCx 2/18 negative   - BCx 2/17, 2/19: NGTD  - COVID negative 2/20  - MRSA nares 2/20 negative  - LE duplex 2/21: no DVT however bl peroneal veins not visualized   - ammonia elevated to 59 on 2/22, started on lactulose, possibly contributing to ams   - ammonia 50 on 2/26   - d/c'd vanc 2/22  - c/w aztreonam 2g q8h (started 2/24- switched from eduardo due to concern for pcn induced thrombocytopenia)   - c/w clinda 600mg q8h   - on levo @0.05  - neuro eval appreciated  - ID recs appreciated      #Colonic Polyp  #Diarrhea in setting of recent abx use  #Hx of GIB   #Pneumoretroperitoneum   #Pancolitis   - Colonoscopy 2/4: 1 large 3-4 cm sigmoid pedunculated polyp and 2 polyps (7, 14 mm) in descending colon s/p bx  - GI consulted (Dr. Daniels)   - Patient is cleared for colonoscopy per Cardiology   - Negative for C-Diff infection  - CEA elevated 5.6  - S/P colonoscopy and polypectomy 2/18/2022. Polyp (13 mm) in the descending colon. (hot snare Polypectomy).  Polyp (25 mm to 30 mm) in the rectosigmoid junction at 20 cm from the anal verge. (Endoloop, Polypectomy, Endoclip).   - Repeat Colonoscopy in 6 months   - GI recs appreciated    - 2/21: overnight pt had abdominal tenderness, KUB ordered showing paraspinal lucencies concerning for possible pneumoretroperitoneum   - abdomen soft, nontender, nondistended on my exam  - CTAP PO/IV contrast 2/21: findings c/w pancolitis, no intra or retroperitoneal free air  - c/w aztreonam, clinda   - Pt tolerating tube feeds. Passed S&S- pureed diet however PO intake minimal. Will c/w tube feeds and calorie count   - surgery recs appreciated   - ID recs appreciated     #Exfoliative skin lesions- possibly externally induced (e.g scratching), r/o TEN given vesicles and desquamation   #+C-ANCA  - pt with superficial scabs diffusely over body, most prominent over chest, as well as areas of desquamation over arms, legs, and hips   - Continue to monitor, if any worsening derm consult   - Burn recs appreciated; dx with incontinence dermatitis    - f/u wound care recs   - derm recs appreciated, recalled burn for skin bx, f/u burn c/s   - rheum recs appreciated, no clear evidence for ANCA associated vasulitis, could be elevated due to sepsis. Agree with skin bx. Sent PR4/MPO antibody assay, will rpt c-anca once acute illness resolves.  - f/u niacin levels   - s/p skin bx by burn 2/28, f/u path     #Prolonged qtc    - qtc 613 initially, now 537 on 2/28 EKG   - repleating mg today, keep Mg>2, K>4.   - on no qtc prolonging medications   - f/u arsenic levels   - daily ekg     #Normocytic anemia   #Acute thrombocytopenia  #Elevated INR/PTT not on a/c  - not on heparin this admission, unclear if was on previously at [facility]  - plt, hgb continued to downtrend so given 1u plt 1u pRBC on 2/23. Addnl unit plt given 2/24, platelets appear to be stabilizing will follow    - INR also uptrending, gave 10mg vitamin K IVPB 2/23   - cindy positive, possibly due to transfusion (not documented but likely received in [facility])  - retic count 0.9, LDH nl, haptoglobin slightly low. Ddimer elevated, fibrinogen nl, fibrin split products elevated    - f/u HIT panel   - HIV, hep B negative   - monitor INR   - started on trial of solumedrol 40mg IV 2/24, plt stabilized, will begin taper 2/28 (prednisone 30 x3d, 20 x3d, 10 x3d.   - heme onc recs appreciated    #Hypotension   - am cortisol 5.7   - C/w midodrine 15mg TID   - On Levo, downtitrate as tolerated     #Hx of UTI- resolved   #R lower face cellulitis- resolved  - CT maxillofacial with C 2/12: Mild soft tissue inflammation suggesting cellulitis of the right lower face  - pt was on cefepime, vancomycin (end date 2/15)  - Started on Unasyn 2/16,/c today 2/20 started on broad spectrum Abx 2/20  - No documentations of + bcx or ucx on the charts from [facility]   - Rpt UCx negative   - Bclx 2/17 NGTD, F/u repeat    - Dental recs appreciated   - F/u SPEP     #Hypernatremia- improving   - c/w free water via peg     #Hypothyroidism  - TSH 15.6  - C/w synthroid 50 mcg po q24  - Patient will need to repeat TSH and FT4 in 6 wks (April)    #Hx L pleural effusion on CXR 2/8  - CT Chest with Cont 2/9 obtained: Moderate b/l pleural effusions resulting in complete atelectasis of both lower lobes  - f/u rpt cxr today     #Hx NSTEMI   #Hx of Elevated Troponin  - in setting of hypotension   - c/w Midodrine 15 mg q8h  - Normal Lexiscan at [facility]  - EKG noted     #Hx of ETOH abuse  - last drink (as per nurse) 6 months ago  - c/w Folic acid and Thiamine  - F/u Dietician eval  - Ensure 3x/day    #Possible Thiamine deficiency   #Possible Folic acid deficiency   - C/w Folic acid and Thiamine    #Misc   - DVT prophylaxis: SCD   - GI prophylaxis: PPI  - Diet: NPO tube feeds    - Activity status: IAT   - Code status: Full code   - Disposition: acute

## 2022-03-01 LAB
ALBUMIN SERPL ELPH-MCNC: 1.6 G/DL — LOW (ref 3.5–5.2)
ALP SERPL-CCNC: 262 U/L — HIGH (ref 30–115)
ALT FLD-CCNC: 75 U/L — HIGH (ref 0–41)
AMMONIA BLD-MCNC: 42 UMOL/L — SIGNIFICANT CHANGE UP (ref 11–55)
ANION GAP SERPL CALC-SCNC: 6 MMOL/L — LOW (ref 7–14)
ANION GAP SERPL CALC-SCNC: 8 MMOL/L — SIGNIFICANT CHANGE UP (ref 7–14)
AST SERPL-CCNC: 101 U/L — HIGH (ref 0–41)
BASOPHILS # BLD AUTO: 0 K/UL — SIGNIFICANT CHANGE UP (ref 0–0.2)
BASOPHILS NFR BLD AUTO: 0 % — SIGNIFICANT CHANGE UP (ref 0–1)
BILIRUB SERPL-MCNC: 0.3 MG/DL — SIGNIFICANT CHANGE UP (ref 0.2–1.2)
BUN SERPL-MCNC: 20 MG/DL — SIGNIFICANT CHANGE UP (ref 10–20)
BUN SERPL-MCNC: 22 MG/DL — HIGH (ref 10–20)
CALCIUM SERPL-MCNC: 7.1 MG/DL — LOW (ref 8.5–10.1)
CALCIUM SERPL-MCNC: 7.3 MG/DL — LOW (ref 8.5–10.1)
CHLORIDE SERPL-SCNC: 114 MMOL/L — HIGH (ref 98–110)
CHLORIDE SERPL-SCNC: 114 MMOL/L — HIGH (ref 98–110)
CO2 SERPL-SCNC: 23 MMOL/L — SIGNIFICANT CHANGE UP (ref 17–32)
CO2 SERPL-SCNC: 25 MMOL/L — SIGNIFICANT CHANGE UP (ref 17–32)
CREAT SERPL-MCNC: 0.8 MG/DL — SIGNIFICANT CHANGE UP (ref 0.7–1.5)
CREAT SERPL-MCNC: 0.9 MG/DL — SIGNIFICANT CHANGE UP (ref 0.7–1.5)
EGFR: 73 ML/MIN/1.73M2 — SIGNIFICANT CHANGE UP
EGFR: 84 ML/MIN/1.73M2 — SIGNIFICANT CHANGE UP
EOSINOPHIL # BLD AUTO: 0.03 K/UL — SIGNIFICANT CHANGE UP (ref 0–0.7)
EOSINOPHIL NFR BLD AUTO: 0.4 % — SIGNIFICANT CHANGE UP (ref 0–8)
GLUCOSE SERPL-MCNC: 123 MG/DL — HIGH (ref 70–99)
GLUCOSE SERPL-MCNC: 79 MG/DL — SIGNIFICANT CHANGE UP (ref 70–99)
HCT VFR BLD CALC: 21.9 % — LOW (ref 37–47)
HEPARIN-PF4 AB RESULT: <0.6 U/ML — SIGNIFICANT CHANGE UP (ref 0–0.9)
HGB BLD-MCNC: 7.3 G/DL — LOW (ref 12–16)
IMM GRANULOCYTES NFR BLD AUTO: 1.5 % — HIGH (ref 0.1–0.3)
INTERPRETATION SERPL IFE-IMP: SIGNIFICANT CHANGE UP
LYMPHOCYTES # BLD AUTO: 1.17 K/UL — LOW (ref 1.2–3.4)
LYMPHOCYTES # BLD AUTO: 17.1 % — LOW (ref 20.5–51.1)
MAGNESIUM SERPL-MCNC: 1.7 MG/DL — LOW (ref 1.8–2.4)
MCHC RBC-ENTMCNC: 30.2 PG — SIGNIFICANT CHANGE UP (ref 27–31)
MCHC RBC-ENTMCNC: 33.3 G/DL — SIGNIFICANT CHANGE UP (ref 32–37)
MCV RBC AUTO: 90.5 FL — SIGNIFICANT CHANGE UP (ref 81–99)
MONOCYTES # BLD AUTO: 0.38 K/UL — SIGNIFICANT CHANGE UP (ref 0.1–0.6)
MONOCYTES NFR BLD AUTO: 5.5 % — SIGNIFICANT CHANGE UP (ref 1.7–9.3)
NEUTROPHILS # BLD AUTO: 5.17 K/UL — SIGNIFICANT CHANGE UP (ref 1.4–6.5)
NEUTROPHILS NFR BLD AUTO: 75.5 % — HIGH (ref 42.2–75.2)
NRBC # BLD: 2 /100 WBCS — HIGH (ref 0–0)
PF4 HEPARIN CMPLX AB SER-ACNC: NEGATIVE — SIGNIFICANT CHANGE UP
PHOSPHATE SERPL-MCNC: 2.6 MG/DL — SIGNIFICANT CHANGE UP (ref 2.1–4.9)
PLATELET # BLD AUTO: 58 K/UL — LOW (ref 130–400)
POTASSIUM SERPL-MCNC: 3.9 MMOL/L — SIGNIFICANT CHANGE UP (ref 3.5–5)
POTASSIUM SERPL-MCNC: 4 MMOL/L — SIGNIFICANT CHANGE UP (ref 3.5–5)
POTASSIUM SERPL-SCNC: 3.9 MMOL/L — SIGNIFICANT CHANGE UP (ref 3.5–5)
POTASSIUM SERPL-SCNC: 4 MMOL/L — SIGNIFICANT CHANGE UP (ref 3.5–5)
PROT SERPL-MCNC: 4.8 G/DL — LOW (ref 6–8)
RBC # BLD: 2.42 M/UL — LOW (ref 4.2–5.4)
RBC # FLD: 17.1 % — HIGH (ref 11.5–14.5)
SODIUM SERPL-SCNC: 145 MMOL/L — SIGNIFICANT CHANGE UP (ref 135–146)
SODIUM SERPL-SCNC: 145 MMOL/L — SIGNIFICANT CHANGE UP (ref 135–146)
WBC # BLD: 6.85 K/UL — SIGNIFICANT CHANGE UP (ref 4.8–10.8)
WBC # FLD AUTO: 6.85 K/UL — SIGNIFICANT CHANGE UP (ref 4.8–10.8)

## 2022-03-01 PROCEDURE — 71045 X-RAY EXAM CHEST 1 VIEW: CPT | Mod: 26

## 2022-03-01 PROCEDURE — 99233 SBSQ HOSP IP/OBS HIGH 50: CPT

## 2022-03-01 RX ORDER — MAGNESIUM SULFATE 500 MG/ML
1 VIAL (ML) INJECTION ONCE
Refills: 0 | Status: COMPLETED | OUTPATIENT
Start: 2022-03-01 | End: 2022-03-01

## 2022-03-01 RX ORDER — MAGNESIUM SULFATE 500 MG/ML
1 VIAL (ML) INJECTION ONCE
Refills: 0 | Status: DISCONTINUED | OUTPATIENT
Start: 2022-03-01 | End: 2022-03-01

## 2022-03-01 RX ADMIN — Medication 100 MILLIGRAM(S): at 05:13

## 2022-03-01 RX ADMIN — SODIUM CHLORIDE 75 MILLILITER(S): 9 INJECTION, SOLUTION INTRAVENOUS at 17:02

## 2022-03-01 RX ADMIN — NYSTATIN CREAM 1 APPLICATION(S): 100000 CREAM TOPICAL at 05:13

## 2022-03-01 RX ADMIN — Medication 100 MILLIGRAM(S): at 22:11

## 2022-03-01 RX ADMIN — Medication 2000 UNIT(S): at 11:42

## 2022-03-01 RX ADMIN — PANTOPRAZOLE SODIUM 40 MILLIGRAM(S): 20 TABLET, DELAYED RELEASE ORAL at 17:02

## 2022-03-01 RX ADMIN — Medication 100 MILLIGRAM(S): at 11:42

## 2022-03-01 RX ADMIN — Medication 2.81 MICROGRAM(S)/KG/MIN: at 22:13

## 2022-03-01 RX ADMIN — Medication 1 TABLET(S): at 11:43

## 2022-03-01 RX ADMIN — Medication 100 MILLIGRAM(S): at 13:40

## 2022-03-01 RX ADMIN — LACTULOSE 10 GRAM(S): 10 SOLUTION ORAL at 05:14

## 2022-03-01 RX ADMIN — PANTOPRAZOLE SODIUM 40 MILLIGRAM(S): 20 TABLET, DELAYED RELEASE ORAL at 05:14

## 2022-03-01 RX ADMIN — Medication 30 MILLIGRAM(S): at 05:14

## 2022-03-01 RX ADMIN — Medication 1 APPLICATION(S): at 17:03

## 2022-03-01 RX ADMIN — Medication 1 APPLICATION(S): at 05:13

## 2022-03-01 RX ADMIN — NYSTATIN CREAM 1 APPLICATION(S): 100000 CREAM TOPICAL at 17:03

## 2022-03-01 RX ADMIN — CHLORHEXIDINE GLUCONATE 1 APPLICATION(S): 213 SOLUTION TOPICAL at 11:44

## 2022-03-01 RX ADMIN — SODIUM CHLORIDE 75 MILLILITER(S): 9 INJECTION, SOLUTION INTRAVENOUS at 22:13

## 2022-03-01 RX ADMIN — Medication 50 MICROGRAM(S): at 22:12

## 2022-03-01 RX ADMIN — Medication 100 GRAM(S): at 08:57

## 2022-03-01 RX ADMIN — Medication 1 APPLICATION(S): at 11:42

## 2022-03-01 RX ADMIN — Medication 2.81 MICROGRAM(S)/KG/MIN: at 10:05

## 2022-03-01 NOTE — PROGRESS NOTE ADULT - SUBJECTIVE AND OBJECTIVE BOX
Patient is a 61y old  Female who presents with a chief complaint of Polypectomy (28 Feb 2022 16:43)      Over Night Events:  Patient seen and examined.   s/p skin biopsy   s/p cheetah and bolus around 1.5 liters   off pressors     ROS:  See HPI    PHYSICAL EXAM    ICU Vital Signs Last 24 Hrs  T(C): 35.9 (01 Mar 2022 04:00), Max: 35.9 (28 Feb 2022 16:00)  T(F): 96.7 (01 Mar 2022 04:00), Max: 96.7 (01 Mar 2022 04:00)  HR: 84 (01 Mar 2022 07:00) (0 - 93)  BP: 98/65 (01 Mar 2022 07:00) (73/52 - 139/78)  BP(mean): 77 (01 Mar 2022 07:00) (58 - 103)  ABP: --  ABP(mean): --  RR: 18 (01 Mar 2022 07:00) (15 - 33)  SpO2: 99% (01 Mar 2022 07:00) (69% - 100%)      General: AOX3  HEENT:   khadij[place]             Lymph Nodes: NO cervical LN   Lungs: Bilateral BS  Cardiovascular: Regular   Abdomen: Soft, Positive BS  Extremities: No clubbing   Skin: warm   Neurological: no focal   Musculoskeletal: move all ext     I&O's Detail    28 Feb 2022 07:01  -  01 Mar 2022 07:00  --------------------------------------------------------  IN:    dextrose 5% + lactated ringers: 140 mL    Enteral Tube Flush: 200 mL    Free Water: 60 mL    IV PiggyBack: 450 mL    Lactated Ringers: 975 mL    Lactated Ringers Bolus: 1500 mL    Norepinephrine: 31.9 mL    Peptamen A.F.: 875 mL  Total IN: 4231.9 mL    OUT:    Indwelling Catheter - Urethral (mL): 584 mL  Total OUT: 584 mL    Total NET: 3647.9 mL          LABS:                          7.3    6.85  )-----------( 58       ( 01 Mar 2022 04:10 )             21.9         01 Mar 2022 04:10    145    |  114    |  22     ----------------------------<  79     4.0     |  25     |  0.8      Ca    7.3        01 Mar 2022 04:10  Phos  2.6       01 Mar 2022 04:10  Mg     1.7       01 Mar 2022 04:10    TPro  4.8    /  Alb  1.6    /  TBili  0.3    /  DBili  x      /  AST  101    /  ALT  75     /  AlkPhos  262    01 Mar 2022 04:10  Amylase x     lipase x                                                 PT/INR - ( 28 Feb 2022 04:50 )   PT: 12.70 sec;   INR: 1.11 ratio         PTT - ( 28 Feb 2022 04:50 )  PTT:32.3 sec                                                                                                                                                                                        MEDICATIONS  (STANDING):  aztreonam  IVPB 2000 milliGRAM(s) IV Intermittent every 8 hours  chlorhexidine 4% Liquid 1 Application(s) Topical daily  cholecalciferol 2000 Unit(s) Oral daily  clindamycin IVPB 600 milliGRAM(s) IV Intermittent every 8 hours  clindamycin IVPB      lactated ringers. 1000 milliLiter(s) (75 mL/Hr) IV Continuous <Continuous>  lactulose Syrup 10 Gram(s) Oral two times a day  levothyroxine Injectable 50 MICROGram(s) IV Push at bedtime  multivitamin/minerals 1 Tablet(s) Oral daily  norepinephrine Infusion 0.05 MICROgram(s)/kG/Min (2.81 mL/Hr) IV Continuous <Continuous>  nystatin Cream 1 Application(s) Topical two times a day  pantoprazole  Injectable 40 milliGRAM(s) IV Push every 12 hours  predniSONE   Tablet 30 milliGRAM(s) Oral daily  thiamine 100 milliGRAM(s) Oral daily  triamcinolone 0.1% Cream 1 Application(s) Topical two times a day  vitamin A &amp; D Ointment 1 Application(s) Topical daily    MEDICATIONS  (PRN):          Xrays:  TLC:  OG:  ET tube:                                                                                    b/l effusion    ECHO:  [facility] ICU:

## 2022-03-01 NOTE — PROGRESS NOTE ADULT - SUBJECTIVE AND OBJECTIVE BOX
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 last drink (as per nurse) 6 months ago  - c/w Folic acid and Thiamine  - F/u Dietician eval  - Ensure 3x/day    #Possible Thiamine deficiency   #Possible Folic acid deficiency   - C/w Folic acid and Thiamine    #Misc   - DVT prophylaxis: SCD   - GI prophylaxis: PPI  - Diet: NPO tube feeds    - Activity status: IAT   - Code status: Full code   - Disposition: acute     #Handoff   - off pressors   - f/u skin bx results   - monitor hgb, plt   - c/w lactulose

## 2022-03-01 NOTE — CHART NOTE - NSCHARTNOTEFT_GEN_A_CORE
Pt seen at bedside, skin biopsy sites on abdomen and RLE clean dry and intact, sutures in place.    Continue wound care with dry gauze/tegaderm daily.

## 2022-03-01 NOTE — PROGRESS NOTE ADULT - ASSESSMENT
IMPRESSION:  AMS   Sepsis   Septic shock  Possible aspiration PNA  Acute hypoxemic respiratory failure   Colon polyps s/p polypectomy of 2 polyps on 2/18  Anemia  HO GIB  HO ETOH abuse  Active smoker  NAGMA    PLAN:    CNS: CTH. Avoid CNS depressants.   Thiamine, folate.       HEENT: Oral care    PULMONARY:  HOB @ 45 degrees.  Aspiration precautions.    Supplemental O2 target Spo2 92-94%  CXR    CARDIOVASCULAR:   d/c iv fluid keep is = os     BNP.   off Levophed      GI: GI prophylaxis. follow GI NG feed   diet   free H2O.   speech and swallow eval   RENAL:  Follow up lytes.  Correct as needed      INFECTIOUS DISEASE:   culture temp spikes  follow id   burn for skin lesion   repeat procal     HEMATOLOGICAL:  DVT prophylaxis.  follow h/h . plt  follow hematology   on prednisone     ENDOCRINE:  Follow up FS.  Insulin protocol if needed.      MUSCULOSKELETAL:  Bed rest     keep icu , heredia in because of incontinence and has bad desquamation lesion in the area   leave tlc for today if remain of pressors will d/c amando   place peripheral lines

## 2022-03-02 ENCOUNTER — APPOINTMENT (OUTPATIENT)
Dept: GASTROENTEROLOGY | Facility: CLINIC | Age: 62
End: 2022-03-02

## 2022-03-02 LAB
ALBUMIN SERPL ELPH-MCNC: 1.6 G/DL — LOW (ref 3.5–5.2)
ALP SERPL-CCNC: 217 U/L — HIGH (ref 30–115)
ALT FLD-CCNC: 70 U/L — HIGH (ref 0–41)
ANION GAP SERPL CALC-SCNC: 6 MMOL/L — LOW (ref 7–14)
AST SERPL-CCNC: 63 U/L — HIGH (ref 0–41)
BASOPHILS # BLD AUTO: 0.02 K/UL — SIGNIFICANT CHANGE UP (ref 0–0.2)
BASOPHILS NFR BLD AUTO: 0.3 % — SIGNIFICANT CHANGE UP (ref 0–1)
BILIRUB SERPL-MCNC: 0.3 MG/DL — SIGNIFICANT CHANGE UP (ref 0.2–1.2)
BLD GP AB SCN SERPL QL: SIGNIFICANT CHANGE UP
BUN SERPL-MCNC: 23 MG/DL — HIGH (ref 10–20)
CALCIUM SERPL-MCNC: 7.4 MG/DL — LOW (ref 8.5–10.1)
CHLORIDE SERPL-SCNC: 115 MMOL/L — HIGH (ref 98–110)
CO2 SERPL-SCNC: 23 MMOL/L — SIGNIFICANT CHANGE UP (ref 17–32)
CREAT SERPL-MCNC: 0.7 MG/DL — SIGNIFICANT CHANGE UP (ref 0.7–1.5)
EGFR: 99 ML/MIN/1.73M2 — SIGNIFICANT CHANGE UP
EOSINOPHIL # BLD AUTO: 0.08 K/UL — SIGNIFICANT CHANGE UP (ref 0–0.7)
EOSINOPHIL NFR BLD AUTO: 1 % — SIGNIFICANT CHANGE UP (ref 0–8)
GLUCOSE SERPL-MCNC: 88 MG/DL — SIGNIFICANT CHANGE UP (ref 70–99)
HCT VFR BLD CALC: 20.9 % — LOW (ref 37–47)
HCT VFR BLD CALC: 23.8 % — LOW (ref 37–47)
HGB BLD-MCNC: 7 G/DL — LOW (ref 12–16)
HGB BLD-MCNC: 7.9 G/DL — LOW (ref 12–16)
IMM GRANULOCYTES NFR BLD AUTO: 4.2 % — HIGH (ref 0.1–0.3)
LACTOFERRIN STL-MCNC: 115.2 — HIGH (ref 0–7.24)
LYMPHOCYTES # BLD AUTO: 1.02 K/UL — LOW (ref 1.2–3.4)
LYMPHOCYTES # BLD AUTO: 13.1 % — LOW (ref 20.5–51.1)
MAGNESIUM SERPL-MCNC: 1.7 MG/DL — LOW (ref 1.8–2.4)
MCHC RBC-ENTMCNC: 29.8 PG — SIGNIFICANT CHANGE UP (ref 27–31)
MCHC RBC-ENTMCNC: 29.9 PG — SIGNIFICANT CHANGE UP (ref 27–31)
MCHC RBC-ENTMCNC: 33.2 G/DL — SIGNIFICANT CHANGE UP (ref 32–37)
MCHC RBC-ENTMCNC: 33.5 G/DL — SIGNIFICANT CHANGE UP (ref 32–37)
MCV RBC AUTO: 88.9 FL — SIGNIFICANT CHANGE UP (ref 81–99)
MCV RBC AUTO: 90.2 FL — SIGNIFICANT CHANGE UP (ref 81–99)
MONOCYTES # BLD AUTO: 0.55 K/UL — SIGNIFICANT CHANGE UP (ref 0.1–0.6)
MONOCYTES NFR BLD AUTO: 7 % — SIGNIFICANT CHANGE UP (ref 1.7–9.3)
NEUTROPHILS # BLD AUTO: 5.81 K/UL — SIGNIFICANT CHANGE UP (ref 1.4–6.5)
NEUTROPHILS NFR BLD AUTO: 74.4 % — SIGNIFICANT CHANGE UP (ref 42.2–75.2)
NRBC # BLD: 1 /100 WBCS — HIGH (ref 0–0)
NRBC # BLD: 2 /100 WBCS — HIGH (ref 0–0)
PHOSPHATE SERPL-MCNC: 2.3 MG/DL — SIGNIFICANT CHANGE UP (ref 2.1–4.9)
PLATELET # BLD AUTO: 44 K/UL — LOW (ref 130–400)
PLATELET # BLD AUTO: 49 K/UL — LOW (ref 130–400)
POTASSIUM SERPL-MCNC: 4 MMOL/L — SIGNIFICANT CHANGE UP (ref 3.5–5)
POTASSIUM SERPL-SCNC: 4 MMOL/L — SIGNIFICANT CHANGE UP (ref 3.5–5)
PROT SERPL-MCNC: 4.6 G/DL — LOW (ref 6–8)
RBC # BLD: 2.35 M/UL — LOW (ref 4.2–5.4)
RBC # BLD: 2.64 M/UL — LOW (ref 4.2–5.4)
RBC # FLD: 15.9 % — HIGH (ref 11.5–14.5)
RBC # FLD: 16.9 % — HIGH (ref 11.5–14.5)
SODIUM SERPL-SCNC: 144 MMOL/L — SIGNIFICANT CHANGE UP (ref 135–146)
SRA INTERP SER-IMP: SIGNIFICANT CHANGE UP
WBC # BLD: 5.96 K/UL — SIGNIFICANT CHANGE UP (ref 4.8–10.8)
WBC # BLD: 7.81 K/UL — SIGNIFICANT CHANGE UP (ref 4.8–10.8)
WBC # FLD AUTO: 5.96 K/UL — SIGNIFICANT CHANGE UP (ref 4.8–10.8)
WBC # FLD AUTO: 7.81 K/UL — SIGNIFICANT CHANGE UP (ref 4.8–10.8)

## 2022-03-02 PROCEDURE — 99291 CRITICAL CARE FIRST HOUR: CPT

## 2022-03-02 PROCEDURE — 71045 X-RAY EXAM CHEST 1 VIEW: CPT | Mod: 26

## 2022-03-02 RX ORDER — SODIUM CHLORIDE 9 MG/ML
250 INJECTION INTRAMUSCULAR; INTRAVENOUS; SUBCUTANEOUS ONCE
Refills: 0 | Status: DISCONTINUED | OUTPATIENT
Start: 2022-03-02 | End: 2022-03-02

## 2022-03-02 RX ORDER — NOREPINEPHRINE BITARTRATE/D5W 8 MG/250ML
0.05 PLASTIC BAG, INJECTION (ML) INTRAVENOUS
Qty: 16 | Refills: 0 | Status: DISCONTINUED | OUTPATIENT
Start: 2022-03-02 | End: 2022-03-02

## 2022-03-02 RX ORDER — MIDODRINE HYDROCHLORIDE 2.5 MG/1
5 TABLET ORAL EVERY 8 HOURS
Refills: 0 | Status: DISCONTINUED | OUTPATIENT
Start: 2022-03-02 | End: 2022-03-10

## 2022-03-02 RX ORDER — MAGNESIUM SULFATE 500 MG/ML
1 VIAL (ML) INJECTION ONCE
Refills: 0 | Status: COMPLETED | OUTPATIENT
Start: 2022-03-02 | End: 2022-03-02

## 2022-03-02 RX ORDER — NOREPINEPHRINE BITARTRATE/D5W 8 MG/250ML
0.01 PLASTIC BAG, INJECTION (ML) INTRAVENOUS
Qty: 16 | Refills: 0 | Status: DISCONTINUED | OUTPATIENT
Start: 2022-03-02 | End: 2022-03-02

## 2022-03-02 RX ADMIN — Medication 2000 UNIT(S): at 12:16

## 2022-03-02 RX ADMIN — Medication 100 MILLIGRAM(S): at 05:57

## 2022-03-02 RX ADMIN — Medication 100 MILLIGRAM(S): at 06:51

## 2022-03-02 RX ADMIN — NYSTATIN CREAM 1 APPLICATION(S): 100000 CREAM TOPICAL at 05:57

## 2022-03-02 RX ADMIN — Medication 1 APPLICATION(S): at 05:58

## 2022-03-02 RX ADMIN — Medication 30 MILLIGRAM(S): at 05:58

## 2022-03-02 RX ADMIN — Medication 100 MILLIGRAM(S): at 14:47

## 2022-03-02 RX ADMIN — NYSTATIN CREAM 1 APPLICATION(S): 100000 CREAM TOPICAL at 17:01

## 2022-03-02 RX ADMIN — LACTULOSE 10 GRAM(S): 10 SOLUTION ORAL at 05:59

## 2022-03-02 RX ADMIN — MIDODRINE HYDROCHLORIDE 5 MILLIGRAM(S): 2.5 TABLET ORAL at 12:28

## 2022-03-02 RX ADMIN — CHLORHEXIDINE GLUCONATE 1 APPLICATION(S): 213 SOLUTION TOPICAL at 12:16

## 2022-03-02 RX ADMIN — Medication 100 GRAM(S): at 08:40

## 2022-03-02 RX ADMIN — Medication 0.56 MICROGRAM(S)/KG/MIN: at 10:00

## 2022-03-02 RX ADMIN — Medication 50 MICROGRAM(S): at 22:42

## 2022-03-02 RX ADMIN — Medication 100 MILLIGRAM(S): at 21:54

## 2022-03-02 RX ADMIN — Medication 100 MILLIGRAM(S): at 12:16

## 2022-03-02 RX ADMIN — PANTOPRAZOLE SODIUM 40 MILLIGRAM(S): 20 TABLET, DELAYED RELEASE ORAL at 17:01

## 2022-03-02 RX ADMIN — MIDODRINE HYDROCHLORIDE 5 MILLIGRAM(S): 2.5 TABLET ORAL at 21:54

## 2022-03-02 RX ADMIN — Medication 1 TABLET(S): at 12:16

## 2022-03-02 RX ADMIN — Medication 1 APPLICATION(S): at 11:28

## 2022-03-02 RX ADMIN — PANTOPRAZOLE SODIUM 40 MILLIGRAM(S): 20 TABLET, DELAYED RELEASE ORAL at 05:58

## 2022-03-02 RX ADMIN — Medication 1 APPLICATION(S): at 17:01

## 2022-03-02 RX ADMIN — LACTULOSE 10 GRAM(S): 10 SOLUTION ORAL at 17:01

## 2022-03-02 NOTE — PROGRESS NOTE ADULT - SUBJECTIVE AND OBJECTIVE BOX
Patient is a 61y old  Female who presents with a chief complaint of Polypectomy (01 Mar 2022 14:41)      Over Night Events:  Patient seen and examined.   back on [facility]     ROS:  See HPI    PHYSICAL EXAM    ICU Vital Signs Last 24 Hrs  T(C): 36.2 (02 Mar 2022 07:00), Max: 36.4 (01 Mar 2022 20:00)  T(F): 97.2 (02 Mar 2022 07:00), Max: 97.5 (01 Mar 2022 20:00)  HR: 96 (02 Mar 2022 07:00) (82 - 99)  BP: 81/52 (02 Mar 2022 07:00) (74/52 - 115/73)  BP(mean): 61 (02 Mar 2022 07:00) (59 - 89)  ABP: --  ABP(mean): --  RR: 20 (02 Mar 2022 07:00) (15 - 24)  SpO2: 95% (02 Mar 2022 07:00) (95% - 100%)      General: AOx3  HEENT:       khadijah         Lymph Nodes: NO cervical LN   Lungs: Bilateral BS  Cardiovascular: Regular   Abdomen: Soft, Positive BS  Extremities: No clubbing  2 edema   Skin: warm   Neurological: no focal   Musculoskeletal: move all ext     I&O's Detail    01 Mar 2022 07:01  -  02 Mar 2022 07:00  --------------------------------------------------------  IN:    Enteral Tube Flush: 180 mL    IV PiggyBack: 100 mL    Lactated Ringers: 975 mL    Norepinephrine: 16.5 mL    Peptamen A.F.: 875 mL  Total IN: 2146.5 mL    OUT:    Indwelling Catheter - Urethral (mL): 434 mL  Total OUT: 434 mL    Total NET: 1712.5 mL          LABS:                          7.0    7.81  )-----------( 49       ( 02 Mar 2022 06:00 )             20.9         02 Mar 2022 06:00    144    |  115    |  23     ----------------------------<  88     4.0     |  23     |  0.7      Ca    7.4        02 Mar 2022 06:00  Phos  2.3       02 Mar 2022 06:00  Mg     1.7       02 Mar 2022 06:00    TPro  4.6    /  Alb  1.6    /  TBili  0.3    /  DBili  x      /  AST  63     /  ALT  70     /  AlkPhos  217    02 Mar 2022 06:00  Amylase x     lipase x                                                                                                                                                                                                                                         MEDICATIONS  (STANDING):  aztreonam  IVPB 2000 milliGRAM(s) IV Intermittent every 8 hours  chlorhexidine 4% Liquid 1 Application(s) Topical daily  cholecalciferol 2000 Unit(s) Oral daily  clindamycin IVPB 600 milliGRAM(s) IV Intermittent every 8 hours  clindamycin IVPB      lactated ringers. 1000 milliLiter(s) (75 mL/Hr) IV Continuous <Continuous>  lactulose Syrup 10 Gram(s) Oral two times a day  levothyroxine Injectable 50 MICROGram(s) IV Push at bedtime  multivitamin/minerals 1 Tablet(s) Oral daily  norepinephrine Infusion 0.05 MICROgram(s)/kG/Min (2.81 mL/Hr) IV Continuous <Continuous>  nystatin Cream 1 Application(s) Topical two times a day  pantoprazole  Injectable 40 milliGRAM(s) IV Push every 12 hours  thiamine 100 milliGRAM(s) Oral daily  triamcinolone 0.1% Cream 1 Application(s) Topical two times a day  vitamin A &amp; D Ointment 1 Application(s) Topical daily    MEDICATIONS  (PRN):          Xrays:  TLC:  OG:  ET tube:                                                                                    b/l effusion opacity    ECHO:  [facility] ICU:

## 2022-03-02 NOTE — PROGRESS NOTE ADULT - ASSESSMENT
SUGGEST:  - d/c lactulose  - d/c high does thiamine - received > 1 week already  - cont MV+minerals po or via NG daily  - cont to encourage po diet but resume tube feeds - however, change feeding schedule to allow for po meals      - Peptamen  ml over 45 min after each attempted po meal  - anemia w/u?

## 2022-03-02 NOTE — PROGRESS NOTE ADULT - SUBJECTIVE AND OBJECTIVE BOX
SANTIAGO CALIXTO 61y Female  MRN#: [id number]   CODE STATUS: FULL     Hospital Day: 14d    Pt is currently admitted with the primary diagnosis of GIB s/p polypectomy, ams, pancolitis,     SUBJECTIVE  Hospital Course: Patient is a 60 y/o female with PMHx of GIB, Bowel and Bladder incontinence, Smoking (3-4 cigarettes/day), ETOH abuse, hypothyroidism, and ongoing anemia transferred from [facility] for Polypectomy. Patient was admitted to [facility] on 01/31 for AMS, weakness and decreased appetite. She was found to have UTI, NSTEMI, and electrolyte imbalance. During her stay at [facility] she had a colonoscopy done which revealed 1 large 3-4 cm sigmoid pedunculated polyp and 2 polyps (7, 14 mm) in descending colon. Patient was transferred as they are not able to perform large Polypectomies there. She was not able to be discharged as ongoing anemia and concern that the cause are these large polyps.    Patient had a colonoscopy and polypectomy done on 2/18/2022. Polyp (13 mm) in the descending colon. (hot snare Polypectomy).  Polyp (25 mm to 30 mm) in the rectosigmoid junction at 20 cm from the anal verge. (Endoloop, Polypectomy, Endoclip).   After the colonoscopy patient became hypothermic and hypotensive. Over night patient was started to Levo and warming blanket.   2/20 AM patient had RRT for desaturation and hypotension. Patient was placed on 50% O2, patient had good response. Levophed also started to raise BP. Patient remains altered, not speaking, but remains somewhat awake and just makes grunting sounds. Patient is not following commands. Central line was placed. Spoke to ICU fellow, patient is being upgraded to ICU for closer monitoring.     In CCU pt had abdominal tenderness, KUB ordered, concern for RP free air. CTAP with PO/IV contrast ordered, no perforation or pneumo-peritoneum/retroperitoneum however showing findings c/w pancolitis. Surgery following, ID consulted. Pt on vanc/zosyn. In CCU, pt developed coagulopathy (low plt, anemia, increasing INR despite not on AC). Treated with pRBC, plt transfusion as well as vitamin K. Heme onc on board, thought to be due to possible DIC 2/2 sepsis. Started on solumedrol 40mg for positive cindy however possibly positive due to transfusion at [facility]. Plt stabilizing so steroids tapered. Derm consulted for diffuse skin rash- bullous lesions with desquamation and areas of healing with hyperpigmentation. Burn consulted for skin bx. C-anca also positive, rhuem consulted, presentation unlikely due to vasculitis, will rpt cindy once acute illness resolves.     Overnight events: none    Interval hx/Subjective complaints: Pt feeling well, no complaints.     Pt denies any fevers, chills, fatigue, CP, palpitations, cough, SOB, abdominal pain, n/v/d, hematochezia, melena, weakness, numbness, tingling, or other FND.                                           ----------------------------------------------------------  OBJECTIVE  PAST MEDICAL & SURGICAL HISTORY                                            -----------------------------------------------------------  ALLERGIES:  No Known Allergies                                            ------------------------------------------------------------    HOME MEDICATIONS  Home Medications:                           MEDICATIONS:  STANDING MEDICATIONS  aztreonam  IVPB 2000 milliGRAM(s) IV Intermittent every 8 hours  chlorhexidine 4% Liquid 1 Application(s) Topical daily  cholecalciferol 2000 Unit(s) Oral daily  clindamycin IVPB 600 milliGRAM(s) IV Intermittent every 8 hours  clindamycin IVPB      lactulose Syrup 10 Gram(s) Oral two times a day  levothyroxine Injectable 50 MICROGram(s) IV Push at bedtime  midodrine. 5 milliGRAM(s) Oral every 8 hours  multivitamin/minerals 1 Tablet(s) Oral daily  norepinephrine Infusion 0.01 MICROgram(s)/kG/Min IV Continuous <Continuous>  nystatin Cream 1 Application(s) Topical two times a day  pantoprazole  Injectable 40 milliGRAM(s) IV Push every 12 hours  thiamine 100 milliGRAM(s) Oral daily  triamcinolone 0.1% Cream 1 Application(s) Topical two times a day  vitamin A &amp; D Ointment 1 Application(s) Topical daily    PRN MEDICATIONS                                            ------------------------------------------------------------  VITAL SIGNS: Last 24 Hours  T(C): 36.2 (02 Mar 2022 11:00), Max: 36.4 (01 Mar 2022 20:00)  T(F): 97.2 (02 Mar 2022 11:00), Max: 97.5 (01 Mar 2022 20:00)  HR: 86 (02 Mar 2022 12:00) (83 - 99)  BP: 82/53 (02 Mar 2022 12:00) (77/54 - 130/58)  BP(mean): 63 (02 Mar 2022 12:00) (61 - 89)  RR: 16 (02 Mar 2022 12:00) (14 - 25)  SpO2: 98% (02 Mar 2022 12:00) (95% - 100%)      03-01-22 @ 07:01  -  03-02-22 @ 07:00  --------------------------------------------------------  IN: 2406.5 mL / OUT: 599 mL / NET: 1807.5 mL    03-02-22 @ 07:01  -  03-02-22 @ 12:36  --------------------------------------------------------  IN: 899.1 mL / OUT: 170 mL / NET: 729.1 mL                                             --------------------------------------------------------------  LABS:                        7.0    7.81  )-----------( 49       ( 02 Mar 2022 06:00 )             20.9     03-02    144  |  115<H>  |  23<H>  ----------------------------<  88  4.0   |  23  |  0.7    Ca    7.4<L>      02 Mar 2022 06:00  Phos  2.3     03-02  Mg     1.7     03-02    TPro  4.6<L>  /  Alb  1.6<L>  /  TBili  0.3  /  DBili  x   /  AST  63<H>  /  ALT  70<H>  /  AlkPhos  217<H>  03-02                                            -------------------------------------------------------------  RADIOLOGY:                                            --------------------------------------------------------------  PHYSICAL EXAM:  General: Ill appearing woman laying in bed with NGT in place in nad  HEENT: ncat, eomi, mmm  LUNGS: ctab  HEART: s1/s2, rrr  ABDOMEN: soft, ntnd, bs+  EXT: wwp, no cyanosis, clubbing, edema  NEURO: aox2, moves all extremities   SKIN: superficial scabs diffusely over body, most prominant over chest, as well as areas of desquamation over arms, legs, and hips. Slightly improved from previous exams                                          --------------------------------------------------------------    ASSESSMENT & PLAN  61F [facility] GIB, Bowel and Bladder incontinence, Smoking (3-4 cigarettes/day), ETOH abuse, hypothyroidism, and ongoing anemia, admitted to [facility] 1/31 for ams found to have UTI. Dev GIB, colonoscopy showing polyps, transferred to [facility] for polypectomy now upgraded to CCU for hypotension, likely septic shock 2/2 aspiration pna.     #AMS with unclear etiology   #sepsis w/ shock/hypothermia vs aspiration after procedure under anesthesia 2/18 vs CVA   #Pancolitis   - Pt upgraded from floor on 2/20 after RRT for hypotension and desaturation. RIJ CVC placed, started on levo, venti mask, o2 and hypotension subsequently improved   - CTH 2/20: no ICH, mass effect, or midline shift   - EEG 2/20: generalized slowing    - UCx 2/18 negative   - BCx 2/17, 2/19: NGTD  - COVID negative 2/20  - MRSA nares 2/20 negative  - LE duplex 2/21: no DVT however bl peroneal veins not visualized   - ammonia elevated to 59 on 2/22, started on lactulose, possibly contributing to ams   - ammonia 50 on 2/26   - d/c'd vanc 2/22  - c/w aztreonam 2g q8h (started 2/24- switched from eduardo due to concern for pcn induced thrombocytopenia)   - c/w clinda 600mg q8h   - off levo   - Exchange TLC today   - neuro eval appreciated  - ID recs appreciated      #Colonic Polyp  #Diarrhea in setting of recent abx use  #Hx of GIB   #Pneumoretroperitoneum   #Pancolitis   - Colonoscopy 2/4: 1 large 3-4 cm sigmoid pedunculated polyp and 2 polyps (7, 14 mm) in descending colon s/p bx  - GI consulted (Dr. Daniels)   - Patient is cleared for colonoscopy per Cardiology   - Negative for C-Diff infection  - CEA elevated 5.6  - S/P colonoscopy and polypectomy 2/18/2022. Polyp (13 mm) in the descending colon. (hot snare Polypectomy).  Polyp (25 mm to 30 mm) in the rectosigmoid junction at 20 cm from the anal verge. (Endoloop, Polypectomy, Endoclip).   - Repeat Colonoscopy in 6 months   - GI recs appreciated    - 2/21: overnight pt had abdominal tenderness, KUB ordered showing paraspinal lucencies concerning for possible pneumoretroperitoneum   - abdomen soft, nontender, nondistended on my exam  - CTAP PO/IV contrast 2/21: findings c/w pancolitis, no intra or retroperitoneal free air  - c/w aztreonam, clinda   - Pt tolerating tube feeds. Passed S&S- pureed diet however PO intake minimal. Will c/w tube feeds   - surgery recs appreciated   - ID recs appreciated     #Exfoliative skin lesions- possibly externally induced (e.g scratching), r/o TEN given vesicles and desquamation   #+C-ANCA  - pt with superficial scabs diffusely over body, most prominent over chest, as well as areas of desquamation over arms, legs, and hips   - Continue to monitor, if any worsening derm consult   - Burn recs appreciated; dx with incontinence dermatitis    - derm recs appreciated, recalled burn for skin bx, f/u burn c/s   - rheum recs appreciated, no clear evidence for ANCA associated vasulitis, could be elevated due to sepsis. Agree with skin bx. Sent PR4/MPO antibody assay, will rpt c-anca once acute illness resolves.  - f/u niacin levels   - s/p skin bx by burn 2/28, f/u path     #Prolonged qtc    - qtc 613 initially, now 537 on 2/28 EKG   - repleating mg today, keep Mg>2, K>4.   - on no qtc prolonging medications   - f/u arsenic levels   - daily ekg     #Normocytic anemia   #Acute thrombocytopenia- resolving   #Elevated INR/PTT not on a/c- resolved   - not on heparin this admission, unclear if was on previously at [facility]  - plt, hgb continued to downtrend so given 1u plt 1u pRBC on 2/23. Addnl unit plt given 2/24, platelets appear to be stabilizing will follow    - INR also uptrending, gave 10mg vitamin K IVPB 2/23   - cindy positive, possibly due to transfusion (not documented but likely received in [facility])  - retic count 0.9, LDH nl, haptoglobin slightly low. Ddimer elevated, fibrinogen nl, fibrin split products elevated    - heparin plt antibody negative   - HIV, hep B negative   - monitor INR   - started on trial of solumedrol 40mg IV 2/24, plt stabilized, will begin taper 2/28 (prednisone 30 x3d, 20 x3d, 10 x3d.   - Given 1u pRBC 3/2   - heme onc recs appreciated    #Hypotension   - am cortisol 5.7   - C/w midodrine 5mg TID    #Hx of UTI- resolved   #R lower face cellulitis- resolved  - CT maxillofacial with C 2/12: Mild soft tissue inflammation suggesting cellulitis of the right lower face  - pt was on cefepime, vancomycin (end date 2/15)  - Started on Unasyn 2/16,/c today 2/20 started on broad spectrum Abx 2/20  - No documentations of + bcx or ucx on the charts from [facility]   - Rpt UCx negative   - Bclx 2/17 NGTD, F/u repeat    - Dental recs appreciated   - F/u SPEP     #Hypernatremia- improving   - c/w free water via peg     #Hypothyroidism  - TSH 15.6  - C/w synthroid 50 mcg po q24  - Patient will need to repeat TSH and FT4 in 6 wks (April)    #Hx L pleural effusion on CXR 2/8- resolved   - CT Chest with Cont 2/9 obtained: Moderate b/l pleural effusions resulting in complete atelectasis of both lower lobes    #Hx NSTEMI   #Hx of Elevated Troponin  - in setting of hypotension   - Normal Lexiscan at [facility]  - EKG noted     #Hx of ETOH abuse  - last drink (as per nurse) 6 months ago  - c/w Folic acid and Thiamine  - F/u Dietician eval  - Ensure 3x/day    #Possible Thiamine deficiency   #Possible Folic acid deficiency   - C/w Folic acid and Thiamine    #Misc   - DVT prophylaxis: SCD   - GI prophylaxis: PPI  - Diet: NPO tube feeds    - Activity status: IAT   - Code status: Full code   - Disposition: acute     #Handoff   - off pressors   - f/u skin bx results   - monitor hgb, plt   - c/w lactulose   - will exchange TLC today

## 2022-03-02 NOTE — PROGRESS NOTE ADULT - SUBJECTIVE AND OBJECTIVE BOX
pt seen this morning, awake, weak - ate only a few spoonsful of puree breakfast  small NG feeding tube in place  abd soft, ND, NT  RN reports + loose brown BM overnight  + edema  Vital Signs Last 24 Hrs  T(C): 36.2 (02 Mar 2022 16:00), Max: 36.4 (01 Mar 2022 20:00)  T(F): 97.1 (02 Mar 2022 16:00), Max: 97.5 (01 Mar 2022 20:00)  HR: 94 (02 Mar 2022 17:00) (86 - 101)  BP: 92/66 (02 Mar 2022 17:00) (77/54 - 130/58)  BP(mean): 75 (02 Mar 2022 17:00) (61 - 85)  RR: 20 (02 Mar 2022 17:00) (14 - 25)  SpO2: 99% (02 Mar 2022 17:00) (95% - 99%)    MEDICATIONS  (STANDING):  aztreonam  IVPB 2000 milliGRAM(s) IV Intermittent every 8 hours  chlorhexidine 4% Liquid 1 Application(s) Topical daily  cholecalciferol 2000 Unit(s) Oral daily  clindamycin IVPB 600 milliGRAM(s) IV Intermittent every 8 hours  lactulose Syrup 10 Gram(s) Oral two times a day  levothyroxine Injectable 50 MICROGram(s) IV Push at bedtime  midodrine. 5 milliGRAM(s) Oral every 8 hours  multivitamin/minerals 1 Tablet(s) Oral daily  nystatin Cream 1 Application(s) Topical two times a day  pantoprazole  Injectable 40 milliGRAM(s) IV Push every 12 hours  thiamine 100 milliGRAM(s) Oral daily  triamcinolone 0.1% Cream 1 Application(s) Topical two times a day  vitamin A &amp; D Ointment 1 Application(s) Topical daily                        7.0    7.81  )-----------( 49       ( 02 Mar 2022 06:00 )             20.9   03-02    144  |  115<H>  |  23<H>  ----------------------------<  88  4.0   |  23  |  0.7    Ca    7.4<L>      02 Mar 2022 06:00  Phos  2.3     03-02  Mg     1.7     03-02    TPro  4.6<L>  /  Alb  1.6<L>  /  TBili  0.3  /  DBili  x   /  AST  63<H>  /  ALT  70<H>  /  AlkPhos  217<H>  03-02

## 2022-03-02 NOTE — PROGRESS NOTE ADULT - ASSESSMENT
IMPRESSION:  AMS   Sepsis   Septic shock  Possible aspiration PNA  Acute hypoxemic respiratory failure   Colon polyps s/p polypectomy of 2 polyps on 2/18  Anemia  HO GIB  HO ETOH abuse  Active smoker  NAGMA    PLAN:    CNS: CTH. Avoid CNS depressants.   Thiamine, folate.       HEENT: Oral care    PULMONARY:  HOB @ 45 degrees.  Aspiration precautions.    Supplemental O2 target Spo2 92-94%  CXR    CARDIOVASCULAR:   d/c iv fluid keep is = os   do cheetah     BNP.   on  Levophed      GI: GI prophylaxis. follow GI NG feed   diet   free H2O.   speech and swallow eval   RENAL:  Follow up lytes.  Correct as needed      INFECTIOUS DISEASE: ABx as per Id   follow Id   culture temp spikes  follow id   burn for skin lesion   repeat procal     HEMATOLOGICAL:  DVT prophylaxis.  follow h/h . plt  follow hematology   on prednisone   transfuse 1 unit prbc   ENDOCRINE:  Follow up FS.  Insulin protocol if needed.      MUSCULOSKELETAL:  Bed rest     keep icu , heredia in because of incontinence and has bad desquamation lesion in the area   change tlc to different site   place peripheral lines

## 2022-03-03 LAB
ALBUMIN SERPL ELPH-MCNC: 1.9 G/DL — LOW (ref 3.5–5.2)
ALP SERPL-CCNC: 208 U/L — HIGH (ref 30–115)
ALT FLD-CCNC: 101 U/L — HIGH (ref 0–41)
ANION GAP SERPL CALC-SCNC: 8 MMOL/L — SIGNIFICANT CHANGE UP (ref 7–14)
ANISOCYTOSIS BLD QL: SLIGHT — SIGNIFICANT CHANGE UP
AST SERPL-CCNC: 101 U/L — HIGH (ref 0–41)
BASOPHILS # BLD AUTO: 0.01 K/UL — SIGNIFICANT CHANGE UP (ref 0–0.2)
BASOPHILS NFR BLD AUTO: 0.2 % — SIGNIFICANT CHANGE UP (ref 0–1)
BILIRUB SERPL-MCNC: 0.3 MG/DL — SIGNIFICANT CHANGE UP (ref 0.2–1.2)
BUN SERPL-MCNC: 23 MG/DL — HIGH (ref 10–20)
CALCIUM SERPL-MCNC: 7.6 MG/DL — LOW (ref 8.5–10.1)
CHLORIDE SERPL-SCNC: 114 MMOL/L — HIGH (ref 98–110)
CO2 SERPL-SCNC: 23 MMOL/L — SIGNIFICANT CHANGE UP (ref 17–32)
CREAT SERPL-MCNC: 0.6 MG/DL — LOW (ref 0.7–1.5)
EGFR: 103 ML/MIN/1.73M2 — SIGNIFICANT CHANGE UP
EOSINOPHIL # BLD AUTO: 0.07 K/UL — SIGNIFICANT CHANGE UP (ref 0–0.7)
EOSINOPHIL NFR BLD AUTO: 1.1 % — SIGNIFICANT CHANGE UP (ref 0–8)
GIANT PLATELETS BLD QL SMEAR: PRESENT — SIGNIFICANT CHANGE UP
GLUCOSE SERPL-MCNC: 76 MG/DL — SIGNIFICANT CHANGE UP (ref 70–99)
HCT VFR BLD CALC: 24 % — LOW (ref 37–47)
HGB BLD-MCNC: 8.2 G/DL — LOW (ref 12–16)
IMM GRANULOCYTES NFR BLD AUTO: 4.7 % — HIGH (ref 0.1–0.3)
LYMPHOCYTES # BLD AUTO: 0.95 K/UL — LOW (ref 1.2–3.4)
LYMPHOCYTES # BLD AUTO: 15.3 % — LOW (ref 20.5–51.1)
MACROCYTES BLD QL: SLIGHT — SIGNIFICANT CHANGE UP
MAGNESIUM SERPL-MCNC: 1.7 MG/DL — LOW (ref 1.8–2.4)
MANUAL SMEAR VERIFICATION: SIGNIFICANT CHANGE UP
MCHC RBC-ENTMCNC: 30.5 PG — SIGNIFICANT CHANGE UP (ref 27–31)
MCHC RBC-ENTMCNC: 34.2 G/DL — SIGNIFICANT CHANGE UP (ref 32–37)
MCV RBC AUTO: 89.2 FL — SIGNIFICANT CHANGE UP (ref 81–99)
METAMYELOCYTES # FLD: 0.9 % — HIGH (ref 0–0)
MONOCYTES # BLD AUTO: 0.54 K/UL — SIGNIFICANT CHANGE UP (ref 0.1–0.6)
MONOCYTES NFR BLD AUTO: 8.7 % — SIGNIFICANT CHANGE UP (ref 1.7–9.3)
MYELOCYTES NFR BLD: 1.8 % — HIGH (ref 0–0)
NEUTROPHILS # BLD AUTO: 4.35 K/UL — SIGNIFICANT CHANGE UP (ref 1.4–6.5)
NEUTROPHILS NFR BLD AUTO: 70 % — SIGNIFICANT CHANGE UP (ref 42.2–75.2)
NEUTS BAND # BLD: 2.6 % — SIGNIFICANT CHANGE UP (ref 0–6)
NRBC # BLD: 1 /100 WBCS — HIGH (ref 0–0)
PHOSPHATE SERPL-MCNC: 2.4 MG/DL — SIGNIFICANT CHANGE UP (ref 2.1–4.9)
PLAT MORPH BLD: ABNORMAL
PLATELET # BLD AUTO: 51 K/UL — LOW (ref 130–400)
POTASSIUM SERPL-MCNC: 4.1 MMOL/L — SIGNIFICANT CHANGE UP (ref 3.5–5)
POTASSIUM SERPL-SCNC: 4.1 MMOL/L — SIGNIFICANT CHANGE UP (ref 3.5–5)
PROT SERPL-MCNC: 5 G/DL — LOW (ref 6–8)
RBC # BLD: 2.69 M/UL — LOW (ref 4.2–5.4)
RBC # FLD: 16 % — HIGH (ref 11.5–14.5)
RBC BLD AUTO: ABNORMAL
SODIUM SERPL-SCNC: 145 MMOL/L — SIGNIFICANT CHANGE UP (ref 135–146)
WBC # BLD: 6.21 K/UL — SIGNIFICANT CHANGE UP (ref 4.8–10.8)
WBC # FLD AUTO: 6.21 K/UL — SIGNIFICANT CHANGE UP (ref 4.8–10.8)

## 2022-03-03 PROCEDURE — 71045 X-RAY EXAM CHEST 1 VIEW: CPT | Mod: 26,77

## 2022-03-03 PROCEDURE — 99233 SBSQ HOSP IP/OBS HIGH 50: CPT

## 2022-03-03 PROCEDURE — 71045 X-RAY EXAM CHEST 1 VIEW: CPT | Mod: 26,76

## 2022-03-03 RX ORDER — MAGNESIUM SULFATE 500 MG/ML
2 VIAL (ML) INJECTION
Refills: 0 | Status: COMPLETED | OUTPATIENT
Start: 2022-03-03 | End: 2022-03-03

## 2022-03-03 RX ORDER — THIAMINE MONONITRATE (VIT B1) 100 MG
100 TABLET ORAL DAILY
Refills: 0 | Status: DISCONTINUED | OUTPATIENT
Start: 2022-03-03 | End: 2022-03-26

## 2022-03-03 RX ORDER — QUETIAPINE FUMARATE 200 MG/1
25 TABLET, FILM COATED ORAL ONCE
Refills: 0 | Status: COMPLETED | OUTPATIENT
Start: 2022-03-03 | End: 2022-03-03

## 2022-03-03 RX ORDER — OXYCODONE HYDROCHLORIDE 5 MG/1
5 TABLET ORAL ONCE
Refills: 0 | Status: DISCONTINUED | OUTPATIENT
Start: 2022-03-03 | End: 2022-03-03

## 2022-03-03 RX ADMIN — MIDODRINE HYDROCHLORIDE 5 MILLIGRAM(S): 2.5 TABLET ORAL at 05:02

## 2022-03-03 RX ADMIN — OXYCODONE HYDROCHLORIDE 5 MILLIGRAM(S): 5 TABLET ORAL at 21:38

## 2022-03-03 RX ADMIN — Medication 100 MILLIGRAM(S): at 14:42

## 2022-03-03 RX ADMIN — Medication 1 APPLICATION(S): at 18:29

## 2022-03-03 RX ADMIN — Medication 2000 UNIT(S): at 11:15

## 2022-03-03 RX ADMIN — Medication 100 MILLIGRAM(S): at 21:39

## 2022-03-03 RX ADMIN — MIDODRINE HYDROCHLORIDE 5 MILLIGRAM(S): 2.5 TABLET ORAL at 21:38

## 2022-03-03 RX ADMIN — PANTOPRAZOLE SODIUM 40 MILLIGRAM(S): 20 TABLET, DELAYED RELEASE ORAL at 17:50

## 2022-03-03 RX ADMIN — NYSTATIN CREAM 1 APPLICATION(S): 100000 CREAM TOPICAL at 17:51

## 2022-03-03 RX ADMIN — Medication 25 GRAM(S): at 07:28

## 2022-03-03 RX ADMIN — NYSTATIN CREAM 1 APPLICATION(S): 100000 CREAM TOPICAL at 05:02

## 2022-03-03 RX ADMIN — Medication 1 TABLET(S): at 11:15

## 2022-03-03 RX ADMIN — Medication 100 MILLIGRAM(S): at 05:01

## 2022-03-03 RX ADMIN — Medication 100 MILLIGRAM(S): at 21:38

## 2022-03-03 RX ADMIN — Medication 1 APPLICATION(S): at 12:56

## 2022-03-03 RX ADMIN — Medication 25 GRAM(S): at 10:25

## 2022-03-03 RX ADMIN — LACTULOSE 10 GRAM(S): 10 SOLUTION ORAL at 05:01

## 2022-03-03 RX ADMIN — Medication 50 MICROGRAM(S): at 22:40

## 2022-03-03 RX ADMIN — OXYCODONE HYDROCHLORIDE 5 MILLIGRAM(S): 5 TABLET ORAL at 22:46

## 2022-03-03 RX ADMIN — PANTOPRAZOLE SODIUM 40 MILLIGRAM(S): 20 TABLET, DELAYED RELEASE ORAL at 05:02

## 2022-03-03 RX ADMIN — Medication 20 MILLIGRAM(S): at 05:02

## 2022-03-03 RX ADMIN — CHLORHEXIDINE GLUCONATE 1 APPLICATION(S): 213 SOLUTION TOPICAL at 12:51

## 2022-03-03 RX ADMIN — Medication 100 MILLIGRAM(S): at 12:52

## 2022-03-03 RX ADMIN — MIDODRINE HYDROCHLORIDE 5 MILLIGRAM(S): 2.5 TABLET ORAL at 14:42

## 2022-03-03 RX ADMIN — LACTULOSE 10 GRAM(S): 10 SOLUTION ORAL at 17:50

## 2022-03-03 RX ADMIN — Medication 100 MILLIGRAM(S): at 14:24

## 2022-03-03 RX ADMIN — Medication 1 APPLICATION(S): at 05:02

## 2022-03-03 NOTE — PROGRESS NOTE ADULT - SUBJECTIVE AND OBJECTIVE BOX
SANTIAGO CALIXTO 61y Female  MRN#: [id number]   CODE STATUS: FULL     Hospital Day: 15d    Pt is currently admitted with the primary diagnosis of GIB s/p polypectomy, ams, pancolitis    SUBJECTIVE  Hospital Course: Patient is a 60 y/o female with PMHx of GIB, Bowel and Bladder incontinence, Smoking (3-4 cigarettes/day), ETOH abuse, hypothyroidism, and ongoing anemia transferred from [facility] for Polypectomy. Patient was admitted to [facility] on 01/31 for AMS, weakness and decreased appetite. She was found to have UTI, NSTEMI, and electrolyte imbalance. During her stay at [facility] she had a colonoscopy done which revealed 1 large 3-4 cm sigmoid pedunculated polyp and 2 polyps (7, 14 mm) in descending colon. Patient was transferred as they are not able to perform large Polypectomies there. She was not able to be discharged as ongoing anemia and concern that the cause are these large polyps.    Patient had a colonoscopy and polypectomy done on 2/18/2022. Polyp (13 mm) in the descending colon. (hot snare Polypectomy).  Polyp (25 mm to 30 mm) in the rectosigmoid junction at 20 cm from the anal verge. (Endoloop, Polypectomy, Endoclip).   After the colonoscopy patient became hypothermic and hypotensive. Over night patient was started to Levo and warming blanket.   2/20 AM patient had RRT for desaturation and hypotension. Patient was placed on 50% O2, patient had good response. Levophed also started to raise BP. Patient remains altered, not speaking, but remains somewhat awake and just makes grunting sounds. Patient is not following commands. Central line was placed. Spoke to ICU fellow, patient is being upgraded to ICU for closer monitoring.     In CCU pt had abdominal tenderness, KUB ordered, concern for RP free air. CTAP with PO/IV contrast ordered, no perforation or pneumo-peritoneum/retroperitoneum however showing findings c/w pancolitis. Surgery following, ID consulted. Pt on vanc/zosyn. In CCU, pt developed coagulopathy (low plt, anemia, increasing INR despite not on AC). Treated with pRBC, plt transfusion as well as vitamin K. Heme onc on board, thought to be due to possible DIC 2/2 sepsis. Started on solumedrol 40mg for positive cindy however possibly positive due to transfusion at [facility]. Plt stabilizing so steroids tapered. Derm consulted for diffuse skin rash- bullous lesions with desquamation and areas of healing with hyperpigmentation. Burn consulted for skin bx. C-anca also positive, rhuem consulted, presentation unlikely due to vasculitis, will rpt cindy once acute illness resolves.     Overnight events: none     Interval hx/Subjective complaints: Pt aox1, only answering yes/no questions however denies any complaints.     Pt denies any fevers, chills, fatigue, CP, palpitations, cough, SOB, abdominal pain, n/v/d, hematochezia, melena, weakness, numbness, tingling, or other FND.                                           ----------------------------------------------------------  OBJECTIVE  PAST MEDICAL & SURGICAL HISTORY                                            -----------------------------------------------------------  ALLERGIES:  No Known Allergies                                            ------------------------------------------------------------    HOME MEDICATIONS  Home Medications:                           MEDICATIONS:  STANDING MEDICATIONS  aztreonam  IVPB 2000 milliGRAM(s) IV Intermittent every 8 hours  chlorhexidine 4% Liquid 1 Application(s) Topical daily  cholecalciferol 2000 Unit(s) Oral daily  clindamycin IVPB 600 milliGRAM(s) IV Intermittent every 8 hours  clindamycin IVPB      lactulose Syrup 10 Gram(s) Oral two times a day  levothyroxine Injectable 50 MICROGram(s) IV Push at bedtime  magnesium sulfate  IVPB 2 Gram(s) IV Intermittent every 2 hours  midodrine. 5 milliGRAM(s) Oral every 8 hours  multivitamin/minerals 1 Tablet(s) Oral daily  nystatin Cream 1 Application(s) Topical two times a day  pantoprazole  Injectable 40 milliGRAM(s) IV Push every 12 hours  predniSONE   Tablet 20 milliGRAM(s) Oral daily  thiamine 100 milliGRAM(s) Oral daily  triamcinolone 0.1% Cream 1 Application(s) Topical two times a day  vitamin A &amp; D Ointment 1 Application(s) Topical daily    PRN MEDICATIONS                                            ------------------------------------------------------------  VITAL SIGNS: Last 24 Hours  T(C): 36.4 (03 Mar 2022 04:00), Max: 36.8 (02 Mar 2022 20:00)  T(F): 97.5 (03 Mar 2022 04:00), Max: 98.2 (02 Mar 2022 20:00)  HR: 93 (03 Mar 2022 07:00) (86 - 101)  BP: 105/65 (03 Mar 2022 07:00) (77/54 - 130/58)  BP(mean): 80 (03 Mar 2022 07:00) (61 - 104)  RR: 25 (03 Mar 2022 07:00) (14 - 25)  SpO2: 100% (03 Mar 2022 07:00) (92% - 100%)      03-02-22 @ 07:01  -  03-03-22 @ 07:00  --------------------------------------------------------  IN: 2319.1 mL / OUT: 765 mL / NET: 1554.1 mL                                             --------------------------------------------------------------  LABS:                        8.2    6.21  )-----------( 51       ( 03 Mar 2022 04:45 )             24.0     03-03    145  |  114<H>  |  23<H>  ----------------------------<  76  4.1   |  23  |  0.6<L>    Ca    7.6<L>      03 Mar 2022 04:45  Phos  2.4     03-03  Mg     1.7     03-03    TPro  5.0<L>  /  Alb  1.9<L>  /  TBili  0.3  /  DBili  x   /  AST  101<H>  /  ALT  101<H>  /  AlkPhos  208<H>  03-03                                              -------------------------------------------------------------  RADI  < from: Xray Chest 1 View- PORTABLE-Routine (03.03.22 @ 05:20) >  IMPRESSION:    Interval insertion of right IJ venous catheter terminating over distal   superior vena cava.    Remaining lines and tubes are unchanged.    Unchanged bilateral opacities/effusions. No pneumothorax.    Stable cardiomediastinal silhouette.    Unchanged osseous structures.  `  < end of copied text >                                          --------------------------------------------------------------  PHYSICAL EXAM:  General: Ill appearing woman laying in bed in nad  HEENT: ncat, eomi, mmm  LUNGS: ctab  HEART: s1/s2, rrr  ABDOMEN: soft, ntnd, bs+  EXT: wwp, no cyanosis, clubbing, 1+ IRENE  NEURO: aox1, moves all extremities   SKIN: areas of desquamation (worse on buttox), healing superficial scabs, and hyperpigmentation diffusely over entire body   Lines: [facility] c/d/i                                          --------------------------------------------------------------    ASSESSMENT & PLAN  61F PMH GIB, Bowel and Bladder incontinence, Smoking (3-4 cigarettes/day), ETOH abuse, hypothyroidism, and ongoing anemia, admitted to [facility] 1/31 for ams found to have UTI. Dev GIB, colonoscopy showing polyps, transferred to [facility] for polypectomy now upgraded to CCU for hypotension, likely septic shock 2/2 aspiration pna.     #AMS with unclear etiology   #sepsis w/ shock/hypothermia vs aspiration after procedure under anesthesia 2/18 vs CVA   #Pancolitis   - Pt upgraded from floor on 2/20 after RRT for hypotension and desaturation. RIJ CVC placed, started on levo, venti mask, o2 and hypotension subsequently improved   - CTH 2/20: no ICH, mass effect, or midline shift   - EEG 2/20: generalized slowing    - UCx 2/18 negative   - BCx 2/17, 2/19: NGTD  - COVID negative 2/20  - MRSA nares 2/20 negative  - LE duplex 2/21: no DVT however bl peroneal veins not visualized   - ammonia elevated to 59 on 2/22, started on lactulose, possibly contributing to ams   - ammonia 50 on 2/26   - d/c'd vanc 2/22  - c/w aztreonam 2g q8h (started 2/24- switched from eduardo due to concern for pcn induced thrombocytopenia)   - c/w clinda 600mg q8h   - off levo   - Exchanged TLC 3/2   - neuro eval appreciated  - ID recs appreciated      #Colonic Polyp  #Diarrhea in setting of recent abx use  #Hx of GIB   #Pneumoretroperitoneum   #Pancolitis   - Colonoscopy 2/4: 1 large 3-4 cm sigmoid pedunculated polyp and 2 polyps (7, 14 mm) in descending colon s/p bx  - GI consulted (Dr. Daniels)   - Patient is cleared for colonoscopy per Cardiology   - Negative for C-Diff infection  - CEA elevated 5.6  - S/P colonoscopy and polypectomy 2/18/2022. Polyp (13 mm) in the descending colon. (hot snare Polypectomy).  Polyp (25 mm to 30 mm) in the rectosigmoid junction at 20 cm from the anal verge. (Endoloop, Polypectomy, Endoclip).   - Repeat Colonoscopy in 6 months   - GI recs appreciated    - 2/21: overnight pt had abdominal tenderness, KUB ordered showing paraspinal lucencies concerning for possible pneumoretroperitoneum   - abdomen soft, nontender, nondistended on my exam  - CTAP PO/IV contrast 2/21: findings c/w pancolitis, no intra or retroperitoneal free air  - c/w aztreonam, clinda   - Pt tolerating tube feeds. Passed S&S- pureed diet however PO intake minimal. Will c/w tube feeds   - surgery recs appreciated   - ID recs appreciated     #Exfoliative skin lesions- possibly externally induced (e.g scratching), r/o TEN given vesicles and desquamation   #+C-ANCA  - pt with superficial scabs diffusely over body, most prominent over chest, as well as areas of desquamation over arms, legs, and hips   - Continue to monitor, if any worsening derm consult   - Burn recs appreciated; dx with incontinence dermatitis    - derm recs appreciated, recalled burn for skin bx, f/u burn c/s   - rheum recs appreciated, no clear evidence for ANCA associated vasulitis, could be elevated due to sepsis. Agree with skin bx. Sent PR4/MPO antibody assay, will rpt c-anca once acute illness resolves.  - f/u niacin levels   - arsenic negative   - s/p skin bx by burn 2/28, f/u path     #Prolonged qtc    - qtc 613 initially, now 537 on 2/28 EKG   - repleating mg today, keep Mg>2, K>4.   - on no qtc prolonging medications   - arsenic levels negative   - daily ekg     #Normocytic anemia   #Acute thrombocytopenia- resolving   #Elevated INR/PTT not on a/c- resolved   - not on heparin this admission, unclear if was on previously at [facility]  - plt, hgb continued to downtrend so given 1u plt 1u pRBC on 2/23. Addnl unit plt given 2/24, platelets appear to be stabilizing will follow    - INR also uptrending, gave 10mg vitamin K IVPB 2/23   - cindy positive, possibly due to transfusion (not documented but likely received in [facility])  - retic count 0.9, LDH nl, haptoglobin slightly low. Ddimer elevated, fibrinogen nl, fibrin split products elevated    - heparin plt antibody negative   - HIV, hep B negative   - monitor INR   - started on trial of solumedrol 40mg IV 2/24, plt stabilized, will begin taper 2/28 (prednisone 30 x3d, 20 x3d, 10 x3d.   - Given 1u pRBC 3/2   - heme onc recs appreciated    #Hypotension   - am cortisol 5.7   - C/w midodrine 5mg TID    #Hx of UTI- resolved   #R lower face cellulitis- resolved  - CT maxillofacial with C 2/12: Mild soft tissue inflammation suggesting cellulitis of the right lower face  - pt was on cefepime, vancomycin (end date 2/15)  - Started on Unasyn 2/16,/c today 2/20 started on broad spectrum Abx 2/20  - No documentations of + bcx or ucx on the charts from [facility]   - Rpt UCx negative   - Bclx 2/17 NGTD, F/u repeat    - Dental recs appreciated   - F/u SPEP     #Hypernatremia- improving   - c/w free water via peg     #Hypothyroidism  - TSH 15.6  - C/w synthroid 50 mcg po q24  - Patient will need to repeat TSH and FT4 in 6 wks (April)    #Hx L pleural effusion on CXR 2/8- resolved   - CT Chest with Cont 2/9 obtained: Moderate b/l pleural effusions resulting in complete atelectasis of both lower lobes    #Hx NSTEMI   #Hx of Elevated Troponin  - in setting of hypotension   - Normal Lexiscan at [facility]  - EKG noted     #Hx of ETOH abuse  - last drink (as per nurse) 6 months ago  - c/w Folic acid and Thiamine  - F/u Dietician eval  - Ensure 3x/day    #Possible Thiamine deficiency   #Possible Folic acid deficiency   - C/w Folic acid and Thiamine    #Misc   - DVT prophylaxis: SCD   - GI prophylaxis: PPI  - Diet: NPO tube feeds    - Activity status: IAT   - Code status: Full code   - Disposition: acute     #Handoff   - off pressors   - f/u skin bx results   - monitor hgb, plt   - c/w lactulose

## 2022-03-03 NOTE — PROGRESS NOTE ADULT - ASSESSMENT
· Assessment	  60 yo F with PMH of GIB, Bowel and Bladder incontinence, Smoking (1/2 ppd), ETOH abuse, hypothyroidism, anemia transferred from [facility] for EMR/ Polypectomy. . Pt was admitted to [facility] on 01/31 for AMS, weakness and decreased appetite. She was found to have UTI, Elevated troponin, and electrolyte imbalance. During her stay at [facility] pt had colonoscopy done, which revealed 1 large 3-4 cm sigmoid pedunculated polyp (bx > tub adenoma, but was friable) and 2 polyps (7, 14 mm) in descending colon s/p bx.    2/18 S/P colonoscopy and polypectomy 2/18 .  CT: Diffuse colonic wall thickening, consistent with pancolitis. Intra-abdominal ascites. No intraperitoneal or retroperitoneal free air. No extraluminal contrast extravasation.    IMPRESSION;  Clinically alert and no change in PE/complaints  Resolved septic shock  Clinically no peritonitis  Possible bacterial PNA on the left secondary to aspiration  which has resolved on CXR  WBC 3.6  Thrombocytopenic since 2/18 > stable   Mild pyuria > clinically no pyelonephritis  2/17,19,22  BCx NG  2/18 UCx NG  2/17,20 CD NG  Nares ORSA NG  COVID-19 NG    RECOMMENDATIONS;  Aztreonam 2 gm iv q8h since 2/24  Clindamycin 600 mg iv q8h  Off loading to prevent pressure sores and preventive measures to avoid aspiration   If any questions , please call [phone] or send a message on dscout teams  Please update ID in real time with any pertinent new laboratory /microbiological/radiographically findings or a change in the clinical characteristics

## 2022-03-03 NOTE — PROGRESS NOTE ADULT - SUBJECTIVE AND OBJECTIVE BOX
Patient is a 61y old  Female who presents with a chief complaint of Polypectomy (02 Mar 2022 17:59)      Over Night Events:  Patient seen and examined.   off pressors since yesterday night     ROS:  See HPI    PHYSICAL EXAM    ICU Vital Signs Last 24 Hrs  T(C): 36.4 (03 Mar 2022 04:00), Max: 36.8 (02 Mar 2022 20:00)  T(F): 97.5 (03 Mar 2022 04:00), Max: 98.2 (02 Mar 2022 20:00)  HR: 93 (03 Mar 2022 07:00) (86 - 101)  BP: 105/65 (03 Mar 2022 07:00) (77/54 - 130/58)  BP(mean): 80 (03 Mar 2022 07:00) (61 - 104)  ABP: --  ABP(mean): --  RR: 25 (03 Mar 2022 07:00) (14 - 25)  SpO2: 100% (03 Mar 2022 07:00) (92% - 100%)      General: awake   HEENT:    khadijah            Lymph Nodes: NO cervical LN   Lungs: Bilateral BS  Cardiovascular: Regular   Abdomen: Soft, Positive BS  Extremities: No clubbing   Skin: warm   Neurological: no focal   Musculoskeletal: move all ext     I&O's Detail    02 Mar 2022 07:01  -  03 Mar 2022 07:00  --------------------------------------------------------  IN:    Enteral Tube Flush: 370 mL    IV PiggyBack: 550 mL    Norepinephrine: 5.7 mL    Norepinephrine: 3.4 mL    Peptamen A.F.: 1025 mL    PRBCs (Packed Red Blood Cells): 365 mL  Total IN: 2319.1 mL    OUT:    Indwelling Catheter - Urethral (mL): 765 mL    Norepinephrine: 0 mL  Total OUT: 765 mL    Total NET: 1554.1 mL          LABS:                          8.2    6.21  )-----------( 51       ( 03 Mar 2022 04:45 )             24.0         03 Mar 2022 04:45    145    |  114    |  23     ----------------------------<  76     4.1     |  23     |  0.6      Ca    7.6        03 Mar 2022 04:45  Phos  2.4       03 Mar 2022 04:45  Mg     1.7       03 Mar 2022 04:45    TPro  5.0    /  Alb  1.9    /  TBili  0.3    /  DBili  x      /  AST  101    /  ALT  101    /  AlkPhos  208    03 Mar 2022 04:45  Amylase x     lipase x                                                                                                                                                                                                                                         MEDICATIONS  (STANDING):  aztreonam  IVPB 2000 milliGRAM(s) IV Intermittent every 8 hours  chlorhexidine 4% Liquid 1 Application(s) Topical daily  cholecalciferol 2000 Unit(s) Oral daily  clindamycin IVPB 600 milliGRAM(s) IV Intermittent every 8 hours  clindamycin IVPB      lactulose Syrup 10 Gram(s) Oral two times a day  levothyroxine Injectable 50 MICROGram(s) IV Push at bedtime  magnesium sulfate  IVPB 2 Gram(s) IV Intermittent every 2 hours  midodrine. 5 milliGRAM(s) Oral every 8 hours  multivitamin/minerals 1 Tablet(s) Oral daily  nystatin Cream 1 Application(s) Topical two times a day  pantoprazole  Injectable 40 milliGRAM(s) IV Push every 12 hours  predniSONE   Tablet 20 milliGRAM(s) Oral daily  thiamine 100 milliGRAM(s) Oral daily  triamcinolone 0.1% Cream 1 Application(s) Topical two times a day  vitamin A &amp; D Ointment 1 Application(s) Topical daily    MEDICATIONS  (PRN):          Xrays:  TLC:  OG:  ET tube:                                                                                    right effusion    ECHO:  [facility] ICU:

## 2022-03-03 NOTE — PROGRESS NOTE ADULT - GASTROINTESTINAL DETAILS
soft/nontender/no rebound tenderness/no guarding/no rigidity
soft/nontender/no rebound tenderness/no guarding/no rigidity

## 2022-03-03 NOTE — PROGRESS NOTE ADULT - ASSESSMENT
IMPRESSION:  AMS   Sepsis   Septic shock  Possible aspiration PNA  Acute hypoxemic respiratory failure   Colon polyps s/p polypectomy of 2 polyps on 2/18  Anemia  HO GIB  HO ETOH abuse  Active smoker  NAGMA    PLAN:    CNS: CTH. Avoid CNS depressants.   Thiamine, folate.       HEENT: Oral care    PULMONARY:  HOB @ 45 degrees.  Aspiration precautions.    Supplemental O2 target Spo2 92-94%  CXR    CARDIOVASCULAR:   keep is = os avoid fluid overlaod     BNP.   on  Levophed      GI: GI prophylaxis. follow GI NG feed   diet   free H2O.   speech and swallow eval   RENAL:  Follow up lytes.  Correct as needed      INFECTIOUS DISEASE: ABx as per Id   follow Id   culture temp spikes  follow id   burn for skin lesion   repeat procal     HEMATOLOGICAL:  DVT prophylaxis.  follow h/h . plt  follow hematology   on prednisone   transfuse 1 unit prbc   ENDOCRINE:  Follow up FS.  Insulin protocol if needed.      MUSCULOSKELETAL:  Bed rest     keep icu , heredia in because of incontinence and has bad desquamation lesion in the area   change tlc to different site   new right IJ     IMPRESSION:  AMS   Sepsis   Septic shock  Possible aspiration PNA  Acute hypoxemic respiratory failure   Colon polyps s/p polypectomy of 2 polyps on 2/18  Anemia  HO GIB  HO ETOH abuse  Active smoker  NAGMA    PLAN:    CNS: CTH. Avoid CNS depressants.   Thiamine, folate.       HEENT: Oral care    PULMONARY:  HOB @ 45 degrees.  Aspiration precautions.    Supplemental O2 target Spo2 92-94%  CXR    CARDIOVASCULAR:   keep is = os avoid fluid overlaod     BNP.   on  Levophed      GI: GI prophylaxis. follow GI NG feed   diet   free H2O.   speech and swallow eval   RENAL:  Follow up lytes.  Correct as needed      INFECTIOUS DISEASE: ABx as per Id   follow Id   culture temp spikes  follow id   burn for skin lesion   repeat procal     HEMATOLOGICAL:  DVT prophylaxis.  follow h/h . plt  follow hematology   on prednisone     ENDOCRINE:  Follow up FS.  Insulin protocol if needed.      MUSCULOSKELETAL:  Bed rest     keep icu , heredia in because of incontinence and has bad desquamation lesion in the area   change tlc to different site   new right IJ

## 2022-03-03 NOTE — PROGRESS NOTE ADULT - SUBJECTIVE AND OBJECTIVE BOX
SANTIAGO CALIXTO  61y, Female    All available historical data reviewed    OVERNIGHT EVENTS:    no fevers  alert  feels well and has no new complaints   ROS:  not reliable    VITALS:  T(F): 97.4, Max: 98.2 (03-02-22 @ 20:00)  HR: 84  BP: 97/58  RR: 19Vital Signs Last 24 Hrs  T(C): 36.3 (03 Mar 2022 08:00), Max: 36.8 (02 Mar 2022 20:00)  T(F): 97.4 (03 Mar 2022 08:00), Max: 98.2 (02 Mar 2022 20:00)  HR: 84 (03 Mar 2022 12:00) (83 - 100)  BP: 97/58 (03 Mar 2022 12:00) (91/57 - 125/78)  BP(mean): 71 (03 Mar 2022 12:00) (70 - 104)  RR: 19 (03 Mar 2022 12:00) (15 - 25)  SpO2: 96% (03 Mar 2022 12:00) (92% - 100%)    TESTS & MEASUREMENTS:                        8.2    6.21  )-----------( 51       ( 03 Mar 2022 04:45 )             24.0     03-03    145  |  114<H>  |  23<H>  ----------------------------<  76  4.1   |  23  |  0.6<L>    Ca    7.6<L>      03 Mar 2022 04:45  Phos  2.4     03-03  Mg     1.7     03-03    TPro  5.0<L>  /  Alb  1.9<L>  /  TBili  0.3  /  DBili  x   /  AST  101<H>  /  ALT  101<H>  /  AlkPhos  208<H>  03-03    LIVER FUNCTIONS - ( 03 Mar 2022 04:45 )  Alb: 1.9 g/dL / Pro: 5.0 g/dL / ALK PHOS: 208 U/L / ALT: 101 U/L / AST: 101 U/L / GGT: x                   RADIOLOGY & ADDITIONAL TESTS:  Personal review of radiological diagnostics performed  Echo and EKG results noted when applicable.     MEDICATIONS:  aztreonam  IVPB 2000 milliGRAM(s) IV Intermittent every 8 hours  chlorhexidine 4% Liquid 1 Application(s) Topical daily  cholecalciferol 2000 Unit(s) Oral daily  clindamycin IVPB 600 milliGRAM(s) IV Intermittent every 8 hours  clindamycin IVPB      lactulose Syrup 10 Gram(s) Oral two times a day  levothyroxine Injectable 50 MICROGram(s) IV Push at bedtime  midodrine. 5 milliGRAM(s) Oral every 8 hours  multivitamin/minerals 1 Tablet(s) Oral daily  nystatin Cream 1 Application(s) Topical two times a day  pantoprazole  Injectable 40 milliGRAM(s) IV Push every 12 hours  predniSONE   Tablet 20 milliGRAM(s) Oral daily  thiamine 100 milliGRAM(s) Oral daily  triamcinolone 0.1% Cream 1 Application(s) Topical two times a day  vitamin A &amp; D Ointment 1 Application(s) Topical daily      ANTIBIOTICS:  aztreonam  IVPB 2000 milliGRAM(s) IV Intermittent every 8 hours  clindamycin IVPB 600 milliGRAM(s) IV Intermittent every 8 hours  clindamycin IVPB

## 2022-03-04 LAB
ALBUMIN SERPL ELPH-MCNC: 1.8 G/DL — LOW (ref 3.5–5.2)
ALP SERPL-CCNC: 161 U/L — HIGH (ref 30–115)
ALT FLD-CCNC: 83 U/L — HIGH (ref 0–41)
ANION GAP SERPL CALC-SCNC: 6 MMOL/L — LOW (ref 7–14)
AST SERPL-CCNC: 57 U/L — HIGH (ref 0–41)
BASOPHILS # BLD AUTO: 0.01 K/UL — SIGNIFICANT CHANGE UP (ref 0–0.2)
BASOPHILS NFR BLD AUTO: 0.2 % — SIGNIFICANT CHANGE UP (ref 0–1)
BILIRUB SERPL-MCNC: 0.2 MG/DL — SIGNIFICANT CHANGE UP (ref 0.2–1.2)
BUN SERPL-MCNC: 21 MG/DL — HIGH (ref 10–20)
CALCIUM SERPL-MCNC: 7.7 MG/DL — LOW (ref 8.5–10.1)
CHLORIDE SERPL-SCNC: 114 MMOL/L — HIGH (ref 98–110)
CO2 SERPL-SCNC: 23 MMOL/L — SIGNIFICANT CHANGE UP (ref 17–32)
CREAT SERPL-MCNC: 0.5 MG/DL — LOW (ref 0.7–1.5)
EGFR: 107 ML/MIN/1.73M2 — SIGNIFICANT CHANGE UP
EOSINOPHIL # BLD AUTO: 0.03 K/UL — SIGNIFICANT CHANGE UP (ref 0–0.7)
EOSINOPHIL NFR BLD AUTO: 0.5 % — SIGNIFICANT CHANGE UP (ref 0–8)
GLUCOSE SERPL-MCNC: 91 MG/DL — SIGNIFICANT CHANGE UP (ref 70–99)
HCT VFR BLD CALC: 21.5 % — LOW (ref 37–47)
HGB BLD-MCNC: 7.2 G/DL — LOW (ref 12–16)
IMM GRANULOCYTES NFR BLD AUTO: 2.5 % — HIGH (ref 0.1–0.3)
LYMPHOCYTES # BLD AUTO: 0.88 K/UL — LOW (ref 1.2–3.4)
LYMPHOCYTES # BLD AUTO: 13.9 % — LOW (ref 20.5–51.1)
MAGNESIUM SERPL-MCNC: 2 MG/DL — SIGNIFICANT CHANGE UP (ref 1.8–2.4)
MCHC RBC-ENTMCNC: 30.3 PG — SIGNIFICANT CHANGE UP (ref 27–31)
MCHC RBC-ENTMCNC: 33.5 G/DL — SIGNIFICANT CHANGE UP (ref 32–37)
MCV RBC AUTO: 90.3 FL — SIGNIFICANT CHANGE UP (ref 81–99)
MONOCYTES # BLD AUTO: 0.43 K/UL — SIGNIFICANT CHANGE UP (ref 0.1–0.6)
MONOCYTES NFR BLD AUTO: 6.8 % — SIGNIFICANT CHANGE UP (ref 1.7–9.3)
NEUTROPHILS # BLD AUTO: 4.81 K/UL — SIGNIFICANT CHANGE UP (ref 1.4–6.5)
NEUTROPHILS NFR BLD AUTO: 76.1 % — HIGH (ref 42.2–75.2)
NRBC # BLD: 0 /100 WBCS — SIGNIFICANT CHANGE UP (ref 0–0)
PHOSPHATE SERPL-MCNC: 3.1 MG/DL — SIGNIFICANT CHANGE UP (ref 2.1–4.9)
PLATELET # BLD AUTO: 55 K/UL — LOW (ref 130–400)
POTASSIUM SERPL-MCNC: 3.9 MMOL/L — SIGNIFICANT CHANGE UP (ref 3.5–5)
POTASSIUM SERPL-SCNC: 3.9 MMOL/L — SIGNIFICANT CHANGE UP (ref 3.5–5)
PROCALCITONIN SERPL-MCNC: 0.46 NG/ML — HIGH (ref 0.02–0.1)
PROT SERPL-MCNC: 4.8 G/DL — LOW (ref 6–8)
RBC # BLD: 2.38 M/UL — LOW (ref 4.2–5.4)
RBC # FLD: 16.3 % — HIGH (ref 11.5–14.5)
SODIUM SERPL-SCNC: 143 MMOL/L — SIGNIFICANT CHANGE UP (ref 135–146)
T3 SERPL-MCNC: 55 NG/DL — LOW (ref 80–200)
T4 AB SER-ACNC: 3 UG/DL — LOW (ref 4.6–12)
TSH SERPL-MCNC: 17.04 UIU/ML — HIGH (ref 0.27–4.2)
VIT C SERPL-MCNC: <0.1 MG/DL — LOW (ref 0.4–2)
WBC # BLD: 6.32 K/UL — SIGNIFICANT CHANGE UP (ref 4.8–10.8)
WBC # FLD AUTO: 6.32 K/UL — SIGNIFICANT CHANGE UP (ref 4.8–10.8)

## 2022-03-04 PROCEDURE — 71045 X-RAY EXAM CHEST 1 VIEW: CPT | Mod: 26

## 2022-03-04 PROCEDURE — 99233 SBSQ HOSP IP/OBS HIGH 50: CPT

## 2022-03-04 RX ADMIN — QUETIAPINE FUMARATE 25 MILLIGRAM(S): 200 TABLET, FILM COATED ORAL at 01:29

## 2022-03-04 RX ADMIN — NYSTATIN CREAM 1 APPLICATION(S): 100000 CREAM TOPICAL at 05:25

## 2022-03-04 RX ADMIN — Medication 2000 UNIT(S): at 11:46

## 2022-03-04 RX ADMIN — Medication 1 TABLET(S): at 11:46

## 2022-03-04 RX ADMIN — PANTOPRAZOLE SODIUM 40 MILLIGRAM(S): 20 TABLET, DELAYED RELEASE ORAL at 18:05

## 2022-03-04 RX ADMIN — Medication 1 APPLICATION(S): at 12:30

## 2022-03-04 RX ADMIN — LACTULOSE 10 GRAM(S): 10 SOLUTION ORAL at 18:03

## 2022-03-04 RX ADMIN — Medication 100 MILLIGRAM(S): at 05:27

## 2022-03-04 RX ADMIN — Medication 1 APPLICATION(S): at 05:27

## 2022-03-04 RX ADMIN — MIDODRINE HYDROCHLORIDE 5 MILLIGRAM(S): 2.5 TABLET ORAL at 14:58

## 2022-03-04 RX ADMIN — Medication 20 MILLIGRAM(S): at 05:26

## 2022-03-04 RX ADMIN — PANTOPRAZOLE SODIUM 40 MILLIGRAM(S): 20 TABLET, DELAYED RELEASE ORAL at 05:27

## 2022-03-04 RX ADMIN — MIDODRINE HYDROCHLORIDE 5 MILLIGRAM(S): 2.5 TABLET ORAL at 23:10

## 2022-03-04 RX ADMIN — Medication 100 MILLIGRAM(S): at 11:45

## 2022-03-04 RX ADMIN — NYSTATIN CREAM 1 APPLICATION(S): 100000 CREAM TOPICAL at 18:03

## 2022-03-04 RX ADMIN — Medication 50 MICROGRAM(S): at 23:11

## 2022-03-04 RX ADMIN — Medication 1 APPLICATION(S): at 18:02

## 2022-03-04 RX ADMIN — CHLORHEXIDINE GLUCONATE 1 APPLICATION(S): 213 SOLUTION TOPICAL at 12:30

## 2022-03-04 RX ADMIN — MIDODRINE HYDROCHLORIDE 5 MILLIGRAM(S): 2.5 TABLET ORAL at 05:26

## 2022-03-04 RX ADMIN — Medication 100 MILLIGRAM(S): at 05:26

## 2022-03-04 NOTE — PROGRESS NOTE ADULT - ASSESSMENT
ASSESSMENT  61F PMH GIB, Bowel and Bladder incontinence, Smoking (3-4 cigarettes/day), ETOH abuse, hypothyroidism, and ongoing anemia, admitted to [facility] 1/31 for ams found to have UTI. Dev GIB, colonoscopy showing polyps, transferred to [facility] for polypectomy now upgraded to CCU for hypotension, likely septic shock 2/2 aspiration pna.     - AMS, unclear etiology  - sepsis w/ shock/hypothermia vs aspiration after procedure under anesthesia 2/18 vs CVA  - acute thrombocytopenia  - chronic anemia  - Exfoliative dermatitis   - Colonoscopy 2/4: 1 large 3-4 cm sigmoid pedunculated polyp and 2 polyps (7, 14 mm) in descending colon s/p polypectomy 2/18/2022.   - r/o pneumoretroperitoneum (on KUB 2/21)  - pancolitis on CT 2/21  - hypothyroidism  - Hx of ETOH abuse  - hypokalemia, hypernatremia  - severe protein calorie malnutrition  - severe wt loss, ~34% over past 9 months  SUGGEST:  - d/c lactulose  - d/c high does thiamine - received > 1 week already  - cont MV+minerals po or via NG daily  - cont to encourage po diet but continue tube feeds - however, change feeding schedule to allow for po meals      - Peptamen  ml over 45 min after each attempted po meal  - anemia w/u?

## 2022-03-04 NOTE — CONSULT NOTE ADULT - ATTENDING COMMENTS
Pt with mildly elevated TSH which has improved since increase in levothyroxine from 25 mcg to 50 mcg (IV) ~ 1 week ago.  Pt currently hypothermic but hemodynamically stable and not bradycardic.  Agree with current levothyroxine dose.  Repeat TSH and Free T4 in ~ 3 weeks.  If becomes hypotensive with bradycardia then increase levothyroxine to 125 mcg IV q 24hrs.  (Above d/w CCU team)

## 2022-03-04 NOTE — PROGRESS NOTE ADULT - EXTREMITIES
No cyanosis, clubbing or edema
No cyanosis, clubbing or edema
detailed exam

## 2022-03-04 NOTE — CHART NOTE - NSCHARTNOTEFT_GEN_A_CORE
MICU Transfer Note    Transfer from: CCU (MICU pt)   Transfer to:  (  ) Medicine    (  ) Telemetry    (  ) RCU    (  ) Palliative    (  ) Stroke Unit    ( x ) SDU  Accepting physician:    Hospital Course: Patient is a 60 y/o female with PMHx of GIB, Bowel and Bladder incontinence, Smoking (3-4 cigarettes/day), ETOH abuse, hypothyroidism, and ongoing anemia transferred from [facility] for Polypectomy. Patient was admitted to [facility] on 01/31 for AMS, weakness and decreased appetite. She was found to have UTI, NSTEMI, and electrolyte imbalance. During her stay at [facility] she had a colonoscopy done which revealed 1 large 3-4 cm sigmoid pedunculated polyp and 2 polyps (7, 14 mm) in descending colon. Patient was transferred as they are not able to perform large Polypectomies there. She was not able to be discharged as ongoing anemia and concern that the cause are these large polyps.    Patient had a colonoscopy and polypectomy done on 2/18/2022. Polyp (13 mm) in the descending colon. (hot snare Polypectomy).  Polyp (25 mm to 30 mm) in the rectosigmoid junction at 20 cm from the anal verge. (Endoloop, Polypectomy, Endoclip).   After the colonoscopy patient became hypothermic and hypotensive. Over night patient was started to Levo and warming blanket.   2/20 AM patient had RRT for desaturation and hypotension. Patient was placed on 50% O2, patient had good response. Levophed also started to raise BP. Patient remains altered, not speaking, but remains somewhat awake and just makes grunting sounds. Patient is not following commands. Central line was placed. Spoke to ICU fellow, patient is being upgraded to ICU for closer monitoring.     In CCU pt had abdominal tenderness, KUB ordered, concern for RP free air. CTAP with PO/IV contrast ordered, no perforation or pneumo-peritoneum/retroperitoneum however showing findings c/w pancolitis. Surgery following, ID consulted. Pt on vanc/zosyn. In CCU, pt developed coagulopathy (low plt, anemia, increasing INR despite not on AC). Treated with pRBC, plt transfusion as well as vitamin K. Heme onc on board, thought to be due to possible DIC 2/2 sepsis. Started on solumedrol 40mg for positive cindy however possibly positive due to transfusion at [facility]. Plt stabilizing so steroids tapered. Derm consulted for diffuse skin rash- bullous lesions with desquamation and areas of healing with hyperpigmentation. Burn consulted for skin bx (done 2/28, results pending). C-anca also positive, rhuem consulted, presentation unlikely due to vasculitis, will rpt cindy once acute illness resolves.     ASSESSMENT & PLAN:   61F PMH GIB, Bowel and Bladder incontinence, Smoking (3-4 cigarettes/day), ETOH abuse, hypothyroidism, and ongoing anemia, admitted to [facility] 1/31 for ams found to have UTI. Dev GIB, colonoscopy showing polyps, transferred to [facility] for polypectomy now upgraded to CCU for hypotension, likely septic shock 2/2 aspiration pna.     #AMS with unclear etiology   #sepsis w/ shock/hypothermia vs aspiration after procedure under anesthesia 2/18 vs CVA   #Pancolitis   - Pt upgraded from floor on 2/20 after RRT for hypotension and desaturation. RIJ CVC placed, started on levo, venti mask, o2 and hypotension subsequently improved   - CTH 2/20: no ICH, mass effect, or midline shift   - EEG 2/20: generalized slowing    - UCx 2/18 negative   - BCx 2/17, 2/19: NGTD  - COVID negative 2/20  - MRSA nares 2/20 negative  - LE duplex 2/21: no DVT however bl peroneal veins not visualized   - ammonia elevated to 59 on 2/22, started on lactulose, possibly contributing to ams   - ammonia 50 on 2/26   - d/c'd vanc 2/22  - c/w aztreonam 2g q8h (started 2/24- switched from eduardo due to concern for pcn induced thrombocytopenia)   - c/w clinda 600mg q8h   - off levo   - Exchanged TLC 3/2   - neuro eval appreciated  - ID recs appreciated      #Colonic Polyp  #Diarrhea in setting of recent abx use  #Hx of GIB   #Pneumoretroperitoneum   #Pancolitis   - Colonoscopy 2/4: 1 large 3-4 cm sigmoid pedunculated polyp and 2 polyps (7, 14 mm) in descending colon s/p bx  - GI consulted (Dr. Daniels)   - Patient is cleared for colonoscopy per Cardiology   - Negative for C-Diff infection  - CEA elevated 5.6  - S/P colonoscopy and polypectomy 2/18/2022. Polyp (13 mm) in the descending colon. (hot snare Polypectomy).  Polyp (25 mm to 30 mm) in the rectosigmoid junction at 20 cm from the anal verge. (Endoloop, Polypectomy, Endoclip).   - Repeat Colonoscopy in 6 months   - GI recs appreciated    - 2/21: overnight pt had abdominal tenderness, KUB ordered showing paraspinal lucencies concerning for possible pneumoretroperitoneum   - abdomen soft, nontender, nondistended on my exam  - CTAP PO/IV contrast 2/21: findings c/w pancolitis, no intra or retroperitoneal free air  - c/w aztreonam, clinda   - Pt tolerating tube feeds. Passed S&S- pureed diet however PO intake minimal. Will c/w tube feeds, likely will need PEG tube in future if PO intake doesn't improve   - surgery recs appreciated   - ID recs appreciated     #Exfoliative skin lesions- possibly externally induced (e.g scratching), r/o TEN given vesicles and desquamation   #+C-ANCA  - pt with superficial scabs diffusely over body, most prominent over chest, as well as areas of desquamation over arms, legs, and hips   - Burn recs appreciated; dx with incontinence dermatitis    - derm recs appreciated, recalled burn for skin bx (done 2/28), f/u path   - rheum recs appreciated, no clear evidence for ANCA associated vasculitis could be elevated due to sepsis. Agree with skin bx. Sent PR4/MPO antibody assay, will rpt c-anca once acute illness resolves.  - f/u niacin levels   - arsenic negative   - s/p skin bx by burn 2/28, f/u path     #Prolonged qtc    - qtc 613 initially, now 537 on 2/28 EKG   - repleating mg today, keep Mg>2, K>4.   - on no qtc prolonging medications   - arsenic levels negative   - daily ekg   - continue to monitor     #Normocytic anemia   #Acute thrombocytopenia- resolving   #Elevated INR/PTT not on a/c- resolved   - not on heparin this admission, unclear if was on previously at [facility]  - plt, hgb continued to downtrend so given 1u plt 1u pRBC on 2/23. Addnl unit plt given 2/24, platelets appear to be stabilizing will follow    - INR also uptrending, gave 10mg vitamin K IVPB 2/23   - cindy positive, possibly due to transfusion (not documented but likely received in [facility])  - retic count 0.9, LDH nl, haptoglobin slightly low. Ddimer elevated, fibrinogen nl, fibrin split products elevated    - heparin plt antibody negative   - HIV, hep B negative   - monitor INR   - started on trial of solumedrol 40mg IV 2/24, plt stabilized, will begin taper 2/28 (prednisone 30 x3d, 20 x3d, 10 x3d.   - Given 1u pRBC 3/2. 3/4  - continue to monitor CBC, keep hbg >7  - heme onc recs appreciated    #Hypotension   - am cortisol 5.7   - C/w midodrine 5mg TID    #Hypernatremia- improving   - c/w free water via NGT    #Hypothyroidism  - TSH 15.6  - C/w synthroid 50 mcg po q24 (increased form 25mcg   - Patient will need to repeat TSH and FT4 in 6 wks (April)  - f/u endo recs     #Hx of UTI- resolved   #R lower face cellulitis- resolved  - CT maxillofacial with C 2/12: Mild soft tissue inflammation suggesting cellulitis of the right lower face  - pt was on cefepime, vancomycin (end date 2/15)  - Started on Unasyn 2/16,/c today 2/20 started on broad spectrum Abx 2/20  - No documentations of + bcx or ucx on the charts from [facility]   - Rpt UCx negative   - Bclx 2/17 NGTD, F/u repeat    - Dental recs appreciated   - F/u SPEP     #Hx L pleural effusion on CXR 2/8- resolved   - CT Chest with Cont 2/9 obtained: Moderate b/l pleural effusions resulting in complete atelectasis of both lower lobes    #Hx NSTEMI   #Hx of Elevated Troponin  - in setting of hypotension   - Normal Lexiscan at [facility]  - EKG noted     #Hx of ETOH abuse  - last drink (as per nurse) 6 months ago  - c/w Folic acid and Thiamine  - F/u Dietician eval  - Ensure 3x/day    #Possible Thiamine deficiency   #Possible Folic acid deficiency   - C/w Folic acid and Thiamine    #Misc   - DVT prophylaxis: SCD   - GI prophylaxis: PPI  - Diet: NPO tube feeds    - Activity status: IAT   - Code status: Full code   - Disposition: acute     #Handoff   - off pressors   - f/u skin bx results   - monitor hgb, plt   - f/u niacin levels   - f/u endo recs     Vital Signs Last 24 Hrs  T(C): 36.3 (04 Mar 2022 08:00), Max: 36.4 (03 Mar 2022 16:00)  T(F): 97.3 (04 Mar 2022 08:00), Max: 97.5 (03 Mar 2022 16:00)  HR: 81 (04 Mar 2022 10:00) (78 - 101)  BP: 107/65 (04 Mar 2022 10:00) (80/52 - 148/80)  BP(mean): 81 (04 Mar 2022 10:00) (61 - 110)  RR: 13 (04 Mar 2022 10:00) (9 - 26)  SpO2: 99% (04 Mar 2022 10:00) (95% - 100%)  I&O's Summary    03 Mar 2022 07:01  -  04 Mar 2022 07:00  --------------------------------------------------------  IN: 1950 mL / OUT: 785 mL / NET: 1165 mL    04 Mar 2022 07:01  -  04 Mar 2022 12:11  --------------------------------------------------------  IN: 0 mL / OUT: 60 mL / NET: -60 mL          MEDICATIONS  (STANDING):  aztreonam  IVPB 2000 milliGRAM(s) IV Intermittent every 8 hours  chlorhexidine 4% Liquid 1 Application(s) Topical daily  cholecalciferol 2000 Unit(s) Oral daily  clindamycin IVPB 600 milliGRAM(s) IV Intermittent every 8 hours  clindamycin IVPB      lactulose Syrup 10 Gram(s) Oral two times a day  levothyroxine Injectable 50 MICROGram(s) IV Push at bedtime  midodrine. 5 milliGRAM(s) Oral every 8 hours  multivitamin/minerals 1 Tablet(s) Oral daily  nystatin Cream 1 Application(s) Topical two times a day  pantoprazole  Injectable 40 milliGRAM(s) IV Push every 12 hours  predniSONE   Tablet 20 milliGRAM(s) Oral daily  thiamine 100 milliGRAM(s) Oral daily  triamcinolone 0.1% Cream 1 Application(s) Topical two times a day  vitamin A &amp; D Ointment 1 Application(s) Topical daily    MEDICATIONS  (PRN):        LABS                                            7.2                   Neurophils% (auto):   76.1   (03-04 @ 05:10):    6.32 )-----------(55           Lymphocytes% (auto):  13.9                                          21.5                   Eosinphils% (auto):   0.5      Manual%: Neutrophils x    ; Lymphocytes x    ; Eosinophils x    ; Bands%: x    ; Blasts x                                    143    |  114    |  21                  Calcium: 7.7   / iCa: x      (03-04 @ 05:10)    ----------------------------<  91        Magnesium: 2.0                              3.9     |  23     |  0.5              Phosphorous: 3.1      TPro  4.8    /  Alb  1.8    /  TBili  0.2    /  DBili  x      /  AST  57     /  ALT  83     /  AlkPhos  161    04 Mar 2022 05:10

## 2022-03-04 NOTE — PROGRESS NOTE ADULT - ASSESSMENT
· Assessment	  62 yo F with PMH of GIB, Bowel and Bladder incontinence, Smoking (1/2 ppd), ETOH abuse, hypothyroidism, anemia transferred from [facility] for EMR/ Polypectomy. . Pt was admitted to [facility] on 01/31 for AMS, weakness and decreased appetite. She was found to have UTI, Elevated troponin, and electrolyte imbalance. During her stay at [facility] pt had colonoscopy done, which revealed 1 large 3-4 cm sigmoid pedunculated polyp (bx > tub adenoma, but was friable) and 2 polyps (7, 14 mm) in descending colon s/p bx.    2/18 S/P colonoscopy and polypectomy 2/18 .  CT: Diffuse colonic wall thickening, consistent with pancolitis. Intra-abdominal ascites. No intraperitoneal or retroperitoneal free air. No extraluminal contrast extravasation.    IMPRESSION;  Clinically alert and no change in PE/complaints  Resolved septic shock  Clinically no peritonitis  Possible bacterial PNA on the left secondary to aspiration  which has resolved on CXR  WBC 6.3  Thrombocytopenic since 2/18 > stable   Mild pyuria > clinically no pyelonephritis  2/17,19,22  BCx NG  2/18 UCx NG  2/17,20 CD NG  Nares ORSA NG  COVID-19 NG    RECOMMENDATIONS;  Aztreonam 2 gm iv q8h since 2/24  Clindamycin 600 mg iv q8h  Could hold ABx for now  Off loading to prevent pressure sores and preventive measures to avoid aspiration   If any questions , please call [phone] or send a message on [facility] teams  Please update ID in real time with any pertinent new laboratory /microbiological/radiographically findings or a change in the clinical characteristics

## 2022-03-04 NOTE — PROGRESS NOTE ADULT - SUBJECTIVE AND OBJECTIVE BOX
Patient is a 61y old  Female who presents with a chief complaint of Polypectomy (03 Mar 2022 14:56)      Over Night Events:  Patient seen and examined.   off pressors for 48 hrs   wound care   ROS:  See HPI    PHYSICAL EXAM    ICU Vital Signs Last 24 Hrs  T(C): 36 (04 Mar 2022 04:00), Max: 36.4 (03 Mar 2022 12:00)  T(F): 96.8 (04 Mar 2022 04:00), Max: 97.6 (03 Mar 2022 12:00)  HR: 80 (04 Mar 2022 07:00) (80 - 101)  BP: 89/51 (04 Mar 2022 07:00) (80/52 - 148/80)  BP(mean): 64 (04 Mar 2022 07:00) (61 - 110)  ABP: --  ABP(mean): --  RR: 13 (04 Mar 2022 07:00) (9 - 26)  SpO2: 99% (04 Mar 2022 07:00) (92% - 100%)      General: Awake   HEENT:     khadijah           Lymph Nodes: NO cervical LN   Lungs: Bilateral BS  Cardiovascular: Regular   Abdomen: Soft, Positive BS  Extremities: No clubbing   Skin: warm sacral decubitus desquamation    Neurological:   Musculoskeletal: move all ext     I&O's Detail    03 Mar 2022 07:01  -  04 Mar 2022 07:00  --------------------------------------------------------  IN:    Enteral Tube Flush: 575 mL    Free Water: 600 mL    IV PiggyBack: 150 mL    Oral Fluid: 250 mL    Peptamen A.F.: 375 mL  Total IN: 1950 mL    OUT:    Indwelling Catheter - Urethral (mL): 745 mL  Total OUT: 745 mL    Total NET: 1205 mL          LABS:                          7.2    6.32  )-----------( 55       ( 04 Mar 2022 05:10 )             21.5         04 Mar 2022 05:10    143    |  114    |  21     ----------------------------<  91     3.9     |  23     |  0.5      Ca    7.7        04 Mar 2022 05:10  Phos  3.1       04 Mar 2022 05:10  Mg     2.0       04 Mar 2022 05:10    TPro  4.8    /  Alb  1.8    /  TBili  0.2    /  DBili  x      /  AST  57     /  ALT  83     /  AlkPhos  161    04 Mar 2022 05:10  Amylase x     lipase x                                                                                                                                                                                                                                         MEDICATIONS  (STANDING):  aztreonam  IVPB 2000 milliGRAM(s) IV Intermittent every 8 hours  chlorhexidine 4% Liquid 1 Application(s) Topical daily  cholecalciferol 2000 Unit(s) Oral daily  clindamycin IVPB 600 milliGRAM(s) IV Intermittent every 8 hours  clindamycin IVPB      lactulose Syrup 10 Gram(s) Oral two times a day  levothyroxine Injectable 50 MICROGram(s) IV Push at bedtime  midodrine. 5 milliGRAM(s) Oral every 8 hours  multivitamin/minerals 1 Tablet(s) Oral daily  nystatin Cream 1 Application(s) Topical two times a day  pantoprazole  Injectable 40 milliGRAM(s) IV Push every 12 hours  predniSONE   Tablet 20 milliGRAM(s) Oral daily  thiamine 100 milliGRAM(s) Oral daily  triamcinolone 0.1% Cream 1 Application(s) Topical two times a day  vitamin A &amp; D Ointment 1 Application(s) Topical daily    MEDICATIONS  (PRN):          Xrays:  TLC:  OG:  ET tube:                                                                                    b/l effsuion    ECHO:  CAM ICU:

## 2022-03-04 NOTE — PROGRESS NOTE ADULT - SUBJECTIVE AND OBJECTIVE BOX
SANTIAGO CALIXTO 61y Female  MRN#: [id number]   CODE STATUS: FULL     Hospital Day: 16d    Pt is currently admitted with the primary diagnosis of     SUBJECTIVE  Hospital Course: Patient is a 60 y/o female with PMHx of GIB, Bowel and Bladder incontinence, Smoking (3-4 cigarettes/day), ETOH abuse, hypothyroidism, and ongoing anemia transferred from [facility] for Polypectomy. Patient was admitted to [facility] on 01/31 for AMS, weakness and decreased appetite. She was found to have UTI, NSTEMI, and electrolyte imbalance. During her stay at [facility] she had a colonoscopy done which revealed 1 large 3-4 cm sigmoid pedunculated polyp and 2 polyps (7, 14 mm) in descending colon. Patient was transferred as they are not able to perform large Polypectomies there. She was not able to be discharged as ongoing anemia and concern that the cause are these large polyps.    Patient had a colonoscopy and polypectomy done on 2/18/2022. Polyp (13 mm) in the descending colon. (hot snare Polypectomy).  Polyp (25 mm to 30 mm) in the rectosigmoid junction at 20 cm from the anal verge. (Endoloop, Polypectomy, Endoclip).   After the colonoscopy patient became hypothermic and hypotensive. Over night patient was started to Levo and warming blanket.   2/20 AM patient had RRT for desaturation and hypotension. Patient was placed on 50% O2, patient had good response. Levophed also started to raise BP. Patient remains altered, not speaking, but remains somewhat awake and just makes grunting sounds. Patient is not following commands. Central line was placed. Spoke to ICU fellow, patient is being upgraded to ICU for closer monitoring.     In CCU pt had abdominal tenderness, KUB ordered, concern for RP free air. CTAP with PO/IV contrast ordered, no perforation or pneumo-peritoneum/retroperitoneum however showing findings c/w pancolitis. Surgery following, ID consulted. Pt on vanc/zosyn. In CCU, pt developed coagulopathy (low plt, anemia, increasing INR despite not on AC). Treated with pRBC, plt transfusion as well as vitamin K. Heme onc on board, thought to be due to possible DIC 2/2 sepsis. Started on solumedrol 40mg for positive cindy however possibly positive due to transfusion at [facility]. Plt stabilizing so steroids tapered. Derm consulted for diffuse skin rash- bullous lesions with desquamation and areas of healing with hyperpigmentation. Burn consulted for skin bx (done 2/28, results pending). C-anca also positive, rhuem consulted, presentation unlikely due to vasculitis, will rpt cindy once acute illness resolves.     Overnight events: none     Interval hx/Subjective complaints: Woman laying in bed in nad however lethargic, not contributing to ros however resting comfortably in nad.                                           ----------------------------------------------------------  OBJECTIVE  PAST MEDICAL & SURGICAL HISTORY                                            -----------------------------------------------------------  ALLERGIES:  No Known Allergies                                            ------------------------------------------------------------    HOME MEDICATIONS  Home Medications:                           MEDICATIONS:  STANDING MEDICATIONS  aztreonam  IVPB 2000 milliGRAM(s) IV Intermittent every 8 hours  chlorhexidine 4% Liquid 1 Application(s) Topical daily  cholecalciferol 2000 Unit(s) Oral daily  clindamycin IVPB 600 milliGRAM(s) IV Intermittent every 8 hours  clindamycin IVPB      lactulose Syrup 10 Gram(s) Oral two times a day  levothyroxine Injectable 50 MICROGram(s) IV Push at bedtime  midodrine. 5 milliGRAM(s) Oral every 8 hours  multivitamin/minerals 1 Tablet(s) Oral daily  nystatin Cream 1 Application(s) Topical two times a day  pantoprazole  Injectable 40 milliGRAM(s) IV Push every 12 hours  predniSONE   Tablet 20 milliGRAM(s) Oral daily  thiamine 100 milliGRAM(s) Oral daily  triamcinolone 0.1% Cream 1 Application(s) Topical two times a day  vitamin A &amp; D Ointment 1 Application(s) Topical daily    PRN MEDICATIONS                                            ------------------------------------------------------------  VITAL SIGNS: Last 24 Hours  T(C): 36.3 (04 Mar 2022 08:00), Max: 36.4 (03 Mar 2022 12:00)  T(F): 97.3 (04 Mar 2022 08:00), Max: 97.6 (03 Mar 2022 12:00)  HR: 81 (04 Mar 2022 10:00) (78 - 101)  BP: 107/65 (04 Mar 2022 10:00) (80/52 - 148/80)  BP(mean): 81 (04 Mar 2022 10:00) (61 - 110)  RR: 13 (04 Mar 2022 10:00) (9 - 26)  SpO2: 99% (04 Mar 2022 10:00) (95% - 100%)      03-03-22 @ 07:01  -  03-04-22 @ 07:00  --------------------------------------------------------  IN: 1950 mL / OUT: 785 mL / NET: 1165 mL    03-04-22 @ 07:01  -  03-04-22 @ 11:24  --------------------------------------------------------  IN: 0 mL / OUT: 60 mL / NET: -60 mL                                             --------------------------------------------------------------  LABS:                        7.2    6.32  )-----------( 55       ( 04 Mar 2022 05:10 )             21.5     03-04    143  |  114<H>  |  21<H>  ----------------------------<  91  3.9   |  23  |  0.5<L>    Ca    7.7<L>      04 Mar 2022 05:10  Phos  3.1     03-04  Mg     2.0     03-04    TPro  4.8<L>  /  Alb  1.8<L>  /  TBili  0.2  /  DBili  x   /  AST  57<H>  /  ALT  83<H>  /  AlkPhos  161<H>  03-04                                            -------------------------------------------------------------  RADIOLOGY:  < from: Xray Chest 1 View- PORTABLE-Routine (03.04.22 @ 06:59) >    IMPRESSION:    Unchanged right greater than left bilateral opacities/effusions.    < end of copied text >                                          --------------------------------------------------------------  PHYSICAL EXAM:  General: Ill appearing woman laying in bed in nad  HEENT: ncat, eomi, mmm  LUNGS: ctab  HEART: s1/s2, rrr  ABDOMEN: soft, ntnd, bs+  EXT: wwp, no cyanosis, clubbing. 2+ pitting IRENE, 1+ UE edema  NEURO: aox0, moves all extremities   SKIN: areas of desquamation (worse on buttox), healing superficial scabs, and hyperpigmentation diffusely over entire body                                          --------------------------------------------------------------    ASSESSMENT & PLAN  61F PMH GIB, Bowel and Bladder incontinence, Smoking (3-4 cigarettes/day), ETOH abuse, hypothyroidism, and ongoing anemia, admitted to [facility] 1/31 for ams found to have UTI. Dev GIB, colonoscopy showing polyps, transferred to [facility]- for polypectomy now upgraded to CCU for hypotension, likely septic shock 2/2 aspiration pna.     #AMS with unclear etiology   #sepsis w/ shock/hypothermia vs aspiration after procedure under anesthesia 2/18 vs CVA   #Pancolitis   - Pt upgraded from floor on 2/20 after RRT for hypotension and desaturation. RIJ CVC placed, started on levo, venti mask, o2 and hypotension subsequently improved   - CTH 2/20: no ICH, mass effect, or midline shift   - EEG 2/20: generalized slowing    - UCx 2/18 negative   - BCx 2/17, 2/19: NGTD  - COVID negative 2/20  - MRSA nares 2/20 negative  - LE duplex 2/21: no DVT however bl peroneal veins not visualized   - ammonia elevated to 59 on 2/22, started on lactulose, possibly contributing to ams   - ammonia 50 on 2/26   - d/c'd vanc 2/22  - c/w aztreonam 2g q8h (started 2/24- switched from eduardo due to concern for pcn induced thrombocytopenia)   - c/w clinda 600mg q8h   - off levo   - Exchanged TLC 3/2   - neuro eval appreciated  - ID recs appreciated      #Colonic Polyp  #Diarrhea in setting of recent abx use  #Hx of GIB   #Pneumoretroperitoneum   #Pancolitis   - Colonoscopy 2/4: 1 large 3-4 cm sigmoid pedunculated polyp and 2 polyps (7, 14 mm) in descending colon s/p bx  - GI consulted (Dr. Daniels)   - Patient is cleared for colonoscopy per Cardiology   - Negative for C-Diff infection  - CEA elevated 5.6  - S/P colonoscopy and polypectomy 2/18/2022. Polyp (13 mm) in the descending colon. (hot snare Polypectomy).  Polyp (25 mm to 30 mm) in the rectosigmoid junction at 20 cm from the anal verge. (Endoloop, Polypectomy, Endoclip).   - Repeat Colonoscopy in 6 months   - GI recs appreciated    - 2/21: overnight pt had abdominal tenderness, KUB ordered showing paraspinal lucencies concerning for possible pneumoretroperitoneum   - abdomen soft, nontender, nondistended on my exam  - CTAP PO/IV contrast 2/21: findings c/w pancolitis, no intra or retroperitoneal free air  - c/w aztreonam, clinda   - Pt tolerating tube feeds. Passed S&S- pureed diet however PO intake minimal. Will c/w tube feeds, likely will need PEG tube in future if PO intake doesn't improve   - surgery recs appreciated   - ID recs appreciated     #Exfoliative skin lesions- possibly externally induced (e.g scratching), r/o TEN given vesicles and desquamation   #+C-ANCA  - pt with superficial scabs diffusely over body, most prominent over chest, as well as areas of desquamation over arms, legs, and hips   - Burn recs appreciated; dx with incontinence dermatitis    - derm recs appreciated, recalled burn for skin bx (done 2/28), f/u path   - rheum recs appreciated, no clear evidence for ANCA associated vasculitis could be elevated due to sepsis. Agree with skin bx. Sent PR4/MPO antibody assay, will rpt c-anca once acute illness resolves.  - f/u niacin levels   - arsenic negative   - s/p skin bx by burn 2/28, f/u path     #Prolonged qtc    - qtc 613 initially, now 537 on 2/28 EKG   - repleating mg today, keep Mg>2, K>4.   - on no qtc prolonging medications   - arsenic levels negative   - daily ekg   - continue to monitor     #Normocytic anemia   #Acute thrombocytopenia- resolving   #Elevated INR/PTT not on a/c- resolved   - not on heparin this admission, unclear if was on previously at [facility]  - plt, hgb continued to downtrend so given 1u plt 1u pRBC on 2/23. Addnl unit plt given 2/24, platelets appear to be stabilizing will follow    - INR also uptrending, gave 10mg vitamin K IVPB 2/23   - cindy positive, possibly due to transfusion (not documented but likely received in [facility])  - retic count 0.9, LDH nl, haptoglobin slightly low. Ddimer elevated, fibrinogen nl, fibrin split products elevated    - heparin plt antibody negative   - HIV, hep B negative   - monitor INR   - started on trial of solumedrol 40mg IV 2/24, plt stabilized, will begin taper 2/28 (prednisone 30 x3d, 20 x3d, 10 x3d.   - Given 1u pRBC 3/2. 3/4  - continue to monitor CBC, keep hbg >7  - heme onc recs appreciated    #Hypotension   - am cortisol 5.7   - C/w midodrine 5mg TID    #Hypernatremia- improving   - c/w free water via NGT    #Hypothyroidism  - TSH 15.6  - C/w synthroid 50 mcg po q24 (increased form 25mcg   - Patient will need to repeat TSH and FT4 in 6 wks (April)  - f/u endo recs     #Hx of UTI- resolved   #R lower face cellulitis- resolved  - CT maxillofacial with C 2/12: Mild soft tissue inflammation suggesting cellulitis of the right lower face  - pt was on cefepime, vancomycin (end date 2/15)  - Started on Unasyn 2/16,/c today 2/20 started on broad spectrum Abx 2/20  - No documentations of + bcx or ucx on the charts from [facility]   - Rpt UCx negative   - Bclx 2/17 NGTD, F/u repeat    - Dental recs appreciated   - F/u SPEP     #Hx L pleural effusion on CXR 2/8- resolved   - CT Chest with Cont 2/9 obtained: Moderate b/l pleural effusions resulting in complete atelectasis of both lower lobes    #Hx NSTEMI   #Hx of Elevated Troponin  - in setting of hypotension   - Normal Lexiscan at [facility]  - EKG noted     #Hx of ETOH abuse  - last drink (as per nurse) 6 months ago  - c/w Folic acid and Thiamine  - F/u Dietician eval  - Ensure 3x/day    #Possible Thiamine deficiency   #Possible Folic acid deficiency   - C/w Folic acid and Thiamine    #Misc   - DVT prophylaxis: SCD   - GI prophylaxis: PPI  - Diet: NPO tube feeds    - Activity status: IAT   - Code status: Full code   - Disposition: acute     #Handoff   - off pressors   - f/u skin bx results   - monitor hgb, plt   - f/u niacin levels   - f/u endo recs

## 2022-03-04 NOTE — PROGRESS NOTE ADULT - GASTROINTESTINAL
Soft, non-tender, no hepatosplenomegaly, normal bowel sounds
detailed exam
Soft, non-tender, no hepatosplenomegaly, normal bowel sounds
Soft, non-tender, no hepatosplenomegaly, normal bowel sounds
detailed exam

## 2022-03-04 NOTE — CONSULT NOTE ADULT - SUBJECTIVE AND OBJECTIVE BOX
Patient is a 61y old  Female who presents with a chief complaint of Polypectomy (04 Mar 2022 07:45)      HPI:  Pt is a 60 yo F with PMH of GIB, Bowel and Bladder incontinence, Smoking (3-4 cigarettes/day), ETOH abuse, hypothyroidism anemia transferred from [facility] for Polypectomy. Pt is from home living with her son and bedbound at baseline. Pt was admitted to [facility] on 01/31 for AMS, weakness and decreased appetite. She was found to have UTI, NSTEMI, and electrolyte imbalance. During her stay at [facility] pt had colonoscopy done which revealed 1 large 3-4 cm sigmoid pedunculated polyp and 2 polyps (7, 14 mm) in descending colon s/p bx. Pt was transferred to [facility] for polyp removal.  (16 Feb 2022 23:24)         PAST MEDICAL & SURGICAL HISTORY:      SOCIAL HISTORY:    FAMILY HISTORY:    Allergies    No Known Allergies    Intolerances      ROS:  Negative except for:              HOME MEDICATIONS:      Vital Signs Last 24 Hrs  T(C): 36.3 (04 Mar 2022 08:00), Max: 36.4 (03 Mar 2022 12:00)  T(F): 97.3 (04 Mar 2022 08:00), Max: 97.6 (03 Mar 2022 12:00)  HR: 78 (04 Mar 2022 09:00) (78 - 101)  BP: 89/67 (04 Mar 2022 09:00) (80/52 - 148/80)  BP(mean): 74 (04 Mar 2022 09:00) (61 - 110)  RR: 12 (04 Mar 2022 09:00) (9 - 26)  SpO2: 100% (04 Mar 2022 09:00) (95% - 100%)    PHYSICAL EXAM  General: adult in NAD  HEENT: clear oropharynx, anicteric sclera, pink conjunctiva  Neck: supple  CV: normal S1/S2 with no murmur rubs or gallops  Lungs: positive air movement b/l ant lungs,clear to auscultation, no wheezes, no rales  Abdomen: soft non-tender non-distended, no hepatosplenomegaly  Ext: no clubbing cyanosis or edema  Skin: no rashes and no petechiae  Neuro: alert and oriented X 4, no focal deficits    MEDICATIONS  (STANDING):  aztreonam  IVPB 2000 milliGRAM(s) IV Intermittent every 8 hours  chlorhexidine 4% Liquid 1 Application(s) Topical daily  cholecalciferol 2000 Unit(s) Oral daily  clindamycin IVPB 600 milliGRAM(s) IV Intermittent every 8 hours  clindamycin IVPB      lactulose Syrup 10 Gram(s) Oral two times a day  levothyroxine Injectable 50 MICROGram(s) IV Push at bedtime  midodrine. 5 milliGRAM(s) Oral every 8 hours  multivitamin/minerals 1 Tablet(s) Oral daily  nystatin Cream 1 Application(s) Topical two times a day  pantoprazole  Injectable 40 milliGRAM(s) IV Push every 12 hours  predniSONE   Tablet 20 milliGRAM(s) Oral daily  thiamine 100 milliGRAM(s) Oral daily  triamcinolone 0.1% Cream 1 Application(s) Topical two times a day  vitamin A &amp; D Ointment 1 Application(s) Topical daily    MEDICATIONS  (PRN):      LABS:                          7.2    6.32  )-----------( 55       ( 04 Mar 2022 05:10 )             21.5         Mean Cell Volume : 90.3 fL  Mean Cell Hemoglobin : 30.3 pg  Mean Cell Hemoglobin Concentration : 33.5 g/dL  Auto Neutrophil # : 4.81 K/uL  Auto Lymphocyte # : 0.88 K/uL  Auto Monocyte # : 0.43 K/uL  Auto Eosinophil # : 0.03 K/uL  Auto Basophil # : 0.01 K/uL  Auto Neutrophil % : 76.1 %  Auto Lymphocyte % : 13.9 %  Auto Monocyte % : 6.8 %  Auto Eosinophil % : 0.5 %  Auto Basophil % : 0.2 %      Serial CBC's  03-04 @ 05:10  Hct-21.5 / Hgb-7.2 / Plat-55 / RBC-2.38 / WBC-6.32  Serial CBC's  03-03 @ 04:45  Hct-24.0 / Hgb-8.2 / Plat-51 / RBC-2.69 / WBC-6.21  Serial CBC's  03-02 @ 20:43  Hct-23.8 / Hgb-7.9 / Plat-44 / RBC-2.64 / WBC-5.96  Serial CBC's  03-02 @ 06:00  Hct-20.9 / Hgb-7.0 / Plat-49 / RBC-2.35 / WBC-7.81  Serial CBC's  03-01 @ 04:10  Hct-21.9 / Hgb-7.3 / Plat-58 / RBC-2.42 / WBC-6.85      03-04    143  |  114<H>  |  21<H>  ----------------------------<  91  3.9   |  23  |  0.5<L>    Ca    7.7<L>      04 Mar 2022 05:10  Phos  3.1     03-04  Mg     2.0     03-04    TPro  4.8<L>  /  Alb  1.8<L>  /  TBili  0.2  /  DBili  x   /  AST  57<H>  /  ALT  83<H>  /  AlkPhos  161<H>  03-04        BLOOD SMEAR INTERPRETATION:       RADIOLOGY & ADDITIONAL STUDIES:

## 2022-03-04 NOTE — PROGRESS NOTE ADULT - SUBJECTIVE AND OBJECTIVE BOX
SANTIAGO CALIXTO  61y, Female    All available historical data reviewed    OVERNIGHT EVENTS:  hypothermic  no pressors  soft BMs  clinically no change    ROS:  unable to obtain history secondary to patient's mental status and/or sedation     VITALS:  T(F): 97.5, Max: 97.5 (03-03-22 @ 16:00)  HR: 78  BP: 91/58  RR: 12Vital Signs Last 24 Hrs  T(C): 36.4 (04 Mar 2022 12:00), Max: 36.4 (03 Mar 2022 16:00)  T(F): 97.5 (04 Mar 2022 12:00), Max: 97.5 (03 Mar 2022 16:00)  HR: 78 (04 Mar 2022 12:00) (78 - 101)  BP: 91/58 (04 Mar 2022 12:00) (80/52 - 148/80)  BP(mean): 70 (04 Mar 2022 12:00) (61 - 110)  RR: 12 (04 Mar 2022 12:00) (9 - 26)  SpO2: 100% (04 Mar 2022 12:00) (96% - 100%)    TESTS & MEASUREMENTS:                        7.2    6.32  )-----------( 55       ( 04 Mar 2022 05:10 )             21.5     03-04    143  |  114<H>  |  21<H>  ----------------------------<  91  3.9   |  23  |  0.5<L>    Ca    7.7<L>      04 Mar 2022 05:10  Phos  3.1     03-04  Mg     2.0     03-04    TPro  4.8<L>  /  Alb  1.8<L>  /  TBili  0.2  /  DBili  x   /  AST  57<H>  /  ALT  83<H>  /  AlkPhos  161<H>  03-04    LIVER FUNCTIONS - ( 04 Mar 2022 05:10 )  Alb: 1.8 g/dL / Pro: 4.8 g/dL / ALK PHOS: 161 U/L / ALT: 83 U/L / AST: 57 U/L / GGT: x                   RADIOLOGY & ADDITIONAL TESTS:  Personal review of radiological diagnostics performed  Echo and EKG results noted when applicable.     MEDICATIONS:  aztreonam  IVPB 2000 milliGRAM(s) IV Intermittent every 8 hours  chlorhexidine 4% Liquid 1 Application(s) Topical daily  cholecalciferol 2000 Unit(s) Oral daily  clindamycin IVPB 600 milliGRAM(s) IV Intermittent every 8 hours  clindamycin IVPB      lactulose Syrup 10 Gram(s) Oral two times a day  levothyroxine Injectable 50 MICROGram(s) IV Push at bedtime  midodrine. 5 milliGRAM(s) Oral every 8 hours  multivitamin/minerals 1 Tablet(s) Oral daily  nystatin Cream 1 Application(s) Topical two times a day  pantoprazole  Injectable 40 milliGRAM(s) IV Push every 12 hours  predniSONE   Tablet 20 milliGRAM(s) Oral daily  thiamine 100 milliGRAM(s) Oral daily  triamcinolone 0.1% Cream 1 Application(s) Topical two times a day  vitamin A &amp; D Ointment 1 Application(s) Topical daily      ANTIBIOTICS:  aztreonam  IVPB 2000 milliGRAM(s) IV Intermittent every 8 hours  clindamycin IVPB 600 milliGRAM(s) IV Intermittent every 8 hours  clindamycin IVPB

## 2022-03-04 NOTE — CONSULT NOTE ADULT - ASSESSMENT
60 yo F with PMH of GIB, Bowel and Bladder incontinence, Smoking (1/2 ppd), ETOH abuse, hypothyroidism, anemia transferred from [facility] for EMR/ Polypectomy. . Pt was admitted to [facility] on 01/31 for AMS, weakness and decreased appetite. She was found to have UTI, Elevated troponin, and electrolyte imbalance. During her stay at [facility] pt had colonoscopy done, which revealed 1 large 3-4 cm sigmoid pedunculated polyp (bx > tub adenoma, but was friable) and 2 polyps (7, 14 mm) in descending colon s/p bx.    2/18 S/P colonoscopy and polypectomy 2/18 .  CT: Diffuse colonic wall thickening, consistent with pancolitis. Intra-abdominal ascites. No intraperitoneal or retroperitoneal free air. No extraluminal contrast extravasation.    CCU team consulted endocrinology for hypothyroidism    # Elevated TSH   - 2/26 TSH 6.4   - 2/18 TSH 15  - Please get full set of TFTs (TSH, Free T4 and Total T3) 62 yo F with PMH of GIB, Bowel and Bladder incontinence, Smoking (1/2 ppd), ETOH abuse, hypothyroidism, anemia transferred from [facility] for EMR/ Polypectomy. . Pt was admitted to [facility] on 01/31 for AMS, weakness and decreased appetite. She was found to have UTI, Elevated troponin, and electrolyte imbalance. During her stay at [facility] pt had colonoscopy done, which revealed 1 large 3-4 cm sigmoid pedunculated polyp (bx > tub adenoma, but was friable) and 2 polyps (7, 14 mm) in descending colon s/p bx.    2/18 S/P colonoscopy and polypectomy 2/18 .  CT: Diffuse colonic wall thickening, consistent with pancolitis. Intra-abdominal ascites. No intraperitoneal or retroperitoneal free air. No extraluminal contrast extravasation.    CCU team consulted endocrinology for hypothyroidism    # Elevated TSH with hypothermia, HR 80s, HD stable.  - 2/26 TSH 6.4   - 2/18 TSH 15  - Please get full set of TFTs (TSH, Free T4 and Total T3)  - Agree with current (recently increased) levothyroxine dose.  Repeat TSH and Free T4 in ~ 3 weeks.  If becomes hypotensive with bradycardia then increase levothyroxine to 125 mcg IV q 24hrs.

## 2022-03-04 NOTE — PROGRESS NOTE ADULT - SUBJECTIVE AND OBJECTIVE BOX
61F PMH GIB, Bowel and Bladder incontinence, Smoking (3-4 cigarettes/day), ETOH abuse, hypothyroidism, and ongoing anemia, admitted to [facility] 1/31 for ams found to have UTI. Dev GIB, colonoscopy showing polyps, transferred to [facility] for polypectomy now upgraded to CCU for hypotension, likely septic shock 2/2 aspiration pna.   CT + pancolitis  + bleeding requiring transfusions, again this morning  + lethargy without clear etiology  pt can be wakened, ate a bit better (> 25%) yesterday but nothing for breakfast today  requested ice cream  I&O + one liter last 24h  abd soft, some tenderness or discomfort on exam  NG feeding tube in place  + Tapia, may be d/c today  T(C): 36.2 (04 Mar 2022 16:00), Max: 36.4 (04 Mar 2022 12:00)  T(F): 97.2 (04 Mar 2022 16:00), Max: 97.5 (04 Mar 2022 12:00)  HR: 80 (04 Mar 2022 17:00) (71 - 98)  BP: 109/76 (04 Mar 2022 17:00) (80/52 - 145/80)  BP(mean): 86 (04 Mar 2022 17:00) (61 - 110)  RR: 18 (04 Mar 2022 17:00) (11 - 26)  SpO2: 99% (04 Mar 2022 17:00) (97% - 100%)    MEDICATIONS  (STANDING):  chlorhexidine 4% Liquid 1 Application(s) Topical daily  cholecalciferol 2000 Unit(s) Oral daily  lactulose Syrup 10 Gram(s) Oral two times a day  levothyroxine Injectable 50 MICROGram(s) IV Push at bedtime  midodrine. 5 milliGRAM(s) Oral every 8 hours  multivitamin/minerals 1 Tablet(s) Oral daily  nystatin Cream 1 Application(s) Topical two times a day  pantoprazole  Injectable 40 milliGRAM(s) IV Push every 12 hours  predniSONE   Tablet 20 milliGRAM(s) Oral daily  thiamine 100 milliGRAM(s) Oral daily  triamcinolone 0.1% Cream 1 Application(s) Topical two times a day  vitamin A &amp; D Ointment 1 Application(s) Topical daily    Diet, DASH/TLC:   Sodium & Cholesterol Restricted  Pureed (PUREED)  Tube Feeding Modality: Nasogastric  Peptamen A.F. Formula  Total Volume for 24 Hours (mL): 1125  gravity feeds 375 ml over 45 min Every 8 hours     give tube feed AFTER EACH ATTEMPTED PO MEAL, NOT Q8H   Frequency: Every 8 Hours     Free Water Flush Instructions:  flush 50 ml water syringe push before and after each feeding (03-02-22 @ 17:59) [Active]                        7.2    6.32  )-----------( 55       ( 04 Mar 2022 05:10 )             21.5   03-04    143  |  114<H>  |  21<H>  ----------------------------<  91  3.9   |  23  |  0.5<L>    Ca    7.7<L>      04 Mar 2022 05:10  Phos  3.1     03-04  Mg     2.0     03-04    TPro  4.8<L>  /  Alb  1.8<L>  /  TBili  0.2  /  DBili  x   /  AST  57<H>  /  ALT  83<H>  /  AlkPhos  161<H>  03-04

## 2022-03-04 NOTE — PROGRESS NOTE ADULT - ASSESSMENT
IMPRESSION:  AMS   Sepsis   Septic shock  Possible aspiration PNA  Acute hypoxemic respiratory failure   Colon polyps s/p polypectomy of 2 polyps on 2/18  Anemia  HO GIB  HO ETOH abuse  Active smoker  NAGMA  sacral decubitus ??   hypothyroidism   PLAN:    CNS: CTH. Avoid CNS depressants.   Thiamine, folate.       HEENT: Oral care    PULMONARY:  HOB @ 45 degrees.  Aspiration precautions.    Supplemental O2 target Spo2 92-94%  CXR    CARDIOVASCULAR:   keep is = os avoid fluid overlaod     BNP.   on  Levophed      GI: GI prophylaxis. follow GI NG feed   diet   free H2O.   speech and swallow eval   RENAL:  Follow up lytes.  Correct as needed      INFECTIOUS DISEASE: ABx as per Id   follow Id   culture temp spikes  follow id   burn for skin lesion   repeat procal     HEMATOLOGICAL:  DVT prophylaxis.  follow h/h . plt  follow hematology transfuse 1 unit prbc   on prednisone   stool guiag     ENDOCRINE:  Follow up FS.  Insulin protocol if needed.  endo consult   on synthroid was increase to 50     MUSCULOSKELETAL:  Bed rest burn / wound care follow up     keep icu , heredia in because of incontinence and has bad desquamation lesion in the area   change tlc to different site   new right IJ  transfer to step down under hospitalist   d/c tls   d/c heredia today

## 2022-03-05 LAB
ALBUMIN SERPL ELPH-MCNC: 1.9 G/DL — LOW (ref 3.5–5.2)
ALP SERPL-CCNC: 131 U/L — HIGH (ref 30–115)
ALT FLD-CCNC: 62 U/L — HIGH (ref 0–41)
ANION GAP SERPL CALC-SCNC: 8 MMOL/L — SIGNIFICANT CHANGE UP (ref 7–14)
AST SERPL-CCNC: 32 U/L — SIGNIFICANT CHANGE UP (ref 0–41)
BASOPHILS # BLD AUTO: 0.01 K/UL — SIGNIFICANT CHANGE UP (ref 0–0.2)
BASOPHILS NFR BLD AUTO: 0.2 % — SIGNIFICANT CHANGE UP (ref 0–1)
BILIRUB SERPL-MCNC: 0.2 MG/DL — SIGNIFICANT CHANGE UP (ref 0.2–1.2)
BUN SERPL-MCNC: 20 MG/DL — SIGNIFICANT CHANGE UP (ref 10–20)
C DIFF BY PCR RESULT: NEGATIVE — SIGNIFICANT CHANGE UP
C DIFF TOX GENS STL QL NAA+PROBE: SIGNIFICANT CHANGE UP
CALCIUM SERPL-MCNC: 7.7 MG/DL — LOW (ref 8.5–10.1)
CHLORIDE SERPL-SCNC: 113 MMOL/L — HIGH (ref 98–110)
CO2 SERPL-SCNC: 21 MMOL/L — SIGNIFICANT CHANGE UP (ref 17–32)
CREAT SERPL-MCNC: 0.5 MG/DL — LOW (ref 0.7–1.5)
EGFR: 107 ML/MIN/1.73M2 — SIGNIFICANT CHANGE UP
EOSINOPHIL # BLD AUTO: 0.02 K/UL — SIGNIFICANT CHANGE UP (ref 0–0.7)
EOSINOPHIL NFR BLD AUTO: 0.3 % — SIGNIFICANT CHANGE UP (ref 0–8)
GLUCOSE SERPL-MCNC: 64 MG/DL — LOW (ref 70–99)
HCT VFR BLD CALC: 25.7 % — LOW (ref 37–47)
HGB BLD-MCNC: 8.4 G/DL — LOW (ref 12–16)
IMM GRANULOCYTES NFR BLD AUTO: 1.3 % — HIGH (ref 0.1–0.3)
LYMPHOCYTES # BLD AUTO: 0.95 K/UL — LOW (ref 1.2–3.4)
LYMPHOCYTES # BLD AUTO: 15 % — LOW (ref 20.5–51.1)
MAGNESIUM SERPL-MCNC: 1.8 MG/DL — SIGNIFICANT CHANGE UP (ref 1.8–2.4)
MCHC RBC-ENTMCNC: 29.6 PG — SIGNIFICANT CHANGE UP (ref 27–31)
MCHC RBC-ENTMCNC: 32.7 G/DL — SIGNIFICANT CHANGE UP (ref 32–37)
MCV RBC AUTO: 90.5 FL — SIGNIFICANT CHANGE UP (ref 81–99)
MONOCYTES # BLD AUTO: 0.61 K/UL — HIGH (ref 0.1–0.6)
MONOCYTES NFR BLD AUTO: 9.7 % — HIGH (ref 1.7–9.3)
NEUTROPHILS # BLD AUTO: 4.65 K/UL — SIGNIFICANT CHANGE UP (ref 1.4–6.5)
NEUTROPHILS NFR BLD AUTO: 73.5 % — SIGNIFICANT CHANGE UP (ref 42.2–75.2)
NRBC # BLD: 0 /100 WBCS — SIGNIFICANT CHANGE UP (ref 0–0)
PHOSPHATE SERPL-MCNC: 3.3 MG/DL — SIGNIFICANT CHANGE UP (ref 2.1–4.9)
PLATELET # BLD AUTO: 70 K/UL — LOW (ref 130–400)
POTASSIUM SERPL-MCNC: 4 MMOL/L — SIGNIFICANT CHANGE UP (ref 3.5–5)
POTASSIUM SERPL-SCNC: 4 MMOL/L — SIGNIFICANT CHANGE UP (ref 3.5–5)
PROT SERPL-MCNC: 4.7 G/DL — LOW (ref 6–8)
RBC # BLD: 2.84 M/UL — LOW (ref 4.2–5.4)
RBC # FLD: 17.2 % — HIGH (ref 11.5–14.5)
SODIUM SERPL-SCNC: 142 MMOL/L — SIGNIFICANT CHANGE UP (ref 135–146)
WBC # BLD: 6.32 K/UL — SIGNIFICANT CHANGE UP (ref 4.8–10.8)
WBC # FLD AUTO: 6.32 K/UL — SIGNIFICANT CHANGE UP (ref 4.8–10.8)

## 2022-03-05 PROCEDURE — 71045 X-RAY EXAM CHEST 1 VIEW: CPT | Mod: 26

## 2022-03-05 RX ORDER — MAGNESIUM SULFATE 500 MG/ML
2 VIAL (ML) INJECTION ONCE
Refills: 0 | Status: COMPLETED | OUTPATIENT
Start: 2022-03-05 | End: 2022-03-05

## 2022-03-05 RX ORDER — FUROSEMIDE 40 MG
20 TABLET ORAL ONCE
Refills: 0 | Status: COMPLETED | OUTPATIENT
Start: 2022-03-05 | End: 2022-03-05

## 2022-03-05 RX ADMIN — NYSTATIN CREAM 1 APPLICATION(S): 100000 CREAM TOPICAL at 18:35

## 2022-03-05 RX ADMIN — Medication 1 APPLICATION(S): at 06:33

## 2022-03-05 RX ADMIN — MIDODRINE HYDROCHLORIDE 5 MILLIGRAM(S): 2.5 TABLET ORAL at 14:12

## 2022-03-05 RX ADMIN — Medication 25 GRAM(S): at 08:00

## 2022-03-05 RX ADMIN — PANTOPRAZOLE SODIUM 40 MILLIGRAM(S): 20 TABLET, DELAYED RELEASE ORAL at 06:32

## 2022-03-05 RX ADMIN — Medication 20 MILLIGRAM(S): at 08:33

## 2022-03-05 RX ADMIN — CHLORHEXIDINE GLUCONATE 1 APPLICATION(S): 213 SOLUTION TOPICAL at 12:54

## 2022-03-05 RX ADMIN — MIDODRINE HYDROCHLORIDE 5 MILLIGRAM(S): 2.5 TABLET ORAL at 23:13

## 2022-03-05 RX ADMIN — Medication 2000 UNIT(S): at 12:54

## 2022-03-05 RX ADMIN — Medication 100 MILLIGRAM(S): at 12:54

## 2022-03-05 RX ADMIN — PANTOPRAZOLE SODIUM 40 MILLIGRAM(S): 20 TABLET, DELAYED RELEASE ORAL at 18:35

## 2022-03-05 RX ADMIN — Medication 1 APPLICATION(S): at 18:35

## 2022-03-05 RX ADMIN — Medication 20 MILLIGRAM(S): at 06:33

## 2022-03-05 RX ADMIN — NYSTATIN CREAM 1 APPLICATION(S): 100000 CREAM TOPICAL at 06:33

## 2022-03-05 RX ADMIN — Medication 1 APPLICATION(S): at 12:53

## 2022-03-05 RX ADMIN — MIDODRINE HYDROCHLORIDE 5 MILLIGRAM(S): 2.5 TABLET ORAL at 06:31

## 2022-03-05 RX ADMIN — Medication 1 TABLET(S): at 12:54

## 2022-03-05 NOTE — PROGRESS NOTE ADULT - SUBJECTIVE AND OBJECTIVE BOX
Patient is a 61y old  Female who presents with a chief complaint of Polypectomy (04 Mar 2022 18:29)        HPI:  Pt is a 60 yo F with PMH of GIB, Bowel and Bladder incontinence, Smoking (3-4 cigarettes/day), ETOH abuse, hypothyroidism anemia transferred from [facility] for Polypectomy. Pt is from home living with her son and bedbound at baseline. Pt was admitted to [facility] on 01/31 for AMS, weakness and decreased appetite. She was found to have UTI, NSTEMI, and electrolyte imbalance. During her stay at [facility] pt had colonoscopy done which revealed 1 large 3-4 cm sigmoid pedunculated polyp and 2 polyps (7, 14 mm) in descending colon s/p bx. Pt was transferred to [facility] for polyp removal.  (16 Feb 2022 23:24)      Pt evaluated on rounds.  I reviewed the radiology tests and hospital record.    I reviewed previous notes on this patient.    Interval Events: No overnight events.          REVIEW OF SYSTEMS:   see HPI      OBJECTIVE:  ICU Vital Signs Last 24 Hrs  T(C): 36 (05 Mar 2022 00:00), Max: 36.4 (04 Mar 2022 12:00)  T(F): 96.8 (05 Mar 2022 00:00), Max: 97.5 (04 Mar 2022 12:00)  HR: 83 (05 Mar 2022 02:00) (71 - 95)  BP: 81/53 (05 Mar 2022 02:00) (81/53 - 134/84)  BP(mean): 63 (05 Mar 2022 02:00) (63 - 99)    RR: 15 (05 Mar 2022 02:00) (11 - 20)  SpO2: 97% (05 Mar 2022 02:00) (97% - 100%)        03-03 @ 07:01  -  03-04 @ 07:00  --------------------------------------------------------  IN: 1950 mL / OUT: 785 mL / NET: 1165 mL    03-04 @ 07:01  -  03-05 @ 06:25  --------------------------------------------------------  IN: 1791 mL / OUT: 340 mL / NET: 1451 mL      CAPILLARY BLOOD GLUCOSE            PHYSICAL EXAM:       · ENMT:   Airway patent,   Nasal mucosa clear.  Mouth with normal mucosa.   No thrush    · EYES:   Clear bilaterally,   pupils equal,   round and reactive to light.    · CARDIAC:   Normal rate,   regular rhythm.    Heart sounds S1, S2.   No murmurs, no rubs or gallops on auscultation  no edema        CAROTID:   normal systolic impulse  no bruits    · RESPIRATORY:   rales  normal chest expansion  no retractions or use of accessory muscles  palpation of chest is normal with no fremitus  percussion of chest demonstrates no hyperresonance or dullness    · GASTROINTESTINAL:  Abdomen soft,   non-tender,   + BS  liver/spleen not palpable    · MUSCULOSKELETAL:   no clubbing, cyanosis      · SKIN:   Skin normal color for race,   warm, dry   No evidence of rash.        · HEME LYMPH:   no splenomegaly.  No cervical  lymphadenopathy.  no inguinal lymphadenopathy    HOSPITAL MEDICATIONS:  MEDICATIONS  (STANDING):  chlorhexidine 4% Liquid 1 Application(s) Topical daily  cholecalciferol 2000 Unit(s) Oral daily  lactulose Syrup 10 Gram(s) Oral two times a day  levothyroxine Injectable 50 MICROGram(s) IV Push at bedtime  midodrine. 5 milliGRAM(s) Oral every 8 hours  multivitamin/minerals 1 Tablet(s) Oral daily  nystatin Cream 1 Application(s) Topical two times a day  pantoprazole  Injectable 40 milliGRAM(s) IV Push every 12 hours  predniSONE   Tablet 20 milliGRAM(s) Oral daily  thiamine 100 milliGRAM(s) Oral daily  triamcinolone 0.1% Cream 1 Application(s) Topical two times a day  vitamin A &amp; D Ointment 1 Application(s) Topical daily    MEDICATIONS  (PRN):    sodium chloride 0.9% Bolus:   250 milliLiter(s), IV Bolus, once, infuse over 60 Minute(s), Stop After 1 Doses  lactated ringers.: Solution, 1000 milliLiter(s) infuse at 75 mL/Hr  lactated ringers Bolus:   1000 milliLiter(s), IV Bolus, once, infuse over 60 Minute(s), Stop After 1 Doses  lactated ringers Bolus:   250 milliLiter(s), IV Bolus, once, infuse over 30 Minute(s), Stop After 1 Doses  lactated ringers Bolus:   500 milliLiter(s), IV Bolus, once, infuse over 30 Minute(s), Stop After 1 Doses  lactated ringers Bolus:   500 milliLiter(s), IV Bolus, once, infuse over 30 Minute(s), Stop After 1 Doses  lactated ringers Bolus:   500 milliLiter(s), IV Bolus, once, infuse over 60 Minute(s), Stop After 1 Doses  lactated ringers Bolus:   500 milliLiter(s), IV Bolus, once, infuse over 60 Minute(s), Stop After 1 Doses  lactated ringers Bolus:   500 milliLiter(s), IV Bolus, once, infuse over 60 Minute(s), Stop After 1 Doses  sodium chloride 0.9% Bolus:   250 milliLiter(s), IV Bolus, once, infuse over 30 Minute(s), Stop After 1 Doses  lactated ringers Bolus:   500 milliLiter(s), IV Bolus, once, infuse over 30 Minute(s), Stop After 1 Doses  lactated ringers Bolus:   500 milliLiter(s), IV Bolus, once, infuse over 30 Minute(s), Stop After 1 Doses  lactated ringers Bolus:   500 milliLiter(s), IV Bolus, once, infuse over 30 Minute(s), Stop After 1 Doses  lactated ringers.: Solution, 1000 milliLiter(s) infuse at 100 mL/Hr  lactated ringers Bolus:   1000 milliLiter(s), IV Bolus, once, infuse over 60 Minute(s), Stop After 1 Doses  lactated ringers Bolus:   500 milliLiter(s), IV Bolus, once, infuse over 60 Minute(s), Stop After 1 Doses  lactated ringers.: Solution, 1000 milliLiter(s) infuse at 75 mL/Hr  Provider's Contact #: [phone]  sodium chloride 0.45%.: Solution, 1000 milliLiter(s) infuse at 75 mL/Hr, Stop After 1 Days      LABS:                        8.4    6.32  )-----------( 70       ( 05 Mar 2022 04:30 )             25.7     03-04    143  |  114<H>  |  21<H>  ----------------------------<  91  3.9   |  23  |  0.5<L>    Ca    7.7<L>      04 Mar 2022 05:10  Phos  3.1     03-04  Mg     2.0     03-04    TPro  4.8<L>  /  Alb  1.8<L>  /  TBili  0.2  /  DBili  x   /  AST  57<H>  /  ALT  83<H>  /  AlkPhos  161<H>  03-04        COVID-19 PCR: NotDetec (27 Feb 2022 06:18)  COVID-19 PCR: NotDetec (20 Feb 2022 09:16)      RADIOLOGY: Today I personally interpreted the latest CXR and other pertinent films.

## 2022-03-05 NOTE — PROGRESS NOTE ADULT - SUBJECTIVE AND OBJECTIVE BOX
SANTIAGO CALIXTO 61y Female  MRN#: [id number]   CODE STATUS: FULL     Hospital Day: 17d    Pt is currently admitted with the primary diagnosis of GIB s/p polypectomy, ams, pancolitis,     SUBJECTIVE  Hospital Course: Patient is a 60 y/o female with PMHx of GIB, Bowel and Bladder incontinence, Smoking (3-4 cigarettes/day), ETOH abuse, hypothyroidism, and ongoing anemia transferred from [facility] [place] for Polypectomy. Patient was admitted to [facility] [place] on 01/31 for AMS, weakness and decreased appetite. She was found to have UTI, NSTEMI, and electrolyte imbalance. During her stay at [facility] [place] she had a colonoscopy done which revealed 1 large 3-4 cm sigmoid pedunculated polyp and 2 polyps (7, 14 mm) in descending colon. Patient was transferred as they are not able to perform large Polypectomies there. She was not able to be discharged as ongoing anemia and concern that the cause are these large polyps.    Patient had a colonoscopy and polypectomy done on 2/18/2022. Polyp (13 mm) in the descending colon. (hot snare Polypectomy).  Polyp (25 mm to 30 mm) in the rectosigmoid junction at 20 cm from the anal verge. (Endoloop, Polypectomy, Endoclip).   After the colonoscopy patient became hypothermic and hypotensive. Over night patient was started to Levo and warming blanket.   2/20 AM patient had RRT for desaturation and hypotension. Patient was placed on 50% O2, patient had good response. Levophed also started to raise BP. Patient remains altered, not speaking, but remains somewhat awake and just makes grunting sounds. Patient is not following commands. Central line was placed. Spoke to ICU fellow, patient is being upgraded to ICU for closer monitoring.     In CCU pt had abdominal tenderness, KUB ordered, concern for RP free air. CTAP with PO/IV contrast ordered, no perforation or pneumo-peritoneum/retroperitoneum however showing findings c/w pancolitis. Surgery following, ID consulted. Pt on vanc/zosyn. In CCU, pt developed coagulopathy (low plt, anemia, increasing INR despite not on AC). Treated with pRBC, plt transfusion as well as vitamin K. Heme onc on board, thought to be due to possible DIC 2/2 sepsis. Started on solumedrol 40mg for positive cindy however possibly positive due to transfusion at [facility] [place]. Plt stabilizing so steroids tapered. Derm consulted for diffuse skin rash- bullous lesions with desquamation and areas of healing with hyperpigmentation. Burn consulted for skin bx (done 2/28, results pending). C-anca also positive, rhuem consulted, presentation unlikely due to vasculitis, will rpt cindy once acute illness resolves.     Overnight events: none    Interval hx/Subjective complaints: Pt more awake, c/o burning pain around areas of excoriation on her thighs when she urinates, heredia replaced. Otherwise no complaints.      Pt denies any fevers, chills, fatigue, CP, palpitations, cough, SOB, abdominal pain, n/v/d, hematochezia, melena, weakness, numbness, tingling, or other FND.                                           ----------------------------------------------------------  OBJECTIVE  PAST MEDICAL & SURGICAL HISTORY                                            -----------------------------------------------------------  ALLERGIES:  No Known Allergies                                            ------------------------------------------------------------    HOME MEDICATIONS  Home Medications:                           MEDICATIONS:  STANDING MEDICATIONS  chlorhexidine 4% Liquid 1 Application(s) Topical daily  cholecalciferol 2000 Unit(s) Oral daily  levothyroxine Injectable 50 MICROGram(s) IV Push at bedtime  midodrine. 5 milliGRAM(s) Oral every 8 hours  multivitamin/minerals 1 Tablet(s) Oral daily  nystatin Cream 1 Application(s) Topical two times a day  pantoprazole  Injectable 40 milliGRAM(s) IV Push every 12 hours  thiamine 100 milliGRAM(s) Oral daily  triamcinolone 0.1% Cream 1 Application(s) Topical two times a day  vitamin A &amp; D Ointment 1 Application(s) Topical daily    PRN MEDICATIONS                                            ------------------------------------------------------------  VITAL SIGNS: Last 24 Hours  T(C): 37.1 (05 Mar 2022 12:00), Max: 37.3 (05 Mar 2022 08:00)  T(F): 98.7 (05 Mar 2022 12:00), Max: 99.2 (05 Mar 2022 08:00)  HR: 82 (05 Mar 2022 12:00) (73 - 95)  BP: 125/60 (05 Mar 2022 12:00) (74/47 - 134/84)  BP(mean): 84 (05 Mar 2022 12:00) (56 - 99)  RR: 17 (05 Mar 2022 12:00) (12 - 32)  SpO2: 98% (05 Mar 2022 12:00) (96% - 100%)      03-04-22 @ 07:01  -  03-05-22 @ 07:00  --------------------------------------------------------  IN: 1991 mL / OUT: 340 mL / NET: 1651 mL    03-05-22 @ 07:01  -  03-05-22 @ 13:43  --------------------------------------------------------  IN: 675 mL / OUT: 0 mL / NET: 675 mL                                             --------------------------------------------------------------  LABS:                        8.4    6.32  )-----------( 70       ( 05 Mar 2022 04:30 )             25.7     03-05    142  |  113<H>  |  20  ----------------------------<  64<L>  4.0   |  21  |  0.5<L>    Ca    7.7<L>      05 Mar 2022 04:30  Phos  3.3     03-05  Mg     1.8     03-05    TPro  4.7<L>  /  Alb  1.9<L>  /  TBili  0.2  /  DBili  x   /  AST  32  /  ALT  62<H>  /  AlkPhos  131<H>  03-05                Culture - Blood (collected 03 Mar 2022 11:00)  Source: .Blood Blood-Venous  Preliminary Report (04 Mar 2022 22:01):    No growth to date.                                            -------------------------------------------------------------  RADIOLOGY:                                            --------------------------------------------------------------  PHYSICAL EXAM:  General: Ill appearing woman laying in bed in nad  HEENT: ncat, eomi, mmm  LUNGS: ctab  HEART: s1/s2, rrr  ABDOMEN: soft, ntnd, bs+   EXT: wwp, no cyanosis, clubbing. 2+ pitting IRENE  NEURO: aox2, moves all extremities   SKIN: areas of desquamation (worse on buttox), healing superficial scabs, and hyperpigmentation diffusely over entire body                                          --------------------------------------------------------------    ASSESSMENT & PLAN  61F PMH GIB, Bowel and Bladder incontinence, Smoking (3-4 cigarettes/day), ETOH abuse, hypothyroidism, and ongoing anemia, admitted to [facility] [place] 1/31 for ams found to have UTI. Dev GIB, colonoscopy showing polyps, transferred to [facility]- for polypectomy now upgraded to CCU for hypotension, likely septic shock 2/2 aspiration pna.     #AMS with unclear etiology   #sepsis w/ shock/hypothermia vs aspiration after procedure under anesthesia 2/18 vs CVA   #Pancolitis   - Pt upgraded from floor on 2/20 after RRT for hypotension and desaturation. RIJ CVC placed, started on levo, venti mask, o2 and hypotension subsequently improved   - CTH 2/20: no ICH, mass effect, or midline shift   - EEG 2/20: generalized slowing    - UCx 2/18 negative   - BCx 2/17, 2/19: NGTD  - COVID negative 2/20  - MRSA nares 2/20 negative  - LE duplex 2/21: no DVT however bl peroneal veins not visualized   - ammonia elevated to 59 on 2/22, started on lactulose, possibly contributing to ams   - ammonia 50 on 2/26   - d/c'd vanc 2/22  - c/w aztreonam 2g q8h (started 2/24- switched from eduardo due to concern for pcn induced thrombocytopenia)   - c/w clinda 600mg q8h   - off levo   - Exchanged TLC 3/2   - neuro eval appreciated  - ID recs appreciated      #Colonic Polyp  #Diarrhea in setting of recent abx use  #Hx of GIB   #Pneumoretroperitoneum   #Pancolitis   - Colonoscopy 2/4: 1 large 3-4 cm sigmoid pedunculated polyp and 2 polyps (7, 14 mm) in descending colon s/p bx  - GI consulted (Dr. Daniels)   - Patient is cleared for colonoscopy per Cardiology   - Negative for C-Diff infection  - CEA elevated 5.6  - S/P colonoscopy and polypectomy 2/18/2022. Polyp (13 mm) in the descending colon. (hot snare Polypectomy).  Polyp (25 mm to 30 mm) in the rectosigmoid junction at 20 cm from the anal verge. (Endoloop, Polypectomy, Endoclip).   - Repeat Colonoscopy in 6 months   - GI recs appreciated    - 2/21: overnight pt had abdominal tenderness, KUB ordered showing paraspinal lucencies concerning for possible pneumoretroperitoneum   - abdomen soft, nontender, nondistended on my exam  - CTAP PO/IV contrast 2/21: findings c/w pancolitis, no intra or retroperitoneal free air  - c/w aztreonam, clinda   - Pt tolerating tube feeds. Passed S&S- pureed diet however PO intake minimal. Will c/w tube feeds, likely will need PEG tube in future if PO intake doesn't improve   - surgery recs appreciated   - ID recs appreciated     #Exfoliative skin lesions- possibly externally induced (e.g scratching), r/o TEN given vesicles and desquamation   #+C-ANCA  - pt with superficial scabs diffusely over body, most prominent over chest, as well as areas of desquamation over arms, legs, and hips   - Burn recs appreciated; dx with incontinence dermatitis    - derm recs appreciated, recalled burn for skin bx (done 2/28), f/u path   - rheum recs appreciated, no clear evidence for ANCA associated vasculitis could be elevated due to sepsis. Agree with skin bx. Sent PR4/MPO antibody assay, will rpt c-anca once acute illness resolves.  - f/u niacin levels   - arsenic negative   - s/p skin bx by burn 2/28, f/u path     #Prolonged qtc    - qtc 613 initially, now 537 on 2/28 EKG   - repleating mg today, keep Mg>2, K>4.   - on no qtc prolonging medications   - arsenic levels negative   - daily ekg   - continue to monitor     #Normocytic anemia   #Acute thrombocytopenia- resolving   #Elevated INR/PTT not on a/c- resolved   - not on heparin this admission, unclear if was on previously at [facility] [place]  - plt, hgb continued to downtrend so given 1u plt 1u pRBC on 2/23. Addnl unit plt given 2/24, platelets appear to be stabilizing will follow    - INR also uptrending, gave 10mg vitamin K IVPB 2/23   - cindy positive, possibly due to transfusion (not documented but likely received in [facility] [place])  - retic count 0.9, LDH nl, haptoglobin slightly low. Ddimer elevated, fibrinogen nl, fibrin split products elevated    - heparin plt antibody negative   - HIV, hep B negative   - monitor INR   - started on trial of solumedrol 40mg IV 2/24, plt stabilized, will begin taper 2/28 (prednisone 30 x3d, 20 x3d, 10 x3d.   - Given 1u pRBC 3/2. 3/4  - continue to monitor CBC, keep hbg >7  - heme onc recs appreciated    #Hypotension   - am cortisol 5.7   - C/w midodrine 5mg TID    #Hypernatremia- improving   - c/w free water via NGT    #Hypothyroidism  - TSH 15.6  - C/w synthroid 50 mcg po q24 (increased form 25mcg   - Patient will need to repeat TSH and FT4 in 6 wks (April)  - f/u endo recs     #Hx of UTI- resolved   #R lower face cellulitis- resolved  - CT maxillofacial with C 2/12: Mild soft tissue inflammation suggesting cellulitis of the right lower face  - pt was on cefepime, vancomycin (end date 2/15)  - Started on Unasyn 2/16,/c today 2/20 started on broad spectrum Abx 2/20  - No documentations of + bcx or ucx on the charts from [facility] [place]   - Rpt UCx negative   - Bclx 2/17 NGTD, F/u repeat    - Dental recs appreciated   - F/u SPEP     #Hx L pleural effusion on CXR 2/8- resolved   - CT Chest with Cont 2/9 obtained: Moderate b/l pleural effusions resulting in complete atelectasis of both lower lobes    #Hx NSTEMI   #Hx of Elevated Troponin  - in setting of hypotension   - Normal Lexiscan at [facility] [place]  - EKG noted     #Hx of ETOH abuse  - last drink (as per nurse) 6 months ago  - c/w Folic acid and Thiamine  - F/u Dietician eval  - Ensure 3x/day    #Possible Thiamine deficiency   #Possible Folic acid deficiency   - C/w Folic acid and Thiamine    #Misc   - DVT prophylaxis: SCD   - GI prophylaxis: PPI  - Diet: NPO tube feeds    - Activity status: IAT   - Code status: Full code   - Disposition: acute     #Handoff   - off pressors   - f/u skin bx results - if not resulted by Monday please check with lab   - monitor hgb, plt   - f/u niacin levels   - rpt TFTs in 3 weeks (end of march)   - If hypotensive with bradycardia inc levothyroxine to 125mcg q24h   - edematous, given lasix 20mg today, f/u outpt and volume status tomorrow, continue with diuresis if needed  Stable

## 2022-03-05 NOTE — PROGRESS NOTE ADULT - ASSESSMENT
IMPRESSION:  AMS   Sepsis   Septic shock  Possible aspiration PNA  Acute hypoxemic respiratory failure   Colon polyps s/p polypectomy of 2 polyps on 2/18  Anemia  HO GIB  HO ETOH abuse  Active smoker  NAGMA  sacral decubitus ??   hypothyroidism     PLAN:    CNS: CTH. Avoid CNS depressants.   Thiamine, folate.       HEENT: Oral care    PULMONARY:  HOB @ 45 degrees.  Aspiration precautions.    Supplemental O2 target Spo2 92-94%  CXR    CARDIOVASCULAR:   keep is = os avoid fluid overlaod     BNP.   on  Levophed      GI: GI prophylaxis. follow GI NG feed   diet   free H2O.   speech and swallow eval     RENAL:  Follow up lytes.  Correct as needed  gentle diuresis      INFECTIOUS DISEASE: ABx as per Id   follow Id   culture temp spikes  follow id   burn for skin lesion   repeat procal     HEMATOLOGICAL:  DVT prophylaxis.    follow h/h . plt  follow hematology   keep HH>8  on prednisone       ENDOCRINE:  Follow up FS.  Insulin protocol if needed.  endo consult   synthroid increase to 75ug    MUSCULOSKELETAL:  Bed rest burn / wound care follow up     keep icu , heredia in because of incontinence and has bad desquamation lesion in the area   change tlc to different site   new right IJ  transfer to step down under hospitalist   d/c tls   d/c heredia today

## 2022-03-06 LAB
ALBUMIN SERPL ELPH-MCNC: 1.9 G/DL — LOW (ref 3.5–5.2)
ALP SERPL-CCNC: 134 U/L — HIGH (ref 30–115)
ALT FLD-CCNC: 59 U/L — HIGH (ref 0–41)
ANION GAP SERPL CALC-SCNC: 9 MMOL/L — SIGNIFICANT CHANGE UP (ref 7–14)
AST SERPL-CCNC: 46 U/L — HIGH (ref 0–41)
BASOPHILS # BLD AUTO: 0.02 K/UL — SIGNIFICANT CHANGE UP (ref 0–0.2)
BASOPHILS NFR BLD AUTO: 0.2 % — SIGNIFICANT CHANGE UP (ref 0–1)
BILIRUB SERPL-MCNC: 0.3 MG/DL — SIGNIFICANT CHANGE UP (ref 0.2–1.2)
BUN SERPL-MCNC: 16 MG/DL — SIGNIFICANT CHANGE UP (ref 10–20)
CALCIUM SERPL-MCNC: 7.9 MG/DL — LOW (ref 8.5–10.1)
CHLORIDE SERPL-SCNC: 109 MMOL/L — SIGNIFICANT CHANGE UP (ref 98–110)
CO2 SERPL-SCNC: 21 MMOL/L — SIGNIFICANT CHANGE UP (ref 17–32)
CREAT SERPL-MCNC: 0.6 MG/DL — LOW (ref 0.7–1.5)
EGFR: 103 ML/MIN/1.73M2 — SIGNIFICANT CHANGE UP
EOSINOPHIL # BLD AUTO: 0.02 K/UL — SIGNIFICANT CHANGE UP (ref 0–0.7)
EOSINOPHIL NFR BLD AUTO: 0.2 % — SIGNIFICANT CHANGE UP (ref 0–8)
GLUCOSE SERPL-MCNC: 88 MG/DL — SIGNIFICANT CHANGE UP (ref 70–99)
HCT VFR BLD CALC: 29.1 % — LOW (ref 37–47)
HGB BLD-MCNC: 9.4 G/DL — LOW (ref 12–16)
IMM GRANULOCYTES NFR BLD AUTO: 0.9 % — HIGH (ref 0.1–0.3)
LYMPHOCYTES # BLD AUTO: 1.31 K/UL — SIGNIFICANT CHANGE UP (ref 1.2–3.4)
LYMPHOCYTES # BLD AUTO: 14.1 % — LOW (ref 20.5–51.1)
MAGNESIUM SERPL-MCNC: 1.9 MG/DL — SIGNIFICANT CHANGE UP (ref 1.8–2.4)
MCHC RBC-ENTMCNC: 29.8 PG — SIGNIFICANT CHANGE UP (ref 27–31)
MCHC RBC-ENTMCNC: 32.3 G/DL — SIGNIFICANT CHANGE UP (ref 32–37)
MCV RBC AUTO: 92.4 FL — SIGNIFICANT CHANGE UP (ref 81–99)
MONOCYTES # BLD AUTO: 0.61 K/UL — HIGH (ref 0.1–0.6)
MONOCYTES NFR BLD AUTO: 6.5 % — SIGNIFICANT CHANGE UP (ref 1.7–9.3)
NEUTROPHILS # BLD AUTO: 7.28 K/UL — HIGH (ref 1.4–6.5)
NEUTROPHILS NFR BLD AUTO: 78.1 % — HIGH (ref 42.2–75.2)
NRBC # BLD: 0 /100 WBCS — SIGNIFICANT CHANGE UP (ref 0–0)
PHOSPHATE SERPL-MCNC: 3.9 MG/DL — SIGNIFICANT CHANGE UP (ref 2.1–4.9)
PLATELET # BLD AUTO: 90 K/UL — LOW (ref 130–400)
POTASSIUM SERPL-MCNC: 5.1 MMOL/L — HIGH (ref 3.5–5)
POTASSIUM SERPL-SCNC: 5.1 MMOL/L — HIGH (ref 3.5–5)
PROT SERPL-MCNC: 5.5 G/DL — LOW (ref 6–8)
RBC # BLD: 3.15 M/UL — LOW (ref 4.2–5.4)
RBC # FLD: 18 % — HIGH (ref 11.5–14.5)
SODIUM SERPL-SCNC: 139 MMOL/L — SIGNIFICANT CHANGE UP (ref 135–146)
WBC # BLD: 9.32 K/UL — SIGNIFICANT CHANGE UP (ref 4.8–10.8)
WBC # FLD AUTO: 9.32 K/UL — SIGNIFICANT CHANGE UP (ref 4.8–10.8)

## 2022-03-06 PROCEDURE — 71045 X-RAY EXAM CHEST 1 VIEW: CPT | Mod: 26

## 2022-03-06 RX ORDER — FUROSEMIDE 40 MG
20 TABLET ORAL ONCE
Refills: 0 | Status: COMPLETED | OUTPATIENT
Start: 2022-03-06 | End: 2022-03-06

## 2022-03-06 RX ADMIN — NYSTATIN CREAM 1 APPLICATION(S): 100000 CREAM TOPICAL at 17:39

## 2022-03-06 RX ADMIN — Medication 50 MICROGRAM(S): at 00:02

## 2022-03-06 RX ADMIN — Medication 2000 UNIT(S): at 12:56

## 2022-03-06 RX ADMIN — NYSTATIN CREAM 1 APPLICATION(S): 100000 CREAM TOPICAL at 05:39

## 2022-03-06 RX ADMIN — Medication 10 MILLIGRAM(S): at 05:40

## 2022-03-06 RX ADMIN — Medication 20 MILLIGRAM(S): at 18:23

## 2022-03-06 RX ADMIN — MIDODRINE HYDROCHLORIDE 5 MILLIGRAM(S): 2.5 TABLET ORAL at 15:46

## 2022-03-06 RX ADMIN — Medication 1 TABLET(S): at 12:56

## 2022-03-06 RX ADMIN — PANTOPRAZOLE SODIUM 40 MILLIGRAM(S): 20 TABLET, DELAYED RELEASE ORAL at 05:40

## 2022-03-06 RX ADMIN — PANTOPRAZOLE SODIUM 40 MILLIGRAM(S): 20 TABLET, DELAYED RELEASE ORAL at 17:39

## 2022-03-06 RX ADMIN — Medication 100 MILLIGRAM(S): at 12:57

## 2022-03-06 RX ADMIN — Medication 1 APPLICATION(S): at 12:57

## 2022-03-06 RX ADMIN — Medication 1 APPLICATION(S): at 05:38

## 2022-03-06 RX ADMIN — MIDODRINE HYDROCHLORIDE 5 MILLIGRAM(S): 2.5 TABLET ORAL at 05:39

## 2022-03-06 RX ADMIN — MIDODRINE HYDROCHLORIDE 5 MILLIGRAM(S): 2.5 TABLET ORAL at 21:48

## 2022-03-06 RX ADMIN — CHLORHEXIDINE GLUCONATE 1 APPLICATION(S): 213 SOLUTION TOPICAL at 12:56

## 2022-03-06 RX ADMIN — Medication 1 APPLICATION(S): at 17:39

## 2022-03-06 NOTE — PROGRESS NOTE ADULT - SUBJECTIVE AND OBJECTIVE BOX
Patient is a 61y old  Female who presents with a chief complaint of Polypectomy (05 Mar 2022 13:43)        HPI:  Pt is a 62 yo F with PMH of GIB, Bowel and Bladder incontinence, Smoking (3-4 cigarettes/day), ETOH abuse, hypothyroidism anemia transferred from [facility] for Polypectomy. Pt is from home living with her son and bedbound at baseline. Pt was admitted to [facility] on 01/31 for AMS, weakness and decreased appetite. She was found to have UTI, NSTEMI, and electrolyte imbalance. During her stay at [facility] pt had colonoscopy done which revealed 1 large 3-4 cm sigmoid pedunculated polyp and 2 polyps (7, 14 mm) in descending colon s/p bx. Pt was transferred to [facility] for polyp removal.  (16 Feb 2022 23:24)      Pt evaluated on rounds.  I reviewed the radiology tests and hospital record.    I reviewed previous notes on this patient.    Interval Events: No overnight events.      REVIEW OF SYSTEMS:   see HPI      OBJECTIVE:  ICU Vital Signs Last 24 Hrs  T(C): 36.4 (06 Mar 2022 00:00), Max: 37.3 (05 Mar 2022 08:00)  T(F): 97.6 (06 Mar 2022 00:00), Max: 99.2 (05 Mar 2022 08:00)  HR: 81 (06 Mar 2022 02:00) (73 - 94)  BP: 111/68 (06 Mar 2022 02:00) (89/62 - 125/60)  BP(mean): 85 (06 Mar 2022 02:00) (71 - 98)  ABP: --  ABP(mean): --  RR: 16 (06 Mar 2022 02:00) (15 - 32)  SpO2: 99% (05 Mar 2022 18:00) (97% - 100%)        03-04 @ 07:01  -  03-05 @ 07:00  --------------------------------------------------------  IN: 1991 mL / OUT: 340 mL / NET: 1651 mL    03-05 @ 07:01  -  03-06 @ 06:33  --------------------------------------------------------  IN: 1025 mL / OUT: 595 mL / NET: 430 mL      CAPILLARY BLOOD GLUCOSE            PHYSICAL EXAM:       · ENMT:   Airway patent,   Nasal mucosa clear.  Mouth with normal mucosa.   No thrush    · EYES:   Clear bilaterally,   pupils equal,   round and reactive to light.    · CARDIAC:   Normal rate,   regular rhythm.    Heart sounds S1, S2.   No murmurs, no rubs or gallops on auscultation  no edema        CAROTID:   normal systolic impulse  no bruits    · RESPIRATORY:   rales  normal chest expansion  no retractions or use of accessory muscles  palpation of chest is normal with no fremitus  percussion of chest demonstrates no hyperresonance or dullness    · GASTROINTESTINAL:  Abdomen soft,   non-tender,   + BS  liver/spleen not palpable    · MUSCULOSKELETAL:   no clubbing, cyanosis      · SKIN:   Skin normal color for race,   warm, dry   No evidence of rash.        · HEME LYMPH:   no splenomegaly.  No cervical  lymphadenopathy.  no inguinal lymphadenopathy    HOSPITAL MEDICATIONS:  MEDICATIONS  (STANDING):  chlorhexidine 4% Liquid 1 Application(s) Topical daily  cholecalciferol 2000 Unit(s) Oral daily  levothyroxine Injectable 50 MICROGram(s) IV Push at bedtime  midodrine. 5 milliGRAM(s) Oral every 8 hours  multivitamin/minerals 1 Tablet(s) Oral daily  nystatin Cream 1 Application(s) Topical two times a day  pantoprazole  Injectable 40 milliGRAM(s) IV Push every 12 hours  predniSONE   Tablet 10 milliGRAM(s) Oral daily  thiamine 100 milliGRAM(s) Oral daily  triamcinolone 0.1% Cream 1 Application(s) Topical two times a day  vitamin A &amp; D Ointment 1 Application(s) Topical daily    MEDICATIONS  (PRN):    sodium chloride 0.9% Bolus:   250 milliLiter(s), IV Bolus, once, infuse over 60 Minute(s), Stop After 1 Doses  lactated ringers.: Solution, 1000 milliLiter(s) infuse at 75 mL/Hr  lactated ringers Bolus:   1000 milliLiter(s), IV Bolus, once, infuse over 60 Minute(s), Stop After 1 Doses  lactated ringers Bolus:   250 milliLiter(s), IV Bolus, once, infuse over 30 Minute(s), Stop After 1 Doses  lactated ringers Bolus:   500 milliLiter(s), IV Bolus, once, infuse over 30 Minute(s), Stop After 1 Doses  lactated ringers Bolus:   500 milliLiter(s), IV Bolus, once, infuse over 30 Minute(s), Stop After 1 Doses  lactated ringers Bolus:   500 milliLiter(s), IV Bolus, once, infuse over 60 Minute(s), Stop After 1 Doses  lactated ringers Bolus:   500 milliLiter(s), IV Bolus, once, infuse over 60 Minute(s), Stop After 1 Doses  lactated ringers Bolus:   500 milliLiter(s), IV Bolus, once, infuse over 60 Minute(s), Stop After 1 Doses  sodium chloride 0.9% Bolus:   250 milliLiter(s), IV Bolus, once, infuse over 30 Minute(s), Stop After 1 Doses  lactated ringers Bolus:   500 milliLiter(s), IV Bolus, once, infuse over 30 Minute(s), Stop After 1 Doses  lactated ringers Bolus:   500 milliLiter(s), IV Bolus, once, infuse over 30 Minute(s), Stop After 1 Doses  lactated ringers Bolus:   500 milliLiter(s), IV Bolus, once, infuse over 30 Minute(s), Stop After 1 Doses  lactated ringers.: Solution, 1000 milliLiter(s) infuse at 100 mL/Hr  lactated ringers Bolus:   1000 milliLiter(s), IV Bolus, once, infuse over 60 Minute(s), Stop After 1 Doses  lactated ringers Bolus:   500 milliLiter(s), IV Bolus, once, infuse over 60 Minute(s), Stop After 1 Doses  lactated ringers.: Solution, 1000 milliLiter(s) infuse at 75 mL/Hr  Provider's Contact #: [phone]  sodium chloride 0.45%.: Solution, 1000 milliLiter(s) infuse at 75 mL/Hr, Stop After 1 Days      LABS:                        9.4    9.32  )-----------( 90       ( 06 Mar 2022 05:41 )             29.1     03-05    142  |  113<H>  |  20  ----------------------------<  64<L>  4.0   |  21  |  0.5<L>    Ca    7.7<L>      05 Mar 2022 04:30  Phos  3.3     03-05  Mg     1.8     03-05    TPro  4.7<L>  /  Alb  1.9<L>  /  TBili  0.2  /  DBili  x   /  AST  32  /  ALT  62<H>  /  AlkPhos  131<H>  03-05          COVID-19 PCR: NotDetec (27 Feb 2022 06:18)  COVID-19 PCR: NotDetec (20 Feb 2022 09:16)        RADIOLOGY: Today I personally interpreted the latest CXR and other pertinent films.

## 2022-03-06 NOTE — PROGRESS NOTE ADULT - ASSESSMENT
IMPRESSION:  AMS   Sepsis   Septic shock  Possible aspiration PNA  Acute hypoxemic respiratory failure   Colon polyps s/p polypectomy of 2 polyps on 2/18  Anemia  HO GIB  HO ETOH abuse  Active smoker  NAGMA  sacral decubitus ??   hypothyroidism     PLAN:    CNS: CTH. Avoid CNS depressants.   Thiamine, folate.       HEENT: Oral care    PULMONARY:  HOB @ 45 degrees.  Aspiration precautions.    Supplemental O2 target Spo2 92-94%  CXR    CARDIOVASCULAR:   keep is = os avoid fluid overlaod   BNP.       GI: GI prophylaxis. follow GI NG feed   diet   free H2O.   speech and swallow eval     RENAL:  Follow up lytes.  Correct as needed  gentle diuresis      INFECTIOUS DISEASE: ABx as per Id   follow Id   culture temp spikes  follow id   burn for skin lesion   repeat procal     HEMATOLOGICAL:  DVT prophylaxis.    follow h/h . plt  follow hematology   keep HH>8  on prednisone       ENDOCRINE:  Follow up FS.  Insulin protocol if needed.  endo consult   synthroid 50ug    MUSCULOSKELETAL:  Bed rest burn / wound care follow up     keep icu , heredia in because of incontinence and has bad desquamation lesion in the area   change tlc to different site   new right IJ  transfer to step down under hospitalist   d/c tls   d/c heredia today

## 2022-03-06 NOTE — PROGRESS NOTE ADULT - SUBJECTIVE AND OBJECTIVE BOX
SANTIAGO CALIXTO 61y Female  MRN#: [id number]   CODE STATUS: Full code    Hospital Day: 18d    Pt is currently admitted with the primary diagnosis of GIB s/p polypectomy, ams, pancolitis,     SUBJECTIVE  Hospital Course: Patient is a 60 y/o female with PMHx of GIB, Bowel and Bladder incontinence, Smoking (3-4 cigarettes/day), ETOH abuse, hypothyroidism, and ongoing anemia transferred from [facility] for Polypectomy. Patient was admitted to [facility] on 01/31 for AMS, weakness and decreased appetite. She was found to have UTI, NSTEMI, and electrolyte imbalance. During her stay at [facility] she had a colonoscopy done which revealed 1 large 3-4 cm sigmoid pedunculated polyp and 2 polyps (7, 14 mm) in descending colon. Patient was transferred as they are not able to perform large Polypectomies there. She was not able to be discharged as ongoing anemia and concern that the cause are these large polyps.    Patient had a colonoscopy and polypectomy done on 2/18/2022. Polyp (13 mm) in the descending colon. (hot snare Polypectomy).  Polyp (25 mm to 30 mm) in the rectosigmoid junction at 20 cm from the anal verge. (Endoloop, Polypectomy, Endoclip).   After the colonoscopy patient became hypothermic and hypotensive. Over night patient was started to Levo and warming blanket.   2/20 AM patient had RRT for desaturation and hypotension. Patient was placed on 50% O2, patient had good response. Levophed also started to raise BP. Patient remains altered, not speaking, but remains somewhat awake and just makes grunting sounds. Patient is not following commands. Central line was placed. Spoke to ICU fellow, patient is being upgraded to ICU for closer monitoring.     In CCU pt had abdominal tenderness, KUB ordered, concern for RP free air. CTAP with PO/IV contrast ordered, no perforation or pneumo-peritoneum/retroperitoneum however showing findings c/w pancolitis. Surgery following, ID consulted. Pt on vanc/zosyn. In CCU, pt developed coagulopathy (low plt, anemia, increasing INR despite not on AC). Treated with pRBC, plt transfusion as well as vitamin K. Heme onc on board, thought to be due to possible DIC 2/2 sepsis. Started on solumedrol 40mg for positive cindy however possibly positive due to transfusion at [facility]. Plt stabilizing so steroids tapered. Derm consulted for diffuse skin rash- bullous lesions with desquamation and areas of healing with hyperpigmentation. Burn consulted for skin bx (done 2/28, results pending). C-anca also positive, rhuem consulted, presentation unlikely due to vasculitis, will rpt cindy once acute illness resolves.     Overnight events: none    Interval hx/Subjective complaints: Pt more awake, c/o burning pain around areas of excoriation on her thighs when she urinates, heredia replaced. Otherwise no complaints.      Pt denies any fevers, chills, fatigue, CP, palpitations, cough, SOB, abdominal pain, n/v/d, hematochezia, melena, weakness, numbness, tingling, or other FND.     Present Today:   - Heredia:  No [  ], Yes [x   ] : Indication:     - Type of IV Access:       .. CVC/Piccline:  No [x  ], Yes [   ] : Indication:       .. Midline: No [x  ], Yes [   ] : Indication:                                             ----------------------------------------------------------  OBJECTIVE  PAST MEDICAL & SURGICAL HISTORY                                            -----------------------------------------------------------  ALLERGIES:  No Known Allergies                                            ------------------------------------------------------------    HOME MEDICATIONS  Home Medications:                           MEDICATIONS:  STANDING MEDICATIONS  chlorhexidine 4% Liquid 1 Application(s) Topical daily  cholecalciferol 2000 Unit(s) Oral daily  levothyroxine Injectable 50 MICROGram(s) IV Push at bedtime  midodrine. 5 milliGRAM(s) Oral every 8 hours  multivitamin/minerals 1 Tablet(s) Oral daily  nystatin Cream 1 Application(s) Topical two times a day  pantoprazole  Injectable 40 milliGRAM(s) IV Push every 12 hours  predniSONE   Tablet 10 milliGRAM(s) Oral daily  thiamine 100 milliGRAM(s) Oral daily  triamcinolone 0.1% Cream 1 Application(s) Topical two times a day  vitamin A &amp; D Ointment 1 Application(s) Topical daily    PRN MEDICATIONS                                            ------------------------------------------------------------  VITAL SIGNS: Last 24 Hours  T(C): 35.8 (06 Mar 2022 08:00), Max: 37.1 (05 Mar 2022 12:00)  T(F): 96.4 (06 Mar 2022 08:00), Max: 98.7 (05 Mar 2022 12:00)  HR: 101 (06 Mar 2022 08:00) (73 - 101)  BP: 131/73 (06 Mar 2022 08:00) (106/69 - 131/73)  BP(mean): 96 (06 Mar 2022 08:00) (80 - 98)  RR: 25 (06 Mar 2022 08:00) (15 - 32)  SpO2: 97% (06 Mar 2022 08:00) (97% - 100%)      03-05-22 @ 07:01  -  03-06-22 @ 07:00  --------------------------------------------------------  IN: 1325 mL / OUT: 865 mL / NET: 460 mL    03-06-22 @ 07:01  -  03-06-22 @ 09:27  --------------------------------------------------------  IN: 120 mL / OUT: 0 mL / NET: 120 mL                                             --------------------------------------------------------------  LABS:                        9.4    9.32  )-----------( 90       ( 06 Mar 2022 05:41 )             29.1     03-06    139  |  109  |  16  ----------------------------<  88  5.1<H>   |  21  |  0.6<L>    Ca    7.9<L>      06 Mar 2022 05:41  Phos  3.9     03-06  Mg     1.9     03-06    TPro  5.5<L>  /  Alb  1.9<L>  /  TBili  0.3  /  DBili  x   /  AST  46<H>  /  ALT  59<H>  /  AlkPhos  134<H>  03-06                Culture - Blood (collected 03 Mar 2022 11:00)  Source: .Blood Blood-Venous  Preliminary Report (04 Mar 2022 22:01):    No growth to date.                                                    -------------------------------------------------------------  RADIOLOGY:                                            --------------------------------------------------------------    PHYSICAL EXAM:  General: tiffany babin  HEENT: non remarkable  LUNGS: decreased air sounds  HEART: normal s1 s2  ABDOMEN: soft non tender  EXT: +1 edema  SKIN: excoriations                                           --------------------------------------------------------------    ASSESSMENT & PLAN  61F [facility] GIB, Bowel and Bladder incontinence, Smoking (3-4 cigarettes/day), ETOH abuse, hypothyroidism, and ongoing anemia, admitted to [facility] 1/31 for ams found to have UTI. Dev GIB, colonoscopy showing polyps, transferred to [facility]- for polypectomy now upgraded to CCU for hypotension, likely septic shock 2/2 aspiration pna.     #AMS with unclear etiology   #sepsis w/ shock/hypothermia vs aspiration after procedure under anesthesia 2/18 vs CVA   #Pancolitis   - Pt upgraded from floor on 2/20 after RRT for hypotension and desaturation. RIJ CVC placed, started on levo, venti mask, o2 and hypotension subsequently improved   - CTH 2/20: no ICH, mass effect, or midline shift   - EEG 2/20: generalized slowing    - UCx 2/18 negative   - BCx 2/17, 2/19: NGTD  - COVID negative 2/20  - MRSA nares 2/20 negative  - LE duplex 2/21: no DVT however bl peroneal veins not visualized   - ammonia elevated to 59 on 2/22, started on lactulose, possibly contributing to ams   - ammonia 50 on 2/26   - d/c'd vanc 2/22  - c/w aztreonam 2g q8h (started 2/24- switched from eduardo due to concern for pcn induced thrombocytopenia)   - c/w clinda 600mg q8h   - off levo   - Exchanged TLC 3/2   - neuro eval appreciated  - ID recs appreciated      #Colonic Polyp  #Diarrhea in setting of recent abx use  #Hx of GIB   #Pneumoretroperitoneum   #Pancolitis   - Colonoscopy 2/4: 1 large 3-4 cm sigmoid pedunculated polyp and 2 polyps (7, 14 mm) in descending colon s/p bx  - GI consulted (Dr. Daniels)   - Patient is cleared for colonoscopy per Cardiology   - Negative for C-Diff infection  - CEA elevated 5.6  - S/P colonoscopy and polypectomy 2/18/2022. Polyp (13 mm) in the descending colon. (hot snare Polypectomy).  Polyp (25 mm to 30 mm) in the rectosigmoid junction at 20 cm from the anal verge. (Endoloop, Polypectomy, Endoclip).   - Repeat Colonoscopy in 6 months   - GI recs appreciated    - 2/21: overnight pt had abdominal tenderness, KUB ordered showing paraspinal lucencies concerning for possible pneumoretroperitoneum   - abdomen soft, nontender, nondistended on my exam  - CTAP PO/IV contrast 2/21: findings c/w pancolitis, no intra or retroperitoneal free air  - c/w aztreonam, clinda   - Pt tolerating tube feeds. Passed S&S- pureed diet however PO intake minimal. Will c/w tube feeds, likely will need PEG tube in future if PO intake doesn't improve   - surgery recs appreciated   - ID recs appreciated     #Exfoliative skin lesions- possibly externally induced (e.g scratching), r/o TEN given vesicles and desquamation   #+C-ANCA  - pt with superficial scabs diffusely over body, most prominent over chest, as well as areas of desquamation over arms, legs, and hips   - Burn recs appreciated; dx with incontinence dermatitis    - derm recs appreciated, recalled burn for skin bx (done 2/28), f/u path   - rheum recs appreciated, no clear evidence for ANCA associated vasculitis could be elevated due to sepsis. Agree with skin bx. Sent PR4/MPO antibody assay, will rpt c-anca once acute illness resolves.  - f/u niacin levels   - arsenic negative   - s/p skin bx by burn 2/28, f/u path     #Prolonged qtc    - qtc 613 initially, now 537 on 2/28 EKG   - repleating mg today, keep Mg>2, K>4.   - on no qtc prolonging medications   - arsenic levels negative   - daily ekg   - continue to monitor     #Normocytic anemia   #Acute thrombocytopenia- resolving   #Elevated INR/PTT not on a/c- resolved   - not on heparin this admission, unclear if was on previously at [facility]  - plt, hgb continued to downtrend so given 1u plt 1u pRBC on 2/23. Addnl unit plt given 2/24, platelets appear to be stabilizing will follow    - INR also uptrending, gave 10mg vitamin K IVPB 2/23   - cindy positive, possibly due to transfusion (not documented but likely received in [facility])  - retic count 0.9, LDH nl, haptoglobin slightly low. Ddimer elevated, fibrinogen nl, fibrin split products elevated    - heparin plt antibody negative   - HIV, hep B negative   - monitor INR   - started on trial of solumedrol 40mg IV 2/24, plt stabilized, will begin taper 2/28 (prednisone 30 x3d, 20 x3d, 10 x3d.   - Given 1u pRBC 3/2. 3/4  - continue to monitor CBC, keep hbg >7  - heme onc recs appreciated    #Hypotension   - am cortisol 5.7   - C/w midodrine 5mg TID    #Hypernatremia- improving   - c/w free water via NGT    #Hypothyroidism  - TSH 15.6  - C/w synthroid 50 mcg po q24 (increased form 25mcg   - Patient will need to repeat TSH and FT4 in 6 wks (April)  - f/u endo recs     #Hx of UTI- resolved   #R lower face cellulitis- resolved  - CT maxillofacial with C 2/12: Mild soft tissue inflammation suggesting cellulitis of the right lower face  - pt was on cefepime, vancomycin (end date 2/15)  - Started on Unasyn 2/16,/c today 2/20 started on broad spectrum Abx 2/20  - No documentations of + bcx or ucx on the charts from [facility]   - Rpt UCx negative   - Bclx 2/17 NGTD, F/u repeat    - Dental recs appreciated   - F/u SPEP     #Hx L pleural effusion on CXR 2/8- resolved   - CT Chest with Cont 2/9 obtained: Moderate b/l pleural effusions resulting in complete atelectasis of both lower lobes    #Hx NSTEMI   #Hx of Elevated Troponin  - in setting of hypotension   - Normal Lexiscan at [facility]  - EKG noted     #Hx of ETOH abuse  - last drink (as per nurse) 6 months ago  - c/w Folic acid and Thiamine  - F/u Dietician eval  - Ensure 3x/day    #Possible Thiamine deficiency   #Possible Folic acid deficiency   - C/w Folic acid and Thiamine    #Misc   - DVT prophylaxis: SCD   - GI prophylaxis: PPI  - Diet: NPO tube feeds    - Activity status: IAT   - Code status: Full code   - Disposition: acute     #Handoff   - off pressors   - f/u skin bx results - if not resulted by Monday please check with lab   - monitor hgb, plt   - f/u niacin levels   - rpt TFTs in 3 weeks (end of march)   - If hypotensive with bradycardia inc levothyroxine to 125mcg q24h   - Pending transfer to SDU

## 2022-03-07 LAB
ALBUMIN SERPL ELPH-MCNC: 2.3 G/DL — LOW (ref 3.5–5.2)
ALP SERPL-CCNC: 128 U/L — HIGH (ref 30–115)
ALT FLD-CCNC: 53 U/L — HIGH (ref 0–41)
ANION GAP SERPL CALC-SCNC: 11 MMOL/L — SIGNIFICANT CHANGE UP (ref 7–14)
AST SERPL-CCNC: 30 U/L — SIGNIFICANT CHANGE UP (ref 0–41)
BASOPHILS # BLD AUTO: 0.03 K/UL — SIGNIFICANT CHANGE UP (ref 0–0.2)
BASOPHILS NFR BLD AUTO: 0.3 % — SIGNIFICANT CHANGE UP (ref 0–1)
BILIRUB SERPL-MCNC: 0.4 MG/DL — SIGNIFICANT CHANGE UP (ref 0.2–1.2)
BUN SERPL-MCNC: 12 MG/DL — SIGNIFICANT CHANGE UP (ref 10–20)
CALCIUM SERPL-MCNC: 8.1 MG/DL — LOW (ref 8.5–10.1)
CHLORIDE SERPL-SCNC: 113 MMOL/L — HIGH (ref 98–110)
CO2 SERPL-SCNC: 21 MMOL/L — SIGNIFICANT CHANGE UP (ref 17–32)
CREAT SERPL-MCNC: 0.5 MG/DL — LOW (ref 0.7–1.5)
EGFR: 107 ML/MIN/1.73M2 — SIGNIFICANT CHANGE UP
EOSINOPHIL # BLD AUTO: 0.02 K/UL — SIGNIFICANT CHANGE UP (ref 0–0.7)
EOSINOPHIL NFR BLD AUTO: 0.2 % — SIGNIFICANT CHANGE UP (ref 0–8)
GLUCOSE SERPL-MCNC: 67 MG/DL — LOW (ref 70–99)
HCT VFR BLD CALC: 31.4 % — LOW (ref 37–47)
HGB BLD-MCNC: 10.3 G/DL — LOW (ref 12–16)
IMM GRANULOCYTES NFR BLD AUTO: 0.5 % — HIGH (ref 0.1–0.3)
LYMPHOCYTES # BLD AUTO: 1.49 K/UL — SIGNIFICANT CHANGE UP (ref 1.2–3.4)
LYMPHOCYTES # BLD AUTO: 13.1 % — LOW (ref 20.5–51.1)
MAGNESIUM SERPL-MCNC: 1.7 MG/DL — LOW (ref 1.8–2.4)
MCHC RBC-ENTMCNC: 30.7 PG — SIGNIFICANT CHANGE UP (ref 27–31)
MCHC RBC-ENTMCNC: 32.8 G/DL — SIGNIFICANT CHANGE UP (ref 32–37)
MCV RBC AUTO: 93.5 FL — SIGNIFICANT CHANGE UP (ref 81–99)
MONOCYTES # BLD AUTO: 0.73 K/UL — HIGH (ref 0.1–0.6)
MONOCYTES NFR BLD AUTO: 6.4 % — SIGNIFICANT CHANGE UP (ref 1.7–9.3)
NEUTROPHILS # BLD AUTO: 9.02 K/UL — HIGH (ref 1.4–6.5)
NEUTROPHILS NFR BLD AUTO: 79.5 % — HIGH (ref 42.2–75.2)
NRBC # BLD: 0 /100 WBCS — SIGNIFICANT CHANGE UP (ref 0–0)
PHOSPHATE SERPL-MCNC: 3.6 MG/DL — SIGNIFICANT CHANGE UP (ref 2.1–4.9)
PLATELET # BLD AUTO: 135 K/UL — SIGNIFICANT CHANGE UP (ref 130–400)
POTASSIUM SERPL-MCNC: 4 MMOL/L — SIGNIFICANT CHANGE UP (ref 3.5–5)
POTASSIUM SERPL-SCNC: 4 MMOL/L — SIGNIFICANT CHANGE UP (ref 3.5–5)
PROT SERPL-MCNC: 6.1 G/DL — SIGNIFICANT CHANGE UP (ref 6–8)
RBC # BLD: 3.36 M/UL — LOW (ref 4.2–5.4)
RBC # FLD: 18.1 % — HIGH (ref 11.5–14.5)
SARS-COV-2 RNA SPEC QL NAA+PROBE: SIGNIFICANT CHANGE UP
SODIUM SERPL-SCNC: 145 MMOL/L — SIGNIFICANT CHANGE UP (ref 135–146)
WBC # BLD: 11.35 K/UL — HIGH (ref 4.8–10.8)
WBC # FLD AUTO: 11.35 K/UL — HIGH (ref 4.8–10.8)

## 2022-03-07 PROCEDURE — 71045 X-RAY EXAM CHEST 1 VIEW: CPT | Mod: 26

## 2022-03-07 PROCEDURE — 99233 SBSQ HOSP IP/OBS HIGH 50: CPT

## 2022-03-07 PROCEDURE — 93010 ELECTROCARDIOGRAM REPORT: CPT

## 2022-03-07 RX ORDER — MAGNESIUM SULFATE 500 MG/ML
2 VIAL (ML) INJECTION ONCE
Refills: 0 | Status: COMPLETED | OUTPATIENT
Start: 2022-03-07 | End: 2022-03-07

## 2022-03-07 RX ORDER — SODIUM CHLORIDE 9 MG/ML
1000 INJECTION, SOLUTION INTRAVENOUS
Refills: 0 | Status: DISCONTINUED | OUTPATIENT
Start: 2022-03-07 | End: 2022-03-07

## 2022-03-07 RX ORDER — FUROSEMIDE 40 MG
40 TABLET ORAL DAILY
Refills: 0 | Status: DISCONTINUED | OUTPATIENT
Start: 2022-03-07 | End: 2022-03-10

## 2022-03-07 RX ORDER — FUROSEMIDE 40 MG
40 TABLET ORAL DAILY
Refills: 0 | Status: DISCONTINUED | OUTPATIENT
Start: 2022-03-07 | End: 2022-03-07

## 2022-03-07 RX ADMIN — Medication 1 TABLET(S): at 11:31

## 2022-03-07 RX ADMIN — PANTOPRAZOLE SODIUM 40 MILLIGRAM(S): 20 TABLET, DELAYED RELEASE ORAL at 05:19

## 2022-03-07 RX ADMIN — Medication 1 APPLICATION(S): at 05:20

## 2022-03-07 RX ADMIN — NYSTATIN CREAM 1 APPLICATION(S): 100000 CREAM TOPICAL at 17:00

## 2022-03-07 RX ADMIN — PANTOPRAZOLE SODIUM 40 MILLIGRAM(S): 20 TABLET, DELAYED RELEASE ORAL at 17:02

## 2022-03-07 RX ADMIN — CHLORHEXIDINE GLUCONATE 1 APPLICATION(S): 213 SOLUTION TOPICAL at 11:32

## 2022-03-07 RX ADMIN — Medication 10 MILLIGRAM(S): at 05:19

## 2022-03-07 RX ADMIN — Medication 40 MILLIGRAM(S): at 09:21

## 2022-03-07 RX ADMIN — Medication 1 APPLICATION(S): at 11:33

## 2022-03-07 RX ADMIN — Medication 25 GRAM(S): at 09:21

## 2022-03-07 RX ADMIN — NYSTATIN CREAM 1 APPLICATION(S): 100000 CREAM TOPICAL at 05:19

## 2022-03-07 RX ADMIN — Medication 50 MICROGRAM(S): at 00:36

## 2022-03-07 RX ADMIN — Medication 2000 UNIT(S): at 11:32

## 2022-03-07 RX ADMIN — Medication 1 APPLICATION(S): at 17:01

## 2022-03-07 RX ADMIN — Medication 50 MICROGRAM(S): at 23:19

## 2022-03-07 RX ADMIN — Medication 100 MILLIGRAM(S): at 11:31

## 2022-03-07 NOTE — CHART NOTE - NSCHARTNOTEFT_GEN_A_CORE
T(F): 97.2 (03-07-22 @ 07:00), Max: 97.2 (03-06-22 @ 16:00)  HR: 91 (03-07-22 @ 07:00) (68 - 110)  BP: 118/85 (03-07-22 @ 07:00) (109/72 - 157/90)  RR: 17 (03-07-22 @ 07:00) (16 - 32)  SpO2: 95% (03-07-22 @ 07:00) (95% - 100%)    I&O's Detail    06 Mar 2022 07:01  -  07 Mar 2022 07:00  --------------------------------------------------------  IN:    Enteral Tube Flush: 50 mL    Free Water: 200 mL    Oral Fluid: 370 mL    Peptamen A.F.: 375 mL  Total IN: 995 mL    OUT:    Indwelling Catheter - Urethral (mL): 880 mL  Total OUT: 880 mL    Total NET: 115 mL    03-07    145  |  113<H>  |  12  ----------------------------<  67<L>  4.0   |  21  |  0.5<L>    Ca    8.1<L>      07 Mar 2022 04:30  Phos  3.6     03-07  Mg     1.7     03-07    TPro  6.1  /  Alb  2.3<L>  /  TBili  0.4  /  DBili  x   /  AST  30  /  ALT  53<H>  /  AlkPhos  128<H>  03-07                        10.3   11.35 )-----------( 135      ( 07 Mar 2022 04:30 )             31.4     Diet, DASH/TLC:   Sodium & Cholesterol Restricted  Pureed (PUREED)  Tube Feeding Modality: Nasogastric  Peptamen A.F. Formula  Total Volume for 24 Hours (mL): 1125  Bolus  Total Volume of Bolus (mL):  375  Tube Feed Frequency: Every 8 hours   Tube Feed Start Time: 18:00  Bolus Feed Rate (mL per Hour): 500   Bolus Feed Duration (in Hours): 0.75  Bolus Feed Instructions:  give tube feed AFTER EACH ATTEMPTED PO MEAL, NOT Q8H   Frequency: Every 8 Hours    Start Time: 12:00  Free Water Flush Instructions:  flush 50 ml water syringe push before and after each feeding (03-02-22 @ 17:59)    ASSESSMENT  61F PMH GIB, Bowel and Bladder incontinence, Smoking (3-4 cigarettes/day), ETOH abuse, hypothyroidism, and ongoing anemia, admitted to [facility] 1/31 for ams found to have UTI. Dev GIB, colonoscopy showing polyps, transferred to [facility] for polypectomy now upgraded to CCU for hypotension, likely septic shock 2/2 aspiration pna.     - AMS, unclear etiology  - sepsis w/ shock/hypothermia vs aspiration after procedure under anesthesia 2/18 vs CVA  - acute thrombocytopenia  - chronic anemia  - Exfoliative dermatitis   - Colonoscopy 2/4: 1 large 3-4 cm sigmoid pedunculated polyp and 2 polyps (7, 14 mm) in descending colon s/p polypectomy 2/18/2022.   - r/o pneumoretroperitoneum (on KUB 2/21)  - pancolitis on CT 2/21  - hypothyroidism  - Hx of ETOH abuse  - hypokalemia, hypernatremia  - severe protein calorie malnutrition  - severe wt loss, ~34% over past 9 months    SUGGEST:  - d/c lactulose  - d/c high does thiamine - received > 1 week already  - cont MV+minerals po or via NG daily  - cont to encourage po diet but continue tube feeds, Peptamen  ml over 45 min after each attempted po meal Appears comfortable, NAD. Calling out, confused  Afebrile  NGT out, tolerating PO diet but must be encouraged and assisted with meals    T(F): 97.2 (03-07-22 @ 07:00), Max: 97.2 (03-06-22 @ 16:00)  HR: 91 (03-07-22 @ 07:00) (68 - 110)  BP: 118/85 (03-07-22 @ 07:00) (109/72 - 157/90)  RR: 17 (03-07-22 @ 07:00) (16 - 32)  SpO2: 95% (03-07-22 @ 07:00) (95% - 100%)    I&O's Detail    06 Mar 2022 07:01  -  07 Mar 2022 07:00  --------------------------------------------------------  IN:    Enteral Tube Flush: 50 mL    Free Water: 200 mL    Oral Fluid: 370 mL    Peptamen A.F.: 375 mL  Total IN: 995 mL    OUT:    Indwelling Catheter - Urethral (mL): 880 mL  Total OUT: 880 mL    Total NET: 115 mL    03-07    145  |  113<H>  |  12  ----------------------------<  67<L>  4.0   |  21  |  0.5<L>    Ca    8.1<L>      07 Mar 2022 04:30  Phos  3.6     03-07  Mg     1.7     03-07    TPro  6.1  /  Alb  2.3<L>  /  TBili  0.4  /  DBili  x   /  AST  30  /  ALT  53<H>  /  AlkPhos  128<H>  03-07                        10.3   11.35 )-----------( 135      ( 07 Mar 2022 04:30 )             31.4     Diet, DASH/TLC:   Sodium & Cholesterol Restricted  Pureed (PUREED)  Tube Feeding Modality: Nasogastric  Peptamen A.F. Formula  Total Volume for 24 Hours (mL): 1125  Bolus  Total Volume of Bolus (mL):  375  Tube Feed Frequency: Every 8 hours   Tube Feed Start Time: 18:00  Bolus Feed Rate (mL per Hour): 500   Bolus Feed Duration (in Hours): 0.75  Bolus Feed Instructions:  give tube feed AFTER EACH ATTEMPTED PO MEAL, NOT Q8H   Frequency: Every 8 Hours    Start Time: 12:00  Free Water Flush Instructions:  flush 50 ml water syringe push before and after each feeding (03-02-22 @ 17:59)    ASSESSMENT  61F PMH GIB, Bowel and Bladder incontinence, Smoking (3-4 cigarettes/day), ETOH abuse, hypothyroidism, and ongoing anemia, admitted to [facility] 1/31 for ams found to have UTI. Dev GIB, colonoscopy showing polyps, transferred to [facility] for polypectomy now upgraded to CCU for hypotension, likely septic shock 2/2 aspiration pna.     - AMS, unclear etiology  - sepsis w/ shock/hypothermia vs aspiration after procedure under anesthesia 2/18 vs CVA  - acute thrombocytopenia  - chronic anemia  - Exfoliative dermatitis   - Colonoscopy 2/4: 1 large 3-4 cm sigmoid pedunculated polyp and 2 polyps (7, 14 mm) in descending colon s/p polypectomy 2/18/2022.   - r/o pneumoretroperitoneum (on KUB 2/21)  - pancolitis on CT 2/21  - hypothyroidism  - Hx of ETOH abuse  - hypokalemia, hypernatremia  - severe protein calorie malnutrition  - severe wt loss, ~34% over past 9 months    SUGGEST:  - PO diet as tolerated with assistance  - check demetris cts X 2d  - will follow Appears comfortable, NAD. Calling out, confused  Afebrile  NGT out, tolerating PO diet but must be encouraged and assisted with meals    T(F): 97.2 (03-07-22 @ 07:00), Max: 97.2 (03-06-22 @ 16:00)  HR: 91 (03-07-22 @ 07:00) (68 - 110)  BP: 118/85 (03-07-22 @ 07:00) (109/72 - 157/90)  RR: 17 (03-07-22 @ 07:00) (16 - 32)  SpO2: 95% (03-07-22 @ 07:00) (95% - 100%)    I&O's Detail  06 Mar 2022 07:01  -  07 Mar 2022 07:00  IN:    Enteral Tube Flush: 50 mL    Free Water: 200 mL    Oral Fluid: 370 mL    Peptamen A.F.: 375 mL  Total IN: 995 mL  OUT:    Indwelling Catheter - Urethral (mL): 880 mL  Total OUT: 880 mL  Total NET: 115 mL    03-07    145  |  113<H>  |  12  ----------------------------<  67<L>  4.0   |  21  |  0.5<L>    Ca    8.1<L>      07 Mar 2022 04:30  Phos  3.6     03-07  Mg     1.7     03-07    TPro  6.1  /  Alb  2.3<L>  /  TBili  0.4  /  DBili  x   /  AST  30  /  ALT  53<H>  /  AlkPhos  128<H>  03-07                        10.3   11.35 )-----------( 135      ( 07 Mar 2022 04:30 )             31.4     Diet, DASH/TLC:   Sodium & Cholesterol Restricted  Pureed (PUREED)  Tube Feeding Modality: Nasogastric  Peptamen A.F. Formula  Total Volume for 24 Hours (mL): 1125  Bolus  Total Volume of Bolus (mL):  375  Tube Feed Frequency: Every 8 hours   Tube Feed Start Time: 18:00  Bolus Feed Rate (mL per Hour): 500   Bolus Feed Duration (in Hours): 0.75  Bolus Feed Instructions:  give tube feed AFTER EACH ATTEMPTED PO MEAL, NOT Q8H   Frequency: Every 8 Hours    Start Time: 12:00  Free Water Flush Instructions:  flush 50 ml water syringe push before and after each feeding (03-02-22 @ 17:59)    ASSESSMENT  61F PMH GIB, Bowel and Bladder incontinence, Smoking (3-4 cigarettes/day), ETOH abuse, hypothyroidism, and ongoing anemia, admitted to [facility] 1/31 for ams found to have UTI. Dev GIB, colonoscopy showing polyps, transferred to [facility] for polypectomy now upgraded to CCU for hypotension, likely septic shock 2/2 aspiration pna.     - AMS, unclear etiology  - sepsis w/ shock/hypothermia vs aspiration after procedure under anesthesia 2/18 vs CVA  - acute thrombocytopenia  - chronic anemia  - Exfoliative dermatitis   - Colonoscopy 2/4: 1 large 3-4 cm sigmoid pedunculated polyp and 2 polyps (7, 14 mm) in descending colon s/p polypectomy 2/18/2022.   - r/o pneumoretroperitoneum (on KUB 2/21)  - pancolitis on CT 2/21  - hypothyroidism  - Hx of ETOH abuse  - hypokalemia, hypernatremia  - severe protein calorie malnutrition  - severe wt loss, ~34% over past 9 months    SUGGEST:  - PO diet as tolerated with assistance  - feeding tube out - residents to revise diet Rx  - would add supplements such as yogurt, Ensure pudding, etc  - check demetris cts X 2d  - will follow

## 2022-03-07 NOTE — PROGRESS NOTE ADULT - SUBJECTIVE AND OBJECTIVE BOX
24H events:    Patient is a 61y old Female who presents with a chief complaint of Polypectomy (07 Mar 2022 06:33)    Primary diagnosis of    Today is hospital day 19d. This morning patient was seen and examined at bedside, resting comfortably in bed.    no major event overnight  no major tele events  stable clinically    PAST MEDICAL & SURGICAL HISTORY    SOCIAL HISTORY:  Social History:      ALLERGIES:  No Known Allergies    MEDICATIONS:  STANDING MEDICATIONS  chlorhexidine 4% Liquid 1 Application(s) Topical daily  cholecalciferol 2000 Unit(s) Oral daily  furosemide    Tablet 40 milliGRAM(s) Oral daily  levothyroxine Injectable 50 MICROGram(s) IV Push at bedtime  magnesium sulfate  IVPB 2 Gram(s) IV Intermittent once  midodrine. 5 milliGRAM(s) Oral every 8 hours  multivitamin/minerals 1 Tablet(s) Oral daily  nystatin Cream 1 Application(s) Topical two times a day  pantoprazole  Injectable 40 milliGRAM(s) IV Push every 12 hours  predniSONE   Tablet 10 milliGRAM(s) Oral daily  thiamine 100 milliGRAM(s) Oral daily  triamcinolone 0.1% Cream 1 Application(s) Topical two times a day  vitamin A &amp; D Ointment 1 Application(s) Topical daily    PRN MEDICATIONS    VITALS:   T(F): 97.2  HR: 91  BP: 118/85  RR: 17  SpO2: 95%    PHYSICAL EXAM:  GENERAL: NAD  NERVOUS SYSTEM:  Alert & Oriented X3, non focal   CHEST/LUNG: Clear to auscultation bilaterally; No rales, rhonchi, wheezing, or rubs  HEART: Regular rate and rhythm; No murmurs, rubs, or gallops  ABDOMEN: Soft, Nontender, Nondistended; Bowel sounds present  EXTREMITIES:   No clubbing, cyanosis, Bilateral pitting edema 2+    LABS:                        10.3   11.35 )-----------( 135      ( 07 Mar 2022 04:30 )             31.4     03-07    145  |  113<H>  |  12  ----------------------------<  67<L>  4.0   |  21  |  0.5<L>    Ca    8.1<L>      07 Mar 2022 04:30  Phos  3.6     03-07  Mg     1.7     03-07    TPro  6.1  /  Alb  2.3<L>  /  TBili  0.4  /  DBili  x   /  AST  30  /  ALT  53<H>  /  AlkPhos  128<H>  03-07                  RADIOLOGY:

## 2022-03-07 NOTE — PROGRESS NOTE ADULT - ASSESSMENT
IMPRESSION:  AMS improved   Sepsis   Septic shock resolved   Possible aspiration PNA treated   HFPEF  Acute hypoxemic respiratory failure resolved   Colon polyps s/p polypectomy of 2 polyps on 2/18  HO GIB  HO ETOH abuse  Active smoker  HO hypothyroidism     PLAN:    CNS: Avoid depressants     HEENT: Oral care    PULMONARY:  HOB @ 45 degrees.  Aspiration precautions.  Wean O2.      CARDIOVASCULAR:  Lasix 40 mg daily.  Negative balance.  BNP     GI: GI prophylaxis. Feeding per speech and swallow.  NG feeding for now      RENAL:  Follow up lytes.  Correct as needed    INFECTIOUS DISEASE:  ABx as per Id     HEMATOLOGICAL:  DVT prophylaxis. FU CBC.  Wean Prednisone off     ENDOCRINE:  Follow up FS.  Insulin protocol if needed.      MUSCULOSKELETAL:  Bed rest burn / wound care follow up     Burn follow up.  Wound care.      SDU

## 2022-03-07 NOTE — PROGRESS NOTE ADULT - SUBJECTIVE AND OBJECTIVE BOX
Patient is a 61y old  Female who presents with a chief complaint of Polypectomy (06 Mar 2022 09:27)        Over Night Events:  Resting in Bed  On NC O2.  Off pressors.          ROS:     All ROS are negative except HPI         PHYSICAL EXAM    ICU Vital Signs Last 24 Hrs  T(C): 36 (06 Mar 2022 22:00), Max: 36.2 (06 Mar 2022 16:00)  T(F): 96.8 (06 Mar 2022 22:00), Max: 97.2 (06 Mar 2022 16:00)  HR: 107 (07 Mar 2022 05:00) (68 - 110)  BP: 154/92 (07 Mar 2022 05:00) (109/72 - 157/90)  BP(mean): 115 (07 Mar 2022 05:00) (87 - 127)  ABP: --  ABP(mean): --  RR: 16 (07 Mar 2022 05:00) (16 - 32)  SpO2: 100% (07 Mar 2022 05:00) (96% - 100%)      CONSTITUTIONAL:  Ill appearing in NAD    ENT:   Airway patent,   Mouth with normal mucosa.   No thrush    EYES:   Pupils equal,   Round and reactive to light.    CARDIAC:   Normal rate,   Regular rhythm.    Sig LE edema      Vascular:  Normal systolic impulse  No Carotid bruits    RESPIRATORY:   No wheezing  Bilateral BS  Normal chest expansion  Not tachypneic,  No use of accessory muscles    GASTROINTESTINAL:  Abdomen soft,   Non-tender,   No guarding,   + BS    MUSCULOSKELETAL:   Range of motion is not limited,  No clubbing, cyanosis    NEUROLOGICAL:   Alert  Follows simple commands   No motor  deficits.    SKIN:   Skin normal color for race,     PSYCHIATRIC:   Normal mood and affect.   No apparent risk to self or others.    HEMATOLOGICAL:  No cervical  lymphadenopathy.  no inguinal lymphadenopathy      03-05-22 @ 07:01  -  03-06-22 @ 07:00  --------------------------------------------------------  IN:    Enteral Tube Flush: 50 mL    Free Water: 450 mL    IV PiggyBack: 50 mL    Oral Fluid: 150 mL    Peptamen A.F.: 625 mL  Total IN: 1325 mL    OUT:    Indwelling Catheter - Urethral (mL): 865 mL  Total OUT: 865 mL    Total NET: 460 mL      03-06-22 @ 07:01  -  03-07-22 @ 06:33  --------------------------------------------------------  IN:    Enteral Tube Flush: 50 mL    Free Water: 200 mL    Oral Fluid: 370 mL    Peptamen A.F.: 375 mL  Total IN: 995 mL    OUT:    Indwelling Catheter - Urethral (mL): 880 mL  Total OUT: 880 mL    Total NET: 115 mL          LABS:                            9.4    9.32  )-----------( 90       ( 06 Mar 2022 05:41 )             29.1                     9.4    9.32  )-----------( 90       ( 03-06 @ 05:41 )             29.1                8.4    6.32  )-----------( 70       ( 03-05 @ 04:30 )             25.7                                              03-06    139  |  109  |  16  ----------------------------<  88  5.1<H>   |  21  |  0.6<L>    Ca    7.9<L>      06 Mar 2022 05:41  Phos  3.9     03-06  Mg     1.9     03-06    TPro  5.5<L>  /  Alb  1.9<L>  /  TBili  0.3  /  DBili  x   /  AST  46<H>  /  ALT  59<H>  /  AlkPhos  134<H>  03-06                                                                                           LIVER FUNCTIONS - ( 06 Mar 2022 05:41 )  Alb: 1.9 g/dL / Pro: 5.5 g/dL / ALK PHOS: 134 U/L / ALT: 59 U/L / AST: 46 U/L / GGT: x                                                                                                                                       MEDICATIONS  (STANDING):  chlorhexidine 4% Liquid 1 Application(s) Topical daily  cholecalciferol 2000 Unit(s) Oral daily  levothyroxine Injectable 50 MICROGram(s) IV Push at bedtime  midodrine. 5 milliGRAM(s) Oral every 8 hours  multivitamin/minerals 1 Tablet(s) Oral daily  nystatin Cream 1 Application(s) Topical two times a day  pantoprazole  Injectable 40 milliGRAM(s) IV Push every 12 hours  predniSONE   Tablet 10 milliGRAM(s) Oral daily  thiamine 100 milliGRAM(s) Oral daily  triamcinolone 0.1% Cream 1 Application(s) Topical two times a day  vitamin A &amp; D Ointment 1 Application(s) Topical daily    MEDICATIONS  (PRN):      New X-rays reviewed:                                                                                  ECHO    CXR interpreted by me:  Bilateral effusions

## 2022-03-07 NOTE — PROGRESS NOTE ADULT - ASSESSMENT
AMS improved   Sepsis   Septic shock resolved   Possible aspiration PNA treated   HFPEF  Acute hypoxemic respiratory failure resolved   Colon polyps s/p polypectomy of 2 polyps on 2/18  HO GIB  HO ETOH abuse  Active smoker  HO hypothyroidism     PLAN:    CNS: Avoid depressants     HEENT: Oral care    PULMONARY:  HOB @ 45 degrees.  Aspiration precautions.  Wean O2.      CARDIOVASCULAR:  Lasix 40 mg daily.  Negative balance.  BNP     GI: GI prophylaxis. Feeding per speech and swallow.  NG feeding for now      RENAL:  Follow up lytes.  Correct as needed    INFECTIOUS DISEASE:  ABx as per Id     HEMATOLOGICAL:  DVT prophylaxis. FU CBC.  Wean Prednisone off     ENDOCRINE:  Follow up FS.  Insulin protocol if needed.      MUSCULOSKELETAL:  Bed rest burn / wound care follow up     Burn follow up.  Wound care.      SDU    AMS improved   Sepsis   Septic shock resolved   Possible aspiration PNA treated   HFPEF  Acute hypoxemic respiratory failure resolved   Colon polyps s/p polypectomy of 2 polyps on 2/18  HO GIB  HO ETOH abuse  Active smoker  HO hypothyroidism     PLAN:    CNS: Avoid depressants     HEENT: Oral care    PULMONARY:  HOB @ 45 degrees.  Aspiration precautions.  Wean O2.      CARDIOVASCULAR:  Lasix 40 mg daily.  Negative balance.  BNP     GI: GI prophylaxis. Feeding per speech and swallow.      RENAL:  Follow up lytes.  Correct as needed    INFECTIOUS DISEASE:  ABx as per Id     HEMATOLOGICAL:  DVT prophylaxis. FU CBC.  Wean Prednisone off     ENDOCRINE:  Follow up FS.  Insulin protocol if needed.      MUSCULOSKELETAL:  Bed rest burn / wound care follow up     Burn follow up.  Wound care.      SDU

## 2022-03-08 LAB
ALBUMIN SERPL ELPH-MCNC: 2.3 G/DL — LOW (ref 3.5–5.2)
ALP SERPL-CCNC: 115 U/L — SIGNIFICANT CHANGE UP (ref 30–115)
ALT FLD-CCNC: 45 U/L — HIGH (ref 0–41)
ANION GAP SERPL CALC-SCNC: 13 MMOL/L — SIGNIFICANT CHANGE UP (ref 7–14)
AST SERPL-CCNC: 26 U/L — SIGNIFICANT CHANGE UP (ref 0–41)
BASOPHILS # BLD AUTO: 0.04 K/UL — SIGNIFICANT CHANGE UP (ref 0–0.2)
BASOPHILS NFR BLD AUTO: 0.2 % — SIGNIFICANT CHANGE UP (ref 0–1)
BILIRUB SERPL-MCNC: 0.5 MG/DL — SIGNIFICANT CHANGE UP (ref 0.2–1.2)
BLD GP AB SCN SERPL QL: SIGNIFICANT CHANGE UP
BUN SERPL-MCNC: 9 MG/DL — LOW (ref 10–20)
CALCIUM SERPL-MCNC: 8.1 MG/DL — LOW (ref 8.5–10.1)
CHLORIDE SERPL-SCNC: 110 MMOL/L — SIGNIFICANT CHANGE UP (ref 98–110)
CO2 SERPL-SCNC: 22 MMOL/L — SIGNIFICANT CHANGE UP (ref 17–32)
CREAT SERPL-MCNC: 0.5 MG/DL — LOW (ref 0.7–1.5)
CULTURE RESULTS: SIGNIFICANT CHANGE UP
EGFR: 107 ML/MIN/1.73M2 — SIGNIFICANT CHANGE UP
EOSINOPHIL # BLD AUTO: 0.03 K/UL — SIGNIFICANT CHANGE UP (ref 0–0.7)
EOSINOPHIL NFR BLD AUTO: 0.2 % — SIGNIFICANT CHANGE UP (ref 0–8)
GLUCOSE BLDC GLUCOMTR-MCNC: 145 MG/DL — HIGH (ref 70–99)
GLUCOSE BLDC GLUCOMTR-MCNC: 37 MG/DL — CRITICAL LOW (ref 70–99)
GLUCOSE BLDC GLUCOMTR-MCNC: 38 MG/DL — CRITICAL LOW (ref 70–99)
GLUCOSE BLDC GLUCOMTR-MCNC: 67 MG/DL — LOW (ref 70–99)
GLUCOSE BLDC GLUCOMTR-MCNC: 74 MG/DL — SIGNIFICANT CHANGE UP (ref 70–99)
GLUCOSE BLDC GLUCOMTR-MCNC: 89 MG/DL — SIGNIFICANT CHANGE UP (ref 70–99)
GLUCOSE SERPL-MCNC: 44 MG/DL — CRITICAL LOW (ref 70–99)
HCT VFR BLD CALC: 30.7 % — LOW (ref 37–47)
HGB BLD-MCNC: 9.8 G/DL — LOW (ref 12–16)
IMM GRANULOCYTES NFR BLD AUTO: 0.4 % — HIGH (ref 0.1–0.3)
LYMPHOCYTES # BLD AUTO: 1.23 K/UL — SIGNIFICANT CHANGE UP (ref 1.2–3.4)
LYMPHOCYTES # BLD AUTO: 7.4 % — LOW (ref 20.5–51.1)
MAGNESIUM SERPL-MCNC: 1.6 MG/DL — LOW (ref 1.8–2.4)
MCHC RBC-ENTMCNC: 29.7 PG — SIGNIFICANT CHANGE UP (ref 27–31)
MCHC RBC-ENTMCNC: 31.9 G/DL — LOW (ref 32–37)
MCV RBC AUTO: 93 FL — SIGNIFICANT CHANGE UP (ref 81–99)
MONOCYTES # BLD AUTO: 0.69 K/UL — HIGH (ref 0.1–0.6)
MONOCYTES NFR BLD AUTO: 4.2 % — SIGNIFICANT CHANGE UP (ref 1.7–9.3)
NEUTROPHILS # BLD AUTO: 14.53 K/UL — HIGH (ref 1.4–6.5)
NEUTROPHILS NFR BLD AUTO: 87.6 % — HIGH (ref 42.2–75.2)
NRBC # BLD: 0 /100 WBCS — SIGNIFICANT CHANGE UP (ref 0–0)
NT-PROBNP SERPL-SCNC: HIGH PG/ML (ref 0–300)
PHOSPHATE SERPL-MCNC: 3.9 MG/DL — SIGNIFICANT CHANGE UP (ref 2.1–4.9)
PLATELET # BLD AUTO: 198 K/UL — SIGNIFICANT CHANGE UP (ref 130–400)
POTASSIUM SERPL-MCNC: 3.7 MMOL/L — SIGNIFICANT CHANGE UP (ref 3.5–5)
POTASSIUM SERPL-SCNC: 3.7 MMOL/L — SIGNIFICANT CHANGE UP (ref 3.5–5)
PROT SERPL-MCNC: 5.9 G/DL — LOW (ref 6–8)
RBC # BLD: 3.3 M/UL — LOW (ref 4.2–5.4)
RBC # FLD: 17.9 % — HIGH (ref 11.5–14.5)
SODIUM SERPL-SCNC: 145 MMOL/L — SIGNIFICANT CHANGE UP (ref 135–146)
SPECIMEN SOURCE: SIGNIFICANT CHANGE UP
WBC # BLD: 16.59 K/UL — HIGH (ref 4.8–10.8)
WBC # FLD AUTO: 16.59 K/UL — HIGH (ref 4.8–10.8)

## 2022-03-08 PROCEDURE — 99233 SBSQ HOSP IP/OBS HIGH 50: CPT

## 2022-03-08 PROCEDURE — 93010 ELECTROCARDIOGRAM REPORT: CPT

## 2022-03-08 PROCEDURE — 99232 SBSQ HOSP IP/OBS MODERATE 35: CPT

## 2022-03-08 RX ORDER — MAGNESIUM SULFATE 500 MG/ML
2 VIAL (ML) INJECTION ONCE
Refills: 0 | Status: COMPLETED | OUTPATIENT
Start: 2022-03-08 | End: 2022-03-08

## 2022-03-08 RX ORDER — QUETIAPINE FUMARATE 200 MG/1
25 TABLET, FILM COATED ORAL AT BEDTIME
Refills: 0 | Status: DISCONTINUED | OUTPATIENT
Start: 2022-03-08 | End: 2022-03-09

## 2022-03-08 RX ORDER — FOLIC ACID 0.8 MG
1 TABLET ORAL DAILY
Refills: 0 | Status: DISCONTINUED | OUTPATIENT
Start: 2022-03-08 | End: 2022-03-26

## 2022-03-08 RX ORDER — DEXTROSE 50 % IN WATER 50 %
50 SYRINGE (ML) INTRAVENOUS ONCE
Refills: 0 | Status: COMPLETED | OUTPATIENT
Start: 2022-03-08 | End: 2022-03-08

## 2022-03-08 RX ADMIN — Medication 25 GRAM(S): at 12:00

## 2022-03-08 RX ADMIN — Medication 50 MILLILITER(S): at 08:15

## 2022-03-08 RX ADMIN — Medication 1 APPLICATION(S): at 11:59

## 2022-03-08 RX ADMIN — Medication 1 APPLICATION(S): at 06:38

## 2022-03-08 RX ADMIN — Medication 25 GRAM(S): at 08:32

## 2022-03-08 RX ADMIN — Medication 1 APPLICATION(S): at 17:06

## 2022-03-08 RX ADMIN — NYSTATIN CREAM 1 APPLICATION(S): 100000 CREAM TOPICAL at 17:06

## 2022-03-08 RX ADMIN — PANTOPRAZOLE SODIUM 40 MILLIGRAM(S): 20 TABLET, DELAYED RELEASE ORAL at 06:31

## 2022-03-08 RX ADMIN — QUETIAPINE FUMARATE 25 MILLIGRAM(S): 200 TABLET, FILM COATED ORAL at 22:22

## 2022-03-08 RX ADMIN — MIDODRINE HYDROCHLORIDE 5 MILLIGRAM(S): 2.5 TABLET ORAL at 22:22

## 2022-03-08 RX ADMIN — PANTOPRAZOLE SODIUM 40 MILLIGRAM(S): 20 TABLET, DELAYED RELEASE ORAL at 17:06

## 2022-03-08 RX ADMIN — Medication 1 MILLIGRAM(S): at 11:58

## 2022-03-08 RX ADMIN — Medication 2000 UNIT(S): at 11:57

## 2022-03-08 RX ADMIN — Medication 1 TABLET(S): at 11:57

## 2022-03-08 RX ADMIN — NYSTATIN CREAM 1 APPLICATION(S): 100000 CREAM TOPICAL at 06:38

## 2022-03-08 RX ADMIN — Medication 10 MILLIGRAM(S): at 06:31

## 2022-03-08 RX ADMIN — Medication 50 MICROGRAM(S): at 22:22

## 2022-03-08 RX ADMIN — Medication 100 MILLIGRAM(S): at 11:56

## 2022-03-08 RX ADMIN — CHLORHEXIDINE GLUCONATE 1 APPLICATION(S): 213 SOLUTION TOPICAL at 11:56

## 2022-03-08 RX ADMIN — Medication 40 MILLIGRAM(S): at 06:31

## 2022-03-08 NOTE — CONSULT NOTE ADULT - ATTENDING COMMENTS
As above   AM rounds     Pt seen in ICU/ SDU ; SLP and nurse at bedside     EXAM :  pt awake alert with appropriate verbal responses   Skin - torso , extremities - patchy, peeling, mostly healed ,  dry sites   right neck and left radial hand - tender healing wounds with crusted adherent dessicated eschar   biopsy sites - abdomen and right thigh - healing -    A/P   Healed / healing wounds and biopsy sites   Sutures removed  Rec: SSD and dressing to open area left hand   moisturizer to rest of skin after washing/ otherwise per derm rec

## 2022-03-08 NOTE — SWALLOW BEDSIDE ASSESSMENT ADULT - SWALLOW EVAL: RECOMMENDED DIET
NPO w/alternate means of nutrition/hydration
Puree and thin liquids
Puree w/ thin liquids
NPO w/alternate means of nutrition/hydration
NPO with alternate means of nutrition and hydration
puree with thin liquids

## 2022-03-08 NOTE — PROGRESS NOTE ADULT - ASSESSMENT
IMPRESSION:  AMS improved   Sepsis   Septic shock resolved   Delirium   Possible aspiration PNA treated   HFPEF  Acute hypoxemic respiratory failure resolved   Colon polyps s/p polypectomy of 2 polyps on 2/18  HO GIB  HO ETOH abuse  Active smoker  HO hypothyroidism     PLAN:    CNS: Avoid depressants.  Seroquel  Monitor QTC     HEENT: Oral care    PULMONARY:  HOB @ 45 degrees.  Aspiration precautions.  Wean O2.      CARDIOVASCULAR:  Lasix 40 mg daily.  Negative balance.  BNP     GI: GI prophylaxis. Feeding per speech and swallow.     RENAL:  Follow up lytes.  Correct as needed    INFECTIOUS DISEASE:  ABx as per Id     HEMATOLOGICAL:  DVT prophylaxis. FU CBC.     ENDOCRINE:  Follow up FS.  Insulin protocol if needed.      MUSCULOSKELETAL:  Bed rest burn / wound care follow up     Burn follow up.  Wound care.      Downgrade to floor

## 2022-03-08 NOTE — SWALLOW BEDSIDE ASSESSMENT ADULT - SLP PERTINENT HISTORY OF CURRENT PROBLEM
Pt is a 60 y/o F w/ PMHx of GIB, ETOH abuse, active smoker, hypothyroidism, anemia. Presents w/ AMS, sepsis, possible aspiration PNA, HF, colon polyps s/p polypectomy of 2 polyps 2/18. Pt is a 62 y/o female with PMHx of GIB, Bowel and Bladder incontinence, Smoking (3-4 cigarettes/day), ETOH abuse, hypothyroidism, and ongoing anemia transferred from [facility] for Polypectomy. PT s/p colonoscopy and polypectomy 2/18. Bx result shows adenocarcinoma. After the colonoscopy patient became hypothermic and hypotensive. RRT called 2/20 for desaturation and hypotension.

## 2022-03-08 NOTE — SWALLOW BEDSIDE ASSESSMENT ADULT - SLP GENERAL OBSERVATIONS
Pt received in bed awake, +confused, +room air, +b/L restraints, MD at bedside, pt requested PO Pt received in bed awake, +confused, +room air, +B/L restraints

## 2022-03-08 NOTE — PROGRESS NOTE ADULT - SUBJECTIVE AND OBJECTIVE BOX
Patient is a 61y old  Female who presents with a chief complaint of Polypectomy (07 Mar 2022 08:55)        Over Night Events:  Agitated overnight.  Off pressors.  On RA         ROS:     All ROS are negative except HPI         PHYSICAL EXAM    ICU Vital Signs Last 24 Hrs  T(C): 36.4 (08 Mar 2022 01:08), Max: 36.4 (08 Mar 2022 01:08)  T(F): 97.5 (08 Mar 2022 01:08), Max: 97.5 (08 Mar 2022 01:08)  HR: 107 (07 Mar 2022 20:00) (88 - 107)  BP: 149/98 (07 Mar 2022 18:00) (118/85 - 149/98)  BP(mean): 119 (07 Mar 2022 18:00) (86 - 119)  ABP: --  ABP(mean): --  RR: 22 (07 Mar 2022 20:00) (17 - 32)  SpO2: 100% (07 Mar 2022 20:00) (90% - 100%)      CONSTITUTIONAL:  Ill appearing in  NAD    ENT:   Airway patent,   Mouth with normal mucosa.   No thrush    EYES:   Pupils equal,   Round and reactive to light.    CARDIAC:   Normal rate,   Regular rhythm.    edema      Vascular:  Normal systolic impulse  No Carotid bruits    RESPIRATORY:   No wheezing  Bilateral BS  Normal chest expansion  Not tachypneic,  No use of accessory muscles    GASTROINTESTINAL:  Abdomen soft,   Non-tender,   No guarding,   + BS    MUSCULOSKELETAL:   Range of motion is not limited,  No clubbing, cyanosis    NEUROLOGICAL:   Alert   Confused on and off   No motor  deficits.    SKIN:   Skin normal color for race,     PSYCHIATRIC:   Agitated  No apparent risk to self or others.    HEMATOLOGICAL:  No cervical  lymphadenopathy.  no inguinal lymphadenopathy      03-06-22 @ 07:01  -  03-07-22 @ 07:00  --------------------------------------------------------  IN:    Enteral Tube Flush: 50 mL    Free Water: 200 mL    Oral Fluid: 370 mL    Peptamen A.F.: 375 mL  Total IN: 995 mL    OUT:    Indwelling Catheter - Urethral (mL): 880 mL  Total OUT: 880 mL    Total NET: 115 mL      03-07-22 @ 07:01  -  03-08-22 @ 06:45  --------------------------------------------------------  IN:    IV PiggyBack: 50 mL  Total IN: 50 mL    OUT:    Indwelling Catheter - Urethral (mL): 2375 mL  Total OUT: 2375 mL    Total NET: -2325 mL          LABS:                            9.8    16.59 )-----------( x        ( 08 Mar 2022 05:30 )             30.7                                               03-07    145  |  113<H>  |  12  ----------------------------<  67<L>  4.0   |  21  |  0.5<L>    Ca    8.1<L>      07 Mar 2022 04:30  Phos  3.6     03-07  Mg     1.7     03-07    TPro  6.1  /  Alb  2.3<L>  /  TBili  0.4  /  DBili  x   /  AST  30  /  ALT  53<H>  /  AlkPhos  128<H>  03-07                                                                                           LIVER FUNCTIONS - ( 07 Mar 2022 04:30 )  Alb: 2.3 g/dL / Pro: 6.1 g/dL / ALK PHOS: 128 U/L / ALT: 53 U/L / AST: 30 U/L / GGT: x                                                                                                                                       MEDICATIONS  (STANDING):  chlorhexidine 4% Liquid 1 Application(s) Topical daily  cholecalciferol 2000 Unit(s) Oral daily  furosemide   Injectable 40 milliGRAM(s) IV Push daily  levothyroxine Injectable 50 MICROGram(s) IV Push at bedtime  midodrine. 5 milliGRAM(s) Oral every 8 hours  multivitamin/minerals 1 Tablet(s) Oral daily  nystatin Cream 1 Application(s) Topical two times a day  pantoprazole  Injectable 40 milliGRAM(s) IV Push every 12 hours  thiamine 100 milliGRAM(s) Oral daily  triamcinolone 0.1% Cream 1 Application(s) Topical two times a day  vitamin A &amp; D Ointment 1 Application(s) Topical daily    MEDICATIONS  (PRN):      New X-rays reviewed:                                                                                  ECHO    CXR interpreted by me:  Small bilateral effusions and increased vascular congestion

## 2022-03-08 NOTE — CONSULT NOTE ADULT - ASSESSMENT
61y old  Female seen by Burn for follow up requested by medicine    - Pt no open wounds, healed  - No further burn intervention  - Skin care recommendations as per dermatology  - Derm for pathology results follow up  - Skin biopsy sites healed, sutures out    Remainder of care per primary team    Recall PRN       61y old  Female seen by Burn for follow up requested by medicine    - Pt with 2 open  wounds, otherwise healed  - No further burn intervention  - SSD to left hand   - Skin care other areas - recommendations as per dermatology  - Derm for pathology results follow up  - Skin biopsy sites healed, sutures out    Remainder of care per primary team    Recall PRN

## 2022-03-08 NOTE — SWALLOW BEDSIDE ASSESSMENT ADULT - COMMENTS
pt received lethargic, on facemask. no a candidate for po trials.
pt with AMS and lethargic. RN and MD placing NGT
Pt known to ST this admission w/ recs for puree and thin liquids
mild oral dysphagia

## 2022-03-08 NOTE — SWALLOW BEDSIDE ASSESSMENT ADULT - NS SPL SWALLOW CLINIC TRIAL FT
+ toleration for puree and thin liquids w/o any overt s/s of aspiration/penetration. chewables NT 2' inadequate dentition

## 2022-03-08 NOTE — CONSULT NOTE ADULT - SUBJECTIVE AND OBJECTIVE BOX
Pt reports he is not feeling better after meds given.   Will notify MD. Patient is a 61y old  Female who presents with a chief complaint of Polypectomy (08 Mar 2022 06:45)      HPI:  Pt is a 60 yo F with PMH of GIB, Bowel and Bladder incontinence, Smoking (3-4 cigarettes/day), ETOH abuse, hypothyroidism anemia transferred from [facility] for Polypectomy. Pt is from home living with her son and bedbound at baseline. Pt was admitted to [facility] on 01/31 for AMS, weakness and decreased appetite. She was found to have UTI, NSTEMI, and electrolyte imbalance. During her stay at [facility] pt had colonoscopy done which revealed 1 large 3-4 cm sigmoid pedunculated polyp and 2 polyps (7, 14 mm) in descending colon s/p bx. Pt was transferred to [facility] for polyp removal.     Burn was consulted for rash    Burn performed biopsy on 2/28. Reconsult requested for follow up        PHYSICAL EXAM    Vital Signs Last 24 Hrs  T(C): 36.1 (08 Mar 2022 08:01), Max: 36.4 (08 Mar 2022 01:08)  T(F): 96.9 (08 Mar 2022 08:01), Max: 97.5 (08 Mar 2022 01:08)  HR: 100 (08 Mar 2022 08:01) (100 - 107)  BP: 140/77 (08 Mar 2022 08:01) (136/77 - 162/78)  BP(mean): 102 (08 Mar 2022 08:01) (102 - 119)  RR: 18 (08 Mar 2022 08:01) (18 - 32)  SpO2: 100% (08 Mar 2022 04:00) (94% - 100%)      PHYSICAL EXAM:  GENERAL: NAD, sitting comfortably in bed, being fed, conversing appropriately  SKIN: dry healed areas on extremities, chest and abdomen, no open wounds, some dry peeling skin with healed skin beneath  skin biopsy sites on abdomen and RLE sutures out, clean dry and intact

## 2022-03-08 NOTE — CHART NOTE - NSCHARTNOTEFT_GEN_A_CORE
61F PMH GIB, Bowel and Bladder incontinence, Smoking (3-4 cigarettes/day), ETOH abuse, hypothyroidism, and ongoing anemia, admitted to [facility] 1/31 for ams found to have UTI. Dev GIB, colonoscopy showing polyps, transferred to [facility] for polypectomy now upgraded to CCU for hypotension, likely septic shock 2/2 aspiration pna.     #AMS with unclear etiology   #sepsis w/ shock/hypothermia vs aspiration after procedure under anesthesia 2/18 vs CVA   #Pancolitis   - Pt upgraded from floor on 2/20 after RRT for hypotension and desaturation. RIJ CVC placed, started on levo, venti mask, o2 and hypotension subsequently improved   - CTH 2/20: no ICH, mass effect, or midline shift   - EEG 2/20: generalized slowing    - UCx 2/18 negative   - BCx 2/17, 2/19: NGTD  - COVID negative 2/20  - MRSA nares 2/20 negative  - LE duplex 2/21: no DVT however bl peroneal veins not visualized   - ammonia elevated to 59 on 2/22, started on lactulose, possibly contributing to ams   - ammonia 50 on 2/26   - d/c'd vanc 2/22  - c/w aztreonam 2g q8h (started 2/24- switched from eduardo due to concern for pcn induced thrombocytopenia)   - c/w clinda 600mg q8h   - off levo   - Exchanged TLC 3/2   - neuro eval appreciated  - ID recs appreciated      #Colonic Polyp  #Diarrhea in setting of recent abx use  #Hx of GIB   #Pneumoretroperitoneum   #Pancolitis   - Colonoscopy 2/4: 1 large 3-4 cm sigmoid pedunculated polyp and 2 polyps (7, 14 mm) in descending colon s/p bx  - GI consulted (Dr. Daniels)   - Patient is cleared for colonoscopy per Cardiology   - Negative for C-Diff infection  - CEA elevated 5.6  - S/P colonoscopy and polypectomy 2/18/2022. Polyp (13 mm) in the descending colon. (hot snare Polypectomy).  Polyp (25 mm to 30 mm) in the rectosigmoid junction at 20 cm from the anal verge. (Endoloop, Polypectomy, Endoclip).   - Repeat Colonoscopy in 6 months   - GI recs appreciated    - 2/21: overnight pt had abdominal tenderness, KUB ordered showing paraspinal lucencies concerning for possible pneumoretroperitoneum   - abdomen soft, nontender, nondistended on my exam  - CTAP PO/IV contrast 2/21: findings c/w pancolitis, no intra or retroperitoneal free air  - c/w aztreonam, clinda   - Pt tolerating tube feeds. Passed S&S- pureed diet however PO intake minimal. Will c/w tube feeds, likely will need PEG tube in future if PO intake doesn't improve   - surgery recs appreciated   - ID recs appreciated     #Exfoliative skin lesions- possibly externally induced (e.g scratching), r/o TEN given vesicles and desquamation   #+C-ANCA  - pt with superficial scabs diffusely over body, most prominent over chest, as well as areas of desquamation over arms, legs, and hips   - Burn recs appreciated; dx with incontinence dermatitis    - derm recs appreciated, recalled burn for skin bx (done 2/28), f/u path   - rheum recs appreciated, no clear evidence for ANCA associated vasculitis could be elevated due to sepsis. Agree with skin bx. Sent PR4/MPO antibody assay, will rpt c-anca once acute illness resolves.  - f/u niacin levels   - arsenic negative   - s/p skin bx by burn 2/28, f/u path     #Prolonged qtc    - qtc 613 initially, now 537 on 2/28 EKG   - repleating mg today, keep Mg>2, K>4.   - on no qtc prolonging medications   - arsenic levels negative   - daily ekg   - continue to monitor     #Normocytic anemia   #Acute thrombocytopenia- resolving   #Elevated INR/PTT not on a/c- resolved   - not on heparin this admission, unclear if was on previously at [facility]  - plt, hgb continued to downtrend so given 1u plt 1u pRBC on 2/23. Addnl unit plt given 2/24, platelets appear to be stabilizing will follow    - INR also uptrending, gave 10mg vitamin K IVPB 2/23   - cindy positive, possibly due to transfusion (not documented but likely received in [facility])  - retic count 0.9, LDH nl, haptoglobin slightly low. Ddimer elevated, fibrinogen nl, fibrin split products elevated    - heparin plt antibody negative   - HIV, hep B negative   - monitor INR   - started on trial of solumedrol 40mg IV 2/24, plt stabilized, will begin taper 2/28 (prednisone 30 x3d, 20 x3d, 10 x3d.   - Given 1u pRBC 3/2. 3/4  - continue to monitor CBC, keep hbg >7  - heme onc recs appreciated    #Hypotension   - am cortisol 5.7   - C/w midodrine 5mg TID    #Hypernatremia- improving   - c/w free water via NGT    #Hypothyroidism  - TSH 15.6  - C/w synthroid 50 mcg po q24 (increased form 25mcg   - Patient will need to repeat TSH and FT4 in 6 wks (April)  - f/u endo recs     #Hx of UTI- resolved   #R lower face cellulitis- resolved  - CT maxillofacial with C 2/12: Mild soft tissue inflammation suggesting cellulitis of the right lower face  - pt was on cefepime, vancomycin (end date 2/15)  - Started on Unasyn 2/16,/c today 2/20 started on broad spectrum Abx 2/20  - No documentations of + bcx or ucx on the charts from [facility]   - Rpt UCx negative   - Bclx 2/17 NGTD, F/u repeat    - Dental recs appreciated   - F/u SPEP     #Hx L pleural effusion on CXR 2/8- resolved   - CT Chest with Cont 2/9 obtained: Moderate b/l pleural effusions resulting in complete atelectasis of both lower lobes    #Hx NSTEMI   #Hx of Elevated Troponin  - in setting of hypotension   - Normal Lexiscan at [facility]  - EKG noted     #Hx of ETOH abuse  - last drink (as per nurse) 6 months ago  - c/w Folic acid and Thiamine  - F/u Dietician eval  - Ensure 3x/day    #Possible Thiamine deficiency   #Possible Folic acid deficiency     - C/w Folic acid and Thiamine supplementation     #Misc   - DVT prophylaxis: SCD   - GI prophylaxis: PPI  - Activity status: IAT   - Code status: Full code   - f/u skin bx results  - rpt TFTs in 3 weeks (end of march)   - If hypotensive with bradycardia inc levothyroxine to 125mcg q24h 61F PMH GIB, Bowel and Bladder incontinence, Smoking (3-4 cigarettes/day), ETOH abuse, hypothyroidism, and ongoing anemia, admitted to [facility] 1/31 for ams found to have UTI. Dev GIB, colonoscopy showing polyps, transferred to [facility] for polypectomy now upgraded to CCU for hypotension, likely septic shock 2/2 aspiration pna.     #AMS with unclear etiology     - Pt upgraded from floor on 2/20 after RRT for hypotension and desaturation. RIJ CVC placed, started on levo, venti mask, o2 and hypotension subsequently improved   - CTH 2/20: no ICH, mass effect, or midline shift   - EEG 2/20: generalized slowing    - UCx 2/18 negative   - BCx 2/17, 2/19: NGTD  - COVID negative 2/20  - MRSA nares 2/20 negative  - LE duplex 2/21: no DVT however bl peroneal veins not visualized   - ammonia elevated to 59 on 2/22, started on lactulose, possibly contributing to ams   - ammonia 50 on 2/26   - s.p aztreonam 2g q8h   - s.p clinda 600mg q8h     #Colonic Polyp  #Diarrhea in setting of recent abx use  #Hx of GIB   #Pneumoretroperitoneum   #Pancolitis     - Colonoscopy 2/4: 1 large 3-4 cm sigmoid pedunculated polyp and 2 polyps (7, 14 mm) in descending colon s/p bx  - Negative for C-Diff infection  - CEA elevated 5.6  - S/P colonoscopy and polypectomy 2/18/2022. Polyp (13 mm) in the descending colon. (hot snare Polypectomy).  Polyp (25 mm to 30 mm) in the rectosigmoid junction at 20 cm from the anal verge. (Endoloop, Polypectomy, Endoclip).   - Repeat Colonoscopy in 6 months    - CTAP PO/IV contrast 2/21: findings c/w pancolitis, no intra or retroperitoneal free air      #Exfoliative skin lesions- possibly externally induced (e.g scratching), r/o TEN given vesicles and desquamation   #+C-ANCA  - pt with superficial scabs diffusely over body, most prominent over chest, as well as areas of desquamation over arms, legs, and hips   - Burn recs appreciated; dx with incontinence dermatitis    - derm recs appreciated, recalled burn for skin bx (done 2/28), f/u path   - rheum recs appreciated, no clear evidence for ANCA associated vasculitis could be elevated due to sepsis. Agree with skin bx. Sent PR4/MPO antibody assay, will rpt c-anca once acute illness resolves.  - arsenic negative   - s/p skin bx by burn 2/28, f/u path     #Prolonged qtc      - qtc 613 initially  - repleating mg today, keep Mg>2, K>4.   - on no qtc prolonging medications   - arsenic levels negative   - daily ekg   - continue to monitor     #Normocytic anemia   #Acute thrombocytopenia- resolving   #Elevated INR/PTT not on a/c- resolved     - cindy positive, possibly due to transfusion (not documented but likely received in [facility])  - HIV, hep B negative   - monitor INR       #Hypotension     - C/w midodrine 5mg TID    #Hypernatremia- improving   - c/w free water via NGT    #Hypothyroidism  - TSH 15.6  - C/w synthroid 50 mcg po q24 (increased form 25mcg   - Patient will need to repeat TSH and FT4 in 6 wks (April)      #Hx of UTI- resolved   #R lower face cellulitis- resolved    - CT maxillofacial with C 2/12: Mild soft tissue inflammation suggesting cellulitis of the right lower face  - pt was on cefepime, vancomycin (end date 2/15)  - Started on Unasyn 2/16,/c today 2/20 started on broad spectrum Abx 2/20  - Bclx 2/17 NGTD, F/u repeat    - F/u SPEP       #Hx NSTEMI   #Hx of Elevated Troponin  - in setting of hypotension   - Normal Lexiscan at [facility]  - EKG noted     #Hx of ETOH abuse  - last drink  6 months ago  - c/w Folic acid and Thiamine  - Ensure 3x/day    #Possible Thiamine deficiency   #Possible Folic acid deficiency     - C/w Folic acid and Thiamine supplementation     #Misc   - DVT prophylaxis: SCD   - GI prophylaxis: PPI  - Activity status: IAT   - Code status: Full code   - f/u skin bx results  - rpt TFTs in 3 weeks (end of march)   - If hypotensive with bradycardia inc levothyroxine to 125mcg q24h 61F PMH GIB, Bowel and Bladder incontinence, Smoking (3-4 cigarettes/day), ETOH abuse, hypothyroidism, and ongoing anemia, admitted to [facility]  for ams found to have UTI. Dev GIB, colonoscopy showing polyps, transferred to [facility] for polypectomy upgraded to CCU for hypotension, likely septic shock  aspiration pna.     #AMS with unclear etiology     - Pt upgraded from floor on  after RRT for hypotension and desaturation. RIJ CVC placed, started on levo, venti mask, o2 and hypotension subsequently improved   - CTH : no ICH, mass effect, or midline shift   - EEG : generalized slowing    - UCx  negative   - BCx , : NGTD  - COVID negative   - MRSA nares  negative  - LE duplex : no DVT however b/l peroneal veins not visualized   - ammonia elevated to 59 on , started on lactulose, possibly contributing to ams   - ammonia 50 on    - s.p aztreonam 2g q8h   - s.p clinda 600mg q8h     #Colonic Polyp  #Diarrhea in setting of recent abx use  #Hx of GIB   #Pneumoretroperitoneum   #Pancolitis     - Colonoscopy : 1 large 3-4 cm sigmoid pedunculated polyp and 2 polyps (7, 14 mm) in descending colon s/p bx  - Negative for C-Diff infection  - CEA elevated 5.6  - S/P colonoscopy and polypectomy 2022. Polyp (13 mm) in the descending colon. (hot snare Polypectomy).  Polyp (25 mm to 30 mm) in the rectosigmoid junction at 20 cm from the anal verge. (Endoloop, Polypectomy, Endoclip).   - Repeat Colonoscopy in 6 months    - CTAP PO/IV contrast : findings c/w pancolitis, no intra or retroperitoneal free air      #Exfoliative skin lesions- possibly externally induced (e.g scratching), r/o TEN given vesicles and desquamation   #+C-ANCA  - pt with superficial scabs diffusely over body, most prominent over chest, as well as areas of desquamation over arms, legs, and hips   - Burn recs appreciated; dx with incontinence dermatitis    - derm recs appreciated, recalled burn for skin bx (done ), f/u path   - rheum recs appreciated, no clear evidence for ANCA associated vasculitis could be elevated due to sepsis. Agree with skin bx. Sent PR4/MPO antibody assay, will rpt c-anca once acute illness resolves.  - arsenic negative   - s/p skin bx by burn , f/u path     #Prolonged qtc      - qtc 613 initially  - repleating mg today, keep Mg>2, K>4.   - on no qtc prolonging medications   - arsenic levels negative   - daily ekg   - continue to monitor     #Normocytic anemia   #Acute thrombocytopenia- resolving   #Elevated INR/PTT not on a/c- resolved     - cindy positive, possibly due to transfusion (not documented but likely received in [facility])  - HIV, hep B negative   - monitor INR       #Hypotension     - C/w midodrine 5mg TID    #Hypernatremia- improving   - c/w free water - now on pureed diet with thin liquids    #Hypothyroidism  - TSH 15.6  - C/w synthroid 50 mcg po q24 (increased form 25mcg   - Patient will need to repeat TSH and FT4 in 6 wks (April)      #Hx of UTI- resolved   #Right lower face cellulitis- resolved    - CT maxillofacial with C : Mild soft tissue inflammation suggesting cellulitis of the right lower face  - pt was on cefepime, vancomycin (end date 2/15)  - Started on Unasyn ,/c today  started on broad spectrum Abx   - Bclx  NGTD, F/u repeat    - F/u SPEP       #Hx NSTEMI   #Hx of Elevated Troponin  - in setting of hypotension   - Normal Lexiscan at [facility]  - EKG noted     #Hx of ETOH abuse  - last drink  6 months ago  - c/w Folic acid and Thiamine  - Ensure 3x/day    #Possible Thiamine deficiency   #Possible Folic acid deficiency     - C/w Folic acid and Thiamine supplementation     #Misc   - DVT prophylaxis: SCD   - GI prophylaxis: PPI  - Activity status: IAT   - Code status: Full code   - f/u skin bx results  - rpt TFTs in 3 weeks (end of march)   - If hypotensive with bradycardia inc levothyroxine to 125mcg q24h      Attending note  Pt seen and examined independently this morning in A7  she was awake, alert but confused. Passed for pureed diet with thin liquids  needs 1 to 1 feeds  had low FS today prior to diet being started  if FS stable, then for downgrade to medical floor today    T(F): 97 (22 @ 12:00), Max: 97.5 (22 @ 01:08)  HR: 86 (22 @ 12:00) (86 - 107)  BP: 121/84 (22 @ 12:00) (121/84 - 162/78)  RR: 16 (22 @ 12:00) (16 - 24)  SpO2: 100% (22 @ 12:00) (100% - 100%)    I&O's Summary    07 Mar 2022 07:01  -  08 Mar 2022 07:00  --------------------------------------------------------  IN: 50 mL / OUT: 2375 mL / NET: -2325 mL    08 Mar 2022 07:01  -  08 Mar 2022 15:54  --------------------------------------------------------  IN: 0 mL / OUT: 1235 mL / NET: -1235 mL    Daily Weight in k (08 Mar 2022 06:00)    CAPILLARY BLOOD GLUCOSE      POCT Blood Glucose.: 145 mg/dL (08 Mar 2022 08:09)  POCT Blood Glucose.: 38 mg/dL (08 Mar 2022 07:56)  POCT Blood Glucose.: 37 mg/dL (08 Mar 2022 07:54)    PHYSICAL EXAM:  GENERAL: NAD  HEAD:  Normocephalic  EYES:  conjunctiva and sclera clear  ENMT: Moist mucous membranes  NECK: Supple, No JVD  NERVOUS SYSTEM:  Alert, awake, fair concentration, confused  CHEST/LUNG: decreased bS b/l  HEART: Regular rate and rhythm  ABDOMEN: Soft, Nontender, Nondistended; Bowel sounds present  EXTREMITIES: No edema  SKIN: multiple hyperpigmented areas with peeling    Consultant(s) Notes Reviewed:  [x ] YES  [ ] NO  Care Discussed with Consultants/Other Providers [ x] YES  [ ] NO    MEDICATIONS  (STANDING):  chlorhexidine 4% Liquid 1 Application(s) Topical daily  cholecalciferol 2000 Unit(s) Oral daily  folic acid 1 milliGRAM(s) Oral daily  furosemide   Injectable 40 milliGRAM(s) IV Push daily  levothyroxine Injectable 50 MICROGram(s) IV Push at bedtime  midodrine. 5 milliGRAM(s) Oral every 8 hours  multivitamin/minerals 1 Tablet(s) Oral daily  nystatin Cream 1 Application(s) Topical two times a day  pantoprazole  Injectable 40 milliGRAM(s) IV Push every 12 hours  QUEtiapine 25 milliGRAM(s) Oral at bedtime  thiamine 100 milliGRAM(s) Oral daily  triamcinolone 0.1% Cream 1 Application(s) Topical two times a day  vitamin A &amp; D Ointment 1 Application(s) Topical daily    Telemetry reviewed by me    LABS:                        9.8    16.59 )-----------( 198      ( 08 Mar 2022 05:30 )             30.7     03-08    145  |  110  |  9<L>  ----------------------------<  44<LL>  3.7   |  22  |  0.5<L>    Ca    8.1<L>      08 Mar 2022 05:30  Phos  3.9     03-08  Mg     1.6     03-08    TPro  5.9<L>  /  Alb  2.3<L>  /  TBili  0.5  /  DBili  x   /  AST  26  /  ALT  45<H>  /  AlkPhos  115  03-08    < from: Xray Chest 1 View- PORTABLE-Routine (Xray Chest 1 View- PORTABLE-Routine in AM.) (22 @ 08:08) >    IMPRESSION:  Increased bilateral pleural effusions, bilateral perihilar and basilar   opacities.      < end of copied text >      < from: TTE Echo Complete w/o Contrast w/ Doppler (22 @ 09:22) >    Summary:   1. Moderate to severe left atrial enlargement.   2. LV Ejection Fraction by Smith's Method with a biplane EF of 64 %.   3. Mildly increased LV wall thickness.   4. Spectral Doppler shows pseudonormal pattern of left ventricular   myocardial filling (Grade II diastolic dysfunction).   5. Normal right atrial size.   6. Mild mitral annular calcification.   7. Mild to moderate mitral valve regurgitation.   8. Moderate tricuspid regurgitation.   9. Sclerotic aortic valve with normal opening.    < end of copied text >    Case discussed with residents and RN on rounds today    1. Acute on chronic HFpEF  ECHO with preserved EF, grade 2 diastolic dysfunction  on lasix 40mg daily  daily wts, I's and O's  stable on room air    2. Hypotension  unclear etiology, possible septic shock  ct abd with pancolitis  s/p course of abx  off levophed  on midodrine  BP now stable    3. Colon polyp  s/p colonoscopy with polypectomy   ct abd without free air but showed pancolitis  pt now passed for pureed diet with thin liquids  1 to 1 feeds, monitor FS  nutrition and S&S f/u    4. Thrombocytopenia and anemia  presumed hemolytic anemia, with coagulopathy, possible DIC  resolved  cont prednsione taper per HemOnc recommendation  daily CBC    5. Peeling skin rash  evaluated by Burn, rheumatology and dermatology  f/u biopsy report  on steroid cream    6. Hypomagnesemia repleted    7. Prolonged QTc  resolved    rest of plan of care as outlined above    very guarded prognosis  full code status

## 2022-03-08 NOTE — SWALLOW BEDSIDE ASSESSMENT ADULT - SWALLOW EVAL: DIAGNOSIS
+toleration for puree and thin liquids w/o overt s/s of aspiration/penetration. Further PO trials not tested 2' to pt refusal. +toleration for puree and thin liquids w/o overt s/s of aspiration/penetration.

## 2022-03-09 LAB
ANION GAP SERPL CALC-SCNC: 12 MMOL/L — SIGNIFICANT CHANGE UP (ref 7–14)
APPEARANCE UR: CLEAR — SIGNIFICANT CHANGE UP
BACTERIA # UR AUTO: NEGATIVE — SIGNIFICANT CHANGE UP
BASOPHILS # BLD AUTO: 0.04 K/UL — SIGNIFICANT CHANGE UP (ref 0–0.2)
BASOPHILS NFR BLD AUTO: 0.3 % — SIGNIFICANT CHANGE UP (ref 0–1)
BILIRUB UR-MCNC: NEGATIVE — SIGNIFICANT CHANGE UP
BUN SERPL-MCNC: 6 MG/DL — LOW (ref 10–20)
CALCIUM SERPL-MCNC: 7.8 MG/DL — LOW (ref 8.5–10.1)
CHLORIDE SERPL-SCNC: 108 MMOL/L — SIGNIFICANT CHANGE UP (ref 98–110)
CO2 SERPL-SCNC: 23 MMOL/L — SIGNIFICANT CHANGE UP (ref 17–32)
COLOR SPEC: YELLOW — SIGNIFICANT CHANGE UP
COMMENT - URINE: SIGNIFICANT CHANGE UP
CREAT SERPL-MCNC: 0.5 MG/DL — LOW (ref 0.7–1.5)
DIFF PNL FLD: ABNORMAL
EGFR: 107 ML/MIN/1.73M2 — SIGNIFICANT CHANGE UP
EOSINOPHIL # BLD AUTO: 0.06 K/UL — SIGNIFICANT CHANGE UP (ref 0–0.7)
EOSINOPHIL NFR BLD AUTO: 0.4 % — SIGNIFICANT CHANGE UP (ref 0–8)
EPI CELLS # UR: 2 /HPF — SIGNIFICANT CHANGE UP (ref 0–5)
GLUCOSE BLDC GLUCOMTR-MCNC: 111 MG/DL — HIGH (ref 70–99)
GLUCOSE BLDC GLUCOMTR-MCNC: 132 MG/DL — HIGH (ref 70–99)
GLUCOSE BLDC GLUCOMTR-MCNC: 56 MG/DL — LOW (ref 70–99)
GLUCOSE BLDC GLUCOMTR-MCNC: 58 MG/DL — LOW (ref 70–99)
GLUCOSE BLDC GLUCOMTR-MCNC: 70 MG/DL — SIGNIFICANT CHANGE UP (ref 70–99)
GLUCOSE BLDC GLUCOMTR-MCNC: 80 MG/DL — SIGNIFICANT CHANGE UP (ref 70–99)
GLUCOSE BLDC GLUCOMTR-MCNC: 85 MG/DL — SIGNIFICANT CHANGE UP (ref 70–99)
GLUCOSE SERPL-MCNC: 76 MG/DL — SIGNIFICANT CHANGE UP (ref 70–99)
GLUCOSE UR QL: NEGATIVE — SIGNIFICANT CHANGE UP
HCT VFR BLD CALC: 30.5 % — LOW (ref 37–47)
HGB BLD-MCNC: 9.8 G/DL — LOW (ref 12–16)
HYALINE CASTS # UR AUTO: 2 /LPF — SIGNIFICANT CHANGE UP (ref 0–7)
IMM GRANULOCYTES NFR BLD AUTO: 0.4 % — HIGH (ref 0.1–0.3)
KETONES UR-MCNC: NEGATIVE — SIGNIFICANT CHANGE UP
LEUKOCYTE ESTERASE UR-ACNC: ABNORMAL
LYMPHOCYTES # BLD AUTO: 1.05 K/UL — LOW (ref 1.2–3.4)
LYMPHOCYTES # BLD AUTO: 7 % — LOW (ref 20.5–51.1)
MAGNESIUM SERPL-MCNC: 1.8 MG/DL — SIGNIFICANT CHANGE UP (ref 1.8–2.4)
MCHC RBC-ENTMCNC: 30 PG — SIGNIFICANT CHANGE UP (ref 27–31)
MCHC RBC-ENTMCNC: 32.1 G/DL — SIGNIFICANT CHANGE UP (ref 32–37)
MCV RBC AUTO: 93.3 FL — SIGNIFICANT CHANGE UP (ref 81–99)
MONOCYTES # BLD AUTO: 0.83 K/UL — HIGH (ref 0.1–0.6)
MONOCYTES NFR BLD AUTO: 5.5 % — SIGNIFICANT CHANGE UP (ref 1.7–9.3)
NEUTROPHILS # BLD AUTO: 13.06 K/UL — HIGH (ref 1.4–6.5)
NEUTROPHILS NFR BLD AUTO: 86.4 % — HIGH (ref 42.2–75.2)
NITRITE UR-MCNC: NEGATIVE — SIGNIFICANT CHANGE UP
NRBC # BLD: 0 /100 WBCS — SIGNIFICANT CHANGE UP (ref 0–0)
PH UR: 5.5 — SIGNIFICANT CHANGE UP (ref 5–8)
PLATELET # BLD AUTO: 210 K/UL — SIGNIFICANT CHANGE UP (ref 130–400)
POTASSIUM SERPL-MCNC: 3.3 MMOL/L — LOW (ref 3.5–5)
POTASSIUM SERPL-SCNC: 3.3 MMOL/L — LOW (ref 3.5–5)
PROT UR-MCNC: ABNORMAL
RBC # BLD: 3.27 M/UL — LOW (ref 4.2–5.4)
RBC # FLD: 17.5 % — HIGH (ref 11.5–14.5)
RBC CASTS # UR COMP ASSIST: 69 /HPF — HIGH (ref 0–4)
SODIUM SERPL-SCNC: 143 MMOL/L — SIGNIFICANT CHANGE UP (ref 135–146)
SP GR SPEC: 1.01 — SIGNIFICANT CHANGE UP (ref 1.01–1.03)
UROBILINOGEN FLD QL: SIGNIFICANT CHANGE UP
WBC # BLD: 15.1 K/UL — HIGH (ref 4.8–10.8)
WBC # FLD AUTO: 15.1 K/UL — HIGH (ref 4.8–10.8)
WBC UR QL: 16 /HPF — HIGH (ref 0–5)

## 2022-03-09 PROCEDURE — 71260 CT THORAX DX C+: CPT | Mod: 26

## 2022-03-09 PROCEDURE — 99233 SBSQ HOSP IP/OBS HIGH 50: CPT

## 2022-03-09 PROCEDURE — 71045 X-RAY EXAM CHEST 1 VIEW: CPT | Mod: 26,77

## 2022-03-09 PROCEDURE — 71045 X-RAY EXAM CHEST 1 VIEW: CPT | Mod: 26

## 2022-03-09 PROCEDURE — 74177 CT ABD & PELVIS W/CONTRAST: CPT | Mod: 26

## 2022-03-09 PROCEDURE — 93010 ELECTROCARDIOGRAM REPORT: CPT | Mod: 76

## 2022-03-09 RX ORDER — ENOXAPARIN SODIUM 100 MG/ML
40 INJECTION SUBCUTANEOUS EVERY 24 HOURS
Refills: 0 | Status: DISCONTINUED | OUTPATIENT
Start: 2022-03-09 | End: 2022-03-10

## 2022-03-09 RX ORDER — POTASSIUM CHLORIDE 20 MEQ
40 PACKET (EA) ORAL ONCE
Refills: 0 | Status: COMPLETED | OUTPATIENT
Start: 2022-03-09 | End: 2022-03-09

## 2022-03-09 RX ORDER — DEXTROSE 50 % IN WATER 50 %
50 SYRINGE (ML) INTRAVENOUS ONCE
Refills: 0 | Status: COMPLETED | OUTPATIENT
Start: 2022-03-09 | End: 2022-03-09

## 2022-03-09 RX ORDER — AMPICILLIN SODIUM AND SULBACTAM SODIUM 250; 125 MG/ML; MG/ML
3 INJECTION, POWDER, FOR SUSPENSION INTRAMUSCULAR; INTRAVENOUS EVERY 6 HOURS
Refills: 0 | Status: DISCONTINUED | OUTPATIENT
Start: 2022-03-09 | End: 2022-03-10

## 2022-03-09 RX ADMIN — NYSTATIN CREAM 1 APPLICATION(S): 100000 CREAM TOPICAL at 17:16

## 2022-03-09 RX ADMIN — Medication 50 MICROGRAM(S): at 23:29

## 2022-03-09 RX ADMIN — Medication 50 MILLILITER(S): at 17:55

## 2022-03-09 RX ADMIN — Medication 40 MILLIEQUIVALENT(S): at 17:43

## 2022-03-09 RX ADMIN — Medication 1 MILLIGRAM(S): at 14:06

## 2022-03-09 RX ADMIN — MIDODRINE HYDROCHLORIDE 5 MILLIGRAM(S): 2.5 TABLET ORAL at 14:10

## 2022-03-09 RX ADMIN — Medication 2000 UNIT(S): at 14:07

## 2022-03-09 RX ADMIN — AMPICILLIN SODIUM AND SULBACTAM SODIUM 200 GRAM(S): 250; 125 INJECTION, POWDER, FOR SUSPENSION INTRAMUSCULAR; INTRAVENOUS at 14:09

## 2022-03-09 RX ADMIN — PANTOPRAZOLE SODIUM 40 MILLIGRAM(S): 20 TABLET, DELAYED RELEASE ORAL at 17:46

## 2022-03-09 RX ADMIN — AMPICILLIN SODIUM AND SULBACTAM SODIUM 200 GRAM(S): 250; 125 INJECTION, POWDER, FOR SUSPENSION INTRAMUSCULAR; INTRAVENOUS at 17:15

## 2022-03-09 RX ADMIN — Medication 40 MILLIGRAM(S): at 05:27

## 2022-03-09 RX ADMIN — Medication 1 APPLICATION(S): at 17:15

## 2022-03-09 RX ADMIN — PANTOPRAZOLE SODIUM 40 MILLIGRAM(S): 20 TABLET, DELAYED RELEASE ORAL at 05:27

## 2022-03-09 RX ADMIN — Medication 1 APPLICATION(S): at 05:42

## 2022-03-09 RX ADMIN — Medication 1 APPLICATION(S): at 14:24

## 2022-03-09 RX ADMIN — NYSTATIN CREAM 1 APPLICATION(S): 100000 CREAM TOPICAL at 05:42

## 2022-03-09 RX ADMIN — Medication 1 TABLET(S): at 14:07

## 2022-03-09 RX ADMIN — MIDODRINE HYDROCHLORIDE 5 MILLIGRAM(S): 2.5 TABLET ORAL at 05:27

## 2022-03-09 RX ADMIN — ENOXAPARIN SODIUM 40 MILLIGRAM(S): 100 INJECTION SUBCUTANEOUS at 17:15

## 2022-03-09 RX ADMIN — Medication 100 MILLIGRAM(S): at 14:07

## 2022-03-09 NOTE — PROGRESS NOTE ADULT - SUBJECTIVE AND OBJECTIVE BOX
SANTIAGO CALIXTO 61y Female  MRN#: [id number]   CODE STATUS:________    Hospital Day: 21d    Pt is currently admitted with the primary diagnosis of     SUBJECTIVE  Hospital Course  Patient is a 60 y/o female with PMHx of GIB, Bowel and Bladder incontinence, Smoking (3-4 cigarettes/day), ETOH abuse, hypothyroidism, and ongoing anemia transferred from [facility] for Polypectomy. Patient was admitted to [facility] on 01/31 for AMS, weakness and decreased appetite. She was found to have UTI, NSTEMI, and electrolyte imbalance. During her stay at [facility] she had a colonoscopy done which revealed 1 large 3-4 cm sigmoid pedunculated polyp and 2 polyps (7, 14 mm) in descending colon. Patient was transferred as they are not able to perform large Polypectomies there. She was not able to be discharged as ongoing anemia and concern that the cause are these large polyps.    Patient had a colonoscopy and polypectomy done on 2/18/2022. Polyp (13 mm) in the descending colon. (hot snare Polypectomy).  Polyp (25 mm to 30 mm) in the rectosigmoid junction at 20 cm from the anal verge. (Endoloop, Polypectomy, Endoclip).   After the colonoscopy patient became hypothermic and hypotensive. Over night patient was started to Levo and warming blanket.   2/20 AM patient had RRT for desaturation and hypotension. Patient was placed on 50% O2, patient had good response. Levophed also started to raise BP. Patient remains altered, not speaking, but remains somewhat awake and just makes grunting sounds. Patient is not following commands. Central line was placed. Spoke to ICU fellow, patient is being upgraded to ICU for closer monitoring.     In CCU pt had abdominal tenderness, KUB ordered, concern for RP free air. CTAP with PO/IV contrast ordered, no perforation or pneumo-peritoneum/retroperitoneum however showing findings c/w pancolitis. Surgery following, ID consulted. Pt on vanc/zosyn. In CCU, pt developed coagulopathy (low plt, anemia, increasing INR despite not on AC). Treated with pRBC, plt transfusion as well as vitamin K. Heme onc on board, thought to be due to possible DIC 2/2 sepsis. Started on solumedrol 40mg for positive cindy however possibly positive due to transfusion at [facility]. Plt stabilizing so steroids tapered. Derm consulted for diffuse skin rash- bullous lesions with desquamation and areas of healing with hyperpigmentation. Burn consulted for skin bx (done 2/28, results pending). C-anca also positive, rhuem consulted, presentation unlikely due to vasculitis, will rpt cindy once acute illness resolves.     Overnight events     Subjective complaints     Present Today:   - Tapia:  No [  ], Yes [   ] : Indication:     - Type of IV Access:       .. CVC/Piccline:  No [  ], Yes [   ] : Indication:       .. Midline: No [  ], Yes [   ] : Indication:                                              OBJECTIVE  PAST MEDICAL & SURGICAL HISTORY                                              ALLERGIES:  No Known Allergies                           HOME MEDICATIONS  Home Medications:                           MEDICATIONS:  STANDING MEDICATIONS  ampicillin/sulbactam  IVPB 3 Gram(s) IV Intermittent every 6 hours  chlorhexidine 4% Liquid 1 Application(s) Topical daily  cholecalciferol 2000 Unit(s) Oral daily  enoxaparin Injectable 40 milliGRAM(s) SubCutaneous every 24 hours  folic acid 1 milliGRAM(s) Oral daily  furosemide   Injectable 40 milliGRAM(s) IV Push daily  levothyroxine Injectable 50 MICROGram(s) IV Push at bedtime  midodrine. 5 milliGRAM(s) Oral every 8 hours  multivitamin/minerals 1 Tablet(s) Oral daily  nystatin Cream 1 Application(s) Topical two times a day  pantoprazole  Injectable 40 milliGRAM(s) IV Push every 12 hours  potassium chloride   Powder 40 milliEquivalent(s) Oral once  thiamine 100 milliGRAM(s) Oral daily  triamcinolone 0.1% Cream 1 Application(s) Topical two times a day  vitamin A &amp; D Ointment 1 Application(s) Topical daily    PRN MEDICATIONS                                            ------------------------------------------------------------  VITAL SIGNS: Last 24 Hours  T(C): 36.1 (09 Mar 2022 13:54), Max: 36.6 (09 Mar 2022 04:45)  T(F): 97 (09 Mar 2022 13:54), Max: 97.8 (09 Mar 2022 04:45)  HR: 90 (09 Mar 2022 13:54) (87 - 97)  BP: 148/91 (09 Mar 2022 13:54) (127/66 - 166/91)  BP(mean): 113 (08 Mar 2022 20:56) (103 - 113)  RR: 18 (09 Mar 2022 13:54) (18 - 18)  SpO2: --      03-08-22 @ 07:01  -  03-09-22 @ 07:00  --------------------------------------------------------  IN: 0 mL / OUT: 1910 mL / NET: -1910 mL                                               LABS:                        9.8    15.10 )-----------( 210      ( 09 Mar 2022 07:00 )             30.5     03-09    143  |  108  |  6<L>  ----------------------------<  76  3.3<L>   |  23  |  0.5<L>    Ca    7.8<L>      09 Mar 2022 07:00  Phos  3.9     03-08  Mg     1.8     03-09    TPro  5.9<L>  /  Alb  2.3<L>  /  TBili  0.5  /  DBili  x   /  AST  26  /  ALT  45<H>  /  AlkPhos  115  03-08                                                              RADIOLOGY:        PHYSICAL EXAM:  GENERAL: NAD, lying in bed comfortably  HEAD:  Atraumatic, Normocephalic  EYES: conjunctiva and sclera clear  ENT: Moist mucous membranes  NECK: Supple  CHEST/LUNG: Soft crackles. Unlabored respirations  HEART: Regular rate and rhythm; No murmurs, rubs, or gallops  ABDOMEN: BSx4; Soft, nontender, nondistended  EXTREMITIES:  No LE edema  NERVOUS SYSTEM:  Alert, not oriented to time or place. oriented to person  SKIN: Multiple lesions and excoriations on the upper and lower extremities and perineal area. No erythema or tenderness SANTIAGO CALIXTO 61y Female  MRN#: [id number]   CODE STATUS:________    Hospital Day: 21d    Pt is currently admitted with the primary diagnosis of     SUBJECTIVE  Hospital Course  Patient is a 62 y/o female with PMHx of GIB, Bowel and Bladder incontinence, Smoking (3-4 cigarettes/day), ETOH abuse, hypothyroidism, and ongoing anemia transferred from [facility] for Polypectomy. Patient was admitted to [facility] on 01/31 for AMS, weakness and decreased appetite. She was found to have UTI, NSTEMI, and electrolyte imbalance. During her stay at [facility] she had a colonoscopy done which revealed 1 large 3-4 cm sigmoid pedunculated polyp and 2 polyps (7, 14 mm) in descending colon. Patient was transferred as they are not able to perform large Polypectomies there. She was not able to be discharged as ongoing anemia and concern that the cause are these large polyps.    Patient had a colonoscopy and polypectomy done on 2/18/2022. Polyp (13 mm) in the descending colon. (hot snare Polypectomy).  Polyp (25 mm to 30 mm) in the rectosigmoid junction at 20 cm from the anal verge. (Endoloop, Polypectomy, Endoclip). -> Currently Bx result shows adenocarcinoma    After the colonoscopy patient became hypothermic and hypotensive. Over night patient was started to Levo 2/20 and warming blanket.   2/20 AM patient had RRT for desaturation and hypotension. Patient was placed on 50% O2, patient had good response. Levophed 2/20 also started to raise BP. Patient remained altered, not speaking, but remained somewhat awake and just makes grunting sounds. Patient is not following commands. Central line was placed secondary to. Spoke to ICU fellow, patient is being upgraded to ICU for closer monitoring.     In CCU pt had abdominal tenderness, KUB ordered, concern for RP free air. CTAP with PO/IV contrast ordered, no perforation or pneumo-peritoneum/retroperitoneum however showing findings c/w pancolitis. Surgery following, ID consulted. Pt on vanc/zosyn. In CCU, pt developed coagulopathy (low plt, anemia, increasing INR despite not on AC). Treated with pRBC, plt transfusion as well as vitamin K. Heme onc on board, thought to be due to possible DIC 2/2 sepsis. Started on solumedrol 40mg for positive cindy however possibly positive due to transfusion at [facility]. Plt stabilizing so steroids tapered. Derm consulted for diffuse skin rash- bullous lesions with desquamation and areas of healing with hyperpigmentation. Burn consulted for skin bx (done 2/28, results pending). C-anca also positive, rhuem consulted, presentation unlikely due to vasculitis, will rpt cindy once acute illness resolves.     Overnight events     Subjective complaints     Present Today:   - Tapia:  No [  ], Yes [   ] : Indication:     - Type of IV Access:       .. CVC/Piccline:  No [  ], Yes [   ] : Indication:       .. Midline: No [  ], Yes [   ] : Indication:                                              OBJECTIVE  PAST MEDICAL & SURGICAL HISTORY                                              ALLERGIES:  No Known Allergies                           HOME MEDICATIONS  Home Medications:                           MEDICATIONS:  STANDING MEDICATIONS  ampicillin/sulbactam  IVPB 3 Gram(s) IV Intermittent every 6 hours  chlorhexidine 4% Liquid 1 Application(s) Topical daily  cholecalciferol 2000 Unit(s) Oral daily  enoxaparin Injectable 40 milliGRAM(s) SubCutaneous every 24 hours  folic acid 1 milliGRAM(s) Oral daily  furosemide   Injectable 40 milliGRAM(s) IV Push daily  levothyroxine Injectable 50 MICROGram(s) IV Push at bedtime  midodrine. 5 milliGRAM(s) Oral every 8 hours  multivitamin/minerals 1 Tablet(s) Oral daily  nystatin Cream 1 Application(s) Topical two times a day  pantoprazole  Injectable 40 milliGRAM(s) IV Push every 12 hours  potassium chloride   Powder 40 milliEquivalent(s) Oral once  thiamine 100 milliGRAM(s) Oral daily  triamcinolone 0.1% Cream 1 Application(s) Topical two times a day  vitamin A &amp; D Ointment 1 Application(s) Topical daily    PRN MEDICATIONS                                            ------------------------------------------------------------  VITAL SIGNS: Last 24 Hours  T(C): 36.1 (09 Mar 2022 13:54), Max: 36.6 (09 Mar 2022 04:45)  T(F): 97 (09 Mar 2022 13:54), Max: 97.8 (09 Mar 2022 04:45)  HR: 90 (09 Mar 2022 13:54) (87 - 97)  BP: 148/91 (09 Mar 2022 13:54) (127/66 - 166/91)  BP(mean): 113 (08 Mar 2022 20:56) (103 - 113)  RR: 18 (09 Mar 2022 13:54) (18 - 18)  SpO2: --      03-08-22 @ 07:01  -  03-09-22 @ 07:00  --------------------------------------------------------  IN: 0 mL / OUT: 1910 mL / NET: -1910 mL                                               LABS:                        9.8    15.10 )-----------( 210      ( 09 Mar 2022 07:00 )             30.5     03-09    143  |  108  |  6<L>  ----------------------------<  76  3.3<L>   |  23  |  0.5<L>    Ca    7.8<L>      09 Mar 2022 07:00  Phos  3.9     03-08  Mg     1.8     03-09    TPro  5.9<L>  /  Alb  2.3<L>  /  TBili  0.5  /  DBili  x   /  AST  26  /  ALT  45<H>  /  AlkPhos  115  03-08                                                              RADIOLOGY:        PHYSICAL EXAM:  GENERAL: NAD, lying in bed comfortably  HEAD:  Atraumatic, Normocephalic  EYES: conjunctiva and sclera clear  ENT: Moist mucous membranes  NECK: Supple  CHEST/LUNG: Soft crackles. Unlabored respirations  HEART: Regular rate and rhythm; No murmurs, rubs, or gallops  ABDOMEN: BSx4; Soft, nontender, nondistended  EXTREMITIES:  No LE edema  NERVOUS SYSTEM:  Alert, not oriented to time or place. oriented to person  SKIN: Multiple lesions and excoriations on the upper and lower extremities and perineal area. No erythema or tenderness SANTIAGO CALIXTO 61y Female  MRN#: [id number]   CODE STATUS:________    Hospital Day: 21d    Pt is currently admitted with the primary diagnosis of     SUBJECTIVE  Hospital Course  Patient is a 62 y/o female with PMHx of GIB, Bowel and Bladder incontinence, Smoking (3-4 cigarettes/day), ETOH abuse, hypothyroidism, and ongoing anemia transferred from [facility] for Polypectomy. Patient was admitted to [facility] on 01/31 for AMS, weakness and decreased appetite. She was found to have UTI, NSTEMI, and electrolyte imbalance. During her stay at [facility] she had a colonoscopy done which revealed 1 large 3-4 cm sigmoid pedunculated polyp and 2 polyps (7, 14 mm) in descending colon. Patient was transferred as they are not able to perform large Polypectomies there. She was not able to be discharged as ongoing anemia and concern that the cause are these large polyps.    Patient had a colonoscopy and polypectomy done on 2/18/2022. Polyp (13 mm) in the descending colon. (hot snare Polypectomy).  Polyp (25 mm to 30 mm) in the rectosigmoid junction at 20 cm from the anal verge. (Endoloop, Polypectomy, Endoclip). -> Currently Bx result shows adenocarcinoma    After the colonoscopy patient became hypothermic and hypotensive. Over night patient was started to Levo 2/20 and warming blanket.   2/20 AM patient had RRT for desaturation and hypotension. Patient was placed on 50% O2, patient had good response. Levophed 2/20 also started to raise BP. Patient remained altered, not speaking, but remained somewhat awake and just makes grunting sounds. Patient is not following commands. Left central line was placed on 3/2. Spoke to ICU fellow, patient was upgraded to ICU for closer monitoring.     In CCU pt had abdominal tenderness, KUB ordered, concern for RP free air. CTAP with PO/IV contrast ordered, no perforation or pneumo-peritoneum/retroperitoneum however showing findings c/w pancolitis. Surgery following, ID consulted. Pt was on vanc/zosyn (2/20-22 & 2/21-23 respectively). In CCU, pt developed coagulopathy (low plt, anemia, increasing INR despite not on AC). Treated with pRBC, plt transfusion as well as vitamin K. Heme onc on board, thought to be due to possible DIC 2/2 sepsis. Started on solumedrol 40mg for positive cindy however possibly positive due to transfusion at [facility]. Plt stabilizing so steroids tapered (completed taper). Derm consulted for diffuse skin rash- bullous lesions with desquamation and areas of healing with hyperpigmentation. Burn consulted for skin bx (done 2/28, results pending). C-anca also positive, rhuem consulted, presentation unlikely due to vasculitis, will rpt cindy once acute illness resolves.     3/9: Patient's WBC and PMNs have been up-trending for the past 4 days despite steroids being tapered even before that. Will d/c heredia, take u/a, start Unasyn empirically. Patient not eating well, had hypoglycemia -> NG inserted (consent from son). Polypectomy pathology showing adenocarcinoma -> CT chest w/ IV contrast for staging. Had diarrhea in am -> CT abdomen to f/u pancolitis. AMS -> MR Brain per old neuro note. Spoke w/ son concerning IV contrast and MR contraindications. Son reports patient's mental status to be poor at home as she did not ambulate on her home or answer direct questions.    Overnight events     Subjective complaints   None    Present Today:   - Heredia:  No [  ], Yes [   ] : Indication:     - Type of IV Access:       .. CVC/Piccline:  No [  ], Yes [   ] : Indication:       .. Midline: No [  ], Yes [   ] : Indication:                                              OBJECTIVE  PAST MEDICAL & SURGICAL HISTORY                                              ALLERGIES:  No Known Allergies                           HOME MEDICATIONS  Home Medications:                           MEDICATIONS:  STANDING MEDICATIONS  ampicillin/sulbactam  IVPB 3 Gram(s) IV Intermittent every 6 hours  chlorhexidine 4% Liquid 1 Application(s) Topical daily  cholecalciferol 2000 Unit(s) Oral daily  enoxaparin Injectable 40 milliGRAM(s) SubCutaneous every 24 hours  folic acid 1 milliGRAM(s) Oral daily  furosemide   Injectable 40 milliGRAM(s) IV Push daily  levothyroxine Injectable 50 MICROGram(s) IV Push at bedtime  midodrine. 5 milliGRAM(s) Oral every 8 hours  multivitamin/minerals 1 Tablet(s) Oral daily  nystatin Cream 1 Application(s) Topical two times a day  pantoprazole  Injectable 40 milliGRAM(s) IV Push every 12 hours  potassium chloride   Powder 40 milliEquivalent(s) Oral once  thiamine 100 milliGRAM(s) Oral daily  triamcinolone 0.1% Cream 1 Application(s) Topical two times a day  vitamin A &amp; D Ointment 1 Application(s) Topical daily    PRN MEDICATIONS                                            ------------------------------------------------------------  VITAL SIGNS: Last 24 Hours  T(C): 36.1 (09 Mar 2022 13:54), Max: 36.6 (09 Mar 2022 04:45)  T(F): 97 (09 Mar 2022 13:54), Max: 97.8 (09 Mar 2022 04:45)  HR: 90 (09 Mar 2022 13:54) (87 - 97)  BP: 148/91 (09 Mar 2022 13:54) (127/66 - 166/91)  BP(mean): 113 (08 Mar 2022 20:56) (103 - 113)  RR: 18 (09 Mar 2022 13:54) (18 - 18)  SpO2: --      03-08-22 @ 07:01  -  03-09-22 @ 07:00  --------------------------------------------------------  IN: 0 mL / OUT: 1910 mL / NET: -1910 mL                                               LABS:                        9.8    15.10 )-----------( 210      ( 09 Mar 2022 07:00 )             30.5     03-09    143  |  108  |  6<L>  ----------------------------<  76  3.3<L>   |  23  |  0.5<L>    Ca    7.8<L>      09 Mar 2022 07:00  Phos  3.9     03-08  Mg     1.8     03-09    TPro  5.9<L>  /  Alb  2.3<L>  /  TBili  0.5  /  DBili  x   /  AST  26  /  ALT  45<H>  /  AlkPhos  115  03-08                                                              RADIOLOGY:        PHYSICAL EXAM:  GENERAL: NAD, lying in bed comfortably  HEAD:  Atraumatic, Normocephalic  EYES: conjunctiva and sclera clear  ENT: Moist mucous membranes  NECK: Supple  CHEST/LUNG: Soft crackles. Unlabored respirations  HEART: Regular rate and rhythm; No murmurs, rubs, or gallops  ABDOMEN: BSx4; Soft, nontender, nondistended  EXTREMITIES:  No LE edema  NERVOUS SYSTEM:  Alert, not oriented to time or place. oriented to person  SKIN: Multiple lesions and excoriations on the upper and lower extremities and perineal area. No erythema or tenderness

## 2022-03-09 NOTE — PROGRESS NOTE ADULT - ASSESSMENT
ASSESSMENT & PLAN                                                                                ----------------------------------------------------  # DVT prophylaxis     # GI prophylaxis     # Diet     # Activity Score (AM-PAC)    # Code status     # Disposition                                                                              --------------------------------------------------------    # Handoff      ASSESSMENT & PLAN  61F PMH GIB, Bowel and Bladder incontinence, Smoking (3-4 cigarettes/day), ETOH abuse, hypothyroidism, and ongoing anemia, admitted to [facility] 1/31 for ams found to have UTI. Dev GIB, colonoscopy showing polyps, transferred to [facility] for polypectomy upgraded to CCU for hypotension, likely septic shock 2/2 aspiration pna.     #AMS with unclear etiology     - Pt upgraded from floor on 2/20 after RRT for hypotension and desaturation. RIJ CVC placed, started on levo, venti mask, o2 and hypotension subsequently improved   - CTH 2/20: no ICH, mass effect, or midline shift   - EEG 2/20: generalized slowing    - UCx 2/18 negative   - BCx 2/17, 2/19: NGTD  - COVID negative 2/20  - MRSA nares 2/20 negative  - LE duplex 2/21: no DVT however b/l peroneal veins not visualized   - ammonia elevated to 59 on 2/22, started on lactulose, possibly contributing to ams   - ammonia 50 on 2/26   - s.p aztreonam 2g q8h   - s.p clinda 600mg q8h     #Colonic Polyp  #Diarrhea in setting of recent abx use  #Hx of GIB   #Pneumoretroperitoneum   #Pancolitis     - Colonoscopy 2/4: 1 large 3-4 cm sigmoid pedunculated polyp and 2 polyps (7, 14 mm) in descending colon s/p bx  - Negative for C-Diff infection  - CEA elevated 5.6  - S/P colonoscopy and polypectomy 2/18/2022. Polyp (13 mm) in the descending colon. (hot snare Polypectomy).  Polyp (25 mm to 30 mm) in the rectosigmoid junction at 20 cm from the anal verge. (Endoloop, Polypectomy, Endoclip).   - Repeat Colonoscopy in 6 months    - CTAP PO/IV contrast 2/21: findings c/w pancolitis, no intra or retroperitoneal free air      #Exfoliative skin lesions- possibly externally induced (e.g scratching), r/o TEN given vesicles and desquamation   #+C-ANCA  - pt with superficial scabs diffusely over body, most prominent over chest, as well as areas of desquamation over arms, legs, and hips   - Burn recs appreciated; dx with incontinence dermatitis    - derm recs appreciated, recalled burn for skin bx (done 2/28), f/u path   - rheum recs appreciated, no clear evidence for ANCA associated vasculitis could be elevated due to sepsis. Agree with skin bx. Sent PR4/MPO antibody assay, will rpt c-anca once acute illness resolves.  - arsenic negative   - s/p skin bx by burn 2/28, f/u path     #Prolonged qtc      - qtc 613 initially  - repleating mg today, keep Mg>2, K>4.   - on no qtc prolonging medications   - arsenic levels negative   - daily ekg   - continue to monitor     #Normocytic anemia   #Acute thrombocytopenia- resolving   #Elevated INR/PTT not on a/c- resolved     - cindy positive, possibly due to transfusion (not documented but likely received in [facility])  - HIV, hep B negative   - monitor INR       #Hypotension     - C/w midodrine 5mg TID    #Hypernatremia- improving   - c/w free water - now on pureed diet with thin liquids    #Hypothyroidism  - TSH 15.6  - C/w synthroid IV 50 mcg po q24 (increased form 25mcg )  - Patient will need to repeat TSH and FT4 in 6 wks (End of March)      #Hx of UTI- resolved   #Right lower face cellulitis- resolved    - CT maxillofacial with C 2/12: Mild soft tissue inflammation suggesting cellulitis of the right lower face  - pt was on cefepime, vancomycin (end date 2/15)  - Started on Unasyn 2/16,/c 2/20 started on broad spectrum Antibiotics  - BCx 2/17 -ve, repeat -ve     #Hx NSTEMI   #Hx of Elevated Troponin  - in setting of hypotension   - Normal Lexiscan at [facility]    #Hx of ETOH abuse  #Possible Thiamine deficiency   - last drink  6 months ago  - c/w Folic acid and Thiamine    #Acute on chronic HFpEF - active  ECHO with preserved EF, grade 2 diastolic dysfunction  on lasix 40mg IVP daily  daily wts, I's and O's  stable on room air    #Hypotension - monitoring  unclear etiology, possible septic shock  ct abd with pancolitis  s/p course of abx  off levophed  on midodrine  BP now stable    #Colon polyp  s/p colonoscopy with polypectomy 2/18  ct abd without free air but showed pancolitis  pt now passed for pureed diet with thin liquids  1 to 1 feeds, monitor FS  nutrition and S&S f/u  Bx -> Adenocarcinoma w/ lymphovascular invasion    #Thrombocytopenia and anemia (resolved thrombocytopenia)  presumed hemolytic anemia, with coagulopathy, possible DIC  resolved  completed prednisone taper per HemOnc recommendation  daily CBC    #Peeling skin rash  evaluated by Burn, rheumatology and dermatology -> unlikely vasculitis  f/u biopsy report (could not find order)  on steroid cream  Sent PR4/MPO antibody assay, will repeat c-anca once acute illness resolves.    #Hypomagnesemia repleted    #Prolonged QTc  resolved    #Misc   - DVT prophylaxis: SCD   - GI prophylaxis: PPI  - Activity status: IAT   - Code status: Full code   - f/u skin bx results  - rpt TFTs in 3 weeks (end of march)   - If hypotensive with bradycardia inc levothyroxine to 125mcg q24h                                                                                ----------------------------------------------------  # DVT prophylaxis Lovenox    # GI prophylaxis Protonix    # Diet DASH/TLC Pureed + NG feeds Jevity 1.2    # Activity Score (AM-PAC)    # Code status     # Disposition                                                                              --------------------------------------------------------    # Handoff      ASSESSMENT & PLAN  61F PMH GIB, Bowel and Bladder incontinence, Smoking (3-4 cigarettes/day), ETOH abuse, hypothyroidism, and ongoing anemia, admitted to [facility] 1/31 for ams found to have UTI. Dev GIB, colonoscopy showing polyps, transferred to [facility] for polypectomy upgraded to CCU for hypotension, likely septic shock 2/2 aspiration pna.     #AMS with unclear etiology     - Pt upgraded from floor on 2/20 after RRT for hypotension and desaturation. RIJ CVC placed, started on levo, venti mask, o2 and hypotension subsequently improved   - CTH 2/20: no ICH, mass effect, or midline shift   - EEG 2/20: generalized slowing    - UCx 2/18 negative   - BCx 2/17, 2/19: NGTD  - COVID negative 2/20  - MRSA nares 2/20 negative  - LE duplex 2/21: no DVT however b/l peroneal veins not visualized   - ammonia elevated to 59 on 2/22, started on lactulose, possibly contributing to ams -> 42 (3/1/22)  - s.p aztreonam 2g q8h (stopped 3/4/22)  - s.p clinda 600mg q8h (stopped 3/4/22)  - MR Brain for evaluation of AMS    #Colonic Polyp  #Diarrhea in setting of recent abx use  #Hx of GIB   #Pneumoretroperitoneum   #Pancolitis   s/p colonoscopy with polypectomy 2/18  ct abd without free air but showed pancolitis  pt now passed for pureed diet with thin liquids  1 to 1 feeds, monitor FS  nutrition and S&S f/u  Bx -> Adenocarcinoma w/ lymphovascular invasion  - Colonoscopy 2/4: 1 large 3-4 cm sigmoid pedunculated polyp and 2 polyps (7, 14 mm) in descending colon s/p bx  - Negative for C-Diff infection  - CEA elevated 5.6  - S/P colonoscopy and polypectomy 2/18/2022. Polyp (13 mm) in the descending colon. (hot snare Polypectomy).  Polyp (25 mm to 30 mm) in the rectosigmoid junction at 20 cm from the anal verge. (Endoloop, Polypectomy, Endoclip).   - Bx -> Adenocarcinoma with lymphovascular invasion  - CTAP PO/IV contrast 2/21: findings c/w pancolitis, no intra or retroperitoneal free air  - CT Chest w/ IV contrast for staging  - CT Abdomen Pelvis w/ IV contrast for evaluation of pancolitis  - Monitor Diarrhea      #Exfoliative skin lesions- possibly externally induced (e.g scratching), r/o TEN given vesicles and desquamation   #+ve C-ANCA  evaluated by Burn, rheumatology and dermatology -> unlikely vasculitis  on steroid cream  Sent PR4/MPO antibody assay, will repeat c-anca once acute illness resolves.  - pt with superficial scabs diffusely over body, most prominent over chest, as well as areas of desquamation over arms, legs, and hips   - Burn recs appreciated; dx with incontinence dermatitis    - derm recs appreciated, recalled burn for skin bx (done 2/28), f/u path   - rheum recs appreciated, no clear evidence for ANCA associated vasculitis could be elevated due to sepsis. Agree with skin bx. Sent PR4/MPO antibody assay, will rpt c-anca once acute illness resolves.  - arsenic negative   - s/p skin bx by burn 2/28 (can't find order), f/u path     #Prolonged qtc  - Resolved  - qtc 613 initially -> 493 (3/9)  - repleating mg today, keep Mg>2, K>4.   - on no qtc prolonging medications   - arsenic levels negative   - daily ekg   - continue to monitor     #Normocytic anemia   #Acute thrombocytopenia- resolved   #Elevated INR/PTT not on a/c- resolved   #presumed hemolytic anemia, with coagulopathy, possible DIC - resolved  completed prednisone taper per HemOnc recommendation  daily CBC  - cindy positive, possibly due to transfusion (not documented but likely received in [facility])  - HIV, hep B negative   - monitor INR     #Hypotension - monitoring  unclear etiology, possible septic shock  ct abd with pancolitis  s/p course of abx  BP now stable  - C/w midodrine 5mg TID  - off levophed since 3/2    #Hypernatremia- improving   - c/w free water - now on pureed diet with thin liquids    #Hypothyroidism - active, improved  - TSH 15.6  - C/w synthroid IV 50 mcg q24 (increased form 25mcg) [was not taking PO]  - Patient will need to repeat TSH and FT4 in 6 wks (End of March)  - If hypotensive with bradycardia inc levothyroxine to 125mcg PO q24h (adjust IV dose if taking IV)    #Hx of UTI- resolved   - Suspected repeat UTI -> Repeat UA (3/9)  - D/C Tapia    # Leukocytosis  # Neutophelia  - Unasyn 3/9/22  - F/u BCx, UCx, Stool Cx    #Right lower face cellulitis- resolved  - CT maxillofacial with C 2/12: Mild soft tissue inflammation suggesting cellulitis of the right lower face  - pt was on cefepime, vancomycin (end date 2/15)  - Started on Unasyn 2/16,/c 2/20 started on broad spectrum Antibiotics  - BCx 2/17 -ve, repeat -ve     #Hx NSTEMI   #Hx of Elevated Troponin  - in setting of hypotension   - Normal Lexiscan at [facility]    #Hx of ETOH abuse  #Possible Thiamine deficiency   - last drink  6 months ago  - c/w Folic acid and Thiamine    #Acute on chronic HFpEF - active  ECHO with preserved EF, grade 2 diastolic dysfunction  on lasix 40mg IVP daily  daily wts, I's and O's  stable on room air                                                                              ----------------------------------------------------  # DVT prophylaxis Lovenox (improved Platelets, Patient, PTT, & Hb)    # GI prophylaxis Protonix    # Diet DASH/TLC Pureed + NG feeds Jevity 1.2    # Activity Score (AM-PAC)    # Code status     # Disposition                                                                              --------------------------------------------------------    # Handoff   - If hypotensive with bradycardia inc levothyroxine to 125mcg q24h

## 2022-03-09 NOTE — CHART NOTE - NSCHARTNOTEFT_GEN_A_CORE
s/p NGT replacement today  Had been on PO diet but intake poor  Hypokalemia, Klor given    T(F): 97.8 (03-09-22 @ 04:45), Max: 97.8 (03-09-22 @ 04:45)  HR: 93 (03-09-22 @ 04:45) (87 - 97)  BP: 127/66 (03-09-22 @ 04:45) (127/66 - 166/91)  RR: 18 (03-09-22 @ 04:45) (18 - 18)  SpO2: --    I&O's Detail    08 Mar 2022 07:01  -  09 Mar 2022 07:00  --------------------------------------------------------  IN:  Total IN: 0 mL    OUT:    Indwelling Catheter - Urethral (mL): 1910 mL  Total OUT: 1910 mL    Total NET: -1910 mL    03-09    143  |  108  |  6<L>  ----------------------------<  76  3.3<L>   |  23  |  0.5<L>    Ca    7.8<L>      09 Mar 2022 07:00  Phos  3.9     03-08  Mg     1.8     03-09    TPro  5.9<L>  /  Alb  2.3<L>  /  TBili  0.5  /  DBili  x   /  AST  26  /  ALT  45<H>  /  AlkPhos  115  03-08                      9.8    15.10 )-----------( 210      ( 09 Mar 2022 07:00 )             30.5     CAPILLARY BLOOD GLUCOSE  POCT Blood Glucose.: 70 mg/dL (09 Mar 2022 13:05)  POCT Blood Glucose.: 85 mg/dL (09 Mar 2022 07:51)  POCT Blood Glucose.: 74 mg/dL (08 Mar 2022 21:15)  POCT Blood Glucose.: 89 mg/dL (08 Mar 2022 18:47)  POCT Blood Glucose.: 67 mg/dL (08 Mar 2022 16:28)    Diet, NPO:   Except Medications (03-09-22 @ 13:38)    ASSESSMENT  61F PMH GIB, Bowel and Bladder incontinence, Smoking (3-4 cigarettes/day), ETOH abuse, hypothyroidism, and ongoing anemia, admitted to [facility] 1/31 for ams found to have UTI. Dev GIB, colonoscopy showing polyps, transferred to [facility] for polypectomy now upgraded to CCU for hypotension, likely septic shock 2/2 aspiration pna.     - AMS, unclear etiology  - sepsis w/ shock/hypothermia vs aspiration after procedure under anesthesia 2/18 vs CVA  - acute thrombocytopenia  - chronic anemia  - Exfoliative dermatitis   - Colonoscopy 2/4: 1 large 3-4 cm sigmoid pedunculated polyp and 2 polyps (7, 14 mm) in descending colon s/p polypectomy 2/18/2022.   - r/o pneumoretroperitoneum (on KUB 2/21)  - pancolitis on CT 2/21  - hypothyroidism  - Hx of ETOH abuse  - hypokalemia, hypernatremia  - severe protein calorie malnutrition  - severe wt loss, ~34% over past 9 months    SUGGEST:  - restart NG feeds with Jevity 1.2 240ml X 4 feeds/day given at meal times and qhs   - monitor bmp, in phos, mg closely and replace prn  - monitor BM's   - will follow

## 2022-03-10 LAB
ALBUMIN SERPL ELPH-MCNC: 2.1 G/DL — LOW (ref 3.5–5.2)
ALP SERPL-CCNC: 101 U/L — SIGNIFICANT CHANGE UP (ref 30–115)
ALT FLD-CCNC: 33 U/L — SIGNIFICANT CHANGE UP (ref 0–41)
ANION GAP SERPL CALC-SCNC: 11 MMOL/L — SIGNIFICANT CHANGE UP (ref 7–14)
ANION GAP SERPL CALC-SCNC: 9 MMOL/L — SIGNIFICANT CHANGE UP (ref 7–14)
APTT BLD: 34.2 SEC — SIGNIFICANT CHANGE UP (ref 27–39.2)
AST SERPL-CCNC: 18 U/L — SIGNIFICANT CHANGE UP (ref 0–41)
BASOPHILS # BLD AUTO: 0.04 K/UL — SIGNIFICANT CHANGE UP (ref 0–0.2)
BASOPHILS NFR BLD AUTO: 0.3 % — SIGNIFICANT CHANGE UP (ref 0–1)
BILIRUB SERPL-MCNC: 0.5 MG/DL — SIGNIFICANT CHANGE UP (ref 0.2–1.2)
BUN SERPL-MCNC: 6 MG/DL — LOW (ref 10–20)
BUN SERPL-MCNC: 6 MG/DL — LOW (ref 10–20)
C DIFF BY PCR RESULT: NEGATIVE — SIGNIFICANT CHANGE UP
C DIFF TOX GENS STL QL NAA+PROBE: SIGNIFICANT CHANGE UP
CALCIUM SERPL-MCNC: 7.9 MG/DL — LOW (ref 8.5–10.1)
CALCIUM SERPL-MCNC: 8 MG/DL — LOW (ref 8.5–10.1)
CHLORIDE SERPL-SCNC: 110 MMOL/L — SIGNIFICANT CHANGE UP (ref 98–110)
CHLORIDE SERPL-SCNC: 112 MMOL/L — HIGH (ref 98–110)
CO2 SERPL-SCNC: 25 MMOL/L — SIGNIFICANT CHANGE UP (ref 17–32)
CO2 SERPL-SCNC: 26 MMOL/L — SIGNIFICANT CHANGE UP (ref 17–32)
CREAT SERPL-MCNC: 0.5 MG/DL — LOW (ref 0.7–1.5)
CREAT SERPL-MCNC: <0.5 MG/DL — LOW (ref 0.7–1.5)
EGFR: 107 ML/MIN/1.73M2 — SIGNIFICANT CHANGE UP
EGFR: 113 ML/MIN/1.73M2 — SIGNIFICANT CHANGE UP
EOSINOPHIL # BLD AUTO: 0.05 K/UL — SIGNIFICANT CHANGE UP (ref 0–0.7)
EOSINOPHIL NFR BLD AUTO: 0.3 % — SIGNIFICANT CHANGE UP (ref 0–8)
GLUCOSE BLDC GLUCOMTR-MCNC: 104 MG/DL — HIGH (ref 70–99)
GLUCOSE BLDC GLUCOMTR-MCNC: 107 MG/DL — HIGH (ref 70–99)
GLUCOSE BLDC GLUCOMTR-MCNC: 86 MG/DL — SIGNIFICANT CHANGE UP (ref 70–99)
GLUCOSE SERPL-MCNC: 73 MG/DL — SIGNIFICANT CHANGE UP (ref 70–99)
GLUCOSE SERPL-MCNC: 93 MG/DL — SIGNIFICANT CHANGE UP (ref 70–99)
HCT VFR BLD CALC: 29.6 % — LOW (ref 37–47)
HGB BLD-MCNC: 9.5 G/DL — LOW (ref 12–16)
IMM GRANULOCYTES NFR BLD AUTO: 0.4 % — HIGH (ref 0.1–0.3)
INR BLD: 1.17 RATIO — SIGNIFICANT CHANGE UP (ref 0.65–1.3)
LYMPHOCYTES # BLD AUTO: 1.03 K/UL — LOW (ref 1.2–3.4)
LYMPHOCYTES # BLD AUTO: 6.8 % — LOW (ref 20.5–51.1)
MAGNESIUM SERPL-MCNC: 1.7 MG/DL — LOW (ref 1.8–2.4)
MCHC RBC-ENTMCNC: 30.1 PG — SIGNIFICANT CHANGE UP (ref 27–31)
MCHC RBC-ENTMCNC: 32.1 G/DL — SIGNIFICANT CHANGE UP (ref 32–37)
MCV RBC AUTO: 93.7 FL — SIGNIFICANT CHANGE UP (ref 81–99)
MONOCYTES # BLD AUTO: 0.96 K/UL — HIGH (ref 0.1–0.6)
MONOCYTES NFR BLD AUTO: 6.4 % — SIGNIFICANT CHANGE UP (ref 1.7–9.3)
NEUTROPHILS # BLD AUTO: 12.91 K/UL — HIGH (ref 1.4–6.5)
NEUTROPHILS NFR BLD AUTO: 85.8 % — HIGH (ref 42.2–75.2)
NRBC # BLD: 0 /100 WBCS — SIGNIFICANT CHANGE UP (ref 0–0)
PHOSPHATE SERPL-MCNC: 3.2 MG/DL — SIGNIFICANT CHANGE UP (ref 2.1–4.9)
PLATELET # BLD AUTO: 226 K/UL — SIGNIFICANT CHANGE UP (ref 130–400)
POTASSIUM SERPL-MCNC: 3.7 MMOL/L — SIGNIFICANT CHANGE UP (ref 3.5–5)
POTASSIUM SERPL-MCNC: 3.8 MMOL/L — SIGNIFICANT CHANGE UP (ref 3.5–5)
POTASSIUM SERPL-SCNC: 3.7 MMOL/L — SIGNIFICANT CHANGE UP (ref 3.5–5)
POTASSIUM SERPL-SCNC: 3.8 MMOL/L — SIGNIFICANT CHANGE UP (ref 3.5–5)
PROT SERPL-MCNC: 5.8 G/DL — LOW (ref 6–8)
PROTHROM AB SERPL-ACNC: 13.4 SEC — HIGH (ref 9.95–12.87)
RBC # BLD: 3.16 M/UL — LOW (ref 4.2–5.4)
RBC # FLD: 17.6 % — HIGH (ref 11.5–14.5)
SODIUM SERPL-SCNC: 146 MMOL/L — SIGNIFICANT CHANGE UP (ref 135–146)
SODIUM SERPL-SCNC: 147 MMOL/L — HIGH (ref 135–146)
VIT B1 SERPL-MCNC: 154.9 NMOL/L — SIGNIFICANT CHANGE UP (ref 66.5–200)
WBC # BLD: 15.05 K/UL — HIGH (ref 4.8–10.8)
WBC # FLD AUTO: 15.05 K/UL — HIGH (ref 4.8–10.8)
WRIGHT STN STL: ABNORMAL

## 2022-03-10 PROCEDURE — 99233 SBSQ HOSP IP/OBS HIGH 50: CPT

## 2022-03-10 PROCEDURE — 71045 X-RAY EXAM CHEST 1 VIEW: CPT | Mod: 26

## 2022-03-10 PROCEDURE — 93010 ELECTROCARDIOGRAM REPORT: CPT

## 2022-03-10 RX ORDER — ENOXAPARIN SODIUM 100 MG/ML
60 INJECTION SUBCUTANEOUS EVERY 12 HOURS
Refills: 0 | Status: DISCONTINUED | OUTPATIENT
Start: 2022-03-10 | End: 2022-03-21

## 2022-03-10 RX ORDER — MIDODRINE HYDROCHLORIDE 2.5 MG/1
5 TABLET ORAL EVERY 8 HOURS
Refills: 0 | Status: DISCONTINUED | OUTPATIENT
Start: 2022-03-10 | End: 2022-03-12

## 2022-03-10 RX ORDER — MAGNESIUM SULFATE 500 MG/ML
2 VIAL (ML) INJECTION ONCE
Refills: 0 | Status: COMPLETED | OUTPATIENT
Start: 2022-03-10 | End: 2022-03-10

## 2022-03-10 RX ORDER — FUROSEMIDE 40 MG
40 TABLET ORAL
Refills: 0 | Status: DISCONTINUED | OUTPATIENT
Start: 2022-03-10 | End: 2022-03-15

## 2022-03-10 RX ADMIN — AMPICILLIN SODIUM AND SULBACTAM SODIUM 200 GRAM(S): 250; 125 INJECTION, POWDER, FOR SUSPENSION INTRAMUSCULAR; INTRAVENOUS at 00:09

## 2022-03-10 RX ADMIN — Medication 1 APPLICATION(S): at 17:23

## 2022-03-10 RX ADMIN — Medication 12.5 GRAM(S): at 11:42

## 2022-03-10 RX ADMIN — MIDODRINE HYDROCHLORIDE 5 MILLIGRAM(S): 2.5 TABLET ORAL at 06:58

## 2022-03-10 RX ADMIN — Medication 100 MILLIGRAM(S): at 12:38

## 2022-03-10 RX ADMIN — MIDODRINE HYDROCHLORIDE 5 MILLIGRAM(S): 2.5 TABLET ORAL at 01:03

## 2022-03-10 RX ADMIN — Medication 1 APPLICATION(S): at 06:53

## 2022-03-10 RX ADMIN — AMPICILLIN SODIUM AND SULBACTAM SODIUM 200 GRAM(S): 250; 125 INJECTION, POWDER, FOR SUSPENSION INTRAMUSCULAR; INTRAVENOUS at 12:39

## 2022-03-10 RX ADMIN — Medication 40 MILLIGRAM(S): at 06:53

## 2022-03-10 RX ADMIN — Medication 40 MILLIGRAM(S): at 17:23

## 2022-03-10 RX ADMIN — PANTOPRAZOLE SODIUM 40 MILLIGRAM(S): 20 TABLET, DELAYED RELEASE ORAL at 17:20

## 2022-03-10 RX ADMIN — NYSTATIN CREAM 1 APPLICATION(S): 100000 CREAM TOPICAL at 06:53

## 2022-03-10 RX ADMIN — ENOXAPARIN SODIUM 60 MILLIGRAM(S): 100 INJECTION SUBCUTANEOUS at 17:20

## 2022-03-10 RX ADMIN — Medication 50 MICROGRAM(S): at 21:37

## 2022-03-10 RX ADMIN — AMPICILLIN SODIUM AND SULBACTAM SODIUM 200 GRAM(S): 250; 125 INJECTION, POWDER, FOR SUSPENSION INTRAMUSCULAR; INTRAVENOUS at 06:52

## 2022-03-10 RX ADMIN — Medication 1 MILLIGRAM(S): at 12:37

## 2022-03-10 RX ADMIN — Medication 2000 UNIT(S): at 12:37

## 2022-03-10 RX ADMIN — Medication 1 TABLET(S): at 12:37

## 2022-03-10 RX ADMIN — CHLORHEXIDINE GLUCONATE 1 APPLICATION(S): 213 SOLUTION TOPICAL at 16:08

## 2022-03-10 RX ADMIN — Medication 25 GRAM(S): at 10:52

## 2022-03-10 RX ADMIN — PANTOPRAZOLE SODIUM 40 MILLIGRAM(S): 20 TABLET, DELAYED RELEASE ORAL at 06:57

## 2022-03-10 RX ADMIN — Medication 1 APPLICATION(S): at 12:40

## 2022-03-10 RX ADMIN — ENOXAPARIN SODIUM 60 MILLIGRAM(S): 100 INJECTION SUBCUTANEOUS at 11:42

## 2022-03-10 RX ADMIN — NYSTATIN CREAM 1 APPLICATION(S): 100000 CREAM TOPICAL at 17:21

## 2022-03-10 NOTE — PROGRESS NOTE ADULT - SUBJECTIVE AND OBJECTIVE BOX
SANTIAGO CALIXTO 61y Female  MRN#: [id number]   CODE STATUS:________    Hospital Day: 22    Pt is currently admitted with the primary diagnosis of     SUBJECTIVE  Hospital Course  Patient is a 60 y/o female with PMHx of GIB, Bowel and Bladder incontinence, Smoking (3-4 cigarettes/day), ETOH abuse, hypothyroidism, and ongoing anemia transferred from [facility] for Polypectomy. Patient was admitted to [facility] on  for AMS, weakness and decreased appetite. She was found to have UTI, NSTEMI, and electrolyte imbalance. During her stay at [facility] she had a colonoscopy done which revealed 1 large 3-4 cm sigmoid pedunculated polyp and 2 polyps (7, 14 mm) in descending colon. Patient was transferred as they are not able to perform large Polypectomies there. She was not able to be discharged as ongoing anemia and concern that the cause are these large polyps.    Patient had a colonoscopy and polypectomy done on 2022. Polyp (13 mm) in the descending colon. (hot snare Polypectomy).  Polyp (25 mm to 30 mm) in the rectosigmoid junction at 20 cm from the anal verge. (Endoloop, Polypectomy, Endoclip). -> Currently Bx result shows adenocarcinoma    After the colonoscopy patient became hypothermic and hypotensive. Over night patient was started to Levo / and warming blanket.    AM patient had RRT for desaturation and hypotension. Patient was placed on 50% O2, patient had good response. Levophed  also started to raise BP. Patient remained altered, not speaking, but remained somewhat awake and just makes grunting sounds. Patient is not following commands. Left central line was placed on 3/2. Spoke to ICU fellow, patient was upgraded to ICU for closer monitoring.     In CCU pt had abdominal tenderness, KUB ordered, concern for RP free air. CTAP with PO/IV contrast ordered, no perforation or pneumo-peritoneum/retroperitoneum however showing findings c/w pancolitis. Surgery following, ID consulted. Pt was on vanc/zosyn (- & - respectively). In CCU, pt developed coagulopathy (low plt, anemia, increasing INR despite not on AC). Treated with pRBC, plt transfusion as well as vitamin K. Heme onc on board, thought to be due to possible DIC 2/2 sepsis. Started on solumedrol 40mg for positive cindy however possibly positive due to transfusion at [facility]. Plt stabilizing so steroids tapered (completed taper). Derm consulted for diffuse skin rash- bullous lesions with desquamation and areas of healing with hyperpigmentation. Burn consulted for skin bx (done , results pending). C-anca also positive, rhuem consulted, presentation unlikely due to vasculitis, will rpt cindy once acute illness resolves.     3/9: Patient's WBC and PMNs have been up-trending for the past 4 days despite steroids being tapered even before that. Will d/c heredia, take u/a, start Unasyn empirically. Patient not eating well, had hypoglycemia -> NG inserted (consent from son). Polypectomy pathology showing adenocarcinoma -> CT chest w/ IV contrast for staging. Had diarrhea in am -> CT abdomen to f/u pancolitis. AMS -> MR Brain per old neuro note. Spoke w/ son concerning IV contrast and MR contraindications. Son reports patient's mental status to be poor at home as she did not ambulate on her home or answer direct questions.  3/10: Diarrhea 5/24 hrs, started feeds on previous day, will check infectious causes    Overnight events     Subjective complaints   None    Present Today:   - Heredia:  No [  ], Yes [   ] : Indication:     - Type of IV Access:       .. CVC/Piccline:  No [  ], Yes [   ] : Indication:       .. Midline: No [  ], Yes [   ] : Indication:                                              OBJECTIVE  PAST MEDICAL & SURGICAL HISTORY                                              ALLERGIES:  No Known Allergies                           HOME MEDICATIONS  Home Medications:                           MEDICATIONS:  STANDING MEDICATIONS  ampicillin/sulbactam  IVPB 3 Gram(s) IV Intermittent every 6 hours  chlorhexidine 4% Liquid 1 Application(s) Topical daily  cholecalciferol 2000 Unit(s) Oral daily  enoxaparin Injectable 60 milliGRAM(s) SubCutaneous every 12 hours  folic acid 1 milliGRAM(s) Oral daily  furosemide   Injectable 40 milliGRAM(s) IV Push two times a day  levothyroxine Injectable 50 MICROGram(s) IV Push at bedtime  multivitamin/minerals 1 Tablet(s) Oral daily  nystatin Cream 1 Application(s) Topical two times a day  pantoprazole  Injectable 40 milliGRAM(s) IV Push every 12 hours  thiamine 100 milliGRAM(s) Oral daily  triamcinolone 0.1% Cream 1 Application(s) Topical two times a day  vitamin A &amp; D Ointment 1 Application(s) Topical daily    PRN MEDICATIONS  midodrine. 5 milliGRAM(s) Oral every 8 hours PRN                                            ------------------------------------------------------------  VITAL SIGNS: Last 24 Hours  T(C): 35.7 (10 Mar 2022 11:00), Max: 36.5 (10 Mar 2022 09:08)  T(F): 96.3 (10 Mar 2022 11:00), Max: 97.7 (10 Mar 2022 09:08)  HR: 95 (10 Mar 2022 11:00) (90 - 101)  BP: 126/73 (10 Mar 2022 11:00) (114/82 - 148/91)  BP(mean): --  RR: 20 (10 Mar 2022 11:00) (18 - 20)  SpO2: --      22 @ 07:01  -  03-10-22 @ 07:00  --------------------------------------------------------  IN: 0 mL / OUT: 900 mL / NET: -900 mL                                               LABS:                        9.5    15.05 )-----------( 226      ( 10 Mar 2022 06:30 )             29.6     03-10    146  |  112<H>  |  6<L>  ----------------------------<  73  3.8   |  25  |  <0.5<L>    Ca    7.9<L>      10 Mar 2022 06:30  Phos  3.2     03-10  Mg     1.7     03-10    TPro  5.8<L>  /  Alb  2.1<L>  /  TBili  0.5  /  DBili  x   /  AST  18  /  ALT  33  /  AlkPhos  101  03-10    PT/INR - ( 10 Mar 2022 06:30 )   PT: 13.40 sec;   INR: 1.17 ratio         PTT - ( 10 Mar 2022 06:30 )  PTT:34.2 sec  Urinalysis Basic - ( 09 Mar 2022 16:40 )    Color: Yellow / Appearance: Clear / S.010 / pH: x  Gluc: x / Ketone: Negative  / Bili: Negative / Urobili: <2 mg/dL   Blood: x / Protein: 30 mg/dL / Nitrite: Negative   Leuk Esterase: Large / RBC: 69 /HPF / WBC 16 /HPF   Sq Epi: x / Non Sq Epi: 2 /HPF / Bacteria: Negative                                                            RADIOLOGY:        PHYSICAL EXAM:  GENERAL: NAD, lying in bed comfortably  HEAD:  Atraumatic, Normocephalic  EYES: conjunctiva and sclera clear  ENT: Moist mucous membranes  NECK: Supple  CHEST/LUNG: Soft crackles. Unlabored respirations  HEART: Regular rate and rhythm; No murmurs, rubs, or gallops  ABDOMEN: BSx4; Soft, nontender, nondistended  EXTREMITIES:  No LE edema  NERVOUS SYSTEM:  Alert, not oriented to time or place. oriented to person  SKIN: Multiple lesions and excoriations on the upper and lower extremities and perineal area. No erythema or tenderness

## 2022-03-10 NOTE — PROGRESS NOTE ADULT - ASSESSMENT
61F PMH GIB, Bowel and Bladder incontinence, Smoking (3-4 cigarettes/day), ETOH abuse, hypothyroidism, and ongoing anemia, admitted to [facility] 1/31 for ams found to have UTI. Dev GIB, colonoscopy showing polyps, transferred to [facility] for polypectomy upgraded to CCU for hypotension, likely septic shock 2/2 aspiration pna.     #AMS with unclear etiology     - Pt upgraded from floor on 2/20 after RRT for hypotension and desaturation. RIJ CVC placed, started on levo, venti mask, o2 and hypotension subsequently improved   - CTH 2/20: no ICH, mass effect, or midline shift   - EEG 2/20: generalized slowing    - UCx 2/18 negative   - BCx 2/17, 2/19: NGTD  - COVID negative 2/20  - MRSA nares 2/20 negative  - LE duplex 2/21: no DVT however b/l peroneal veins not visualized   - ammonia elevated to 59 on 2/22, started on lactulose, possibly contributing to ams -> 42 (3/1/22)  - s.p aztreonam 2g q8h (stopped 3/4/22)  - s.p clinda 600mg q8h (stopped 3/4/22)  - MR Brain for evaluation of AMS    #Colonic Polyp  #Diarrhea in setting of recent abx use  #Hx of GIB   #Pneumoretroperitoneum   #Pancolitis   s/p colonoscopy with polypectomy 2/18  ct abd without free air but showed pancolitis  pt now passed for pureed diet with thin liquids  1 to 1 feeds, monitor FS  nutrition and S&S f/u  Bx -> Adenocarcinoma w/ lymphovascular invasion  - Colonoscopy 2/4: 1 large 3-4 cm sigmoid pedunculated polyp and 2 polyps (7, 14 mm) in descending colon s/p bx  - Negative for C-Diff infection  - CEA elevated 5.6  - S/P colonoscopy and polypectomy 2/18/2022. Polyp (13 mm) in the descending colon. (hot snare Polypectomy).  Polyp (25 mm to 30 mm) in the rectosigmoid junction at 20 cm from the anal verge. (Endoloop, Polypectomy, Endoclip).   - Bx -> Adenocarcinoma with lymphovascular invasion  - CTAP PO/IV contrast 2/21: findings c/w pancolitis, no intra or retroperitoneal free air  - CT Chest w/ IV contrast for staging -> small- moderate B/L Pleural effusions; Right IJ DVT  - CT Abdomen Pelvis w/ IV contrast for evaluation of pancolitis -> resolved colitis, increased anasarca, cholelithiasis, T12 chronic fracture, T6 fracture  - Monitor Diarrhea -> 5/24hrs on 3/9-3/10 -> Check C diff. GI PCR, & Horn stain  - Check feeds choice if infectious etiology ruled out    #Cholelithiasis  - Downtrending AST/ALT/ALP, within normal limits  - Asymptomatic  - Monitor for now    #T6/T12 Fracture  - Monitor for pain    #Exfoliative skin lesions- possibly externally induced (e.g scratching), r/o TEN given vesicles and desquamation   #+ve C-ANCA  evaluated by Burn, rheumatology and dermatology -> unlikely vasculitis  on steroid cream  Sent PR4/MPO antibody assay, will repeat c-anca once acute illness resolves.  - pt with superficial scabs diffusely over body, most prominent over chest, as well as areas of desquamation over arms, legs, and hips   - Burn recs appreciated; dx with incontinence dermatitis    - derm recs appreciated, recalled burn for skin bx (done 2/28), f/u path   - rheum recs appreciated, no clear evidence for ANCA associated vasculitis could be elevated due to sepsis. Agree with skin bx. Sent PR4/MPO antibody assay, will rpt c-anca once acute illness resolves.  - arsenic negative   - s/p skin bx by burn 2/28 (can't find order), f/u path     #Prolonged qtc  - Resolved  - qtc 613 initially -> 492 (3/10)  - repleating mg PRN, keep Mg>2, K>4.   - on no qtc prolonging medications   - arsenic levels negative   - daily ekg   - continue to monitor     #Normocytic anemia   #Acute thrombocytopenia- resolved   #Elevated INR/PTT not on a/c- resolved   #presumed hemolytic anemia, with coagulopathy, possible DIC - resolved  completed prednisone taper per HemOnc recommendation  daily CBC  - cindy positive, possibly due to transfusion (not documented but likely received in [facility])  - HIV, hep B negative   - monitor INR     #Hypotension - monitoring  unclear etiology, possible septic shock  ct abd with pancolitis - resolved  s/p course of abx  BP now stable  - Change midodrine 5mg TID to PRN 3/10/22  - off levophed since 3/2    #Hypernatremia- improving   - c/w free water - now on pureed diet with thin liquids    #Hypothyroidism - active, improved  - TSH 15.6  - C/w synthroid IV 50 mcg q24 (increased form 25mcg) [was not taking PO]  - Patient will need to repeat TSH and FT4 in 6 wks (End of March)  - If hypotensive with bradycardia inc levothyroxine to 125mcg PO q24h (adjust IV dose if taking IV)    #Hx of UTI- resolved   - Suspected repeat UTI -> Repeat UA (3/9)  - D/C Willie    # Leukocytosis  # Neutophelia  - Unasyn 3/9/22  - F/u BCx, UCx, Stool Cx  - UA -> decreased WBC from prior & same large LE, no nitrites like before    #Right lower face cellulitis- resolved  - CT maxillofacial with C 2/12: Mild soft tissue inflammation suggesting cellulitis of the right lower face  - pt was on cefepime, vancomycin (end date 2/15)  - Started on Unasyn 2/16,/c 2/20 started on broad spectrum Antibiotics  - BCx 2/17 -ve, repeat -ve     #Hx NSTEMI   #Hx of Elevated Troponin  - in setting of hypotension   - Normal Lexiscan at [facility]    #Hx of ETOH abuse  #Possible Thiamine deficiency   - last drink  6 months ago  - c/w Folic acid and Thiamine    #Acute on chronic HFpEF - active  ECHO with preserved EF, grade 2 diastolic dysfunction  - Increase Lasix to 40mg IVP bid  daily wts, I's and O's  stable on room air                                                                              ----------------------------------------------------  # DVT prophylaxis Lovenox (improved Platelets, Patient, PTT, & Hb)    # GI prophylaxis Protonix    # Diet DASH/TLC Pureed + NG feeds Jevity 1.2 -> Monitor for refeeding syndrome    # Activity Score (AM-PAC)    # Code status     # Disposition                                                                              --------------------------------------------------------    # Handoff   - If hypotensive with bradycardia inc levothyroxine to 125mcg q24h

## 2022-03-11 LAB
ALBUMIN SERPL ELPH-MCNC: 2 G/DL — LOW (ref 3.5–5.2)
ALP SERPL-CCNC: 95 U/L — SIGNIFICANT CHANGE UP (ref 30–115)
ALT FLD-CCNC: 28 U/L — SIGNIFICANT CHANGE UP (ref 0–41)
ANION GAP SERPL CALC-SCNC: 11 MMOL/L — SIGNIFICANT CHANGE UP (ref 7–14)
AST SERPL-CCNC: 21 U/L — SIGNIFICANT CHANGE UP (ref 0–41)
BASOPHILS # BLD AUTO: 0.04 K/UL — SIGNIFICANT CHANGE UP (ref 0–0.2)
BASOPHILS NFR BLD AUTO: 0.4 % — SIGNIFICANT CHANGE UP (ref 0–1)
BILIRUB SERPL-MCNC: 0.5 MG/DL — SIGNIFICANT CHANGE UP (ref 0.2–1.2)
BUN SERPL-MCNC: 6 MG/DL — LOW (ref 10–20)
CALCIUM SERPL-MCNC: 7.7 MG/DL — LOW (ref 8.5–10.1)
CHLORIDE SERPL-SCNC: 109 MMOL/L — SIGNIFICANT CHANGE UP (ref 98–110)
CO2 SERPL-SCNC: 25 MMOL/L — SIGNIFICANT CHANGE UP (ref 17–32)
CREAT SERPL-MCNC: 0.6 MG/DL — LOW (ref 0.7–1.5)
CULTURE RESULTS: SIGNIFICANT CHANGE UP
EGFR: 103 ML/MIN/1.73M2 — SIGNIFICANT CHANGE UP
EOSINOPHIL # BLD AUTO: 0.07 K/UL — SIGNIFICANT CHANGE UP (ref 0–0.7)
EOSINOPHIL NFR BLD AUTO: 0.7 % — SIGNIFICANT CHANGE UP (ref 0–8)
FERRITIN SERPL-MCNC: 1231 NG/ML — HIGH (ref 15–150)
GLUCOSE BLDC GLUCOMTR-MCNC: 76 MG/DL — SIGNIFICANT CHANGE UP (ref 70–99)
GLUCOSE SERPL-MCNC: 58 MG/DL — LOW (ref 70–99)
HCT VFR BLD CALC: 27.3 % — LOW (ref 37–47)
HGB BLD-MCNC: 9.1 G/DL — LOW (ref 12–16)
IMM GRANULOCYTES NFR BLD AUTO: 0.4 % — HIGH (ref 0.1–0.3)
LYMPHOCYTES # BLD AUTO: 1.26 K/UL — SIGNIFICANT CHANGE UP (ref 1.2–3.4)
LYMPHOCYTES # BLD AUTO: 13.1 % — LOW (ref 20.5–51.1)
MAGNESIUM SERPL-MCNC: 1.7 MG/DL — LOW (ref 1.8–2.4)
MCHC RBC-ENTMCNC: 31 PG — SIGNIFICANT CHANGE UP (ref 27–31)
MCHC RBC-ENTMCNC: 33.3 G/DL — SIGNIFICANT CHANGE UP (ref 32–37)
MCV RBC AUTO: 92.9 FL — SIGNIFICANT CHANGE UP (ref 81–99)
MONOCYTES # BLD AUTO: 0.67 K/UL — HIGH (ref 0.1–0.6)
MONOCYTES NFR BLD AUTO: 6.9 % — SIGNIFICANT CHANGE UP (ref 1.7–9.3)
NEUTROPHILS # BLD AUTO: 7.57 K/UL — HIGH (ref 1.4–6.5)
NEUTROPHILS NFR BLD AUTO: 78.5 % — HIGH (ref 42.2–75.2)
NICOTINAMIDE: 29.3 NG/ML — SIGNIFICANT CHANGE UP (ref 5.2–72.1)
NICOTINIC ACID: <5 NG/ML — SIGNIFICANT CHANGE UP (ref 0–5)
NRBC # BLD: 0 /100 WBCS — SIGNIFICANT CHANGE UP (ref 0–0)
PHOSPHATE SERPL-MCNC: 3.8 MG/DL — SIGNIFICANT CHANGE UP (ref 2.1–4.9)
PLATELET # BLD AUTO: 179 K/UL — SIGNIFICANT CHANGE UP (ref 130–400)
POTASSIUM SERPL-MCNC: 3.8 MMOL/L — SIGNIFICANT CHANGE UP (ref 3.5–5)
POTASSIUM SERPL-SCNC: 3.8 MMOL/L — SIGNIFICANT CHANGE UP (ref 3.5–5)
PROT SERPL-MCNC: 6 G/DL — SIGNIFICANT CHANGE UP (ref 6–8)
RBC # BLD: 2.94 M/UL — LOW (ref 4.2–5.4)
RBC # FLD: 17.2 % — HIGH (ref 11.5–14.5)
SODIUM SERPL-SCNC: 145 MMOL/L — SIGNIFICANT CHANGE UP (ref 135–146)
SPECIMEN SOURCE: SIGNIFICANT CHANGE UP
WBC # BLD: 9.65 K/UL — SIGNIFICANT CHANGE UP (ref 4.8–10.8)
WBC # FLD AUTO: 9.65 K/UL — SIGNIFICANT CHANGE UP (ref 4.8–10.8)

## 2022-03-11 PROCEDURE — 93010 ELECTROCARDIOGRAM REPORT: CPT

## 2022-03-11 PROCEDURE — 99233 SBSQ HOSP IP/OBS HIGH 50: CPT

## 2022-03-11 PROCEDURE — 71045 X-RAY EXAM CHEST 1 VIEW: CPT | Mod: 26

## 2022-03-11 RX ORDER — MAGNESIUM SULFATE 500 MG/ML
2 VIAL (ML) INJECTION ONCE
Refills: 0 | Status: COMPLETED | OUTPATIENT
Start: 2022-03-11 | End: 2022-03-11

## 2022-03-11 RX ORDER — SODIUM CHLORIDE 9 MG/ML
1000 INJECTION, SOLUTION INTRAVENOUS
Refills: 0 | Status: DISCONTINUED | OUTPATIENT
Start: 2022-03-11 | End: 2022-03-12

## 2022-03-11 RX ORDER — BENZOCAINE 10 %
2 GEL (GRAM) MUCOUS MEMBRANE
Refills: 0 | Status: DISCONTINUED | OUTPATIENT
Start: 2022-03-11 | End: 2022-03-26

## 2022-03-11 RX ORDER — IBUPROFEN 200 MG
400 TABLET ORAL EVERY 6 HOURS
Refills: 0 | Status: DISCONTINUED | OUTPATIENT
Start: 2022-03-11 | End: 2022-03-26

## 2022-03-11 RX ORDER — ACETAMINOPHEN 500 MG
650 TABLET ORAL EVERY 6 HOURS
Refills: 0 | Status: DISCONTINUED | OUTPATIENT
Start: 2022-03-11 | End: 2022-03-26

## 2022-03-11 RX ADMIN — Medication 2 SPRAY(S): at 17:12

## 2022-03-11 RX ADMIN — ENOXAPARIN SODIUM 60 MILLIGRAM(S): 100 INJECTION SUBCUTANEOUS at 17:13

## 2022-03-11 RX ADMIN — PANTOPRAZOLE SODIUM 40 MILLIGRAM(S): 20 TABLET, DELAYED RELEASE ORAL at 05:49

## 2022-03-11 RX ADMIN — ENOXAPARIN SODIUM 60 MILLIGRAM(S): 100 INJECTION SUBCUTANEOUS at 05:49

## 2022-03-11 RX ADMIN — PANTOPRAZOLE SODIUM 40 MILLIGRAM(S): 20 TABLET, DELAYED RELEASE ORAL at 17:11

## 2022-03-11 RX ADMIN — Medication 1 TABLET(S): at 11:55

## 2022-03-11 RX ADMIN — NYSTATIN CREAM 1 APPLICATION(S): 100000 CREAM TOPICAL at 05:48

## 2022-03-11 RX ADMIN — NYSTATIN CREAM 1 APPLICATION(S): 100000 CREAM TOPICAL at 17:12

## 2022-03-11 RX ADMIN — Medication 2 SPRAY(S): at 11:58

## 2022-03-11 RX ADMIN — Medication 12.5 GRAM(S): at 11:55

## 2022-03-11 RX ADMIN — SODIUM CHLORIDE 50 MILLILITER(S): 9 INJECTION, SOLUTION INTRAVENOUS at 17:42

## 2022-03-11 RX ADMIN — Medication 40 MILLIGRAM(S): at 05:49

## 2022-03-11 RX ADMIN — Medication 2 SPRAY(S): at 14:19

## 2022-03-11 RX ADMIN — Medication 50 MICROGRAM(S): at 22:21

## 2022-03-11 RX ADMIN — Medication 1 APPLICATION(S): at 05:48

## 2022-03-11 RX ADMIN — Medication 12.5 GRAM(S): at 17:11

## 2022-03-11 RX ADMIN — Medication 1 MILLIGRAM(S): at 11:55

## 2022-03-11 RX ADMIN — Medication 2000 UNIT(S): at 11:57

## 2022-03-11 RX ADMIN — CHLORHEXIDINE GLUCONATE 1 APPLICATION(S): 213 SOLUTION TOPICAL at 11:57

## 2022-03-11 RX ADMIN — Medication 100 MILLIGRAM(S): at 11:55

## 2022-03-11 RX ADMIN — Medication 2 SPRAY(S): at 23:19

## 2022-03-11 RX ADMIN — Medication 40 MILLIGRAM(S): at 17:12

## 2022-03-11 RX ADMIN — Medication 1 APPLICATION(S): at 13:55

## 2022-03-11 RX ADMIN — Medication 1 APPLICATION(S): at 17:12

## 2022-03-11 NOTE — PROGRESS NOTE ADULT - ASSESSMENT
61F PMH GIB, Bowel and Bladder incontinence, Smoking (3-4 cigarettes/day), ETOH abuse, hypothyroidism, and ongoing anemia, admitted to [facility] 1/31 for ams found to have UTI. Dev GIB, colonoscopy showing polyps, transferred to [facility] for polypectomy upgraded to CCU for hypotension, likely septic shock 2/2 aspiration pna.     #AMS with unclear etiology     - Pt upgraded from floor on 2/20 after RRT for hypotension and desaturation. RIJ CVC placed, started on levo, venti mask, o2 and hypotension subsequently improved   - CTH 2/20: no ICH, mass effect, or midline shift   - EEG 2/20: generalized slowing    - UCx 2/18 negative   - BCx 2/17, 2/19: NGTD  - COVID negative 2/20  - MRSA nares 2/20 negative  - LE duplex 2/21: no DVT however b/l peroneal veins not visualized   - ammonia elevated to 59 on 2/22, started on lactulose, possibly contributing to ams -> 42 (3/1/22)  - s.p aztreonam 2g q8h (stopped 3/4/22)  - s.p clinda 600mg q8h (stopped 3/4/22)  - MR Brain for evaluation of AMS    #Colonic Polyp  #Diarrhea in setting of recent abx use  #Hx of GIB   #Pneumoretroperitoneum   #Pancolitis   s/p colonoscopy with polypectomy 2/18  ct abd without free air but showed pancolitis  pt now passed for pureed diet with thin liquids  1 to 1 feeds, monitor FS  nutrition and S&S f/u  Bx -> Adenocarcinoma w/ lymphovascular invasion  - Colonoscopy 2/4: 1 large 3-4 cm sigmoid pedunculated polyp and 2 polyps (7, 14 mm) in descending colon s/p bx  - Negative for C-Diff infection, negative GI PCR  - CEA elevated 5.6  - S/P colonoscopy and polypectomy 2/18/2022. Polyp (13 mm) in the descending colon. (hot snare Polypectomy).  Polyp (25 mm to 30 mm) in the rectosigmoid junction at 20 cm from the anal verge. (Endoloop, Polypectomy, Endoclip).   - Bx -> Adenocarcinoma with lymphovascular invasion  - CTAP PO/IV contrast 2/21: findings c/w pancolitis, no intra or retroperitoneal free air  - CT Chest w/ IV contrast for staging -> small- moderate B/L Pleural effusions; Right IJ DVT  - CT Abdomen Pelvis w/ IV contrast for evaluation of pancolitis -> resolved colitis, increased anasarca, cholelithiasis, T12 chronic fracture, T6 fracture  - Monitor Diarrhea -> 5/24hrs on 3/9-3/10 -> Check C diff. GI PCR, & Horn stain -> -ve  - Check feeds choice if infectious etiology ruled out    #Cholelithiasis  - Downtrending AST/ALT/ALP, within normal limits  - Asymptomatic  - Monitor for now    #T6/T12 Fracture  - Monitor for pain    #Exfoliative skin lesions- possibly externally induced (e.g scratching), r/o TEN given vesicles and desquamation   #+ve C-ANCA  evaluated by Burn, rheumatology and dermatology -> unlikely vasculitis  on steroid cream  Sent PR4/MPO antibody assay, will repeat c-anca once acute illness resolves.  - pt with superficial scabs diffusely over body, most prominent over chest, as well as areas of desquamation over arms, legs, and hips   - Burn recs appreciated; dx with incontinence dermatitis    - derm recs appreciated, recalled burn for skin bx (done 2/28), f/u path   - rheum recs appreciated, no clear evidence for ANCA associated vasculitis could be elevated due to sepsis. Agree with skin bx. Sent PR4/MPO antibody assay, will rpt c-anca once acute illness resolves.  - arsenic negative   - s/p skin bx by burn 2/28 -> Psoriasiform dermatitis with suppurative scale crust The differential diagnosis includes infection, drug eruption, viral exanthem, irritant contact dermatitis, and psoriasis.  - Parakeratosis with hyperpigmentation, hypergranulosis, focal necrotic keratinocytes, mild perivascular lymphohistiocytic infiltrate, dermal melanophages, focal extravasated erythrocytes and telangiectatic vessels. The differential diagnosis includes a drug eruption and viral exanthem. Resolving Woo-Yash syndrome is less likely. No fungal hyphae seen    #Prolonged qtc  - active  - qtc 613 initially -> 492 (3/10) -> 526 (3/11)  - repleting mg PRN, keep Mg>2, K>4.   - on no qtc prolonging medications   - arsenic levels negative   - daily EKG   - continue to monitor     #Normocytic anemia   #Acute thrombocytopenia- resolved   #Elevated INR/PTT not on a/c- resolved   #presumed hemolytic anemia, with coagulopathy, possible DIC - resolved  completed prednisone taper per HemOnc recommendation  daily CBC  - cindy positive, possibly due to transfusion (not documented but likely received in [facility])  - HIV, hep B negative   - monitor INR     #Hypotension - monitoring  unclear etiology, possible septic shock  ct abd with pancolitis - resolved  s/p course of abx  BP now stable  - Changed midodrine 5mg TID to PRN 3/10/22  - off levophed since 3/2    #Hypernatremia- improving   - c/w free water - now on pureed diet with thin liquids    #Hypothyroidism - active, improved  - TSH 15.6  - C/w synthroid IV 50 mcg q24 (increased form 25mcg) [was not taking PO]  - Patient will need to repeat TSH and FT4 in 6 wks (End of March)  - If hypotensive with bradycardia inc levothyroxine to 125mcg PO q24h (adjust IV dose if taking IV)    #Hx of UTI- resolved   - Suspected repeat UTI -> Repeat UA (3/9)  - D/C Tapia -> passed ToV    # Leukocytosis  # Neutrophilia  - Unasyn 3/9/22-3/10/22  - F/u BCx  - UA -> decreased WBC from prior & same large LE, no nitrites like before  - UCx -ve  - Stool studies -ve    #Right lower face cellulitis- resolved  - CT maxillofacial with C 2/12: Mild soft tissue inflammation suggesting cellulitis of the right lower face  - pt was on cefepime, vancomycin (end date 2/15)  - Started on Unasyn 2/16,/c 2/20 started on broad spectrum Antibiotics  - BCx 2/17 -ve, repeat -ve     #Hx NSTEMI   #Hx of Elevated Troponin  - in setting of hypotension   - Normal Lexiscan at [facility]    #Hx of ETOH abuse  #Possible Thiamine deficiency   - last drink  6 months ago  - c/w Folic acid and Thiamine    #Acute on chronic HFpEF - active  ECHO with preserved EF, grade 2 diastolic dysfunction  - Increased Lasix to 40mg IVP bid  daily wts, I's and O's  stable on room air                                                                              ----------------------------------------------------  # DVT prophylaxis Lovenox (improved Platelets, Patient, PTT, & Hb)    # GI prophylaxis Protonix    # Diet DASH/TLC Pureed  -> Patient removed NG, attempt oral feeds    # Activity Score (AM-PAC)    # Code status     # Disposition                                                                              --------------------------------------------------------    # Handoff   - If hypotensive with bradycardia inc levothyroxine to 125mcg q24h

## 2022-03-11 NOTE — PROGRESS NOTE ADULT - SUBJECTIVE AND OBJECTIVE BOX
SANTIAGO CALIXTO 61y Female  MRN#: [id number]   CODE STATUS:________    Hospital Day: 23d    Pt is currently admitted with the primary diagnosis of     SUBJECTIVE  Hospital Course  Patient is a 62 y/o female with PMHx of GIB, Bowel and Bladder incontinence, Smoking (3-4 cigarettes/day), ETOH abuse, hypothyroidism, and ongoing anemia transferred from [facility] for Polypectomy. Patient was admitted to [facility] on  for AMS, weakness and decreased appetite. She was found to have UTI, NSTEMI, and electrolyte imbalance. During her stay at [facility] she had a colonoscopy done which revealed 1 large 3-4 cm sigmoid pedunculated polyp and 2 polyps (7, 14 mm) in descending colon. Patient was transferred as they are not able to perform large Polypectomies there. She was not able to be discharged as ongoing anemia and concern that the cause are these large polyps.    Patient had a colonoscopy and polypectomy done on 2022. Polyp (13 mm) in the descending colon. (hot snare Polypectomy).  Polyp (25 mm to 30 mm) in the rectosigmoid junction at 20 cm from the anal verge. (Endoloop, Polypectomy, Endoclip). -> Currently Bx result shows adenocarcinoma    After the colonoscopy patient became hypothermic and hypotensive. Over night patient was started to Levo / and warming blanket.    AM patient had RRT for desaturation and hypotension. Patient was placed on 50% O2, patient had good response. Levophed  also started to raise BP. Patient remained altered, not speaking, but remained somewhat awake and just makes grunting sounds. Patient is not following commands. Left central line was placed on 3/2. Spoke to ICU fellow, patient was upgraded to ICU for closer monitoring.     In CCU pt had abdominal tenderness, KUB ordered, concern for RP free air. CTAP with PO/IV contrast ordered, no perforation or pneumo-peritoneum/retroperitoneum however showing findings c/w pancolitis. Surgery following, ID consulted. Pt was on vanc/zosyn (- & - respectively). In CCU, pt developed coagulopathy (low plt, anemia, increasing INR despite not on AC). Treated with pRBC, plt transfusion as well as vitamin K. Heme onc on board, thought to be due to possible DIC 2/2 sepsis. Started on solumedrol 40mg for positive cindy however possibly positive due to transfusion at [facility]. Plt stabilizing so steroids tapered (completed taper). Derm consulted for diffuse skin rash- bullous lesions with desquamation and areas of healing with hyperpigmentation. Burn consulted for skin bx (done , results pending). C-anca also positive, rhuem consulted, presentation unlikely due to vasculitis, will rpt cindy once acute illness resolves.     3/9: Patient's WBC and PMNs have been up-trending for the past 4 days despite steroids being tapered even before that. Will d/c heredia, take u/a, start Unasyn empirically. Patient not eating well, had hypoglycemia -> NG inserted (consent from son). Polypectomy pathology showing adenocarcinoma -> CT chest w/ IV contrast for staging. Had diarrhea in am -> CT abdomen to f/u pancolitis. AMS -> MR Brain per old neuro note. Spoke w/ son concerning IV contrast and MR contraindications. Son reports patient's mental status to be poor at home as she did not ambulate on her home or answer direct questions.  3/10: Diarrhea 5/24 hrs, started feeds on previous day, will check infectious causes, stopped Unasyn  3/11: 2 BM/ 24 hrs, improved diarrhea consistency as per patient, C diff -ve, GI PCR -ve    Overnight events     Subjective complaints     Present Today:   - Heredia:  No [  ], Yes [   ] : Indication:     - Type of IV Access:       .. CVC/Piccline:  No [  ], Yes [   ] : Indication:       .. Midline: No [  ], Yes [   ] : Indication:                                              OBJECTIVE  PAST MEDICAL & SURGICAL HISTORY                                              ALLERGIES:  No Known Allergies                           HOME MEDICATIONS  Home Medications:                           MEDICATIONS:  STANDING MEDICATIONS  benzocaine 20% Spray 2 Spray(s) Mucosal four times a day  chlorhexidine 4% Liquid 1 Application(s) Topical daily  cholecalciferol 2000 Unit(s) Oral daily  enoxaparin Injectable 60 milliGRAM(s) SubCutaneous every 12 hours  folic acid 1 milliGRAM(s) Oral daily  furosemide   Injectable 40 milliGRAM(s) IV Push two times a day  levothyroxine Injectable 50 MICROGram(s) IV Push at bedtime  magnesium sulfate  IVPB 2 Gram(s) IV Intermittent once  multivitamin/minerals 1 Tablet(s) Oral daily  nystatin Cream 1 Application(s) Topical two times a day  pantoprazole  Injectable 40 milliGRAM(s) IV Push every 12 hours  thiamine 100 milliGRAM(s) Oral daily  triamcinolone 0.1% Cream 1 Application(s) Topical two times a day  vitamin A &amp; D Ointment 1 Application(s) Topical daily    PRN MEDICATIONS  midodrine. 5 milliGRAM(s) Oral every 8 hours PRN                                            ------------------------------------------------------------  VITAL SIGNS: Last 24 Hours  T(C): 36.7 (11 Mar 2022 05:00), Max: 36.7 (11 Mar 2022 05:00)  T(F): 98 (11 Mar 2022 05:00), Max: 98 (11 Mar 2022 05:00)  HR: 88 (11 Mar 2022 05:00) (88 - 97)  BP: 109/73 (11 Mar 2022 05:00) (109/73 - 125/69)  BP(mean): --  RR: 18 (11 Mar 2022 05:00) (18 - 20)  SpO2: --      03-10-22 @ 07:01  -  - @ 07:00  --------------------------------------------------------  IN: 680 mL / OUT: 0 mL / NET: 680 mL                                               LABS:                        9.5    15.05 )-----------( 226      ( 10 Mar 2022 06:30 )             29.6     03-10    147<H>  |  110  |  6<L>  ----------------------------<  93  3.7   |  26  |  0.5<L>    Ca    8.0<L>      10 Mar 2022 20:21  Phos  3.2     03-10  Mg     1.7     03-10    TPro  5.8<L>  /  Alb  2.1<L>  /  TBili  0.5  /  DBili  x   /  AST  18  /  ALT  33  /  AlkPhos  101  03-10    PT/INR - ( 10 Mar 2022 06:30 )   PT: 13.40 sec;   INR: 1.17 ratio         PTT - ( 10 Mar 2022 06:30 )  PTT:34.2 sec  Urinalysis Basic - ( 09 Mar 2022 16:40 )    Color: Yellow / Appearance: Clear / S.010 / pH: x  Gluc: x / Ketone: Negative  / Bili: Negative / Urobili: <2 mg/dL   Blood: x / Protein: 30 mg/dL / Nitrite: Negative   Leuk Esterase: Large / RBC: 69 /HPF / WBC 16 /HPF   Sq Epi: x / Non Sq Epi: 2 /HPF / Bacteria: Negative              GI PCR Panel, Stool (collected 10 Mar 2022 12:38)  Source: .Stool Feces  Final Report (11 Mar 2022 07:30):    GI PCR Results: NOT detected    *******Please Note:*******    GI panel PCR evaluates for:    Campylobacter, Plesiomonas shigelloides, Salmonella,    Vibrio, Yersinia enterocolitica, Enteroaggregative    Escherichia coli (EAEC), Enteropathogenic E.coli (EPEC),    Enterotoxigenic E. coli (ETEC) lt/st, Shiga-like    toxin-producing E. coli (STEC) stx1/stx2,    Shigella/ Enteroinvasive E. coli (EIEC), Cryptosporidium,    Cyclospora cayetanensis, Entamoeba histolytica,    Giardia lamblia, Adenovirus F 40/41, Astrovirus,    Norovirus GI/GII, Rotavirus A, Sapovirus    Culture - Urine (collected 09 Mar 2022 16:40)  Source: Clean Catch Clean Catch (Midstream)  Preliminary Report (11 Mar 2022 10:23):    10,000 - 49,000 CFU/mL Yeast    50,000 - 99,000 CFU/mL Gram Positive Cocci in Pairs and Chains    Culture - Blood (collected 09 Mar 2022 11:15)  Source: .Blood None  Preliminary Report (10 Mar 2022 22:01):    No growth to date.                                                    RADIOLOGY:        PHYSICAL EXAM:  GENERAL: NAD, lying in bed comfortably  HEAD:  Atraumatic, Normocephalic  EYES: conjunctiva and sclera clear  ENT: Moist mucous membranes  NECK: Supple  CHEST/LUNG: Soft crackles. Unlabored respirations  HEART: Regular rate and rhythm; No murmurs, rubs, or gallops  ABDOMEN: BSx4; Soft, diffuse nonspecific tenderness, negative [place], nondistended  EXTREMITIES:  No LE edema  NERVOUS SYSTEM:  Alert, not oriented to time, oriented partially to place. oriented to person  SKIN: Multiple lesions and excoriations on the upper and lower extremities and perineal area. No erythema or tenderness

## 2022-03-12 LAB
ALBUMIN SERPL ELPH-MCNC: 2 G/DL — LOW (ref 3.5–5.2)
ALP SERPL-CCNC: 88 U/L — SIGNIFICANT CHANGE UP (ref 30–115)
ALT FLD-CCNC: 25 U/L — SIGNIFICANT CHANGE UP (ref 0–41)
ANION GAP SERPL CALC-SCNC: 10 MMOL/L — SIGNIFICANT CHANGE UP (ref 7–14)
APTT BLD: 50.1 SEC — HIGH (ref 27–39.2)
AST SERPL-CCNC: 20 U/L — SIGNIFICANT CHANGE UP (ref 0–41)
BASOPHILS # BLD AUTO: 0.04 K/UL — SIGNIFICANT CHANGE UP (ref 0–0.2)
BASOPHILS NFR BLD AUTO: 0.7 % — SIGNIFICANT CHANGE UP (ref 0–1)
BILIRUB SERPL-MCNC: 0.4 MG/DL — SIGNIFICANT CHANGE UP (ref 0.2–1.2)
BUN SERPL-MCNC: 5 MG/DL — LOW (ref 10–20)
CALCIUM SERPL-MCNC: 7.8 MG/DL — LOW (ref 8.5–10.1)
CHLORIDE SERPL-SCNC: 107 MMOL/L — SIGNIFICANT CHANGE UP (ref 98–110)
CO2 SERPL-SCNC: 28 MMOL/L — SIGNIFICANT CHANGE UP (ref 17–32)
CREAT SERPL-MCNC: 0.5 MG/DL — LOW (ref 0.7–1.5)
EGFR: 107 ML/MIN/1.73M2 — SIGNIFICANT CHANGE UP
EOSINOPHIL # BLD AUTO: 0.11 K/UL — SIGNIFICANT CHANGE UP (ref 0–0.7)
EOSINOPHIL NFR BLD AUTO: 1.8 % — SIGNIFICANT CHANGE UP (ref 0–8)
GLUCOSE BLDC GLUCOMTR-MCNC: 62 MG/DL — LOW (ref 70–99)
GLUCOSE BLDC GLUCOMTR-MCNC: 73 MG/DL — SIGNIFICANT CHANGE UP (ref 70–99)
GLUCOSE BLDC GLUCOMTR-MCNC: 84 MG/DL — SIGNIFICANT CHANGE UP (ref 70–99)
GLUCOSE BLDC GLUCOMTR-MCNC: 95 MG/DL — SIGNIFICANT CHANGE UP (ref 70–99)
GLUCOSE SERPL-MCNC: 55 MG/DL — LOW (ref 70–99)
HCT VFR BLD CALC: 27.3 % — LOW (ref 37–47)
HGB BLD-MCNC: 9 G/DL — LOW (ref 12–16)
IMM GRANULOCYTES NFR BLD AUTO: 0.3 % — SIGNIFICANT CHANGE UP (ref 0.1–0.3)
INR BLD: 1.46 RATIO — HIGH (ref 0.65–1.3)
LYMPHOCYTES # BLD AUTO: 1.24 K/UL — SIGNIFICANT CHANGE UP (ref 1.2–3.4)
LYMPHOCYTES # BLD AUTO: 20.5 % — SIGNIFICANT CHANGE UP (ref 20.5–51.1)
MAGNESIUM SERPL-MCNC: 1.9 MG/DL — SIGNIFICANT CHANGE UP (ref 1.8–2.4)
MCHC RBC-ENTMCNC: 30.4 PG — SIGNIFICANT CHANGE UP (ref 27–31)
MCHC RBC-ENTMCNC: 33 G/DL — SIGNIFICANT CHANGE UP (ref 32–37)
MCV RBC AUTO: 92.2 FL — SIGNIFICANT CHANGE UP (ref 81–99)
MONOCYTES # BLD AUTO: 0.51 K/UL — SIGNIFICANT CHANGE UP (ref 0.1–0.6)
MONOCYTES NFR BLD AUTO: 8.4 % — SIGNIFICANT CHANGE UP (ref 1.7–9.3)
NEUTROPHILS # BLD AUTO: 4.12 K/UL — SIGNIFICANT CHANGE UP (ref 1.4–6.5)
NEUTROPHILS NFR BLD AUTO: 68.3 % — SIGNIFICANT CHANGE UP (ref 42.2–75.2)
NRBC # BLD: 0 /100 WBCS — SIGNIFICANT CHANGE UP (ref 0–0)
PHOSPHATE SERPL-MCNC: 3.7 MG/DL — SIGNIFICANT CHANGE UP (ref 2.1–4.9)
PLATELET # BLD AUTO: 203 K/UL — SIGNIFICANT CHANGE UP (ref 130–400)
POTASSIUM SERPL-MCNC: 3.9 MMOL/L — SIGNIFICANT CHANGE UP (ref 3.5–5)
POTASSIUM SERPL-SCNC: 3.9 MMOL/L — SIGNIFICANT CHANGE UP (ref 3.5–5)
PROT SERPL-MCNC: 5.5 G/DL — LOW (ref 6–8)
PROTHROM AB SERPL-ACNC: 16.7 SEC — HIGH (ref 9.95–12.87)
RBC # BLD: 2.96 M/UL — LOW (ref 4.2–5.4)
RBC # FLD: 17.2 % — HIGH (ref 11.5–14.5)
SODIUM SERPL-SCNC: 145 MMOL/L — SIGNIFICANT CHANGE UP (ref 135–146)
WBC # BLD: 6.04 K/UL — SIGNIFICANT CHANGE UP (ref 4.8–10.8)
WBC # FLD AUTO: 6.04 K/UL — SIGNIFICANT CHANGE UP (ref 4.8–10.8)

## 2022-03-12 PROCEDURE — 93010 ELECTROCARDIOGRAM REPORT: CPT

## 2022-03-12 PROCEDURE — 71045 X-RAY EXAM CHEST 1 VIEW: CPT | Mod: 26

## 2022-03-12 PROCEDURE — 99233 SBSQ HOSP IP/OBS HIGH 50: CPT

## 2022-03-12 RX ORDER — MIDODRINE HYDROCHLORIDE 2.5 MG/1
10 TABLET ORAL EVERY 8 HOURS
Refills: 0 | Status: DISCONTINUED | OUTPATIENT
Start: 2022-03-12 | End: 2022-03-26

## 2022-03-12 RX ORDER — SODIUM CHLORIDE 9 MG/ML
500 INJECTION, SOLUTION INTRAVENOUS ONCE
Refills: 0 | Status: COMPLETED | OUTPATIENT
Start: 2022-03-12 | End: 2022-03-12

## 2022-03-12 RX ADMIN — SODIUM CHLORIDE 1000 MILLILITER(S): 9 INJECTION, SOLUTION INTRAVENOUS at 12:23

## 2022-03-12 RX ADMIN — ENOXAPARIN SODIUM 60 MILLIGRAM(S): 100 INJECTION SUBCUTANEOUS at 05:40

## 2022-03-12 RX ADMIN — Medication 50 MICROGRAM(S): at 22:30

## 2022-03-12 RX ADMIN — ENOXAPARIN SODIUM 60 MILLIGRAM(S): 100 INJECTION SUBCUTANEOUS at 18:07

## 2022-03-12 RX ADMIN — Medication 40 MILLIGRAM(S): at 05:40

## 2022-03-12 RX ADMIN — Medication 2 SPRAY(S): at 12:24

## 2022-03-12 RX ADMIN — Medication 2000 UNIT(S): at 12:15

## 2022-03-12 RX ADMIN — NYSTATIN CREAM 1 APPLICATION(S): 100000 CREAM TOPICAL at 18:10

## 2022-03-12 RX ADMIN — NYSTATIN CREAM 1 APPLICATION(S): 100000 CREAM TOPICAL at 05:47

## 2022-03-12 RX ADMIN — Medication 1 APPLICATION(S): at 12:18

## 2022-03-12 RX ADMIN — Medication 1 APPLICATION(S): at 21:47

## 2022-03-12 RX ADMIN — Medication 1 TABLET(S): at 12:17

## 2022-03-12 RX ADMIN — PANTOPRAZOLE SODIUM 40 MILLIGRAM(S): 20 TABLET, DELAYED RELEASE ORAL at 05:41

## 2022-03-12 RX ADMIN — Medication 1 APPLICATION(S): at 05:48

## 2022-03-12 RX ADMIN — CHLORHEXIDINE GLUCONATE 1 APPLICATION(S): 213 SOLUTION TOPICAL at 12:16

## 2022-03-12 RX ADMIN — Medication 2 SPRAY(S): at 05:40

## 2022-03-12 RX ADMIN — Medication 2 SPRAY(S): at 22:29

## 2022-03-12 RX ADMIN — Medication 100 MILLIGRAM(S): at 12:16

## 2022-03-12 RX ADMIN — Medication 1 MILLIGRAM(S): at 12:16

## 2022-03-12 RX ADMIN — MIDODRINE HYDROCHLORIDE 10 MILLIGRAM(S): 2.5 TABLET ORAL at 21:47

## 2022-03-12 RX ADMIN — PANTOPRAZOLE SODIUM 40 MILLIGRAM(S): 20 TABLET, DELAYED RELEASE ORAL at 18:07

## 2022-03-12 RX ADMIN — MIDODRINE HYDROCHLORIDE 10 MILLIGRAM(S): 2.5 TABLET ORAL at 13:59

## 2022-03-12 NOTE — PROGRESS NOTE ADULT - SUBJECTIVE AND OBJECTIVE BOX
Pt seen and examined at bedside.         VITAL SIGNS (Last 24 hrs):  T(C): 36.1 (03-12-22 @ 12:04), Max: 36.6 (03-12-22 @ 05:00)  HR: 70 (03-12-22 @ 12:04) (70 - 84)  BP: 84/52 (03-12-22 @ 12:04) (84/52 - 100/66)  RR: 18 (03-12-22 @ 12:04) (18 - 18)  SpO2: 95% (03-12-22 @ 12:04) (95% - 95%)          PHYSICAL EXAM:  GENERAL: NAD   HEAD:  Atraumatic, Normocephalic  EYES:  conjunctiva and sclera clear  NECK: Supple, No JVD  CHEST/LUNG: Clear to auscultation bilaterally; No wheeze  HEART: Regular rate and rhythm; No murmurs, rubs, or gallops  ABDOMEN: Soft, Nontender, Nondistended; Bowel sounds present  EXTREMITIES:  2+ Peripheral Pulses, No clubbing, cyanosis, or edema       Labs Reviewed  Spoke to patient in regards to abnormal labs.    CBC Full  -  ( 12 Mar 2022 07:00 )  WBC Count : 6.04 K/uL  Hemoglobin : 9.0 g/dL  Hematocrit : 27.3 %  Platelet Count - Automated : 203 K/uL  Mean Cell Volume : 92.2 fL  Mean Cell Hemoglobin : 30.4 pg  Mean Cell Hemoglobin Concentration : 33.0 g/dL  Auto Neutrophil # : 4.12 K/uL  Auto Lymphocyte # : 1.24 K/uL  Auto Monocyte # : 0.51 K/uL  Auto Eosinophil # : 0.11 K/uL  Auto Basophil # : 0.04 K/uL  Auto Neutrophil % : 68.3 %  Auto Lymphocyte % : 20.5 %  Auto Monocyte % : 8.4 %  Auto Eosinophil % : 1.8 %  Auto Basophil % : 0.7 %    BMP:    03-12 @ 07:00    Blood Urea Nitrogen - 5  Calcium - 7.8  Carbon Dioxide - 28  Chloride - 107  Creatinine - 0.5  Glucose - 55  Potassium - 3.9  Sodium - 145         PT/INR - ( 12 Mar 2022 07:00 )   PT: 16.70 sec;   INR: 1.46 ratio         PTT - ( 12 Mar 2022 07:00 )  PTT:50.1 sec  Urine Culture:  03-10 @ 12:38 Urine culture: --    Culture Results:   GI PCR Results: NOT detected  *******Please Note:*******  GI panel PCR evaluates for:  Campylobacter, Plesiomonas shigelloides, Salmonella,  Vibrio, Yersinia enterocolitica, Enteroaggregative  Escherichia coli (EAEC), Enteropathogenic E.coli (EPEC),  Enterotoxigenic E. coli (ETEC) lt/st, Shiga-like  toxin-producing E. coli (STEC) stx1/stx2,  Shigella/ Enteroinvasive E. coli (EIEC), Cryptosporidium,  Cyclospora cayetanensis, Entamoeba histolytica,  Giardia lamblia, Adenovirus F 40/41, Astrovirus,  Norovirus GI/GII, Rotavirus A, Sapovirus  Method Type: --  Organism: --  Organism Identification: --  Specimen Source: .Stool Feces  03-09 @ 16:40 Urine culture: --    Culture Results:   10,000 - 49,000 CFU/mL Yeast  50,000 - 99,000 CFU/mL Gram Positive Cocci in Pairs and Chains  Method Type: --  Organism: --  Organism Identification: --  Specimen Source: Clean Catch Clean Catch (Midstream)  03-09 @ 11:15 Urine culture: --    Culture Results:   No growth to date.  Method Type: --  Organism: --  Organism Identification: --  Specimen Source: .Blood None           MEDICATIONS  (STANDING):  benzocaine 20% Spray 2 Spray(s) Mucosal four times a day  chlorhexidine 4% Liquid 1 Application(s) Topical daily  cholecalciferol 2000 Unit(s) Oral daily  enoxaparin Injectable 60 milliGRAM(s) SubCutaneous every 12 hours  folic acid 1 milliGRAM(s) Oral daily  furosemide   Injectable 40 milliGRAM(s) IV Push two times a day  levothyroxine Injectable 50 MICROGram(s) IV Push at bedtime  metolazone 5 milliGRAM(s) Oral daily  multivitamin/minerals 1 Tablet(s) Oral daily  nystatin Cream 1 Application(s) Topical two times a day  pantoprazole  Injectable 40 milliGRAM(s) IV Push every 12 hours  thiamine 100 milliGRAM(s) Oral daily  triamcinolone 0.1% Cream 1 Application(s) Topical two times a day  vitamin A &amp; D Ointment 1 Application(s) Topical daily    MEDICATIONS  (PRN):  acetaminophen     Tablet .. 650 milliGRAM(s) Oral every 6 hours PRN Mild Pain (1 - 3), Moderate Pain (4 - 6)  ibuprofen  Tablet. 400 milliGRAM(s) Oral every 6 hours PRN Severe Pain (7 - 10)  midodrine. 5 milliGRAM(s) Oral every 8 hours PRN SBP<90

## 2022-03-12 NOTE — PROGRESS NOTE ADULT - ASSESSMENT
1. Acute on chronic HFpEF/Bilateral Pleural Effusions   ECHO with preserved EF, grade 2 diastolic dysfunction  on Lasix 40mg BID and metolazone   daily wts, I's and O's  stable on room air    2. Metabolic encephalopathy - improving   Hold Seroquel     3. Colon polyp  s/p colonoscopy with polypectomy 2/18  biopsy showed adenocarcinoma   CT Chest/A/P negative for metastatic disease   Surgery consult once clinical status improves     4. R IJ DVT - c/w therapeutic Lovenox     5. Rash   evaluated by Burn, rheumatology and dermatology  f/u biopsy report - dermatitis vs drug eruption vs viral exanthem vs psoriasiform   on steroid cream    6. Septic shock and acute hypoxemic respiratory failure - resolved     7. Thrombocytopenia and anemia, possible autoimmune hemolytic anemia   presumed hemolytic anemia   s/p prednisone taper per HemOnc recommendation    8. Urinary retention - TOV 3/9         Pending: IV diuresis, PO intake improvement      Will need STR

## 2022-03-13 LAB
-  AMPICILLIN: SIGNIFICANT CHANGE UP
-  CIPROFLOXACIN: SIGNIFICANT CHANGE UP
-  DAPTOMYCIN: SIGNIFICANT CHANGE UP
-  LEVOFLOXACIN: SIGNIFICANT CHANGE UP
-  LINEZOLID: SIGNIFICANT CHANGE UP
-  NITROFURANTOIN: SIGNIFICANT CHANGE UP
-  TETRACYCLINE: SIGNIFICANT CHANGE UP
-  VANCOMYCIN: SIGNIFICANT CHANGE UP
ALBUMIN SERPL ELPH-MCNC: 1.8 G/DL — LOW (ref 3.5–5.2)
ALP SERPL-CCNC: 89 U/L — SIGNIFICANT CHANGE UP (ref 30–115)
ALT FLD-CCNC: 23 U/L — SIGNIFICANT CHANGE UP (ref 0–41)
ANION GAP SERPL CALC-SCNC: 11 MMOL/L — SIGNIFICANT CHANGE UP (ref 7–14)
APTT BLD: 50.1 SEC — HIGH (ref 27–39.2)
AST SERPL-CCNC: 14 U/L — SIGNIFICANT CHANGE UP (ref 0–41)
BASOPHILS # BLD AUTO: 0.05 K/UL — SIGNIFICANT CHANGE UP (ref 0–0.2)
BASOPHILS NFR BLD AUTO: 0.8 % — SIGNIFICANT CHANGE UP (ref 0–1)
BILIRUB SERPL-MCNC: 0.4 MG/DL — SIGNIFICANT CHANGE UP (ref 0.2–1.2)
BUN SERPL-MCNC: 5 MG/DL — LOW (ref 10–20)
CALCIUM SERPL-MCNC: 7.7 MG/DL — LOW (ref 8.5–10.1)
CHLORIDE SERPL-SCNC: 105 MMOL/L — SIGNIFICANT CHANGE UP (ref 98–110)
CO2 SERPL-SCNC: 26 MMOL/L — SIGNIFICANT CHANGE UP (ref 17–32)
CREAT SERPL-MCNC: 0.5 MG/DL — LOW (ref 0.7–1.5)
CULTURE RESULTS: SIGNIFICANT CHANGE UP
EGFR: 107 ML/MIN/1.73M2 — SIGNIFICANT CHANGE UP
EOSINOPHIL # BLD AUTO: 0.1 K/UL — SIGNIFICANT CHANGE UP (ref 0–0.7)
EOSINOPHIL NFR BLD AUTO: 1.6 % — SIGNIFICANT CHANGE UP (ref 0–8)
GLUCOSE BLDC GLUCOMTR-MCNC: 63 MG/DL — LOW (ref 70–99)
GLUCOSE BLDC GLUCOMTR-MCNC: 71 MG/DL — SIGNIFICANT CHANGE UP (ref 70–99)
GLUCOSE BLDC GLUCOMTR-MCNC: 76 MG/DL — SIGNIFICANT CHANGE UP (ref 70–99)
GLUCOSE BLDC GLUCOMTR-MCNC: 85 MG/DL — SIGNIFICANT CHANGE UP (ref 70–99)
GLUCOSE BLDC GLUCOMTR-MCNC: 87 MG/DL — SIGNIFICANT CHANGE UP (ref 70–99)
GLUCOSE SERPL-MCNC: 62 MG/DL — LOW (ref 70–99)
HCT VFR BLD CALC: 27.3 % — LOW (ref 37–47)
HGB BLD-MCNC: 8.9 G/DL — LOW (ref 12–16)
IMM GRANULOCYTES NFR BLD AUTO: 0.3 % — SIGNIFICANT CHANGE UP (ref 0.1–0.3)
INR BLD: 1.43 RATIO — HIGH (ref 0.65–1.3)
LYMPHOCYTES # BLD AUTO: 1.29 K/UL — SIGNIFICANT CHANGE UP (ref 1.2–3.4)
LYMPHOCYTES # BLD AUTO: 20.2 % — LOW (ref 20.5–51.1)
MAGNESIUM SERPL-MCNC: 1.7 MG/DL — LOW (ref 1.8–2.4)
MCHC RBC-ENTMCNC: 30.2 PG — SIGNIFICANT CHANGE UP (ref 27–31)
MCHC RBC-ENTMCNC: 32.6 G/DL — SIGNIFICANT CHANGE UP (ref 32–37)
MCV RBC AUTO: 92.5 FL — SIGNIFICANT CHANGE UP (ref 81–99)
METHOD TYPE: SIGNIFICANT CHANGE UP
MONOCYTES # BLD AUTO: 0.43 K/UL — SIGNIFICANT CHANGE UP (ref 0.1–0.6)
MONOCYTES NFR BLD AUTO: 6.7 % — SIGNIFICANT CHANGE UP (ref 1.7–9.3)
NEUTROPHILS # BLD AUTO: 4.49 K/UL — SIGNIFICANT CHANGE UP (ref 1.4–6.5)
NEUTROPHILS NFR BLD AUTO: 70.4 % — SIGNIFICANT CHANGE UP (ref 42.2–75.2)
NRBC # BLD: 0 /100 WBCS — SIGNIFICANT CHANGE UP (ref 0–0)
ORGANISM # SPEC MICROSCOPIC CNT: SIGNIFICANT CHANGE UP
ORGANISM # SPEC MICROSCOPIC CNT: SIGNIFICANT CHANGE UP
PHOSPHATE SERPL-MCNC: 3.9 MG/DL — SIGNIFICANT CHANGE UP (ref 2.1–4.9)
PLATELET # BLD AUTO: 252 K/UL — SIGNIFICANT CHANGE UP (ref 130–400)
POTASSIUM SERPL-MCNC: 3.3 MMOL/L — LOW (ref 3.5–5)
POTASSIUM SERPL-SCNC: 3.3 MMOL/L — LOW (ref 3.5–5)
PROT SERPL-MCNC: 5.6 G/DL — LOW (ref 6–8)
PROTHROM AB SERPL-ACNC: 16.4 SEC — HIGH (ref 9.95–12.87)
RBC # BLD: 2.95 M/UL — LOW (ref 4.2–5.4)
RBC # FLD: 16.8 % — HIGH (ref 11.5–14.5)
SODIUM SERPL-SCNC: 142 MMOL/L — SIGNIFICANT CHANGE UP (ref 135–146)
SPECIMEN SOURCE: SIGNIFICANT CHANGE UP
WBC # BLD: 6.38 K/UL — SIGNIFICANT CHANGE UP (ref 4.8–10.8)
WBC # FLD AUTO: 6.38 K/UL — SIGNIFICANT CHANGE UP (ref 4.8–10.8)

## 2022-03-13 PROCEDURE — 99233 SBSQ HOSP IP/OBS HIGH 50: CPT

## 2022-03-13 PROCEDURE — 93010 ELECTROCARDIOGRAM REPORT: CPT

## 2022-03-13 PROCEDURE — 71045 X-RAY EXAM CHEST 1 VIEW: CPT | Mod: 26

## 2022-03-13 RX ORDER — MAGNESIUM SULFATE 500 MG/ML
2 VIAL (ML) INJECTION ONCE
Refills: 0 | Status: COMPLETED | OUTPATIENT
Start: 2022-03-13 | End: 2022-03-13

## 2022-03-13 RX ORDER — MAGNESIUM OXIDE 400 MG ORAL TABLET 241.3 MG
400 TABLET ORAL
Refills: 0 | Status: DISCONTINUED | OUTPATIENT
Start: 2022-03-13 | End: 2022-03-26

## 2022-03-13 RX ORDER — PANTOPRAZOLE SODIUM 20 MG/1
40 TABLET, DELAYED RELEASE ORAL
Refills: 0 | Status: DISCONTINUED | OUTPATIENT
Start: 2022-03-13 | End: 2022-03-18

## 2022-03-13 RX ORDER — POTASSIUM CHLORIDE 20 MEQ
40 PACKET (EA) ORAL ONCE
Refills: 0 | Status: COMPLETED | OUTPATIENT
Start: 2022-03-13 | End: 2022-03-13

## 2022-03-13 RX ORDER — LEVOTHYROXINE SODIUM 125 MCG
100 TABLET ORAL DAILY
Refills: 0 | Status: DISCONTINUED | OUTPATIENT
Start: 2022-03-13 | End: 2022-03-15

## 2022-03-13 RX ADMIN — MAGNESIUM OXIDE 400 MG ORAL TABLET 400 MILLIGRAM(S): 241.3 TABLET ORAL at 17:55

## 2022-03-13 RX ADMIN — NYSTATIN CREAM 1 APPLICATION(S): 100000 CREAM TOPICAL at 05:40

## 2022-03-13 RX ADMIN — MIDODRINE HYDROCHLORIDE 10 MILLIGRAM(S): 2.5 TABLET ORAL at 13:05

## 2022-03-13 RX ADMIN — Medication 100 MILLIGRAM(S): at 12:59

## 2022-03-13 RX ADMIN — Medication 2 SPRAY(S): at 12:57

## 2022-03-13 RX ADMIN — Medication 1 APPLICATION(S): at 05:40

## 2022-03-13 RX ADMIN — PANTOPRAZOLE SODIUM 40 MILLIGRAM(S): 20 TABLET, DELAYED RELEASE ORAL at 05:41

## 2022-03-13 RX ADMIN — Medication 12.5 GRAM(S): at 12:56

## 2022-03-13 RX ADMIN — Medication 40 MILLIEQUIVALENT(S): at 15:47

## 2022-03-13 RX ADMIN — CHLORHEXIDINE GLUCONATE 1 APPLICATION(S): 213 SOLUTION TOPICAL at 12:57

## 2022-03-13 RX ADMIN — MIDODRINE HYDROCHLORIDE 10 MILLIGRAM(S): 2.5 TABLET ORAL at 05:41

## 2022-03-13 RX ADMIN — Medication 1 APPLICATION(S): at 17:56

## 2022-03-13 RX ADMIN — NYSTATIN CREAM 1 APPLICATION(S): 100000 CREAM TOPICAL at 17:56

## 2022-03-13 RX ADMIN — MIDODRINE HYDROCHLORIDE 10 MILLIGRAM(S): 2.5 TABLET ORAL at 21:18

## 2022-03-13 RX ADMIN — Medication 12.5 GRAM(S): at 15:46

## 2022-03-13 RX ADMIN — ENOXAPARIN SODIUM 60 MILLIGRAM(S): 100 INJECTION SUBCUTANEOUS at 05:41

## 2022-03-13 RX ADMIN — Medication 2 SPRAY(S): at 05:40

## 2022-03-13 RX ADMIN — Medication 40 MILLIGRAM(S): at 17:55

## 2022-03-13 RX ADMIN — Medication 1 TABLET(S): at 12:57

## 2022-03-13 RX ADMIN — Medication 1 APPLICATION(S): at 13:05

## 2022-03-13 RX ADMIN — Medication 1 MILLIGRAM(S): at 12:58

## 2022-03-13 RX ADMIN — ENOXAPARIN SODIUM 60 MILLIGRAM(S): 100 INJECTION SUBCUTANEOUS at 17:54

## 2022-03-13 RX ADMIN — Medication 2 SPRAY(S): at 17:58

## 2022-03-13 RX ADMIN — Medication 2000 UNIT(S): at 12:58

## 2022-03-13 NOTE — PROGRESS NOTE ADULT - ASSESSMENT
61F PMH GIB, Bowel and Bladder incontinence, Smoking (3-4 cigarettes/day), ETOH abuse, hypothyroidism, and ongoing anemia, admitted to [facility] 1/31 for ams found to have UTI. Dev GIB, colonoscopy showing polyps, transferred to [facility] for polypectomy upgraded to CCU for hypotension, likely septic shock 2/2 aspiration pna.     #AMS with unclear etiology     - Pt upgraded from floor on 2/20 after RRT for hypotension and desaturation. RIJ CVC placed, started on levo, venti mask, o2 and hypotension subsequently improved   - CTH 2/20: no ICH, mass effect, or midline shift   - EEG 2/20: generalized slowing    - UCx 2/18 negative   - BCx 2/17, 2/19: NGTD  - COVID negative 2/20  - MRSA nares 2/20 negative  - LE duplex 2/21: no DVT however b/l peroneal veins not visualized   - ammonia elevated to 59 on 2/22, started on lactulose, possibly contributing to ams -> 42 (3/1/22)  - s.p aztreonam 2g q8h (stopped 3/4/22)  - s.p clinda 600mg q8h (stopped 3/4/22)    # Malnutrition  - Calorie count x 3 d, started 3/11/22  - f/u hypoglycemia  - Consider PEG tube if failed calorie count  - start standing Magnesium Oxide bid    #Colonic Polyp  #Diarrhea in setting of recent abx use  #Hx of GIB   #Pneumoretroperitoneum   #Pancolitis   s/p colonoscopy with polypectomy 2/18  ct abd without free air but showed pancolitis  pt now passed for pureed diet with thin liquids  1 to 1 feeds, monitor FS  nutrition and S&S f/u  Bx -> Adenocarcinoma w/ lymphovascular invasion  - Colonoscopy 2/4: 1 large 3-4 cm sigmoid pedunculated polyp and 2 polyps (7, 14 mm) in descending colon s/p bx  - Negative for C-Diff infection, negative GI PCR  - CEA elevated 5.6  - S/P colonoscopy and polypectomy 2/18/2022. Polyp (13 mm) in the descending colon. (hot snare Polypectomy).  Polyp (25 mm to 30 mm) in the rectosigmoid junction at 20 cm from the anal verge. (Endoloop, Polypectomy, Endoclip).   - Bx -> Adenocarcinoma with lymphovascular invasion  - CTAP PO/IV contrast 2/21: findings c/w pancolitis, no intra or retroperitoneal free air  - CT Chest w/ IV contrast for staging -> small- moderate B/L Pleural effusions; Right IJ DVT  - CT Abdomen Pelvis w/ IV contrast for evaluation of pancolitis -> resolved colitis, increased anasarca, cholelithiasis, T12 chronic fracture, T6 fracture  - Monitor Diarrhea -> 5/24hrs on 3/9-3/10 -> Check C diff. GI PCR, & Horn stain -> -ve    #Cholelithiasis  - Downtrending AST/ALT/ALP, within normal limits  - Asymptomatic  - Monitor for now    #T6/T12 Fracture  - Monitor for pain    #Exfoliative skin lesions- possibly externally induced (e.g scratching), r/o TEN given vesicles and desquamation   #+ve C-ANCA  evaluated by Burn, rheumatology and dermatology -> unlikely vasculitis  on steroid cream  Sent PR4/MPO antibody assay, will repeat c-anca once acute illness resolves.  - pt with superficial scabs diffusely over body, most prominent over chest, as well as areas of desquamation over arms, legs, and hips   - Burn recs appreciated; dx with incontinence dermatitis    - derm recs appreciated, recalled burn for skin bx (done 2/28), f/u path   - rheum recs appreciated, no clear evidence for ANCA associated vasculitis could be elevated due to sepsis. Agree with skin bx. Sent PR4/MPO antibody assay, will rpt c-anca once acute illness resolves.  - arsenic negative   - s/p skin bx by burn 2/28 -> Psoriasiform dermatitis with suppurative scale crust The differential diagnosis includes infection, drug eruption, viral exanthem, irritant contact dermatitis, and psoriasis.  - Parakeratosis with hyperpigmentation, hypergranulosis, focal necrotic keratinocytes, mild perivascular lymphohistiocytic infiltrate, dermal melanophages, focal extravasated erythrocytes and telangiectatic vessels. The differential diagnosis includes a drug eruption and viral exanthem. Resolving Woo-Yash syndrome is less likely. No fungal hyphae seen    #Prolonged qtc  - active  - qtc 613 initially -> 492 (3/10) -> 526 (3/11) -> 542 (3/13)  - repleting mg PRN, keep Mg>2, K>4.   - on no qtc prolonging medications   - arsenic levels negative   - daily EKG  - continue to monitor     #Normocytic anemia   #Acute thrombocytopenia- resolved   #Elevated INR/PTT not on a/c- resolved   #presumed hemolytic anemia, with coagulopathy, possible DIC - resolved  completed prednisone taper per HemOnc recommendation  daily CBC  - cindy positive, possibly due to transfusion (not documented but likely received in [facility])  - HIV, hep B negative   - monitor INR     #Hypotension - monitoring  unclear etiology, possible septic shock  ct abd with pancolitis - resolved  s/p course of abx  BP now stable  - Changed midodrine 5mg TID to standing 3/12/22  - off levophed since 3/2    #Hypernatremia- improving   - c/w free water - now on pureed diet with thin liquids    #Hypothyroidism - active, improved  - TSH 15.6  - C/w synthroid IV 50 mcg q24 (increased form 25mcg) [was not taking PO]  - Patient will need to repeat TSH and FT4 in 6 wks (End of March)  - If hypotensive with bradycardia inc levothyroxine to 125mcg PO q24h (adjust IV dose if taking IV)    #Hx of UTI- resolved   - Suspected repeat UTI -> Repeat UA (3/9) -> stable  - D/C Tapia -> passed ToV    # Leukocytosis - resolved  # Neutrophilia - resolved  - Unasyn 3/9/22-3/10/22  - F/u BCx -> NGTD  - UA -> decreased WBC from prior & same large LE, no nitrites like before  - UCx -ve  - Stool studies -ve    #Right lower face cellulitis- resolved  - CT maxillofacial with C 2/12: Mild soft tissue inflammation suggesting cellulitis of the right lower face  - pt was on cefepime, vancomycin (end date 2/15)  - Started on Unasyn 2/16,/c 2/20 started on broad spectrum Antibiotics  - BCx 2/17 -ve, repeat -ve     #Hx NSTEMI   #Hx of Elevated Troponin  - in setting of hypotension   - Normal Lexiscan at [facility]    #Hx of ETOH abuse  #Possible Thiamine deficiency   - last drink  6 months ago  - c/w Folic acid and Thiamine    #Acute on chronic HFpEF - active  ECHO with preserved EF, grade 2 diastolic dysfunction  - Increased Lasix to 40mg IVP bid, improving CXR  daily wts, I's and O's  stable on room air                                                                              ----------------------------------------------------  # DVT prophylaxis Lovenox (improved Platelets, Patient, PTT, & Hb)    # GI prophylaxis Protonix    # Diet DASH/TLC Pureed  -> Patient removed NG, attempt oral feeds, calorie count    # Activity Score (AM-PAC)    # Code status     # Disposition                                                                              --------------------------------------------------------    # Handoff   - If hypotensive with bradycardia inc levothyroxine to 125mcg q24h

## 2022-03-13 NOTE — PROGRESS NOTE ADULT - SUBJECTIVE AND OBJECTIVE BOX
Pt seen and examined at bedside.    VITAL SIGNS (Last 24 hrs):  T(C): 36.4 (03-13-22 @ 05:00), Max: 36.4 (03-12-22 @ 20:54)  HR: 80 (03-13-22 @ 05:00) (74 - 80)  BP: 95/56 (03-13-22 @ 05:00) (91/55 - 96/52)  RR: 18 (03-13-22 @ 05:00) (17 - 18)  SpO2: 96% (03-12-22 @ 17:56) (96% - 96%)        I&O's Summary      PHYSICAL EXAM:  GENERAL: NAD  HEAD:  Atraumatic, Normocephalic  EYES: conjunctiva and sclera clear  NECK: Supple, No JVD  CHEST/LUNG: Clear to auscultation bilaterally; No wheeze  HEART: Regular rate and rhythm; No murmurs, rubs, or gallops  ABDOMEN: Soft, Nontender, Nondistended; Bowel sounds present  EXTREMITIES:  2+ Peripheral Pulses, No clubbing, cyanosis, or edema  SKIN: No rashes or lesions    Labs Reviewed        CBC Full  -  ( 13 Mar 2022 06:00 )  WBC Count : 6.38 K/uL  Hemoglobin : 8.9 g/dL  Hematocrit : 27.3 %  Platelet Count - Automated : 252 K/uL  Mean Cell Volume : 92.5 fL  Mean Cell Hemoglobin : 30.2 pg  Mean Cell Hemoglobin Concentration : 32.6 g/dL  Auto Neutrophil # : 4.49 K/uL  Auto Lymphocyte # : 1.29 K/uL  Auto Monocyte # : 0.43 K/uL  Auto Eosinophil # : 0.10 K/uL  Auto Basophil # : 0.05 K/uL  Auto Neutrophil % : 70.4 %  Auto Lymphocyte % : 20.2 %  Auto Monocyte % : 6.7 %  Auto Eosinophil % : 1.6 %  Auto Basophil % : 0.8 %    BMP:    03-13 @ 06:00    Blood Urea Nitrogen - 5  Calcium - 7.7  Carbon Dioxide - 26  Chloride - 105  Creatinine - 0.5  Glucose - 62  Potassium - 3.3  Sodium - 142        PT/INR - ( 13 Mar 2022 06:00 )   PT: 16.40 sec;   INR: 1.43 ratio         PTT - ( 13 Mar 2022 06:00 )  PTT:50.1 sec  Urine Culture:  03-10 @ 12:38 Urine culture: --    Culture Results:   GI PCR Results: NOT detected  *******Please Note:*******  GI panel PCR evaluates for:  Campylobacter, Plesiomonas shigelloides, Salmonella,  Vibrio, Yersinia enterocolitica, Enteroaggregative  Escherichia coli (EAEC), Enteropathogenic E.coli (EPEC),  Enterotoxigenic E. coli (ETEC) lt/st, Shiga-like  toxin-producing E. coli (STEC) stx1/stx2,  Shigella/ Enteroinvasive E. coli (EIEC), Cryptosporidium,  Cyclospora cayetanensis, Entamoeba histolytica,  Giardia lamblia, Adenovirus F 40/41, Astrovirus,  Norovirus GI/GII, Rotavirus A, Sapovirus  Method Type: --  Organism: --  Organism Identification: --  Specimen Source: .Stool Feces  03-09 @ 16:40 Urine culture: --    Culture Results:   10,000 - 49,000 CFU/mL Yeast  50,000 - 99,000 CFU/mL Gram Positive Cocci in Pairs and Chains  Method Type: --  Organism: --  Organism Identification: --  Specimen Source: Clean Catch Clean Catch (Midstream)  03-09 @ 11:15 Urine culture: --    Culture Results:   No growth to date.  Method Type: --  Organism: --  Organism Identification: --  Specimen Source: .Blood None        MEDICATIONS  (STANDING):  benzocaine 20% Spray 2 Spray(s) Mucosal four times a day  chlorhexidine 4% Liquid 1 Application(s) Topical daily  cholecalciferol 2000 Unit(s) Oral daily  enoxaparin Injectable 60 milliGRAM(s) SubCutaneous every 12 hours  folic acid 1 milliGRAM(s) Oral daily  furosemide   Injectable 40 milliGRAM(s) IV Push two times a day  levothyroxine Injectable 50 MICROGram(s) IV Push at bedtime  magnesium oxide 400 milliGRAM(s) Oral two times a day with meals  magnesium sulfate  IVPB 2 Gram(s) IV Intermittent once  metolazone 5 milliGRAM(s) Oral daily  midodrine. 10 milliGRAM(s) Oral every 8 hours  multivitamin/minerals 1 Tablet(s) Oral daily  nystatin Cream 1 Application(s) Topical two times a day  pantoprazole  Injectable 40 milliGRAM(s) IV Push every 12 hours  potassium chloride   Powder 40 milliEquivalent(s) Oral once  thiamine 100 milliGRAM(s) Oral daily  triamcinolone 0.1% Cream 1 Application(s) Topical two times a day  vitamin A &amp; D Ointment 1 Application(s) Topical daily    MEDICATIONS  (PRN):  acetaminophen     Tablet .. 650 milliGRAM(s) Oral every 6 hours PRN Mild Pain (1 - 3), Moderate Pain (4 - 6)  ibuprofen  Tablet. 400 milliGRAM(s) Oral every 6 hours PRN Severe Pain (7 - 10)

## 2022-03-13 NOTE — PROGRESS NOTE ADULT - SUBJECTIVE AND OBJECTIVE BOX
SANTIAGO CALIXTO 61y Female  MRN#: [id number]   CODE STATUS:________    Hospital Day: 25d    Pt is currently admitted with the primary diagnosis of     SUBJECTIVE  Hospital Course  Patient is a 60 y/o female with PMHx of GIB, Bowel and Bladder incontinence, Smoking (3-4 cigarettes/day), ETOH abuse, hypothyroidism, and ongoing anemia transferred from [facility] for Polypectomy. Patient was admitted to [facility] on 01/31 for AMS, weakness and decreased appetite. She was found to have UTI, NSTEMI, and electrolyte imbalance. During her stay at [facility] she had a colonoscopy done which revealed 1 large 3-4 cm sigmoid pedunculated polyp and 2 polyps (7, 14 mm) in descending colon. Patient was transferred as they are not able to perform large Polypectomies there. She was not able to be discharged as ongoing anemia and concern that the cause are these large polyps.    Patient had a colonoscopy and polypectomy done on 2/18/2022. Polyp (13 mm) in the descending colon. (hot snare Polypectomy).  Polyp (25 mm to 30 mm) in the rectosigmoid junction at 20 cm from the anal verge. (Endoloop, Polypectomy, Endoclip). -> Currently Bx result shows adenocarcinoma    After the colonoscopy patient became hypothermic and hypotensive. Over night patient was started to Levo 2/20 and warming blanket.   2/20 AM patient had RRT for desaturation and hypotension. Patient was placed on 50% O2, patient had good response. Levophed 2/20 also started to raise BP. Patient remained altered, not speaking, but remained somewhat awake and just makes grunting sounds. Patient is not following commands. Left central line was placed on 3/2. Spoke to ICU fellow, patient was upgraded to ICU for closer monitoring.     In CCU pt had abdominal tenderness, KUB ordered, concern for RP free air. CTAP with PO/IV contrast ordered, no perforation or pneumo-peritoneum/retroperitoneum however showing findings c/w pancolitis. Surgery following, ID consulted. Pt was on vanc/zosyn (2/20-22 & 2/21-23 respectively). In CCU, pt developed coagulopathy (low plt, anemia, increasing INR despite not on AC). Treated with pRBC, plt transfusion as well as vitamin K. Heme onc on board, thought to be due to possible DIC 2/2 sepsis. Started on solumedrol 40mg for positive cindy however possibly positive due to transfusion at [facility]. Plt stabilizing so steroids tapered (completed taper). Derm consulted for diffuse skin rash- bullous lesions with desquamation and areas of healing with hyperpigmentation. Burn consulted for skin bx (done 2/28, results pending). C-anca also positive, rhuem consulted, presentation unlikely due to vasculitis, will rpt cindy once acute illness resolves.     3/9: Patient's WBC and PMNs have been up-trending for the past 4 days despite steroids being tapered even before that. Will d/c heredia, take u/a, start Unasyn empirically. Patient not eating well, had hypoglycemia -> NG inserted (consent from son). Polypectomy pathology showing adenocarcinoma -> CT chest w/ IV contrast for staging. Had diarrhea in am -> CT abdomen to f/u pancolitis. AMS -> MR Brain per old neuro note. Spoke w/ son concerning IV contrast and MR contraindications. Son reports patient's mental status to be poor at home as she did not ambulate on her home or answer direct questions.  3/10: Diarrhea 5/24 hrs, started feeds on previous day, will check infectious causes, stopped Unasyn  3/11: 2 BM/ 24 hrs, improved diarrhea consistency as per patient, C diff -ve, GI PCR -ve  3/13: More interactive, continuing calorie count, had episode of low glucose in am, no AMS. No shortness of breath. Happy her son is coming again    Overnight events     Subjective complaints     Present Today:   - Heredia:  No [  ], Yes [   ] : Indication:     - Type of IV Access:       .. CVC/Piccline:  No [  ], Yes [   ] : Indication:       .. Midline: No [  ], Yes [   ] : Indication:                                              OBJECTIVE  PAST MEDICAL & SURGICAL HISTORY                                              ALLERGIES:  No Known Allergies                           HOME MEDICATIONS  Home Medications:                           MEDICATIONS:  STANDING MEDICATIONS  benzocaine 20% Spray 2 Spray(s) Mucosal four times a day  chlorhexidine 4% Liquid 1 Application(s) Topical daily  cholecalciferol 2000 Unit(s) Oral daily  enoxaparin Injectable 60 milliGRAM(s) SubCutaneous every 12 hours  folic acid 1 milliGRAM(s) Oral daily  furosemide   Injectable 40 milliGRAM(s) IV Push two times a day  levothyroxine 100 MICROGram(s) Oral daily  magnesium oxide 400 milliGRAM(s) Oral two times a day with meals  magnesium sulfate  IVPB 2 Gram(s) IV Intermittent once  metolazone 5 milliGRAM(s) Oral daily  midodrine. 10 milliGRAM(s) Oral every 8 hours  multivitamin/minerals 1 Tablet(s) Oral daily  nystatin Cream 1 Application(s) Topical two times a day  pantoprazole    Tablet 40 milliGRAM(s) Oral before breakfast  potassium chloride   Powder 40 milliEquivalent(s) Oral once  thiamine 100 milliGRAM(s) Oral daily  triamcinolone 0.1% Cream 1 Application(s) Topical two times a day  vitamin A &amp; D Ointment 1 Application(s) Topical daily    PRN MEDICATIONS  acetaminophen     Tablet .. 650 milliGRAM(s) Oral every 6 hours PRN  ibuprofen  Tablet. 400 milliGRAM(s) Oral every 6 hours PRN                                            ------------------------------------------------------------  VITAL SIGNS: Last 24 Hours  T(C): 36.1 (13 Mar 2022 12:40), Max: 36.4 (12 Mar 2022 20:54)  T(F): 97 (13 Mar 2022 12:40), Max: 97.6 (12 Mar 2022 20:54)  HR: 78 (13 Mar 2022 12:40) (74 - 80)  BP: 131/72 (13 Mar 2022 12:40) (91/55 - 131/72)  BP(mean): --  RR: 18 (13 Mar 2022 12:40) (17 - 18)  SpO2: 95% (13 Mar 2022 12:40) (95% - 96%)                                               LABS:                        8.9    6.38  )-----------( 252      ( 13 Mar 2022 06:00 )             27.3     03-13    142  |  105  |  5<L>  ----------------------------<  62<L>  3.3<L>   |  26  |  0.5<L>    Ca    7.7<L>      13 Mar 2022 06:00  Phos  3.9     03-13  Mg     1.7     03-13    TPro  5.6<L>  /  Alb  1.8<L>  /  TBili  0.4  /  DBili  x   /  AST  14  /  ALT  23  /  AlkPhos  89  03-13    PT/INR - ( 13 Mar 2022 06:00 )   PT: 16.40 sec;   INR: 1.43 ratio         PTT - ( 13 Mar 2022 06:00 )  PTT:50.1 sec                                                          RADIOLOGY:        PHYSICAL EXAM:  GENERAL: NAD, lying in bed comfortably  HEAD:  Atraumatic, Normocephalic  EYES: conjunctiva and sclera clear  ENT: Moist mucous membranes  NECK: Supple  CHEST/LUNG: Soft crackles. Unlabored respirations  HEART: Regular rate and rhythm; No murmurs, rubs, or gallops  ABDOMEN: BSx4; Soft, diffuse nonspecific tenderness, negative [place], nondistended  EXTREMITIES:  No LE edema  NERVOUS SYSTEM:  Alert, interactive, not oriented to time, oriented partially to place. oriented to person  SKIN: Multiple lesions and excoriations on the upper and lower extremities and perineal area. No erythema or tenderness

## 2022-03-13 NOTE — PROGRESS NOTE ADULT - ASSESSMENT
1. Acute on chronic HFpEF/Bilateral Pleural Effusions   ECHO with preserved EF, grade 2 diastolic dysfunction  on Lasix 40mg BID and metolazone   daily wts, I's and O's  stable on room air    2. Metabolic encephalopathy - resolved  Hold Seroquel     3. Colon polyp  s/p colonoscopy with polypectomy 2/18  biopsy showed adenocarcinoma   CT Chest/A/P negative for metastatic disease   Surgery consult once clinical status improves     4. R IJ DVT - c/w therapeutic Lovenox     5. Rash   evaluated by Burn, rheumatology and dermatology  f/u biopsy report - dermatitis vs drug eruption vs viral exanthem vs psoriasiform   on steroid cream    6. Septic shock and acute hypoxemic respiratory failure - resolved     7. Thrombocytopenia and anemia, possible autoimmune hemolytic anemia   presumed hemolytic anemia   s/p prednisone taper per HemOnc recommendation    8. Urinary retention - TOV 3/9    9. Poor PO Intake - calorie count          Pending: IV diuresis, PO intake, calorie count      Will need STR

## 2022-03-14 LAB
ALBUMIN SERPL ELPH-MCNC: 2.2 G/DL — LOW (ref 3.5–5.2)
ALP SERPL-CCNC: 99 U/L — SIGNIFICANT CHANGE UP (ref 30–115)
ALT FLD-CCNC: 23 U/L — SIGNIFICANT CHANGE UP (ref 0–41)
ANION GAP SERPL CALC-SCNC: 9 MMOL/L — SIGNIFICANT CHANGE UP (ref 7–14)
APTT BLD: 59.9 SEC — HIGH (ref 27–39.2)
AST SERPL-CCNC: 16 U/L — SIGNIFICANT CHANGE UP (ref 0–41)
BASOPHILS # BLD AUTO: 0.04 K/UL — SIGNIFICANT CHANGE UP (ref 0–0.2)
BASOPHILS NFR BLD AUTO: 0.8 % — SIGNIFICANT CHANGE UP (ref 0–1)
BILIRUB SERPL-MCNC: 0.3 MG/DL — SIGNIFICANT CHANGE UP (ref 0.2–1.2)
BUN SERPL-MCNC: 5 MG/DL — LOW (ref 10–20)
CALCIUM SERPL-MCNC: 8 MG/DL — LOW (ref 8.5–10.1)
CHLORIDE SERPL-SCNC: 103 MMOL/L — SIGNIFICANT CHANGE UP (ref 98–110)
CO2 SERPL-SCNC: 29 MMOL/L — SIGNIFICANT CHANGE UP (ref 17–32)
CREAT SERPL-MCNC: 0.6 MG/DL — LOW (ref 0.7–1.5)
CULTURE RESULTS: SIGNIFICANT CHANGE UP
EGFR: 103 ML/MIN/1.73M2 — SIGNIFICANT CHANGE UP
EOSINOPHIL # BLD AUTO: 0.1 K/UL — SIGNIFICANT CHANGE UP (ref 0–0.7)
EOSINOPHIL NFR BLD AUTO: 1.9 % — SIGNIFICANT CHANGE UP (ref 0–8)
GLUCOSE BLDC GLUCOMTR-MCNC: 53 MG/DL — CRITICAL LOW (ref 70–99)
GLUCOSE BLDC GLUCOMTR-MCNC: 55 MG/DL — LOW (ref 70–99)
GLUCOSE BLDC GLUCOMTR-MCNC: 56 MG/DL — LOW (ref 70–99)
GLUCOSE BLDC GLUCOMTR-MCNC: 58 MG/DL — LOW (ref 70–99)
GLUCOSE BLDC GLUCOMTR-MCNC: 66 MG/DL — LOW (ref 70–99)
GLUCOSE BLDC GLUCOMTR-MCNC: 67 MG/DL — LOW (ref 70–99)
GLUCOSE BLDC GLUCOMTR-MCNC: 73 MG/DL — SIGNIFICANT CHANGE UP (ref 70–99)
GLUCOSE SERPL-MCNC: 48 MG/DL — CRITICAL LOW (ref 70–99)
HCT VFR BLD CALC: 29.1 % — LOW (ref 37–47)
HGB BLD-MCNC: 9.5 G/DL — LOW (ref 12–16)
IMM GRANULOCYTES NFR BLD AUTO: 0.4 % — HIGH (ref 0.1–0.3)
INR BLD: 1.42 RATIO — HIGH (ref 0.65–1.3)
LYMPHOCYTES # BLD AUTO: 1.62 K/UL — SIGNIFICANT CHANGE UP (ref 1.2–3.4)
LYMPHOCYTES # BLD AUTO: 31.6 % — SIGNIFICANT CHANGE UP (ref 20.5–51.1)
MAGNESIUM SERPL-MCNC: 2.3 MG/DL — SIGNIFICANT CHANGE UP (ref 1.8–2.4)
MCHC RBC-ENTMCNC: 30.2 PG — SIGNIFICANT CHANGE UP (ref 27–31)
MCHC RBC-ENTMCNC: 32.6 G/DL — SIGNIFICANT CHANGE UP (ref 32–37)
MCV RBC AUTO: 92.4 FL — SIGNIFICANT CHANGE UP (ref 81–99)
MONOCYTES # BLD AUTO: 0.4 K/UL — SIGNIFICANT CHANGE UP (ref 0.1–0.6)
MONOCYTES NFR BLD AUTO: 7.8 % — SIGNIFICANT CHANGE UP (ref 1.7–9.3)
NEUTROPHILS # BLD AUTO: 2.95 K/UL — SIGNIFICANT CHANGE UP (ref 1.4–6.5)
NEUTROPHILS NFR BLD AUTO: 57.5 % — SIGNIFICANT CHANGE UP (ref 42.2–75.2)
NRBC # BLD: 0 /100 WBCS — SIGNIFICANT CHANGE UP (ref 0–0)
PHOSPHATE SERPL-MCNC: 4.4 MG/DL — SIGNIFICANT CHANGE UP (ref 2.1–4.9)
PLATELET # BLD AUTO: 242 K/UL — SIGNIFICANT CHANGE UP (ref 130–400)
POTASSIUM SERPL-MCNC: 3.6 MMOL/L — SIGNIFICANT CHANGE UP (ref 3.5–5)
POTASSIUM SERPL-SCNC: 3.6 MMOL/L — SIGNIFICANT CHANGE UP (ref 3.5–5)
PROT SERPL-MCNC: 6.1 G/DL — SIGNIFICANT CHANGE UP (ref 6–8)
PROTHROM AB SERPL-ACNC: 16.3 SEC — HIGH (ref 9.95–12.87)
RBC # BLD: 3.15 M/UL — LOW (ref 4.2–5.4)
RBC # FLD: 16.7 % — HIGH (ref 11.5–14.5)
SODIUM SERPL-SCNC: 141 MMOL/L — SIGNIFICANT CHANGE UP (ref 135–146)
SPECIMEN SOURCE: SIGNIFICANT CHANGE UP
WBC # BLD: 5.13 K/UL — SIGNIFICANT CHANGE UP (ref 4.8–10.8)
WBC # FLD AUTO: 5.13 K/UL — SIGNIFICANT CHANGE UP (ref 4.8–10.8)

## 2022-03-14 PROCEDURE — 99232 SBSQ HOSP IP/OBS MODERATE 35: CPT

## 2022-03-14 PROCEDURE — 93010 ELECTROCARDIOGRAM REPORT: CPT

## 2022-03-14 PROCEDURE — 71045 X-RAY EXAM CHEST 1 VIEW: CPT | Mod: 26

## 2022-03-14 PROCEDURE — 99222 1ST HOSP IP/OBS MODERATE 55: CPT

## 2022-03-14 PROCEDURE — 99233 SBSQ HOSP IP/OBS HIGH 50: CPT

## 2022-03-14 RX ORDER — DRONABINOL 2.5 MG
2.5 CAPSULE ORAL
Refills: 0 | Status: DISCONTINUED | OUTPATIENT
Start: 2022-03-14 | End: 2022-03-15

## 2022-03-14 RX ADMIN — Medication 2 SPRAY(S): at 11:07

## 2022-03-14 RX ADMIN — Medication 2.5 MILLIGRAM(S): at 16:41

## 2022-03-14 RX ADMIN — Medication 1 APPLICATION(S): at 05:26

## 2022-03-14 RX ADMIN — MIDODRINE HYDROCHLORIDE 10 MILLIGRAM(S): 2.5 TABLET ORAL at 13:09

## 2022-03-14 RX ADMIN — Medication 2 SPRAY(S): at 00:22

## 2022-03-14 RX ADMIN — ENOXAPARIN SODIUM 60 MILLIGRAM(S): 100 INJECTION SUBCUTANEOUS at 05:22

## 2022-03-14 RX ADMIN — MAGNESIUM OXIDE 400 MG ORAL TABLET 400 MILLIGRAM(S): 241.3 TABLET ORAL at 16:41

## 2022-03-14 RX ADMIN — MIDODRINE HYDROCHLORIDE 10 MILLIGRAM(S): 2.5 TABLET ORAL at 05:21

## 2022-03-14 RX ADMIN — Medication 2000 UNIT(S): at 11:08

## 2022-03-14 RX ADMIN — Medication 2 SPRAY(S): at 05:21

## 2022-03-14 RX ADMIN — Medication 1 MILLIGRAM(S): at 11:07

## 2022-03-14 RX ADMIN — CHLORHEXIDINE GLUCONATE 1 APPLICATION(S): 213 SOLUTION TOPICAL at 11:08

## 2022-03-14 RX ADMIN — NYSTATIN CREAM 1 APPLICATION(S): 100000 CREAM TOPICAL at 17:26

## 2022-03-14 RX ADMIN — Medication 1 APPLICATION(S): at 17:25

## 2022-03-14 RX ADMIN — Medication 100 MILLIGRAM(S): at 11:08

## 2022-03-14 RX ADMIN — ENOXAPARIN SODIUM 60 MILLIGRAM(S): 100 INJECTION SUBCUTANEOUS at 17:25

## 2022-03-14 RX ADMIN — MIDODRINE HYDROCHLORIDE 10 MILLIGRAM(S): 2.5 TABLET ORAL at 22:05

## 2022-03-14 RX ADMIN — Medication 1 TABLET(S): at 11:08

## 2022-03-14 RX ADMIN — Medication 2 SPRAY(S): at 17:25

## 2022-03-14 RX ADMIN — NYSTATIN CREAM 1 APPLICATION(S): 100000 CREAM TOPICAL at 05:26

## 2022-03-14 RX ADMIN — Medication 100 MICROGRAM(S): at 05:21

## 2022-03-14 RX ADMIN — Medication 1 APPLICATION(S): at 11:08

## 2022-03-14 RX ADMIN — PANTOPRAZOLE SODIUM 40 MILLIGRAM(S): 20 TABLET, DELAYED RELEASE ORAL at 06:04

## 2022-03-14 RX ADMIN — MAGNESIUM OXIDE 400 MG ORAL TABLET 400 MILLIGRAM(S): 241.3 TABLET ORAL at 08:31

## 2022-03-14 NOTE — CONSULT NOTE ADULT - SUBJECTIVE AND OBJECTIVE BOX
Gastroenterology Consultation:    Patient is a 61y old  Female who presents with a chief complaint of Polypectomy (13 Mar 2022 14:14)        Admitted on: 02-16-22      HPI:  Pt is a 62 yo F with PMH of GIB, Bowel and Bladder incontinence, Smoking (3-4 cigarettes/day), ETOH abuse, hypothyroidism anemia transferred from [facility] for Polypectomy. Pt is from home living with her son and bedbound at baseline. Pt was admitted to [facility] on 01/31 for AMS, weakness and decreased appetite. She was found to have UTI, NSTEMI, and electrolyte imbalance. During her stay at [facility] pt had colonoscopy done which revealed 1 large 3-4 cm sigmoid pedunculated polyp and 2 polyps (7, 14 mm) in descending colon s/p bx. Pt was transferred to [facility] for polyp removal.  (16 Feb 2022 23:24)        Prior EGD: none    Prior Colonoscopy: < from: Colonoscopy w/ EMR (02.18.22 @ 16:00) >   Polyp (13 mm) in the descending colon. (hot snare Polypectomy).    Polyp (25 mm to 30 mm) in the rectosigmoid junction at 20 cm from the anal  verge. (Endoloop, Polypectomy, Endoclip). The polyp appeared with dysplasia or  malignancy, it was completely removed via Endoscopic Mucosal resection today.    Tattoo was noted around the rectosigmoid junction, adjacent and distal the  rectosigmoid junction polyp.     < end of copied text >        PAST MEDICAL & SURGICAL HISTORY:        FAMILY HISTORY:      Social History:  Tobacco: denies   Alcohol: as above  Drugs: denies    Home Medications:        MEDICATIONS  (STANDING):  benzocaine 20% Spray 2 Spray(s) Mucosal four times a day  chlorhexidine 4% Liquid 1 Application(s) Topical daily  cholecalciferol 2000 Unit(s) Oral daily  enoxaparin Injectable 60 milliGRAM(s) SubCutaneous every 12 hours  folic acid 1 milliGRAM(s) Oral daily  furosemide   Injectable 40 milliGRAM(s) IV Push two times a day  levothyroxine 100 MICROGram(s) Oral daily  magnesium oxide 400 milliGRAM(s) Oral two times a day with meals  metolazone 5 milliGRAM(s) Oral daily  midodrine. 10 milliGRAM(s) Oral every 8 hours  multivitamin/minerals 1 Tablet(s) Oral daily  nystatin Cream 1 Application(s) Topical two times a day  pantoprazole    Tablet 40 milliGRAM(s) Oral before breakfast  thiamine 100 milliGRAM(s) Oral daily  triamcinolone 0.1% Cream 1 Application(s) Topical two times a day  vitamin A &amp; D Ointment 1 Application(s) Topical daily    MEDICATIONS  (PRN):  acetaminophen     Tablet .. 650 milliGRAM(s) Oral every 6 hours PRN Mild Pain (1 - 3), Moderate Pain (4 - 6)  ibuprofen  Tablet. 400 milliGRAM(s) Oral every 6 hours PRN Severe Pain (7 - 10)      Allergies  No Known Allergies      Review of Systems:   Constitutional:  No Fever, No Chills  ENT/Mouth:  No Hearing Changes,  No Difficulty Swallowing  Eyes:  No Eye Pain, No Vision Changes  Cardiovascular:  No Chest Pain, No Palpitations  Respiratory:  No Cough, No Dyspnea  Gastrointestinal:  As described in HPI  Musculoskeletal:  No Joint Swelling, No Back Pain  Skin:  No Skin Lesions, No Jaundice  Neuro:  No Syncope, No Dizziness  Heme/Lymph:  No Bruising, No Bleeding.          Physical Examination:  T(C): 37.2 (03-14-22 @ 05:00), Max: 37.2 (03-13-22 @ 20:00)  HR: 74 (03-14-22 @ 05:00) (74 - 79)  BP: 97/57 (03-14-22 @ 05:00) (97/57 - 131/72)  RR: 16 (03-14-22 @ 05:00) (16 - 18)  SpO2: 100% (03-14-22 @ 05:00) (95% - 100%)          GENERAL: AAOx3, no acute distress.   HEAD:  Atraumatic, Normocephalic  EYES: conjunctiva and sclera clear  NECK: Supple, no JVD or thyromegaly  CHEST/LUNG: Clear to auscultation bilaterally; No wheeze, rhonchi, or rales  HEART: Regular rate and rhythm; normal S1, S2, No murmurs.  ABDOMEN: Soft, nontender, nondistended; Bowel sounds present  NEUROLOGY: No asterixis or tremor.   SKIN: Intact, no jaundice        Data:                        9.5    5.13  )-----------( 242      ( 14 Mar 2022 08:00 )             29.1     Hgb Trend:  9.5  03-14-22 @ 08:00  8.9  03-13-22 @ 06:00  9.0  03-12-22 @ 07:00  9.1  03-11-22 @ 11:27      03-14    141  |  103  |  5<L>  ----------------------------<  48<LL>  3.6   |  29  |  0.6<L>    Ca    8.0<L>      14 Mar 2022 08:00  Phos  4.4     03-14  Mg     2.3     03-14    TPro  6.1  /  Alb  2.2<L>  /  TBili  0.3  /  DBili  x   /  AST  16  /  ALT  23  /  AlkPhos  99  03-14    Liver panel trend:  TBili 0.3   /   AST 16   /   ALT 23   /   AlkP 99   /   Tptn 6.1   /   Alb 2.2    /   DBili --      03-14  TBili 0.4   /   AST 14   /   ALT 23   /   AlkP 89   /   Tptn 5.6   /   Alb 1.8    /   DBili --      03-13  TBili 0.4   /   AST 20   /   ALT 25   /   AlkP 88   /   Tptn 5.5   /   Alb 2.0    /   DBili --      03-12  TBili 0.5   /   AST 21   /   ALT 28   /   AlkP 95   /   Tptn 6.0   /   Alb 2.0    /   DBili --      03-11  TBili 0.5   /   AST 18   /   ALT 33   /   AlkP 101   /   Tptn 5.8   /   Alb 2.1    /   DBili --      03-10  TBili 0.5   /   AST 26   /   ALT 45   /   AlkP 115   /   Tptn 5.9   /   Alb 2.3    /   DBili --      03-08  TBili 0.4   /   AST 30   /   ALT 53   /   AlkP 128   /   Tptn 6.1   /   Alb 2.3    /   DBili --      03-07  TBili 0.3   /   AST 46   /   ALT 59   /   AlkP 134   /   Tptn 5.5   /   Alb 1.9    /   DBili --      03-06  TBili 0.2   /   AST 32   /   ALT 62   /   AlkP 131   /   Tptn 4.7   /   Alb 1.9    /   DBili --      03-05      PT/INR - ( 14 Mar 2022 08:00 )   PT: 16.30 sec;   INR: 1.42 ratio         PTT - ( 14 Mar 2022 08:00 )  PTT:59.9 sec        Radiology:

## 2022-03-14 NOTE — CHART NOTE - NSCHARTNOTEFT_GEN_A_CORE
Pt with poor PO intake   NGT out and refusing GT  GI noted and appreciated    T(F): 98.6 (03-14-22 @ 12:12), Max: 98.9 (03-13-22 @ 20:00)  HR: 70 (03-14-22 @ 12:12) (70 - 79)  BP: 99/58 (03-14-22 @ 12:12) (97/57 - 110/69)  RR: 18 (03-14-22 @ 12:12) (16 - 18)  SpO2: 100% (03-14-22 @ 05:00) (100% - 100%)    I&O's Detail    03-14    141  |  103  |  5<L>  ----------------------------<  48<LL>  3.6   |  29  |  0.6<L>    Ca    8.0<L>      14 Mar 2022 08:00  Phos  4.4     03-14  Mg     2.3     03-14    TPro  6.1  /  Alb  2.2<L>  /  TBili  0.3  /  DBili  x   /  AST  16  /  ALT  23  /  AlkPhos  99  03-14                        9.5    5.13  )-----------( 242      ( 14 Mar 2022 08:00 )             29.1     CAPILLARY BLOOD GLUCOSE  POCT Blood Glucose.: 73 mg/dL (14 Mar 2022 11:48)  POCT Blood Glucose.: 66 mg/dL (14 Mar 2022 09:28)  POCT Blood Glucose.: 55 mg/dL (14 Mar 2022 09:12)  POCT Blood Glucose.: 58 mg/dL (14 Mar 2022 07:53)  POCT Blood Glucose.: 53 mg/dL (14 Mar 2022 07:50)  POCT Blood Glucose.: 76 mg/dL (13 Mar 2022 20:45)  POCT Blood Glucose.: 87 mg/dL (13 Mar 2022 17:20)     Diet, DASH/TLC:   Sodium & Cholesterol Restricted  Pureed (PUREED) (03-11-22 @ 08:51)    ASSESSMENT  61F PMH GIB, Bowel and Bladder incontinence, Smoking (3-4 cigarettes/day), ETOH abuse, hypothyroidism, and ongoing anemia, admitted to [facility] 1/31 for ams found to have UTI. Dev GIB, colonoscopy showing polyps, transferred to [facility] for polypectomy now upgraded to CCU for hypotension, likely septic shock 2/2 aspiration pna.     - AMS, unclear etiology  - sepsis w/ shock/hypothermia vs aspiration after procedure under anesthesia 2/18 vs CVA  - acute thrombocytopenia  - chronic anemia  - Exfoliative dermatitis   - Colonoscopy 2/4: 1 large 3-4 cm sigmoid pedunculated polyp and 2 polyps (7, 14 mm) in descending colon s/p polypectomy 2/18/2022.   - r/o pneumoretroperitoneum (on KUB 2/21)  - pancolitis on CT 2/21  - hypothyroidism  - Hx of ETOH abuse  - hypokalemia, hypernatremia  - severe protein calorie malnutrition  - severe wt loss, ~34% over past 9 months  - hypoglycemia    SUGGEST:  - PO intake with assistance as tolerated   - add Prosource gelatein 4oz daily, Ensure enlive 8oz daily and ensure clear 8oz daily  - consider megace or remeron as appetite stimulant  - cont MV with minerals  - bowel regimen, monitor BM's   - would benefit from PEG, extensive recent wt loss with severe malnutrition  - will follow Pt with persistently poor PO intake   NGT out and refusing GT  GI noted and appreciated    T(F): 98.6 (03-14-22 @ 12:12), Max: 98.9 (03-13-22 @ 20:00)  HR: 70 (03-14-22 @ 12:12) (70 - 79)  BP: 99/58 (03-14-22 @ 12:12) (97/57 - 110/69)  RR: 18 (03-14-22 @ 12:12) (16 - 18)  SpO2: 100% (03-14-22 @ 05:00) (100% - 100%)    03-14    141  |  103  |  5<L>  ----------------------------<  48<LL>  3.6   |  29  |  0.6<L>    Ca    8.0<L>      14 Mar 2022 08:00  Phos  4.4     03-14  Mg     2.3     03-14    TPro  6.1  /  Alb  2.2<L>  /  TBili  0.3  /  DBili  x   /  AST  16  /  ALT  23  /  AlkPhos  99  03-14                        9.5    5.13  )-----------( 242      ( 14 Mar 2022 08:00 )             29.1     CAPILLARY BLOOD GLUCOSE  POCT Blood Glucose.: 73 mg/dL (14 Mar 2022 11:48)  POCT Blood Glucose.: 66 mg/dL (14 Mar 2022 09:28)  POCT Blood Glucose.: 55 mg/dL (14 Mar 2022 09:12)  POCT Blood Glucose.: 58 mg/dL (14 Mar 2022 07:53)  POCT Blood Glucose.: 53 mg/dL (14 Mar 2022 07:50)  POCT Blood Glucose.: 76 mg/dL (13 Mar 2022 20:45)  POCT Blood Glucose.: 87 mg/dL (13 Mar 2022 17:20)     Diet, DASH/TLC:   Sodium & Cholesterol Restricted  Pureed (PUREED) (03-11-22 @ 08:51)    ASSESSMENT  61F PMH GIB, Bowel and Bladder incontinence, Smoking (3-4 cigarettes/day), ETOH abuse, hypothyroidism, and ongoing anemia, admitted to [facility] 1/31 for ams found to have UTI. Dev GIB, colonoscopy showing polyps, transferred to [facility] for polypectomy now upgraded to CCU for hypotension, likely septic shock 2/2 aspiration pna.     - AMS, unclear etiology  - sepsis w/ shock/hypothermia vs aspiration after procedure under anesthesia 2/18 vs CVA  - acute thrombocytopenia  - chronic anemia  - Exfoliative dermatitis   - Colonoscopy 2/4: 1 large 3-4 cm sigmoid pedunculated polyp and 2 polyps (7, 14 mm) in descending colon s/p polypectomy 2/18/2022.   - r/o pneumoretroperitoneum (on KUB 2/21)  - pancolitis on CT 2/21  - hypothyroidism  - Hx of ETOH abuse  - hypokalemia, hypernatremia  - severe protein calorie malnutrition  - severe wt loss, ~34% over past 9 months  - hypoglycemia    SUGGEST:  - PO intake with assistance as tolerated   - add Prosource gelatein 4oz daily, Ensure enlive 8oz daily and ensure clear 8oz daily  - consider megace or remeron as appetite stimulant  - cont MV with minerals  - bowel regimen, monitor BM's   - would benefit from PEG, extensive recent wt loss with severe malnutrition - pt has refused GT so far - GOC conversation and treatment plans??  - will follow

## 2022-03-14 NOTE — CONSULT NOTE ADULT - ASSESSMENT
ASSESSMENT:  61yF w/ PMHx of  ***  who presented with ***. Physical exam findings, imaging, and labs as documented above.     PLAN:  - Metastatic workup  - GI follow up  - Heme-onc consult  - Patient will ultimately require optimization prior to any intervention  - Follow up outpatient with Dr. Kate  -  -        Above plan discussed with Attending Surgeon Dr. Kate  , patient, patient family, and Primary team  03-14-22 @ 17:42     ASSESSMENT:  61yF w/ PMHx of GIB, smoking, bladder incontinence, hypothyroidism who presented with AMS as a transfer from [facility] for colon mass. 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     CEA 5.6    PLAN:  - Metastatic workup CT a/p from 3/9,  CT chest from 3/10  - GI follow up  - Heme-onc consult  - Patient will ultimately require optimization prior to any intervention  - Follow up outpatient with Dr. Kate      Above plan discussed with Attending Surgeon Dr. Kate  , patient, patient family, and Primary team  03-14-22 @ 17:42

## 2022-03-14 NOTE — PROGRESS NOTE ADULT - SUBJECTIVE AND OBJECTIVE BOX
SANTIAGO CALIXTO 61y Female  MRN#: [id number]   CODE STATUS:________    Hospital Day: 26d    Pt is currently admitted with the primary diagnosis of     SUBJECTIVE  Hospital Course  Patient is a 62 y/o female with PMHx of GIB, Bowel and Bladder incontinence, Smoking (3-4 cigarettes/day), ETOH abuse, hypothyroidism, and ongoing anemia transferred from [facility] for Polypectomy. Patient was admitted to [facility] on 01/31 for AMS, weakness and decreased appetite. She was found to have UTI, NSTEMI, and electrolyte imbalance. During her stay at [facility] she had a colonoscopy done which revealed 1 large 3-4 cm sigmoid pedunculated polyp and 2 polyps (7, 14 mm) in descending colon. Patient was transferred as they are not able to perform large Polypectomies there. She was not able to be discharged as ongoing anemia and concern that the cause are these large polyps.    Patient had a colonoscopy and polypectomy done on 2/18/2022. Polyp (13 mm) in the descending colon. (hot snare Polypectomy).  Polyp (25 mm to 30 mm) in the rectosigmoid junction at 20 cm from the anal verge. (Endoloop, Polypectomy, Endoclip). -> Currently Bx result shows adenocarcinoma    After the colonoscopy patient became hypothermic and hypotensive. Over night patient was started to Levo 2/20 and warming blanket.   2/20 AM patient had RRT for desaturation and hypotension. Patient was placed on 50% O2, patient had good response. Levophed 2/20 also started to raise BP. Patient remained altered, not speaking, but remained somewhat awake and just makes grunting sounds. Patient is not following commands. Left central line was placed on 3/2. Spoke to ICU fellow, patient was upgraded to ICU for closer monitoring.     In CCU pt had abdominal tenderness, KUB ordered, concern for RP free air. CTAP with PO/IV contrast ordered, no perforation or pneumo-peritoneum/retroperitoneum however showing findings c/w pancolitis. Surgery following, ID consulted. Pt was on vanc/zosyn (2/20-22 & 2/21-23 respectively). In CCU, pt developed coagulopathy (low plt, anemia, increasing INR despite not on AC). Treated with pRBC, plt transfusion as well as vitamin K. Heme onc on board, thought to be due to possible DIC 2/2 sepsis. Started on solumedrol 40mg for positive cindy however possibly positive due to transfusion at [facility]. Plt stabilizing so steroids tapered (completed taper). Derm consulted for diffuse skin rash- bullous lesions with desquamation and areas of healing with hyperpigmentation. Burn consulted for skin bx (done 2/28, results pending). C-anca also positive, rhuem consulted, presentation unlikely due to vasculitis, will rpt cindy once acute illness resolves.     3/9: Patient's WBC and PMNs have been up-trending for the past 4 days despite steroids being tapered even before that. Will d/c heredia, take u/a, start Unasyn empirically. Patient not eating well, had hypoglycemia -> NG inserted (consent from son). Polypectomy pathology showing adenocarcinoma -> CT chest w/ IV contrast for staging. Had diarrhea in am -> CT abdomen to f/u pancolitis. AMS -> MR Brain per old neuro note. Spoke w/ son concerning IV contrast and MR contraindications. Son reports patient's mental status to be poor at home as she did not ambulate on her home or answer direct questions.  3/10: Diarrhea 5/24 hrs, started feeds on previous day, will check infectious causes, stopped Unasyn  3/11: 2 BM/ 24 hrs, improved diarrhea consistency as per patient, C diff -ve, GI PCR -ve  3/13: More interactive, continuing calorie count, had episode of low glucose in am, no AMS. No shortness of breath. Happy her son is coming again  3/14: No complaints. Active. Not eating much, drinking though. Spoke with son about PEG, he is amenable. Consulted Surgery and contacted Heme/Onc for Cancer evaluation. Consulted GI, recommending nutrition consult for evaluation of appetite stimulants.    Overnight events     Subjective complaints     Present Today:   - Heredia:  No [  ], Yes [   ] : Indication:     - Type of IV Access:       .. CVC/Piccline:  No [  ], Yes [   ] : Indication:       .. Midline: No [  ], Yes [   ] : Indication:                                              OBJECTIVE  PAST MEDICAL & SURGICAL HISTORY                                              ALLERGIES:  No Known Allergies                           HOME MEDICATIONS  Home Medications:                           MEDICATIONS:  STANDING MEDICATIONS  benzocaine 20% Spray 2 Spray(s) Mucosal four times a day  chlorhexidine 4% Liquid 1 Application(s) Topical daily  cholecalciferol 2000 Unit(s) Oral daily  enoxaparin Injectable 60 milliGRAM(s) SubCutaneous every 12 hours  folic acid 1 milliGRAM(s) Oral daily  furosemide   Injectable 40 milliGRAM(s) IV Push two times a day  levothyroxine 100 MICROGram(s) Oral daily  magnesium oxide 400 milliGRAM(s) Oral two times a day with meals  metolazone 5 milliGRAM(s) Oral daily  midodrine. 10 milliGRAM(s) Oral every 8 hours  multivitamin/minerals 1 Tablet(s) Oral daily  nystatin Cream 1 Application(s) Topical two times a day  pantoprazole    Tablet 40 milliGRAM(s) Oral before breakfast  thiamine 100 milliGRAM(s) Oral daily  triamcinolone 0.1% Cream 1 Application(s) Topical two times a day  vitamin A &amp; D Ointment 1 Application(s) Topical daily    PRN MEDICATIONS  acetaminophen     Tablet .. 650 milliGRAM(s) Oral every 6 hours PRN  ibuprofen  Tablet. 400 milliGRAM(s) Oral every 6 hours PRN                                            ------------------------------------------------------------  VITAL SIGNS: Last 24 Hours  T(C): 37.2 (14 Mar 2022 05:00), Max: 37.2 (13 Mar 2022 20:00)  T(F): 98.9 (14 Mar 2022 05:00), Max: 98.9 (13 Mar 2022 20:00)  HR: 74 (14 Mar 2022 05:00) (74 - 79)  BP: 97/57 (14 Mar 2022 05:00) (97/57 - 131/72)  BP(mean): --  RR: 16 (14 Mar 2022 05:00) (16 - 18)  SpO2: 100% (14 Mar 2022 05:00) (95% - 100%)                                               LABS:                        9.5    5.13  )-----------( 242      ( 14 Mar 2022 08:00 )             29.1     03-14    141  |  103  |  5<L>  ----------------------------<  48<LL>  3.6   |  29  |  0.6<L>    Ca    8.0<L>      14 Mar 2022 08:00  Phos  4.4     03-14  Mg     2.3     03-14    TPro  6.1  /  Alb  2.2<L>  /  TBili  0.3  /  DBili  x   /  AST  16  /  ALT  23  /  AlkPhos  99  03-14    PT/INR - ( 14 Mar 2022 08:00 )   PT: 16.30 sec;   INR: 1.42 ratio         PTT - ( 14 Mar 2022 08:00 )  PTT:59.9 sec                                                          RADIOLOGY:        PHYSICAL EXAM:  GENERAL: NAD, lying in bed comfortably  HEAD:  Atraumatic, Normocephalic  EYES: conjunctiva and sclera clear  ENT: Moist mucous membranes  NECK: Supple  CHEST/LUNG: Soft crackles. Unlabored respirations  HEART: Regular rate and rhythm; No murmurs, rubs, or gallops  ABDOMEN: BSx4; Soft, diffuse nonspecific tenderness, negative [place], nondistended  EXTREMITIES:  No LE edema  NERVOUS SYSTEM:  Alert, interactive, not oriented to time, oriented partially to place. oriented to person  SKIN: Multiple lesions and excoriations on the upper and lower extremities and perineal area. No erythema or tenderness

## 2022-03-14 NOTE — CONSULT NOTE ADULT - SUBJECTIVE AND OBJECTIVE BOX
GENERAL SURGERY CONSULT NOTE    Patient: SANTIAGO CALIXTO , 61y ([birth year])Female   MRN: [id number]  Location: [facility] T4-3B 025 B  Visit: 02-16-22 Inpatient  Date: 03-14-22 @ 17:42    HPI:  Pt is a 60 yo F with PMH of GIB, Bowel and Bladder incontinence, Smoking (3-4 cigarettes/day), ETOH abuse, hypothyroidism anemia transferred from [facility] for Polypectomy. Pt is from home living with her son and bedbound at baseline. Pt was admitted to [facility] on 01/31 for AMS, weakness and decreased appetite. She was found to have UTI, NSTEMI, and electrolyte imbalance. During her stay at [facility] pt had colonoscopy done which revealed 1 large 3-4 cm sigmoid pedunculated polyp and 2 polyps (7, 14 mm) in descending colon s/p bx. Pt was transferred to [facility] for polyp removal.  (16 Feb 2022 23:24)      PAST MEDICAL & SURGICAL HISTORY:      Home Medications:        VITALS:  T(F): 98.6 (03-14-22 @ 12:12), Max: 98.9 (03-13-22 @ 20:00)  HR: 75 (03-14-22 @ 17:06) (70 - 79)  BP: 97/57 (03-14-22 @ 17:06) (97/57 - 110/69)  RR: 18 (03-14-22 @ 12:12) (16 - 18)  SpO2: 100% (03-14-22 @ 05:00) (100% - 100%)    PHYSICAL EXAM:  General: NAD, AAOx3, calm and cooperative  HEENT: NCAT, LORAINE, EOMI, Trachea ML, Neck supple  Cardiac: RRR S1, S2, no Murmurs, rubs or gallops  Respiratory: CTAB, normal respiratory effort, breath sounds equal BL, no wheeze, rhonchi or crackles  Abdomen: Soft, non-distended, non-tender, no rebound, no guarding. +BS.  Rectal: Good tone, +stool, no blood, no hector-anal masses/lesions, no fistulas, fissures, hemorrhoids  Musculoskeletal: Strength 5/5 BL UE/LE, ROM intact, compartments soft  Neuro: Sensation grossly intact and equal throughout, no focal deficits  Vascular: Pulses 2+ throughout, extremities well perfused  Skin: Warm/dry, normal color, no jaundice  Incision/wound: healing well, dressings in place, clean, dry and intact    MEDICATIONS  (STANDING):  benzocaine 20% Spray 2 Spray(s) Mucosal four times a day  chlorhexidine 4% Liquid 1 Application(s) Topical daily  cholecalciferol 2000 Unit(s) Oral daily  dronabinol 2.5 milliGRAM(s) Oral three times a day before meals  enoxaparin Injectable 60 milliGRAM(s) SubCutaneous every 12 hours  folic acid 1 milliGRAM(s) Oral daily  furosemide   Injectable 40 milliGRAM(s) IV Push two times a day  levothyroxine 100 MICROGram(s) Oral daily  magnesium oxide 400 milliGRAM(s) Oral two times a day with meals  metolazone 5 milliGRAM(s) Oral daily  midodrine. 10 milliGRAM(s) Oral every 8 hours  multivitamin/minerals 1 Tablet(s) Oral daily  nystatin Cream 1 Application(s) Topical two times a day  pantoprazole    Tablet 40 milliGRAM(s) Oral before breakfast  thiamine 100 milliGRAM(s) Oral daily  triamcinolone 0.1% Cream 1 Application(s) Topical two times a day  vitamin A &amp; D Ointment 1 Application(s) Topical daily    MEDICATIONS  (PRN):  acetaminophen     Tablet .. 650 milliGRAM(s) Oral every 6 hours PRN Mild Pain (1 - 3), Moderate Pain (4 - 6)  ibuprofen  Tablet. 400 milliGRAM(s) Oral every 6 hours PRN Severe Pain (7 - 10)      LAB/STUDIES:                        9.5    5.13  )-----------( 242      ( 14 Mar 2022 08:00 )             29.1     03-14    141  |  103  |  5<L>  ----------------------------<  48<LL>  3.6   |  29  |  0.6<L>    Ca    8.0<L>      14 Mar 2022 08:00  Phos  4.4     03-14  Mg     2.3     03-14    TPro  6.1  /  Alb  2.2<L>  /  TBili  0.3  /  DBili  x   /  AST  16  /  ALT  23  /  AlkPhos  99  03-14    PT/INR - ( 14 Mar 2022 08:00 )   PT: 16.30 sec;   INR: 1.42 ratio         PTT - ( 14 Mar 2022 08:00 )  PTT:59.9 sec  LIVER FUNCTIONS - ( 14 Mar 2022 08:00 )  Alb: 2.2 g/dL / Pro: 6.1 g/dL / ALK PHOS: 99 U/L / ALT: 23 U/L / AST: 16 U/L / GGT: x                         IMAGING:      ACCESS DEVICES:  [ ] Peripheral IV  [ ] Central Venous Line	[ ] R	[ ] L	[ ] IJ	[ ] Fem	[ ] SC	Placed:   [ ] Arterial Line		[ ] R	[ ] L	[ ] Fem	[ ] Rad	[ ] Ax	Placed:   [ ] PICC:					[ ] Mediport  [ ] Urinary Catheter, Date Placed:    GENERAL SURGERY CONSULT NOTE    Patient: SANTIAGO CALIXTO , 61y ([birth year])Female   MRN: [id number]  Location: [facility] T4-3B 025 B  Visit: 02-16-22 Inpatient  Date: 03-14-22 @ 17:42    HPI:  Patient is a 62 y/o female with PMHx of GIB, Bowel and Bladder incontinence, Smoking (3-4 cigarettes/day), ETOH abuse, hypothyroidism, and ongoing anemia transferred from [facility] for Polypectomy. Patient was admitted to [facility] on 01/31 for AMS, weakness and decreased appetite. She was found to have UTI, NSTEMI, and electrolyte imbalance. During her stay at [facility] she had a colonoscopy done which revealed 1 large 3-4 cm sigmoid pedunculated polyp and 2 polyps (7, 14 mm) in descending colon. Patient was transferred as they are not able to perform large Polypectomies there. She was not able to be discharged as ongoing anemia and concern that the cause are these large polyps.    Patient had a colonoscopy and polypectomy done on 2/18/2022. Polyp (13 mm) in the descending colon. (hot snare Polypectomy).  Polyp (25 mm to 30 mm) in the rectosigmoid junction at 20 cm from the anal verge. (Endoloop, Polypectomy, Endoclip). -> Currently Bx result shows adenocarcinoma    After the colonoscopy patient became hypothermic and hypotensive. Over night patient was started to Levo 2/20 and warming blanket.   2/20 AM patient had RRT for desaturation and hypotension. Patient was placed on 50% O2, patient had good response. Levophed 2/20 also started to raise BP. Patient remained altered, not speaking, but remained somewhat awake and just makes grunting sounds. Patient is not following commands. Left central line was placed on 3/2. Spoke to ICU fellow, patient was upgraded to ICU for closer monitoring.     In CCU pt had abdominal tenderness, KUB ordered, concern for RP free air. CTAP with PO/IV contrast ordered, no perforation or pneumo-peritoneum/retroperitoneum however showing findings c/w pancolitis. Surgery following, ID consulted. Pt was on vanc/zosyn (2/20-22 & 2/21-23 respectively). In CCU, pt developed coagulopathy (low plt, anemia, increasing INR despite not on AC). Treated with pRBC, plt transfusion as well as vitamin K. Heme onc on board, thought to be due to possible DIC 2/2 sepsis. Started on solumedrol 40mg for positive cindy however possibly positive due to transfusion at [facility]. Plt stabilizing so steroids tapered (completed taper). Derm consulted for diffuse skin rash- bullous lesions with desquamation and areas of healing with hyperpigmentation. Burn consulted for skin bx (done 2/28, results pending). C-anca also positive, rhuem consulted, presentation unlikely due to vasculitis, will rpt cindy once acute illness resolves.     3/9: Patient's WBC and PMNs have been up-trending for the past 4 days despite steroids being tapered even before that. Will d/c heredia, take u/a, start Unasyn empirically. Patient not eating well, had hypoglycemia -> NG inserted (consent from son). Polypectomy pathology showing adenocarcinoma -> CT chest w/ IV contrast for staging. Had diarrhea in am -> CT abdomen to f/u pancolitis. AMS -> MR Brain per old neuro note. Spoke w/ son concerning IV contrast and MR contraindications. Son reports patient's mental status to be poor at home as she did not ambulate on her home or answer direct questions.  3/10: Diarrhea 5/24 hrs, started feeds on previous day, will check infectious causes, stopped Unasyn  3/11: 2 BM/ 24 hrs, improved diarrhea consistency as per patient, C diff -ve, GI PCR -ve  3/13: More interactive, continuing calorie count, had episode of low glucose in am, no AMS. No shortness of breath. Happy her son is coming again  3/14: No complaints. Active. Not eating much, drinking though. Spoke with son about PEG, he is amenable. Consulted Surgery and contacted Heme/Onc for Cancer evaluation. Consulted GI, recommending nutrition consult for evaluation of appetite stimulants.  Pathology  2. Colon polyp at 20 cm:   Larger polyp:   - Superficial fragments of at least adenocarcinoma, moderately   differentiated, present at the cauterized inked resection margin.   - Foci suspicious for lymphovascular invasion are identified.   Note: MMR and D2-40 immunohistochemical stains are in progress and results   will follow as an addendum.       PAST MEDICAL & SURGICAL HISTORY:      Home Medications:        VITALS:  T(F): 98.6 (03-14-22 @ 12:12), Max: 98.9 (03-13-22 @ 20:00)  HR: 75 (03-14-22 @ 17:06) (70 - 79)  BP: 97/57 (03-14-22 @ 17:06) (97/57 - 110/69)  RR: 18 (03-14-22 @ 12:12) (16 - 18)  SpO2: 100% (03-14-22 @ 05:00) (100% - 100%)    PHYSICAL EXAM:  General: AAOx3, cachectic  HEENT: NCAT, LORAINE, EOMI  Cardiac: RRR S1, S2  Respiratory: CTAB, normal respiratory effort, breath sounds equal BL, no wheeze, rhonchi or crackles  Abdomen: Soft, non-distended, non-tender, no rebound, no guarding  Musculoskeletal: Strength 5/5 BL UE/LE, ROM intact, compartments soft  Neuro: Sensation grossly intact and equal throughout, no focal deficits  Vascular: Pulses 2+ throughout, extremities well perfused  Skin: Warm/dry, normal color, no jaundice    MEDICATIONS  (STANDING):  benzocaine 20% Spray 2 Spray(s) Mucosal four times a day  chlorhexidine 4% Liquid 1 Application(s) Topical daily  cholecalciferol 2000 Unit(s) Oral daily  dronabinol 2.5 milliGRAM(s) Oral three times a day before meals  enoxaparin Injectable 60 milliGRAM(s) SubCutaneous every 12 hours  folic acid 1 milliGRAM(s) Oral daily  furosemide   Injectable 40 milliGRAM(s) IV Push two times a day  levothyroxine 100 MICROGram(s) Oral daily  magnesium oxide 400 milliGRAM(s) Oral two times a day with meals  metolazone 5 milliGRAM(s) Oral daily  midodrine. 10 milliGRAM(s) Oral every 8 hours  multivitamin/minerals 1 Tablet(s) Oral daily  nystatin Cream 1 Application(s) Topical two times a day  pantoprazole    Tablet 40 milliGRAM(s) Oral before breakfast  thiamine 100 milliGRAM(s) Oral daily  triamcinolone 0.1% Cream 1 Application(s) Topical two times a day  vitamin A &amp; D Ointment 1 Application(s) Topical daily    MEDICATIONS  (PRN):  acetaminophen     Tablet .. 650 milliGRAM(s) Oral every 6 hours PRN Mild Pain (1 - 3), Moderate Pain (4 - 6)  ibuprofen  Tablet. 400 milliGRAM(s) Oral every 6 hours PRN Severe Pain (7 - 10)    LAB/STUDIES:                        9.5    5.13  )-----------( 242      ( 14 Mar 2022 08:00 )             29.1     03-14    141  |  103  |  5<L>  ----------------------------<  48<LL>  3.6   |  29  |  0.6<L>    Ca    8.0<L>      14 Mar 2022 08:00  Phos  4.4     03-14  Mg     2.3     03-14    TPro  6.1  /  Alb  2.2<L>  /  TBili  0.3  /  DBili  x   /  AST  16  /  ALT  23  /  AlkPhos  99  03-14    PT/INR - ( 14 Mar 2022 08:00 )   PT: 16.30 sec;   INR: 1.42 ratio         PTT - ( 14 Mar 2022 08:00 )  PTT:59.9 sec  LIVER FUNCTIONS - ( 14 Mar 2022 08:00 )  Alb: 2.2 g/dL / Pro: 6.1 g/dL / ALK PHOS: 99 U/L / ALT: 23 U/L / AST: 16 U/L / GGT: x                 IMAGING:  < from: CT Abdomen and Pelvis w/ IV Cont (03.09.22 @ 15:54) >  IMPRESSION:    1.  Resolution of previously seen colitis. No evidence of bowel   obstruction.  2.  Extensive anasarca, increased from prior.  3.  Small ascites.  4.  Focus of gas in the urinary bladder. Correlate for recent   instrumentation.  5.  See body of the report for additional findings.  6.  See separately dictated CT chest report for thoracic findings.      < end of copied text >      ACCESS DEVICES:  [x] Peripheral IV  [ ] Central Venous Line	[ ] R	[ ] L	[ ] IJ	[ ] Fem	[ ] SC	Placed:   [ ] Arterial Line		[ ] R	[ ] L	[ ] Fem	[ ] Rad	[ ] Ax	Placed:   [ ] PICC:					[ ] Mediport  [ ] Urinary Catheter, Date Placed:

## 2022-03-14 NOTE — CONSULT NOTE ADULT - ASSESSMENT
61F PMH GIB, Bowel and Bladder incontinence, Smoking (3-4 cigarettes/day), ETOH abuse, hypothyroidism, and ongoing anemia, admitted to [facility] 1/31 for ams found to have UTI. Dev GIB, colonoscopy showing polyps, transferred to [facility] for polypectomy upgraded to CCU for hypotension, likely septic shock 2/2 aspiration pna. pt was later downgraded to medical floor and course was complicated by toxic encephalopathy which is now resolved, prolonged qtc, Acute on chronic HfpEF, Acute RIJ DVT on therapeutic lovenox, Hemolytic anemia and DIC s/p prednisone treatment. Pt's large colonic polyp biopsy was notable for adenocarcinoma with lymphovascular invasion. pan scan did not show metastatic disease. GI was reconsulted for PEG tube placement    #Toxic encephalopathy resolved on 3/10  #Failure to thrive and severe malnutrition  #Locally invasive adenocarcinoma of the colon w/ lymphovascular invasion and no distant mets on imaging  #Prolonged QTC and Acute on Chronic HfPEf  #RIJ DVT on therapeutic lovenox  - pt failed calorie count  - S&S - mild oral dysphagia    Rec  - Pt is AAOx3 upon examination today and states she finds certain solid food aversive which was also confirmed by the son. She is actively refusing any PEG placement at this time despite education of the benefits of the procedure given her malnutrition. PT prefers to opt for appetite stimulants and try oral diet again. Son wants to pursue this as well and wants to defer PEG placement   - nutrition on board - please consider megace or dronabinol for appetite stimulation  - protonix 40 qdaily  - heme/onc and surgery eval for possible chemo therapy +/- surgical intervention  - please recall GI if pt and son are amenable for endoscopic PEG tube placement. If agreeable will need cardiology clearance for procedure

## 2022-03-14 NOTE — CONSULT NOTE ADULT - ATTENDING COMMENTS
Patient is a 61F with PMH of GIB, smoking, bladder incontinence, hypothyroidism who was transferred from [facility] for EMR of sigmoid colon polyp. 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  Pathology showed adenocarcinoma at the resection margin with LVI    Patient seen and examined.  Patient lethargic and cachectic.    Abdomen - soft, non tender, non distended    Vitals and labs reviewed    CEA 5.6    PLAN:  - no acute surgical intervention  - Clinical staging - CT chest abdomen and pelvis  - patient will require optimization prior to elective colectomy  - patient can follow up as outpatient  - colorectal surgery to sign off

## 2022-03-14 NOTE — CONSULT NOTE ADULT - ATTENDING COMMENTS
pt who had encephalopathy after EMR, possible aspiration vs. bacterial translocation, failed calorie count in the beginning, however mental status is now improving. pt would like to try eating more instead of placing PEG. she is A+Ox3. in addition, one of pt's EMR polyps had adenocarcinoma, will need to f/u with surgery and heme/onc.

## 2022-03-15 LAB
AFP-TM SERPL-MCNC: 13.2 NG/ML — HIGH
ALBUMIN SERPL ELPH-MCNC: 1.8 G/DL — LOW (ref 3.5–5.2)
ALP SERPL-CCNC: 89 U/L — SIGNIFICANT CHANGE UP (ref 30–115)
ALT FLD-CCNC: 20 U/L — SIGNIFICANT CHANGE UP (ref 0–41)
ANION GAP SERPL CALC-SCNC: 11 MMOL/L — SIGNIFICANT CHANGE UP (ref 7–14)
APTT BLD: 53.2 SEC — HIGH (ref 27–39.2)
AST SERPL-CCNC: 16 U/L — SIGNIFICANT CHANGE UP (ref 0–41)
BASOPHILS # BLD AUTO: 0.03 K/UL — SIGNIFICANT CHANGE UP (ref 0–0.2)
BASOPHILS NFR BLD AUTO: 0.6 % — SIGNIFICANT CHANGE UP (ref 0–1)
BILIRUB SERPL-MCNC: 0.3 MG/DL — SIGNIFICANT CHANGE UP (ref 0.2–1.2)
BUN SERPL-MCNC: 4 MG/DL — LOW (ref 10–20)
CALCIUM SERPL-MCNC: 7.8 MG/DL — LOW (ref 8.5–10.1)
CANCER AG19-9 SERPL-ACNC: 86 U/ML — HIGH
CEA SERPL-MCNC: 3 NG/ML — SIGNIFICANT CHANGE UP (ref 0–3.8)
CHLORIDE SERPL-SCNC: 102 MMOL/L — SIGNIFICANT CHANGE UP (ref 98–110)
CO2 SERPL-SCNC: 26 MMOL/L — SIGNIFICANT CHANGE UP (ref 17–32)
CREAT SERPL-MCNC: 0.6 MG/DL — LOW (ref 0.7–1.5)
EGFR: 103 ML/MIN/1.73M2 — SIGNIFICANT CHANGE UP
EOSINOPHIL # BLD AUTO: 0.15 K/UL — SIGNIFICANT CHANGE UP (ref 0–0.7)
EOSINOPHIL NFR BLD AUTO: 3 % — SIGNIFICANT CHANGE UP (ref 0–8)
GLUCOSE BLDC GLUCOMTR-MCNC: 110 MG/DL — HIGH (ref 70–99)
GLUCOSE BLDC GLUCOMTR-MCNC: 62 MG/DL — LOW (ref 70–99)
GLUCOSE BLDC GLUCOMTR-MCNC: 89 MG/DL — SIGNIFICANT CHANGE UP (ref 70–99)
GLUCOSE BLDC GLUCOMTR-MCNC: 93 MG/DL — SIGNIFICANT CHANGE UP (ref 70–99)
GLUCOSE SERPL-MCNC: 44 MG/DL — CRITICAL LOW (ref 70–99)
HCT VFR BLD CALC: 26.1 % — LOW (ref 37–47)
HGB BLD-MCNC: 8.7 G/DL — LOW (ref 12–16)
IMM GRANULOCYTES NFR BLD AUTO: 0.4 % — HIGH (ref 0.1–0.3)
INR BLD: 1.46 RATIO — HIGH (ref 0.65–1.3)
LYMPHOCYTES # BLD AUTO: 1.5 K/UL — SIGNIFICANT CHANGE UP (ref 1.2–3.4)
LYMPHOCYTES # BLD AUTO: 29.8 % — SIGNIFICANT CHANGE UP (ref 20.5–51.1)
MAGNESIUM SERPL-MCNC: 2.1 MG/DL — SIGNIFICANT CHANGE UP (ref 1.8–2.4)
MCHC RBC-ENTMCNC: 30 PG — SIGNIFICANT CHANGE UP (ref 27–31)
MCHC RBC-ENTMCNC: 33.3 G/DL — SIGNIFICANT CHANGE UP (ref 32–37)
MCV RBC AUTO: 90 FL — SIGNIFICANT CHANGE UP (ref 81–99)
MONOCYTES # BLD AUTO: 0.42 K/UL — SIGNIFICANT CHANGE UP (ref 0.1–0.6)
MONOCYTES NFR BLD AUTO: 8.3 % — SIGNIFICANT CHANGE UP (ref 1.7–9.3)
NEUTROPHILS # BLD AUTO: 2.91 K/UL — SIGNIFICANT CHANGE UP (ref 1.4–6.5)
NEUTROPHILS NFR BLD AUTO: 57.9 % — SIGNIFICANT CHANGE UP (ref 42.2–75.2)
NRBC # BLD: 0 /100 WBCS — SIGNIFICANT CHANGE UP (ref 0–0)
PHOSPHATE SERPL-MCNC: 4.8 MG/DL — SIGNIFICANT CHANGE UP (ref 2.1–4.9)
PLATELET # BLD AUTO: 224 K/UL — SIGNIFICANT CHANGE UP (ref 130–400)
POTASSIUM SERPL-MCNC: 4 MMOL/L — SIGNIFICANT CHANGE UP (ref 3.5–5)
POTASSIUM SERPL-SCNC: 4 MMOL/L — SIGNIFICANT CHANGE UP (ref 3.5–5)
PROT SERPL-MCNC: 5.6 G/DL — LOW (ref 6–8)
PROTHROM AB SERPL-ACNC: 16.7 SEC — HIGH (ref 9.95–12.87)
RBC # BLD: 2.9 M/UL — LOW (ref 4.2–5.4)
RBC # FLD: 15.9 % — HIGH (ref 11.5–14.5)
SODIUM SERPL-SCNC: 139 MMOL/L — SIGNIFICANT CHANGE UP (ref 135–146)
WBC # BLD: 5.03 K/UL — SIGNIFICANT CHANGE UP (ref 4.8–10.8)
WBC # FLD AUTO: 5.03 K/UL — SIGNIFICANT CHANGE UP (ref 4.8–10.8)

## 2022-03-15 PROCEDURE — 99233 SBSQ HOSP IP/OBS HIGH 50: CPT

## 2022-03-15 PROCEDURE — 93010 ELECTROCARDIOGRAM REPORT: CPT

## 2022-03-15 PROCEDURE — 99232 SBSQ HOSP IP/OBS MODERATE 35: CPT

## 2022-03-15 RX ORDER — MAGNESIUM SULFATE 500 MG/ML
2 VIAL (ML) INJECTION ONCE
Refills: 0 | Status: COMPLETED | OUTPATIENT
Start: 2022-03-15 | End: 2022-03-15

## 2022-03-15 RX ORDER — LEVOTHYROXINE SODIUM 125 MCG
125 TABLET ORAL DAILY
Refills: 0 | Status: DISCONTINUED | OUTPATIENT
Start: 2022-03-16 | End: 2022-03-26

## 2022-03-15 RX ORDER — LEVOTHYROXINE SODIUM 125 MCG
25 TABLET ORAL ONCE
Refills: 0 | Status: COMPLETED | OUTPATIENT
Start: 2022-03-15 | End: 2022-03-15

## 2022-03-15 RX ORDER — BUMETANIDE 0.25 MG/ML
1 INJECTION INTRAMUSCULAR; INTRAVENOUS
Refills: 0 | Status: DISCONTINUED | OUTPATIENT
Start: 2022-03-15 | End: 2022-03-17

## 2022-03-15 RX ADMIN — Medication 40 MILLIGRAM(S): at 06:18

## 2022-03-15 RX ADMIN — MIDODRINE HYDROCHLORIDE 10 MILLIGRAM(S): 2.5 TABLET ORAL at 21:56

## 2022-03-15 RX ADMIN — MAGNESIUM OXIDE 400 MG ORAL TABLET 400 MILLIGRAM(S): 241.3 TABLET ORAL at 17:28

## 2022-03-15 RX ADMIN — NYSTATIN CREAM 1 APPLICATION(S): 100000 CREAM TOPICAL at 06:17

## 2022-03-15 RX ADMIN — Medication 1 APPLICATION(S): at 06:17

## 2022-03-15 RX ADMIN — Medication 2000 UNIT(S): at 11:55

## 2022-03-15 RX ADMIN — MAGNESIUM OXIDE 400 MG ORAL TABLET 400 MILLIGRAM(S): 241.3 TABLET ORAL at 09:44

## 2022-03-15 RX ADMIN — Medication 2 SPRAY(S): at 11:54

## 2022-03-15 RX ADMIN — BUMETANIDE 1 MILLIGRAM(S): 0.25 INJECTION INTRAMUSCULAR; INTRAVENOUS at 17:29

## 2022-03-15 RX ADMIN — ENOXAPARIN SODIUM 60 MILLIGRAM(S): 100 INJECTION SUBCUTANEOUS at 17:28

## 2022-03-15 RX ADMIN — NYSTATIN CREAM 1 APPLICATION(S): 100000 CREAM TOPICAL at 17:28

## 2022-03-15 RX ADMIN — Medication 2 SPRAY(S): at 17:29

## 2022-03-15 RX ADMIN — PANTOPRAZOLE SODIUM 40 MILLIGRAM(S): 20 TABLET, DELAYED RELEASE ORAL at 06:18

## 2022-03-15 RX ADMIN — Medication 100 MICROGRAM(S): at 06:18

## 2022-03-15 RX ADMIN — Medication 2.5 MILLIGRAM(S): at 06:18

## 2022-03-15 RX ADMIN — Medication 1 APPLICATION(S): at 11:54

## 2022-03-15 RX ADMIN — ENOXAPARIN SODIUM 60 MILLIGRAM(S): 100 INJECTION SUBCUTANEOUS at 06:17

## 2022-03-15 RX ADMIN — Medication 100 MILLIGRAM(S): at 11:55

## 2022-03-15 RX ADMIN — Medication 1 APPLICATION(S): at 17:27

## 2022-03-15 RX ADMIN — CHLORHEXIDINE GLUCONATE 1 APPLICATION(S): 213 SOLUTION TOPICAL at 11:54

## 2022-03-15 RX ADMIN — Medication 25 MICROGRAM(S): at 13:52

## 2022-03-15 RX ADMIN — Medication 1 TABLET(S): at 11:54

## 2022-03-15 RX ADMIN — MIDODRINE HYDROCHLORIDE 10 MILLIGRAM(S): 2.5 TABLET ORAL at 13:52

## 2022-03-15 RX ADMIN — Medication 25 GRAM(S): at 09:57

## 2022-03-15 RX ADMIN — Medication 2.5 MILLIGRAM(S): at 17:28

## 2022-03-15 RX ADMIN — MIDODRINE HYDROCHLORIDE 10 MILLIGRAM(S): 2.5 TABLET ORAL at 06:18

## 2022-03-15 RX ADMIN — Medication 1 MILLIGRAM(S): at 11:55

## 2022-03-15 RX ADMIN — Medication 2.5 MILLIGRAM(S): at 11:55

## 2022-03-15 RX ADMIN — Medication 2 SPRAY(S): at 23:00

## 2022-03-15 NOTE — PROGRESS NOTE ADULT - ASSESSMENT
61F PMH GIB, Bowel and Bladder incontinence, Smoking (3-4 cigarettes/day), ETOH abuse, hypothyroidism, and ongoing anemia, admitted to [facility] 1/31 for ams found to have UTI. Dev GIB, colonoscopy showing polyps, transferred to [facility] for polypectomy upgraded to CCU for hypotension, likely septic shock 2/2 aspiration pna.     #AMS with unclear etiology     - Pt upgraded from floor on 2/20 after RRT for hypotension and desaturation. RIJ CVC placed, started on levo, venti mask, o2 and hypotension subsequently improved   - CTH 2/20: no ICH, mass effect, or midline shift   - EEG 2/20: generalized slowing    - UCx 2/18 negative   - BCx 2/17, 2/19: NGTD  - COVID negative 2/20  - MRSA nares 2/20 negative  - LE duplex 2/21: no DVT however b/l peroneal veins not visualized   - ammonia elevated to 59 on 2/22, started on lactulose, possibly contributing to ams -> 42 (3/1/22)  - s.p aztreonam 2g q8h (stopped 3/4/22)  - s.p clinda 600mg q8h (stopped 3/4/22)    # Malnutrition  - Calorie count x 3 d, started 3/11/22  - f/u hypoglycemia, not on D5 due to CHF (taking lasix)  - Consider PEG tube if failed calorie count  - start standing Magnesium Oxide bid  - F/U Nutrition for suplements/stimulants -> ensure enlive + clear + prosource gelatein + Dronabinol 2.5 tid  - Could benefit from PEG (currently trial of supplements & appetite stimulants. If fail -> reassess PEG, son was previously agreeable but wanted stimulant/supplement trial 1st. Will need consent, patient's capacity is questionable, waxing and waning memory, noticeable by son as well)    #Colonic Polyp  #Diarrhea in setting of recent abx use  #Hx of GIB   #Pneumoretroperitoneum   #Pancolitis   s/p colonoscopy with polypectomy 2/18  ct abd without free air but showed pancolitis  pt now passed for pureed diet with thin liquids  1 to 1 feeds, monitor FS  nutrition and S&S f/u  Bx -> Adenocarcinoma w/ lymphovascular invasion -> Surgery evaluation [CT Chest abdomen pelvis did not show metastasis]  - Colonoscopy 2/4: 1 large 3-4 cm sigmoid pedunculated polyp and 2 polyps (7, 14 mm) in descending colon s/p bx  - Negative for C-Diff infection, negative GI PCR  - CEA elevated 5.6  - S/P colonoscopy and polypectomy 2/18/2022. Polyp (13 mm) in the descending colon. (hot snare Polypectomy).  Polyp (25 mm to 30 mm) in the rectosigmoid junction at 20 cm from the anal verge. (Endoloop, Polypectomy, Endoclip).   - Bx -> Adenocarcinoma with lymphovascular invasion  - CTAP PO/IV contrast 2/21: findings c/w pancolitis, no intra or retroperitoneal free air  - CT Chest w/ IV contrast for staging -> small- moderate B/L Pleural effusions; Right IJ DVT  - CT Abdomen Pelvis w/ IV contrast for evaluation of pancolitis -> resolved colitis, increased anasarca, cholelithiasis, T12 chronic fracture, T6 fracture  - Monitor Diarrhea -> 5/24hrs on 3/9-3/10 -> Check C diff. GI PCR, & Horn stain -> -ve    #Cholelithiasis  - Downtrending AST/ALT/ALP, within normal limits  - Asymptomatic  - Monitor for now    #T6/T12 Fracture  - Monitor for pain    #Exfoliative skin lesions- possibly externally induced (e.g scratching), r/o TEN given vesicles and desquamation   #+ve C-ANCA  evaluated by Burn, rheumatology and dermatology -> unlikely vasculitis  on steroid cream  Sent PR4/MPO antibody assay, will repeat c-anca once acute illness resolves.  - pt with superficial scabs diffusely over body, most prominent over chest, as well as areas of desquamation over arms, legs, and hips   - Burn recs appreciated; dx with incontinence dermatitis    - derm recs appreciated, recalled burn for skin bx (done 2/28), f/u path   - rheum recs appreciated, no clear evidence for ANCA associated vasculitis could be elevated due to sepsis. Agree with skin bx. Sent PR4/MPO antibody assay, will rpt c-anca once acute illness resolves.  - arsenic negative   - s/p skin bx by burn 2/28 -> Psoriasiform dermatitis with suppurative scale crust The differential diagnosis includes infection, drug eruption, viral exanthem, irritant contact dermatitis, and psoriasis.  - Parakeratosis with hyperpigmentation, hypergranulosis, focal necrotic keratinocytes, mild perivascular lymphohistiocytic infiltrate, dermal melanophages, focal extravasated erythrocytes and telangiectatic vessels. The differential diagnosis includes a drug eruption and viral exanthem. Resolving Woo-Yash syndrome is less likely. No fungal hyphae seen    #Prolonged qtc  - active  - qtc 613 initially -> 492 (3/10) -> 526 (3/11) -> 542 (3/13) -> 508 (3/14) -> 536 (3/15)  - repleting mg PRN, keep Mg>2, K>4.   - on no qtc prolonging medications   - arsenic levels negative   - daily EKG  - continue to monitor, Mg PRN    #Normocytic anemia   #Acute thrombocytopenia- resolved   #Elevated INR/PTT not on a/c- resolved   #presumed hemolytic anemia, with coagulopathy, possible DIC - resolved  completed prednisone taper per HemOnc recommendation  daily CBC  - cindy positive, possibly due to transfusion (not documented but likely received in [facility])  - HIV, hep B negative   - monitor INR     #Hypotension - monitoring  unclear etiology, possible septic shock  ct abd with pancolitis - resolved  s/p course of abx  BP now stable  - Changed midodrine 5mg TID to standing 3/12/22  - off levophed since 3/2    #Hypernatremia- improving   - c/w free water - now on pureed diet with thin liquids    #Hypothyroidism - active, improved  - TSH 15.6  - Patient will need to repeat TSH and FT4 in 6 wks (End of March)  - hypotensive with bradycardia 3/15/22 -> will increase levothyroxine to 125mcg PO q24h (recently adjusted medication from IV to PO)    #Hx of UTI- resolved   - Suspected repeat UTI -> Repeat UA (3/9) -> stable  - D/C Tapia -> passed ToV    # Leukocytosis - resolved  # Neutrophilia - resolved  - Unasyn 3/9/22-3/10/22  - F/u BCx -> -ve  - UA -> decreased WBC from prior & same large LE, no nitrites like before  - UCx -ve  - Stool studies -ve    #Right lower face cellulitis- resolved  - CT maxillofacial with C 2/12: Mild soft tissue inflammation suggesting cellulitis of the right lower face  - pt was on cefepime, vancomycin (end date 2/15)  - Started on Unasyn 2/16,/c 2/20 started on broad spectrum Antibiotics  - BCx 2/17 -ve, repeat -ve     #Hx NSTEMI   #Hx of Elevated Troponin  - in setting of hypotension   - Normal Lexiscan at [facility]    #Hx of ETOH abuse  #Possible Thiamine deficiency   - last drink  6 months ago  - c/w Folic acid and Thiamine    #Acute on chronic HFpEF - active  ECHO with preserved EF, grade 2 diastolic dysfunction  - Increased Lasix to 40mg IVP bid, improving CXR, stable CXR yesterday  daily wts, I's and O's  stable on room air                                                                              ----------------------------------------------------  # DVT prophylaxis Lovenox (improved Platelets, Patient, PTT, & Hb)    # GI prophylaxis Protonix    # Diet DASH/TLC Pureed  -> Patient removed NG, attempt oral feeds, calorie count -> <25% of needs    # Activity Score (AM-PAC)    # Code status     # Disposition                                                                              --------------------------------------------------------    # Handoff   - If hypotensive with bradycardia inc levothyroxine to 125mcg q24h

## 2022-03-15 NOTE — PROGRESS NOTE ADULT - SUBJECTIVE AND OBJECTIVE BOX
GENERAL SURGERY PROGRESS NOTE    Patient: SANTIAGO CALIXTO , 61y ([birth year])Female   MRN: [id number]  Location: [address]   Visit: 02-16-22 Inpatient  Date: 03-15-22 @ 12:18    Events of past 24 hours: no acute events      Vitals:   T(F): 93.6 (03-15-22 @ 08:00), Max: 93.6 (03-15-22 @ 08:00)  HR: 54 (03-15-22 @ 04:40)  BP: 93/54 (03-15-22 @ 04:40)  RR: 18 (03-14-22 @ 21:18)  SpO2: --      Diet, DASH/TLC:   Sodium & Cholesterol Restricted  Pureed (PUREED)  Prosource Gelatein Plus     Qty per Day:  4 oz  Supplement Feeding Modality:  Oral  Ensure Clear Cans or Servings Per Day:  1       Frequency:  Daily  Ensure Enlive Cans or Servings Per Day:  1       Frequency:  Daily    PHYSICAL EXAM:  General: AAOx3, cachectic  HEENT: NCAT, LORAINE, EOMI  Cardiac: RRR S1, S2  Respiratory: CTAB, normal respiratory effort, breath sounds equal BL, no wheeze, rhonchi or crackles  Abdomen: Soft, non-distended, non-tender, no rebound, no guarding  Musculoskeletal: Strength 5/5 BL UE/LE, ROM intact, compartments soft  Neuro: Sensation grossly intact and equal throughout, no focal deficits  Vascular: Pulses 2+ throughout, extremities well perfused  Skin: Warm/dry, normal color, no jaundice    MEDICATIONS  (STANDING):  benzocaine 20% Spray 2 Spray(s) Mucosal four times a day  chlorhexidine 4% Liquid 1 Application(s) Topical daily  cholecalciferol 2000 Unit(s) Oral daily  dronabinol 2.5 milliGRAM(s) Oral three times a day before meals  enoxaparin Injectable 60 milliGRAM(s) SubCutaneous every 12 hours  folic acid 1 milliGRAM(s) Oral daily  furosemide   Injectable 40 milliGRAM(s) IV Push two times a day  levothyroxine 25 MICROGram(s) Oral once  magnesium oxide 400 milliGRAM(s) Oral two times a day with meals  metolazone 5 milliGRAM(s) Oral daily  midodrine. 10 milliGRAM(s) Oral every 8 hours  multivitamin/minerals 1 Tablet(s) Oral daily  nystatin Cream 1 Application(s) Topical two times a day  pantoprazole    Tablet 40 milliGRAM(s) Oral before breakfast  thiamine 100 milliGRAM(s) Oral daily  triamcinolone 0.1% Cream 1 Application(s) Topical two times a day  vitamin A &amp; D Ointment 1 Application(s) Topical daily    MEDICATIONS  (PRN):  acetaminophen     Tablet .. 650 milliGRAM(s) Oral every 6 hours PRN Mild Pain (1 - 3), Moderate Pain (4 - 6)  ibuprofen  Tablet. 400 milliGRAM(s) Oral every 6 hours PRN Severe Pain (7 - 10)      DVT PROPHYLAXIS: enoxaparin Injectable 60 milliGRAM(s) SubCutaneous every 12 hours    GI PROPHYLAXIS: pantoprazole    Tablet 40 milliGRAM(s) Oral before breakfast    ANTICOAGULATION:   ANTIBIOTICS:            LAB/STUDIES:  Labs:  CAPILLARY BLOOD GLUCOSE      POCT Blood Glucose.: 89 mg/dL (15 Mar 2022 11:33)  POCT Blood Glucose.: 62 mg/dL (15 Mar 2022 07:32)  POCT Blood Glucose.: 67 mg/dL (14 Mar 2022 21:39)  POCT Blood Glucose.: 56 mg/dL (14 Mar 2022 16:44)                          8.7    5.03  )-----------( 224      ( 15 Mar 2022 06:00 )             26.1       Auto Neutrophil %: 57.9 % (03-15-22 @ 06:00)  Auto Immature Granulocyte %: 0.4 % (03-15-22 @ 06:00)    03-15    139  |  102  |  4<L>  ----------------------------<  44<LL>  4.0   |  26  |  0.6<L>      Magnesium, Serum: 2.1 mg/dL (03-15-22 @ 06:00)      LFTs:             5.6  | 0.3  | 16       ------------------[89      ( 15 Mar 2022 06:00 )  1.8  | x    | 20          Lipase:x      Amylase:x             Coags:     16.70  ----< 1.46    ( 15 Mar 2022 06:00 )     53.2            Serum Pro-Brain Natriuretic Peptide: [id number] pg/mL (03-08-22 @ 05:30)

## 2022-03-15 NOTE — PROGRESS NOTE ADULT - SUBJECTIVE AND OBJECTIVE BOX
SANTIAGO CALIXTO 61y Female  MRN#: [id number]     SUBJECTIVE  Patient is a 61y old Female who presents with a chief complaint of Polypectomy (14 Mar 2022 17:41)  Today is hospital day 27d, and this morning she is lying in bed without distress.   No acute overnight events.     OBJECTIVE  PAST MEDICAL & SURGICAL HISTORY    ALLERGIES:  No Known Allergies    MEDICATIONS:  STANDING MEDICATIONS  benzocaine 20% Spray 2 Spray(s) Mucosal four times a day  chlorhexidine 4% Liquid 1 Application(s) Topical daily  cholecalciferol 2000 Unit(s) Oral daily  dronabinol 2.5 milliGRAM(s) Oral three times a day before meals  enoxaparin Injectable 60 milliGRAM(s) SubCutaneous every 12 hours  folic acid 1 milliGRAM(s) Oral daily  furosemide   Injectable 40 milliGRAM(s) IV Push two times a day  levothyroxine 100 MICROGram(s) Oral daily  magnesium oxide 400 milliGRAM(s) Oral two times a day with meals  metolazone 5 milliGRAM(s) Oral daily  midodrine. 10 milliGRAM(s) Oral every 8 hours  multivitamin/minerals 1 Tablet(s) Oral daily  nystatin Cream 1 Application(s) Topical two times a day  pantoprazole    Tablet 40 milliGRAM(s) Oral before breakfast  thiamine 100 milliGRAM(s) Oral daily  triamcinolone 0.1% Cream 1 Application(s) Topical two times a day  vitamin A &amp; D Ointment 1 Application(s) Topical daily    PRN MEDICATIONS  acetaminophen     Tablet .. 650 milliGRAM(s) Oral every 6 hours PRN  ibuprofen  Tablet. 400 milliGRAM(s) Oral every 6 hours PRN    HOME MEDICATIONS  Home Medications:      VITAL SIGNS: Last 24 Hours  T(C): 33.7 (14 Mar 2022 21:18), Max: 37 (14 Mar 2022 12:12)  T(F): 92.6 (14 Mar 2022 21:18), Max: 98.6 (14 Mar 2022 12:12)  HR: 54 (15 Mar 2022 04:40) (54 - 79)  BP: 93/54 (15 Mar 2022 04:40) (93/54 - 99/58)  BP(mean): --  RR: 18 (14 Mar 2022 21:18) (18 - 18)  SpO2: --      LABS:                        8.7    5.03  )-----------( 224      ( 15 Mar 2022 06:00 )             26.1     03-15    139  |  102  |  4<L>  ----------------------------<  44<LL>  4.0   |  26  |  0.6<L>    Ca    7.8<L>      15 Mar 2022 06:00  Phos  4.8     03-15  Mg     2.1     03-15    TPro  5.6<L>  /  Alb  1.8<L>  /  TBili  0.3  /  DBili  x   /  AST  16  /  ALT  20  /  AlkPhos  89  03-15    LIVER FUNCTIONS - ( 15 Mar 2022 06:00 )  Alb: 1.8 g/dL / Pro: 5.6 g/dL / ALK PHOS: 89 U/L / ALT: 20 U/L / AST: 16 U/L / GGT: x           PT/INR - ( 15 Mar 2022 06:00 )   PT: 16.70 sec;   INR: 1.46 ratio      PTT - ( 15 Mar 2022 06:00 )  PTT:53.2 sec    CAPILLARY BLOOD GLUCOSE  POCT Blood Glucose.: 62 mg/dL (15 Mar 2022 07:32)      RADIOLOGY:    < from: CT Abdomen and Pelvis w/ IV Cont (03.09.22 @ 15:54) >  IMPRESSION:  1.  Resolution of previously seen colitis. No evidence of bowel   obstruction.  2.  Extensive anasarca, increased from prior.  3.  Small ascites.  4.  Focus of gas in the urinary bladder. Correlate for recent   instrumentation.  5.  See body of the report for additional findings.  6.  See separately dictated CT chest report for thoracic findings.  --- End of Report ---  < end of copied text >    < from: CT Chest w/ IV Cont (03.09.22 @ 15:54) >  IMPRESSION:  Small to moderate bilateral pleural effusions right greater than left.  Filling defect seen in the right internal jugular vein (series 5 image   13), suggestive of a deep vein thrombus.  Spoke with JUANPABLO LOBATO on 3/10/2022 9:46 AM with readback.  --- End of Report ---  < end of copied text >          PHYSICAL EXAM:  GENERAL: NAD, AAO x 4, 61y F  HEAD:  Atraumatic, Normocephalic  EYES: EOMI, conjunctiva clear and sclera white  NECK: Supple, No JVD  CHEST/LUNG: Clear to auscultation bilaterally; No wheeze; No crackles; No accessory muscles used  HEART: Regular rate and rhythm; No murmurs;   ABDOMEN: Soft, Nontender, Nondistended; Bowel sounds present; No guarding  EXTREMITIES:  2+ Peripheral Pulses, No cyanosis or edema  NEUROLOGY: non-focal    ADMISSION SUMMARY  Patient is a 61y old Female who presents with a chief complaint of Polypectomy (14 Mar 2022 17:41)    SANTIAGO CALIXTO 61y Female  MRN#: [id number]     SUBJECTIVE  Patient is a 61y old Female who presents with a chief complaint of Polypectomy (14 Mar 2022 17:41)  Today is hospital day 27d, and this morning she is lying in bed without distress.   No acute overnight events.     OBJECTIVE  PAST MEDICAL & SURGICAL HISTORY    ALLERGIES:  No Known Allergies    MEDICATIONS:  STANDING MEDICATIONS  benzocaine 20% Spray 2 Spray(s) Mucosal four times a day  chlorhexidine 4% Liquid 1 Application(s) Topical daily  cholecalciferol 2000 Unit(s) Oral daily  dronabinol 2.5 milliGRAM(s) Oral three times a day before meals  enoxaparin Injectable 60 milliGRAM(s) SubCutaneous every 12 hours  folic acid 1 milliGRAM(s) Oral daily  furosemide   Injectable 40 milliGRAM(s) IV Push two times a day  levothyroxine 100 MICROGram(s) Oral daily  magnesium oxide 400 milliGRAM(s) Oral two times a day with meals  metolazone 5 milliGRAM(s) Oral daily  midodrine. 10 milliGRAM(s) Oral every 8 hours  multivitamin/minerals 1 Tablet(s) Oral daily  nystatin Cream 1 Application(s) Topical two times a day  pantoprazole    Tablet 40 milliGRAM(s) Oral before breakfast  thiamine 100 milliGRAM(s) Oral daily  triamcinolone 0.1% Cream 1 Application(s) Topical two times a day  vitamin A &amp; D Ointment 1 Application(s) Topical daily    PRN MEDICATIONS  acetaminophen     Tablet .. 650 milliGRAM(s) Oral every 6 hours PRN  ibuprofen  Tablet. 400 milliGRAM(s) Oral every 6 hours PRN    HOME MEDICATIONS  Home Medications:      VITAL SIGNS: Last 24 Hours  T(C): 33.7 (14 Mar 2022 21:18), Max: 37 (14 Mar 2022 12:12)  T(F): 92.6 (14 Mar 2022 21:18), Max: 98.6 (14 Mar 2022 12:12)  HR: 54 (15 Mar 2022 04:40) (54 - 79)  BP: 93/54 (15 Mar 2022 04:40) (93/54 - 99/58)  BP(mean): --  RR: 18 (14 Mar 2022 21:18) (18 - 18)  SpO2: --      LABS:                        8.7    5.03  )-----------( 224      ( 15 Mar 2022 06:00 )             26.1     03-15    139  |  102  |  4<L>  ----------------------------<  44<LL>  4.0   |  26  |  0.6<L>    Ca    7.8<L>      15 Mar 2022 06:00  Phos  4.8     03-15  Mg     2.1     03-15    TPro  5.6<L>  /  Alb  1.8<L>  /  TBili  0.3  /  DBili  x   /  AST  16  /  ALT  20  /  AlkPhos  89  03-15    LIVER FUNCTIONS - ( 15 Mar 2022 06:00 )  Alb: 1.8 g/dL / Pro: 5.6 g/dL / ALK PHOS: 89 U/L / ALT: 20 U/L / AST: 16 U/L / GGT: x           PT/INR - ( 15 Mar 2022 06:00 )   PT: 16.70 sec;   INR: 1.46 ratio      PTT - ( 15 Mar 2022 06:00 )  PTT:53.2 sec    CAPILLARY BLOOD GLUCOSE  POCT Blood Glucose.: 62 mg/dL (15 Mar 2022 07:32)    Larger polyp:   - Superficial fragments of at least adenocarcinoma, moderately   differentiated, present at the cauterized inked resection margin.   - Foci suspicious for lymphovascular invasion are identified.   Note: MMR and D2-40 immunohistochemical stains are in progress and results   will follow as an addendum.     One smaller polyp:   - Superficial fragments of at least submucosal adenocarcinoma, moderately   differentiated, present at the cauterized inked resection margin.   Two smaller polyps:       RADIOLOGY:    < from: Colonoscopy w/ EMR (02.18.22 @ 16:00) >  A single sessile 13 mm polyp of benign appearance was found  in the descending colon. A single-piece polypectomy was performed using a hot  snare in the descending colon. The polyp was completely removed.   A single pedunculated non-bleeding polyp of benign appearance ranging in size  from 25 mm to 30 mm was found in the rectosigmoid junction at 20 cm from the  anal verge. A piece-meal polypectomy was performed using a hot snare in the  rectosigmoid junction at 20 cm from the anal verge. The polyp was completely  removed. Endoloop was applied to the base of the polyp peduncle and followed by  endoscopic hot snare resection with a piecemeal fashion. Five endoclips were  successfully applied to the rectosigmoid junction at 20 cm from the anal verge  for the purpose of tissue positioning.   Additional findings Tattoo was noted around the rectosigmoid junction, 2-3 cm  distal the rectosigmoid junction polyp.   < end of copied text >    < from: CT Abdomen and Pelvis w/ IV Cont (03.09.22 @ 15:54) >  IMPRESSION:  1.  Resolution of previously seen colitis. No evidence of bowel   obstruction.  2.  Extensive anasarca, increased from prior.  3.  Small ascites.  4.  Focus of gas in the urinary bladder. Correlate for recent   instrumentation.  5.  See body of the report for additional findings.  6.  See separately dictated CT chest report for thoracic findings.  --- End of Report ---  < end of copied text >    < from: CT Chest w/ IV Cont (03.09.22 @ 15:54) >  IMPRESSION:  Small to moderate bilateral pleural effusions right greater than left.  Filling defect seen in the right internal jugular vein (series 5 image   13), suggestive of a deep vein thrombus.  Spoke with JUANPABLO LOBATO on 3/10/2022 9:46 AM with readback.  --- End of Report ---  < end of copied text >      PHYSICAL EXAM:  GENERAL: NAD, AAO x 3, 61y F, thin  HEAD:  Atraumatic, Normocephalic  EYES: EOMI, conjunctiva clear and sclera white  NECK: Supple, No JVD  CHEST/LUNG: Clear to auscultation bilaterally; No wheeze; No crackles; No accessory muscles used  HEART: Regular rate and rhythm; No murmurs;   ABDOMEN: Soft, Nontender, Nondistended; Bowel sounds present; No guarding  EXTREMITIES:  2+ Peripheral Pulses, No cyanosis or edema  NEUROLOGY: non-focal    ADMISSION SUMMARY  Patient is a 61y old Female who presents with a chief complaint of Polypectomy (14 Mar 2022 17:41)    SANTIAGO CALIXTO 61y Female  MRN#: [id number]     SUBJECTIVE  Patient is a 61y old Female who presents with a chief complaint of Polypectomy (14 Mar 2022 17:41)  Today is hospital day 27d, and this morning she is lying in bed without distress.   No acute overnight events.     OBJECTIVE  PAST MEDICAL & SURGICAL HISTORY    ALLERGIES:  No Known Allergies    MEDICATIONS:  STANDING MEDICATIONS  benzocaine 20% Spray 2 Spray(s) Mucosal four times a day  chlorhexidine 4% Liquid 1 Application(s) Topical daily  cholecalciferol 2000 Unit(s) Oral daily  dronabinol 2.5 milliGRAM(s) Oral three times a day before meals  enoxaparin Injectable 60 milliGRAM(s) SubCutaneous every 12 hours  folic acid 1 milliGRAM(s) Oral daily  furosemide   Injectable 40 milliGRAM(s) IV Push two times a day  levothyroxine 100 MICROGram(s) Oral daily  magnesium oxide 400 milliGRAM(s) Oral two times a day with meals  metolazone 5 milliGRAM(s) Oral daily  midodrine. 10 milliGRAM(s) Oral every 8 hours  multivitamin/minerals 1 Tablet(s) Oral daily  nystatin Cream 1 Application(s) Topical two times a day  pantoprazole    Tablet 40 milliGRAM(s) Oral before breakfast  thiamine 100 milliGRAM(s) Oral daily  triamcinolone 0.1% Cream 1 Application(s) Topical two times a day  vitamin A &amp; D Ointment 1 Application(s) Topical daily    PRN MEDICATIONS  acetaminophen     Tablet .. 650 milliGRAM(s) Oral every 6 hours PRN  ibuprofen  Tablet. 400 milliGRAM(s) Oral every 6 hours PRN    HOME MEDICATIONS  Home Medications:      VITAL SIGNS: Last 24 Hours  T(C): 33.7 (14 Mar 2022 21:18), Max: 37 (14 Mar 2022 12:12)  T(F): 92.6 (14 Mar 2022 21:18), Max: 98.6 (14 Mar 2022 12:12)  HR: 54 (15 Mar 2022 04:40) (54 - 79)  BP: 93/54 (15 Mar 2022 04:40) (93/54 - 99/58)  BP(mean): --  RR: 18 (14 Mar 2022 21:18) (18 - 18)  SpO2: --      LABS:                        8.7    5.03  )-----------( 224      ( 15 Mar 2022 06:00 )             26.1     03-15    139  |  102  |  4<L>  ----------------------------<  44<LL>  4.0   |  26  |  0.6<L>    Ca    7.8<L>      15 Mar 2022 06:00  Phos  4.8     03-15  Mg     2.1     03-15    TPro  5.6<L>  /  Alb  1.8<L>  /  TBili  0.3  /  DBili  x   /  AST  16  /  ALT  20  /  AlkPhos  89  03-15    LIVER FUNCTIONS - ( 15 Mar 2022 06:00 )  Alb: 1.8 g/dL / Pro: 5.6 g/dL / ALK PHOS: 89 U/L / ALT: 20 U/L / AST: 16 U/L / GGT: x           PT/INR - ( 15 Mar 2022 06:00 )   PT: 16.70 sec;   INR: 1.46 ratio      PTT - ( 15 Mar 2022 06:00 )  PTT:53.2 sec    CAPILLARY BLOOD GLUCOSE  POCT Blood Glucose.: 62 mg/dL (15 Mar 2022 07:32)    Larger polyp:   - Superficial fragments of at least adenocarcinoma, moderately   differentiated, present at the cauterized inked resection margin.   - Foci suspicious for lymphovascular invasion are identified.   Note: MMR and D2-40 immunohistochemical stains are in progress and results   will follow as an addendum.     One smaller polyp:   - Superficial fragments of at least submucosal adenocarcinoma, moderately   differentiated, present at the cauterized inked resection margin.   Two smaller polyps:       RADIOLOGY:    < from: Colonoscopy w/ EMR (02.18.22 @ 16:00) >  A single sessile 13 mm polyp of benign appearance was found  in the descending colon. A single-piece polypectomy was performed using a hot  snare in the descending colon. The polyp was completely removed.   A single pedunculated non-bleeding polyp of benign appearance ranging in size  from 25 mm to 30 mm was found in the rectosigmoid junction at 20 cm from the  anal verge. A piece-meal polypectomy was performed using a hot snare in the  rectosigmoid junction at 20 cm from the anal verge. The polyp was completely  removed. Endoloop was applied to the base of the polyp peduncle and followed by  endoscopic hot snare resection with a piecemeal fashion. Five endoclips were  successfully applied to the rectosigmoid junction at 20 cm from the anal verge  for the purpose of tissue positioning.   Additional findings Tattoo was noted around the rectosigmoid junction, 2-3 cm  distal the rectosigmoid junction polyp.   < end of copied text >    < from: CT Abdomen and Pelvis w/ IV Cont (03.09.22 @ 15:54) >  IMPRESSION:  1.  Resolution of previously seen colitis. No evidence of bowel   obstruction.  2.  Extensive anasarca, increased from prior.  3.  Small ascites.  4.  Focus of gas in the urinary bladder. Correlate for recent   instrumentation.  5.  See body of the report for additional findings.  6.  See separately dictated CT chest report for thoracic findings.  --- End of Report ---  < end of copied text >    < from: CT Chest w/ IV Cont (03.09.22 @ 15:54) >  IMPRESSION:  Small to moderate bilateral pleural effusions right greater than left.  Filling defect seen in the right internal jugular vein (series 5 image   13), suggestive of a deep vein thrombus.  Spoke with JUANPABLO LOBATO on 3/10/2022 9:46 AM with readback.  --- End of Report ---  < end of copied text >    < from: Xray Chest 1 View- PORTABLE-Routine (Xray Chest 1 View- PORTABLE-Routine in AM.) (03.14.22 @ 08:20) >  IMPRESSION:  Unchanged bibasilar opacities/effusions and prominent interstitial   markings.    < end of copied text >      PHYSICAL EXAM:  GENERAL: NAD, AAO x 3, 61y F, thin  HEAD:  Atraumatic, Normocephalic  EYES: EOMI, conjunctiva clear and sclera white  NECK: Supple, No JVD  CHEST/LUNG: Clear to auscultation bilaterally; No wheeze; No crackles; No accessory muscles used  HEART: Regular rate and rhythm; No murmurs;   ABDOMEN: Soft, Nontender, Nondistended; Bowel sounds present; No guarding  EXTREMITIES:  2+ Peripheral Pulses, No cyanosis or edema  NEUROLOGY: non-focal    ADMISSION SUMMARY  Patient is a 61y old Female who presents with a chief complaint of Polypectomy (14 Mar 2022 17:41)

## 2022-03-15 NOTE — PROGRESS NOTE ADULT - ASSESSMENT
Pt is a 60 yo F with PMH of GIB, Bowel and Bladder incontinence, Smoking (1/2 ppd), ETOH abuse, hypothyroidism, anemia transferred from [facility] for EMR/Polypectomy. She was a Rapid response on 2/20/22 for AMS, hypotension requiring pressor support, and hypothermia. She was upgraded to ICU. Hematology evaluation was requested for evaluation of anemia and thrombocytopenia. Polypectomy shows adenocarcinoma and oncology recalled for follow up.    # S/p polypectomies   - pathology of the large polyp shows superficial fragments of at least adenocarcinoma, moderately   differentiated, present at the cauterized inked resection margin.   - CT CAP w/ contrast shows no evidence of metastatic disease   - CEA (2/17/22): 5.6  - surgery follow up   - f/u MMR   - outpatient oncology follow up    # Normocytic Anemia with recent polypectomy, stable  - iron studies: Fe 55; TIBC <72; ferr 2494. However, in the setting of acute events and significant hypotension, the elevated ferritin is likely reactive and is not a sign of iron overload.  - B12 1892, Folate 11  - Haptoglobin low and Amos +, however LDH WNL, Reticulocyte % low along with normal bilirubin which points against hemolytic anemia  - AMOS can be secondary to recent blood transfusion (not documented but pt likely had PRBC in [facility])  - peripheral blood smear reviewed: occasional schistocytes  - multiple myeloma panel noted, doubt MM  - s/p solumedrol for few days and was d/c given no improvement in pt's anemia and thrombocytopenia. This also makes hemolytic anemia and ITP less likely     # Thrombocytopenia likely multifactorial due to myelosuppression from infection, DIC, alcohol abuse  - resolved  - Hep C, Hep B and HIV negative  - transfuse platelets if platelets < 30k and bleeding     # +C-ANCA  # Multiple skin lesions likely self inflicted   - rheumatology consult appreciated, can be secondary to renal dysfunction    # Elevated TSH  - workup for hypothyroidism per primary team    # Septic Shock requiring pressor support, resolved Pt is a 62 yo F with PMH of GIB, Bowel and Bladder incontinence, Smoking (1/2 ppd), ETOH abuse, hypothyroidism, anemia transferred from [facility] [place] for EMR/Polypectomy. She was a Rapid response on 2/20/22 for AMS, hypotension requiring pressor support, and hypothermia. She was upgraded to ICU. Hematology evaluation was requested for evaluation of anemia and thrombocytopenia. Polypectomy shows adenocarcinoma and oncology recalled for follow up.    # S/p polypectomies   - pathology of the large polyp shows superficial fragments of at least adenocarcinoma, moderately differentiated, present at the cauterized inked resection margin.   - CT CAP w/ contrast shows no evidence of metastatic disease   - CEA (2/17/22): 5.6  - although pleural effusion is unlikely malignancy, would like pulm input whether it can be tapped for cytometry and analysis   - surgery follow up   - f/u MMR   - outpatient oncology follow up    # Normocytic Anemia with recent polypectomy, stable  - iron studies: Fe 55; TIBC <72; ferr 2494. However, in the setting of acute events and significant hypotension, the elevated ferritin is likely reactive and is not a sign of iron overload.  - B12 1892, Folate 11  - Haptoglobin low and Amos +, however LDH WNL, Reticulocyte % low along with normal bilirubin which points against hemolytic anemia  - AMOS can be secondary to recent blood transfusion (not documented but pt likely had PRBC in [facility] [place])  - peripheral blood smear reviewed: occasional schistocytes  - multiple myeloma panel noted, doubt MM  - s/p solumedrol for few days and was d/c given no improvement in pt's anemia and thrombocytopenia. This also makes hemolytic anemia and ITP less likely     # Thrombocytopenia likely multifactorial due to myelosuppression from infection, DIC, alcohol abuse  - resolved  - Hep C, Hep B and HIV negative  - transfuse platelets if platelets < 30k and bleeding     # +C-ANCA  # Multiple skin lesions likely self inflicted   - rheumatology consult appreciated, can be secondary to renal dysfunction    # Elevated TSH  - workup for hypothyroidism per primary team    # Septic Shock requiring pressor support, resolved Pt is a 60 yo F with PMH of GIB, Bowel and Bladder incontinence, Smoking (1/2 ppd), ETOH abuse, hypothyroidism, anemia transferred from [facility] for EMR/Polypectomy. She was a Rapid response on 2/20/22 for AMS, hypotension requiring pressor support, and hypothermia. She was upgraded to ICU. Hematology evaluation was requested for evaluation of anemia and thrombocytopenia. Polypectomy shows adenocarcinoma and oncology recalled for follow up.    # S/p polypectomies   - pathology of the large polyp shows superficial fragments of at least adenocarcinoma, moderately differentiated, present at the cauterized inked resection margin.   - CT CAP w/ contrast shows no evidence of metastatic disease   - CEA (2/17/22): 5.6  - although pleural effusion is unlikely from malignancy, would like pulm input whether it can be tapped for cytometry and analysis   - surgery follow up   - f/u MMR   - outpatient oncology follow up    # Normocytic Anemia with recent polypectomy, stable  - iron studies: Fe 55; TIBC <72; ferr 2494. However, in the setting of acute events and significant hypotension, the elevated ferritin is likely reactive and is not a sign of iron overload.  - B12 1892, Folate 11  - Haptoglobin low and Amos +, however LDH WNL, Reticulocyte % low along with normal bilirubin which points against hemolytic anemia  - AMOS can be secondary to recent blood transfusion (not documented but pt likely had PRBC in [facility])  - peripheral blood smear reviewed: occasional schistocytes  - multiple myeloma panel noted, doubt MM  - s/p solumedrol for few days and was d/c given no improvement in pt's anemia and thrombocytopenia. This also makes hemolytic anemia and ITP less likely     # Thrombocytopenia likely multifactorial due to myelosuppression from infection, DIC, alcohol abuse  - resolved  - Hep C, Hep B and HIV negative  - transfuse platelets if platelets < 30k and bleeding     # +C-ANCA  # Multiple skin lesions likely self inflicted   - rheumatology consult appreciated, can be secondary to renal dysfunction    # Elevated TSH  - workup for hypothyroidism per primary team    # Septic Shock requiring pressor support, resolved

## 2022-03-15 NOTE — PROGRESS NOTE ADULT - SUBJECTIVE AND OBJECTIVE BOX
SANTIAGO CALIXTO 61y Female  MRN#: [id number]   CODE STATUS:________    Hospital Day: 27d    Pt is currently admitted with the primary diagnosis of     SUBJECTIVE  Hospital Course  Patient is a 60 y/o female with PMHx of GIB, Bowel and Bladder incontinence, Smoking (3-4 cigarettes/day), ETOH abuse, hypothyroidism, and ongoing anemia transferred from [facility] for Polypectomy. Patient was admitted to [facility] on 01/31 for AMS, weakness and decreased appetite. She was found to have UTI, NSTEMI, and electrolyte imbalance. During her stay at [facility] she had a colonoscopy done which revealed 1 large 3-4 cm sigmoid pedunculated polyp and 2 polyps (7, 14 mm) in descending colon. Patient was transferred as they are not able to perform large Polypectomies there. She was not able to be discharged as ongoing anemia and concern that the cause are these large polyps.    Patient had a colonoscopy and polypectomy done on 2/18/2022. Polyp (13 mm) in the descending colon. (hot snare Polypectomy).  Polyp (25 mm to 30 mm) in the rectosigmoid junction at 20 cm from the anal verge. (Endoloop, Polypectomy, Endoclip). -> Currently Bx result shows adenocarcinoma    After the colonoscopy patient became hypothermic and hypotensive. Over night patient was started to Levo 2/20 and warming blanket.   2/20 AM patient had RRT for desaturation and hypotension. Patient was placed on 50% O2, patient had good response. Levophed 2/20 also started to raise BP. Patient remained altered, not speaking, but remained somewhat awake and just makes grunting sounds. Patient is not following commands. Left central line was placed on 3/2. Spoke to ICU fellow, patient was upgraded to ICU for closer monitoring.     In CCU pt had abdominal tenderness, KUB ordered, concern for RP free air. CTAP with PO/IV contrast ordered, no perforation or pneumo-peritoneum/retroperitoneum however showing findings c/w pancolitis. Surgery following, ID consulted. Pt was on vanc/zosyn (2/20-22 & 2/21-23 respectively). In CCU, pt developed coagulopathy (low plt, anemia, increasing INR despite not on AC). Treated with pRBC, plt transfusion as well as vitamin K. Heme onc on board, thought to be due to possible DIC 2/2 sepsis. Started on solumedrol 40mg for positive cindy however possibly positive due to transfusion at [facility]. Plt stabilizing so steroids tapered (completed taper). Derm consulted for diffuse skin rash- bullous lesions with desquamation and areas of healing with hyperpigmentation. Burn consulted for skin bx (done 2/28, results pending). C-anca also positive, rhuem consulted, presentation unlikely due to vasculitis, will rpt cindy once acute illness resolves.     3/9: Patient's WBC and PMNs have been up-trending for the past 4 days despite steroids being tapered even before that. Will d/c heredia, take u/a, start Unasyn empirically. Patient not eating well, had hypoglycemia -> NG inserted (consent from son). Polypectomy pathology showing adenocarcinoma -> CT chest w/ IV contrast for staging. Had diarrhea in am -> CT abdomen to f/u pancolitis. AMS -> MR Brain per old neuro note. Spoke w/ son concerning IV contrast and MR contraindications. Son reports patient's mental status to be poor at home as she did not ambulate on her home or answer direct questions.  3/10: Diarrhea 5/24 hrs, started feeds on previous day, will check infectious causes, stopped Unasyn  3/11: 2 BM/ 24 hrs, improved diarrhea consistency as per patient, C diff -ve, GI PCR -ve  3/13: More interactive, continuing calorie count, had episode of low glucose in am, no AMS. No shortness of breath. Happy her son is coming again  3/14: No complaints. Active. Not eating much, drinking though. Spoke with son about PEG, he is amenable. Consulted Surgery and contacted Heme/Onc for Cancer evaluation. Consulted GI, recommending nutrition consult for evaluation of appetite stimulants.  3/15: Asymptomatic hypoglycemia, started dronabinol. Aware of cancer diagnosis. Given Mg for high QTc. Borderline BP & HR -> Increase levothyroxine to 125 mcg QD (recently switched from IV to PO). Will f/u Surgery final recommendations.    Overnight events     Subjective complaints     Present Today:   - Heredia:  No [  ], Yes [   ] : Indication:     - Type of IV Access:       .. CVC/Piccline:  No [  ], Yes [   ] : Indication:       .. Midline: No [  ], Yes [   ] : Indication:                                              OBJECTIVE  PAST MEDICAL & SURGICAL HISTORY                                              ALLERGIES:  No Known Allergies                           HOME MEDICATIONS  Home Medications:                           MEDICATIONS:  STANDING MEDICATIONS  benzocaine 20% Spray 2 Spray(s) Mucosal four times a day  chlorhexidine 4% Liquid 1 Application(s) Topical daily  cholecalciferol 2000 Unit(s) Oral daily  dronabinol 2.5 milliGRAM(s) Oral three times a day before meals  enoxaparin Injectable 60 milliGRAM(s) SubCutaneous every 12 hours  folic acid 1 milliGRAM(s) Oral daily  furosemide   Injectable 40 milliGRAM(s) IV Push two times a day  levothyroxine 100 MICROGram(s) Oral daily  magnesium oxide 400 milliGRAM(s) Oral two times a day with meals  metolazone 5 milliGRAM(s) Oral daily  midodrine. 10 milliGRAM(s) Oral every 8 hours  multivitamin/minerals 1 Tablet(s) Oral daily  nystatin Cream 1 Application(s) Topical two times a day  pantoprazole    Tablet 40 milliGRAM(s) Oral before breakfast  thiamine 100 milliGRAM(s) Oral daily  triamcinolone 0.1% Cream 1 Application(s) Topical two times a day  vitamin A &amp; D Ointment 1 Application(s) Topical daily    PRN MEDICATIONS  acetaminophen     Tablet .. 650 milliGRAM(s) Oral every 6 hours PRN  ibuprofen  Tablet. 400 milliGRAM(s) Oral every 6 hours PRN                                            ------------------------------------------------------------  VITAL SIGNS: Last 24 Hours  T(C): 34.2 (15 Mar 2022 08:00), Max: 37 (14 Mar 2022 12:12)  T(F): 93.6 (15 Mar 2022 08:00), Max: 98.6 (14 Mar 2022 12:12)  HR: 54 (15 Mar 2022 04:40) (54 - 79)  BP: 93/54 (15 Mar 2022 04:40) (93/54 - 99/58)  BP(mean): --  RR: 18 (14 Mar 2022 21:18) (18 - 18)  SpO2: --                                               LABS:                        8.7    5.03  )-----------( 224      ( 15 Mar 2022 06:00 )             26.1     03-15    139  |  102  |  4<L>  ----------------------------<  44<LL>  4.0   |  26  |  0.6<L>    Ca    7.8<L>      15 Mar 2022 06:00  Phos  4.8     03-15  Mg     2.1     03-15    TPro  5.6<L>  /  Alb  1.8<L>  /  TBili  0.3  /  DBili  x   /  AST  16  /  ALT  20  /  AlkPhos  89  03-15    PT/INR - ( 15 Mar 2022 06:00 )   PT: 16.70 sec;   INR: 1.46 ratio         PTT - ( 15 Mar 2022 06:00 )  PTT:53.2 sec                                                          RADIOLOGY:        PHYSICAL EXAM:  GENERAL: NAD, lying in bed comfortably  HEAD:  Atraumatic, Normocephalic  EYES: conjunctiva and sclera clear  ENT: Moist mucous membranes  NECK: Supple  CHEST/LUNG: Soft crackles. Unlabored respirations  HEART: Regular rate and rhythm; No murmurs, rubs, or gallops  ABDOMEN: BSx4; Soft, diffuse nonspecific tenderness, negative [place], nondistended  EXTREMITIES:  No LE edema  NERVOUS SYSTEM:  Alert, interactive, not oriented to time, oriented partially to place. oriented to person  SKIN: Multiple lesions and excoriations on the upper and lower extremities and perineal area. No erythema or tenderness

## 2022-03-15 NOTE — PROGRESS NOTE ADULT - ASSESSMENT
ASSESSMENT:  61yF w/ PMHx of GIB, smoking, bladder incontinence, hypothyroidism who presented with AMS as a transfer from [facility] [place] for colon mass. 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     CEA 5.6    PLAN:  - Complete staging workup  for colon cancer  - Patient will ultimately require optimization prior to any intervention  - Follow up outpatient with Dr. Kate  - Recall surgery, if needed    Blue team spectra [id number]

## 2022-03-16 DIAGNOSIS — E63.9 NUTRITIONAL DEFICIENCY, UNSPECIFIED: ICD-10-CM

## 2022-03-16 DIAGNOSIS — Z51.5 ENCOUNTER FOR PALLIATIVE CARE: ICD-10-CM

## 2022-03-16 DIAGNOSIS — R41.82 ALTERED MENTAL STATUS, UNSPECIFIED: ICD-10-CM

## 2022-03-16 LAB
ALBUMIN SERPL ELPH-MCNC: 1.9 G/DL — LOW (ref 3.5–5.2)
ALP SERPL-CCNC: 96 U/L — SIGNIFICANT CHANGE UP (ref 30–115)
ALT FLD-CCNC: 19 U/L — SIGNIFICANT CHANGE UP (ref 0–41)
ANION GAP SERPL CALC-SCNC: 10 MMOL/L — SIGNIFICANT CHANGE UP (ref 7–14)
APTT BLD: 56.2 SEC — HIGH (ref 27–39.2)
AST SERPL-CCNC: 14 U/L — SIGNIFICANT CHANGE UP (ref 0–41)
BASOPHILS # BLD AUTO: 0.02 K/UL — SIGNIFICANT CHANGE UP (ref 0–0.2)
BASOPHILS NFR BLD AUTO: 0.4 % — SIGNIFICANT CHANGE UP (ref 0–1)
BILIRUB SERPL-MCNC: 0.3 MG/DL — SIGNIFICANT CHANGE UP (ref 0.2–1.2)
BUN SERPL-MCNC: 5 MG/DL — LOW (ref 10–20)
CALCIUM SERPL-MCNC: 7.8 MG/DL — LOW (ref 8.5–10.1)
CHLORIDE SERPL-SCNC: 100 MMOL/L — SIGNIFICANT CHANGE UP (ref 98–110)
CO2 SERPL-SCNC: 28 MMOL/L — SIGNIFICANT CHANGE UP (ref 17–32)
CREAT SERPL-MCNC: 0.8 MG/DL — SIGNIFICANT CHANGE UP (ref 0.7–1.5)
EGFR: 84 ML/MIN/1.73M2 — SIGNIFICANT CHANGE UP
EOSINOPHIL # BLD AUTO: 0.14 K/UL — SIGNIFICANT CHANGE UP (ref 0–0.7)
EOSINOPHIL NFR BLD AUTO: 2.7 % — SIGNIFICANT CHANGE UP (ref 0–8)
GLUCOSE BLDC GLUCOMTR-MCNC: 62 MG/DL — LOW (ref 70–99)
GLUCOSE BLDC GLUCOMTR-MCNC: 66 MG/DL — LOW (ref 70–99)
GLUCOSE BLDC GLUCOMTR-MCNC: 67 MG/DL — LOW (ref 70–99)
GLUCOSE BLDC GLUCOMTR-MCNC: 72 MG/DL — SIGNIFICANT CHANGE UP (ref 70–99)
GLUCOSE BLDC GLUCOMTR-MCNC: 96 MG/DL — SIGNIFICANT CHANGE UP (ref 70–99)
GLUCOSE BLDC GLUCOMTR-MCNC: 97 MG/DL — SIGNIFICANT CHANGE UP (ref 70–99)
GLUCOSE SERPL-MCNC: 87 MG/DL — SIGNIFICANT CHANGE UP (ref 70–99)
HCT VFR BLD CALC: 27.3 % — LOW (ref 37–47)
HGB BLD-MCNC: 9.1 G/DL — LOW (ref 12–16)
IMM GRANULOCYTES NFR BLD AUTO: 0.2 % — SIGNIFICANT CHANGE UP (ref 0.1–0.3)
INR BLD: 1.45 RATIO — HIGH (ref 0.65–1.3)
LYMPHOCYTES # BLD AUTO: 1.33 K/UL — SIGNIFICANT CHANGE UP (ref 1.2–3.4)
LYMPHOCYTES # BLD AUTO: 25.5 % — SIGNIFICANT CHANGE UP (ref 20.5–51.1)
MAGNESIUM SERPL-MCNC: 2.2 MG/DL — SIGNIFICANT CHANGE UP (ref 1.8–2.4)
MCHC RBC-ENTMCNC: 29.9 PG — SIGNIFICANT CHANGE UP (ref 27–31)
MCHC RBC-ENTMCNC: 33.3 G/DL — SIGNIFICANT CHANGE UP (ref 32–37)
MCV RBC AUTO: 89.8 FL — SIGNIFICANT CHANGE UP (ref 81–99)
MONOCYTES # BLD AUTO: 0.4 K/UL — SIGNIFICANT CHANGE UP (ref 0.1–0.6)
MONOCYTES NFR BLD AUTO: 7.7 % — SIGNIFICANT CHANGE UP (ref 1.7–9.3)
NEUTROPHILS # BLD AUTO: 3.31 K/UL — SIGNIFICANT CHANGE UP (ref 1.4–6.5)
NEUTROPHILS NFR BLD AUTO: 63.5 % — SIGNIFICANT CHANGE UP (ref 42.2–75.2)
NRBC # BLD: 0 /100 WBCS — SIGNIFICANT CHANGE UP (ref 0–0)
PHOSPHATE SERPL-MCNC: 5 MG/DL — HIGH (ref 2.1–4.9)
PLATELET # BLD AUTO: 208 K/UL — SIGNIFICANT CHANGE UP (ref 130–400)
POTASSIUM SERPL-MCNC: 3.8 MMOL/L — SIGNIFICANT CHANGE UP (ref 3.5–5)
POTASSIUM SERPL-SCNC: 3.8 MMOL/L — SIGNIFICANT CHANGE UP (ref 3.5–5)
PROT SERPL-MCNC: 5.8 G/DL — LOW (ref 6–8)
PROTHROM AB SERPL-ACNC: 16.6 SEC — HIGH (ref 9.95–12.87)
RBC # BLD: 3.04 M/UL — LOW (ref 4.2–5.4)
RBC # FLD: 16.1 % — HIGH (ref 11.5–14.5)
SODIUM SERPL-SCNC: 138 MMOL/L — SIGNIFICANT CHANGE UP (ref 135–146)
WBC # BLD: 5.21 K/UL — SIGNIFICANT CHANGE UP (ref 4.8–10.8)
WBC # FLD AUTO: 5.21 K/UL — SIGNIFICANT CHANGE UP (ref 4.8–10.8)

## 2022-03-16 PROCEDURE — 99222 1ST HOSP IP/OBS MODERATE 55: CPT

## 2022-03-16 PROCEDURE — 71045 X-RAY EXAM CHEST 1 VIEW: CPT | Mod: 26

## 2022-03-16 PROCEDURE — 99233 SBSQ HOSP IP/OBS HIGH 50: CPT

## 2022-03-16 RX ADMIN — MIDODRINE HYDROCHLORIDE 10 MILLIGRAM(S): 2.5 TABLET ORAL at 05:41

## 2022-03-16 RX ADMIN — Medication 2 SPRAY(S): at 11:20

## 2022-03-16 RX ADMIN — ENOXAPARIN SODIUM 60 MILLIGRAM(S): 100 INJECTION SUBCUTANEOUS at 17:22

## 2022-03-16 RX ADMIN — MIDODRINE HYDROCHLORIDE 10 MILLIGRAM(S): 2.5 TABLET ORAL at 21:49

## 2022-03-16 RX ADMIN — Medication 2 SPRAY(S): at 05:37

## 2022-03-16 RX ADMIN — MIDODRINE HYDROCHLORIDE 10 MILLIGRAM(S): 2.5 TABLET ORAL at 13:14

## 2022-03-16 RX ADMIN — Medication 1 APPLICATION(S): at 05:38

## 2022-03-16 RX ADMIN — Medication 1 TABLET(S): at 11:20

## 2022-03-16 RX ADMIN — NYSTATIN CREAM 1 APPLICATION(S): 100000 CREAM TOPICAL at 17:21

## 2022-03-16 RX ADMIN — MAGNESIUM OXIDE 400 MG ORAL TABLET 400 MILLIGRAM(S): 241.3 TABLET ORAL at 17:22

## 2022-03-16 RX ADMIN — Medication 125 MICROGRAM(S): at 05:41

## 2022-03-16 RX ADMIN — Medication 1 APPLICATION(S): at 17:22

## 2022-03-16 RX ADMIN — CHLORHEXIDINE GLUCONATE 1 APPLICATION(S): 213 SOLUTION TOPICAL at 11:22

## 2022-03-16 RX ADMIN — Medication 1 MILLIGRAM(S): at 11:21

## 2022-03-16 RX ADMIN — Medication 100 MILLIGRAM(S): at 11:21

## 2022-03-16 RX ADMIN — Medication 1 APPLICATION(S): at 11:22

## 2022-03-16 RX ADMIN — Medication 2000 UNIT(S): at 11:21

## 2022-03-16 RX ADMIN — MAGNESIUM OXIDE 400 MG ORAL TABLET 400 MILLIGRAM(S): 241.3 TABLET ORAL at 08:14

## 2022-03-16 RX ADMIN — NYSTATIN CREAM 1 APPLICATION(S): 100000 CREAM TOPICAL at 05:38

## 2022-03-16 RX ADMIN — PANTOPRAZOLE SODIUM 40 MILLIGRAM(S): 20 TABLET, DELAYED RELEASE ORAL at 05:56

## 2022-03-16 RX ADMIN — ENOXAPARIN SODIUM 60 MILLIGRAM(S): 100 INJECTION SUBCUTANEOUS at 05:41

## 2022-03-16 NOTE — CONSULT NOTE ADULT - CONSULT REASON
Large Polyp  Ongoing anemia
Pre-Op Clearance
anemia and thrombocytopenia
Patient w/ adenocarcinoma of colon. Malnourished w/ poor functional capacity and currently too weak to undergo chemo. Refusing PEG. Please assist w/ GOC
ams
PEG
Recalled for Wound care follow up
colon cancer
positive ANCA abs
scabs, blisters and desquamation of the skin
skin desquamation
Right facial cellulitis
possible retroperitoneal air s/p colonoscopy w/ polypectomy
Hypothyroidism
sep[tic shock
sepsis

## 2022-03-16 NOTE — CONSULT NOTE ADULT - CONSULT REQUESTED BY NAME
Medicine
Medicine / [facility]
ed
Medicine
service
service
Primary Team
CCU
Dr. Coburn
Dr. Palacios
Medicine Team
[place]
primary team
Dr Go

## 2022-03-16 NOTE — PROGRESS NOTE ADULT - ASSESSMENT
61F PMH GIB, Bowel and Bladder incontinence, Smoking (3-4 cigarettes/day), ETOH abuse, hypothyroidism, and ongoing anemia, admitted to [facility] 1/31 for ams found to have UTI. Dev GIB, colonoscopy showing polyps, transferred to [facility] for polypectomy upgraded to CCU for hypotension, likely septic shock 2/2 aspiration pna.     #AMS with unclear etiology     - Pt upgraded from floor on 2/20 after RRT for hypotension and desaturation. RIJ CVC placed, started on levo, venti mask, o2 and hypotension subsequently improved   - CTH 2/20: no ICH, mass effect, or midline shift   - EEG 2/20: generalized slowing    - UCx 2/18 negative   - BCx 2/17, 2/19: NGTD  - COVID negative 2/20  - MRSA nares 2/20 negative  - LE duplex 2/21: no DVT however b/l peroneal veins not visualized   - ammonia elevated to 59 on 2/22, started on lactulose, possibly contributing to ams -> 42 (3/1/22)  - s.p aztreonam 2g q8h (stopped 3/4/22)  - s.p clinda 600mg q8h (stopped 3/4/22)    # Malnutrition  - Calorie count x 3 d, started 3/11/22  - f/u hypoglycemia, not on D5 due to CHF (taking lasix)  - Consider PEG tube if failed calorie count  - start standing Magnesium Oxide bid  - F/U Nutrition for suplements/stimulants -> ensure enlive + clear + prosource gelatein + Dronabinol 2.5 tid  - Could benefit from PEG (currently trial of supplements & appetite stimulants. If fail -> reassess PEG, son was previously agreeable but wanted stimulant/supplement trial 1st. Will need consent, patient's capacity is questionable, waxing and waning memory, noticeable by son as well) -> Not eating well, concern about dronabinol's effect on mental status. Will not give Megace due to malignancy. -> Son agreeable with PEG, patient's capacity does not seem well (lacks insight) -> Reconsult GI for PEG    #Colonic Polyp  #Diarrhea in setting of recent abx use  #Hx of GIB   #Pneumoretroperitoneum   #Pancolitis   s/p colonoscopy with polypectomy 2/18  ct abd without free air but showed pancolitis  pt now passed for pureed diet with thin liquids  1 to 1 feeds, monitor FS  nutrition and S&S f/u  Bx -> Adenocarcinoma w/ lymphovascular invasion -> Surgery evaluation [CT Chest abdomen pelvis did not show metastasis] -> Outpatient follow up  - Heme/Onc -> Pulm C/S for pleural effusion malignancy studies  - Colonoscopy 2/4: 1 large 3-4 cm sigmoid pedunculated polyp and 2 polyps (7, 14 mm) in descending colon s/p bx  - Negative for C-Diff infection, negative GI PCR  - CEA elevated 5.6  - S/P colonoscopy and polypectomy 2/18/2022. Polyp (13 mm) in the descending colon. (hot snare Polypectomy).  Polyp (25 mm to 30 mm) in the rectosigmoid junction at 20 cm from the anal verge. (Endoloop, Polypectomy, Endoclip).   - Bx -> Adenocarcinoma with lymphovascular invasion  - CTAP PO/IV contrast 2/21: findings c/w pancolitis, no intra or retroperitoneal free air  - CT Chest w/ IV contrast for staging -> small- moderate B/L Pleural effusions; Right IJ DVT  - CT Abdomen Pelvis w/ IV contrast for evaluation of pancolitis -> resolved colitis, increased anasarca, cholelithiasis, T12 chronic fracture, T6 fracture  - Monitor Diarrhea -> 5/24hrs on 3/9-3/10 -> Check C diff. GI PCR, & Horn stain -> -ve    #Cholelithiasis  - Downtrending AST/ALT/ALP, within normal limits  - Asymptomatic  - Monitor for now    #T6/T12 Fracture  - Monitor for pain    #Exfoliative skin lesions- possibly externally induced (e.g scratching), r/o TEN given vesicles and desquamation   #+ve C-ANCA  evaluated by Burn, rheumatology and dermatology -> unlikely vasculitis  on steroid cream  Sent PR4/MPO antibody assay, will repeat c-anca once acute illness resolves.  - pt with superficial scabs diffusely over body, most prominent over chest, as well as areas of desquamation over arms, legs, and hips   - Burn recs appreciated; dx with incontinence dermatitis    - derm recs appreciated, recalled burn for skin bx (done 2/28), f/u path   - rheum recs appreciated, no clear evidence for ANCA associated vasculitis could be elevated due to sepsis. Agree with skin bx. Sent PR4/MPO antibody assay, will rpt c-anca once acute illness resolves.  - arsenic negative   - s/p skin bx by burn 2/28 -> Psoriasiform dermatitis with suppurative scale crust The differential diagnosis includes infection, drug eruption, viral exanthem, irritant contact dermatitis, and psoriasis.  - Parakeratosis with hyperpigmentation, hypergranulosis, focal necrotic keratinocytes, mild perivascular lymphohistiocytic infiltrate, dermal melanophages, focal extravasated erythrocytes and telangiectatic vessels. The differential diagnosis includes a drug eruption and viral exanthem. Resolving Woo-Yash syndrome is less likely. No fungal hyphae seen    #Prolonged qtc  - active  - qtc 613 initially -> 492 (3/10) -> 526 (3/11) -> 542 (3/13) -> 508 (3/14) -> 536 (3/15)  - repleting mg PRN, keep Mg>2, K>4.   - on no qtc prolonging medications   - arsenic levels negative   - daily EKG  - continue to monitor, Mg PRN    #Normocytic anemia   #Acute thrombocytopenia- resolved   #Elevated INR/PTT not on a/c- resolved   #presumed hemolytic anemia, with coagulopathy, possible DIC - resolved  completed prednisone taper per HemOnc recommendation  daily CBC  - cindy positive, possibly due to transfusion (not documented but likely received in [facility])  - HIV, hep B negative   - monitor INR     #Hypotension - monitoring  unclear etiology, possible septic shock  ct abd with pancolitis - resolved  s/p course of abx  BP now stable  - Changed midodrine 5mg TID to standing 3/12/22  - off levophed since 3/2    #Hypernatremia- improving   - c/w free water - now on pureed diet with thin liquids    #Hypothyroidism - active, improved  - TSH 15.6  - Patient will need to repeat TSH and FT4 in 6 wks (End of March)  - hypotensive with bradycardia 3/15/22 -> increased levothyroxine to 125mcg PO q24h (recently adjusted medication from IV to PO)    #Hx of UTI- resolved   - Suspected repeat UTI -> Repeat UA (3/9) -> stable  - D/C Tapia -> passed ToV    # Leukocytosis - resolved  # Neutrophilia - resolved  - Unasyn 3/9/22-3/10/22  - F/u BCx -> -ve  - UA -> decreased WBC from prior & same large LE, no nitrites like before  - UCx -ve  - Stool studies -ve    #Right lower face cellulitis- resolved  - CT maxillofacial with C 2/12: Mild soft tissue inflammation suggesting cellulitis of the right lower face  - pt was on cefepime, vancomycin (end date 2/15)  - Started on Unasyn 2/16,/c 2/20 started on broad spectrum Antibiotics  - BCx 2/17 -ve, repeat -ve     #Hx NSTEMI   #Hx of Elevated Troponin  - in setting of hypotension   - Normal Lexiscan at [facility]    #Hx of ETOH abuse  #Possible Thiamine deficiency   - last drink  6 months ago  - c/w Folic acid and Thiamine    #Acute on chronic HFpEF - active  ECHO with preserved EF, grade 2 diastolic dysfunction  - Increased Lasix to 40mg IVP bid, improving CXR, repeat CXR today  daily wts, I's and O's  stable on room air                                                                              ----------------------------------------------------  # DVT prophylaxis Lovenox (improved Platelets, Patient, PTT, & Hb)    # GI prophylaxis Protonix    # Diet DASH/TLC Pureed  -> Patient removed NG, attempt oral feeds, calorie count -> <25% of needs    # Activity Score (AM-PAC)    # Code status     # Disposition                                                                              --------------------------------------------------------    # Handoff

## 2022-03-16 NOTE — PROGRESS NOTE ADULT - SUBJECTIVE AND OBJECTIVE BOX
SANTIAGO CALIXTO 61y Female  MRN#: [id number]   CODE STATUS:________    Hospital Day: 28d    Pt is currently admitted with the primary diagnosis of     SUBJECTIVE  Hospital Course  Patient is a 62 y/o female with PMHx of GIB, Bowel and Bladder incontinence, Smoking (3-4 cigarettes/day), ETOH abuse, hypothyroidism, and ongoing anemia transferred from [facility] for Polypectomy. Patient was admitted to [facility] on 01/31 for AMS, weakness and decreased appetite. She was found to have UTI, NSTEMI, and electrolyte imbalance. During her stay at [facility] she had a colonoscopy done which revealed 1 large 3-4 cm sigmoid pedunculated polyp and 2 polyps (7, 14 mm) in descending colon. Patient was transferred as they are not able to perform large Polypectomies there. She was not able to be discharged as ongoing anemia and concern that the cause are these large polyps.    Patient had a colonoscopy and polypectomy done on 2/18/2022. Polyp (13 mm) in the descending colon. (hot snare Polypectomy).  Polyp (25 mm to 30 mm) in the rectosigmoid junction at 20 cm from the anal verge. (Endoloop, Polypectomy, Endoclip). -> Currently Bx result shows adenocarcinoma    After the colonoscopy patient became hypothermic and hypotensive. Over night patient was started to Levo 2/20 and warming blanket.   2/20 AM patient had RRT for desaturation and hypotension. Patient was placed on 50% O2, patient had good response. Levophed 2/20 also started to raise BP. Patient remained altered, not speaking, but remained somewhat awake and just makes grunting sounds. Patient is not following commands. Left central line was placed on 3/2. Spoke to ICU fellow, patient was upgraded to ICU for closer monitoring.     In CCU pt had abdominal tenderness, KUB ordered, concern for RP free air. CTAP with PO/IV contrast ordered, no perforation or pneumo-peritoneum/retroperitoneum however showing findings c/w pancolitis. Surgery following, ID consulted. Pt was on vanc/zosyn (2/20-22 & 2/21-23 respectively). In CCU, pt developed coagulopathy (low plt, anemia, increasing INR despite not on AC). Treated with pRBC, plt transfusion as well as vitamin K. Heme onc on board, thought to be due to possible DIC 2/2 sepsis. Started on solumedrol 40mg for positive cindy however possibly positive due to transfusion at [facility]. Plt stabilizing so steroids tapered (completed taper). Derm consulted for diffuse skin rash- bullous lesions with desquamation and areas of healing with hyperpigmentation. Burn consulted for skin bx (done 2/28, results pending). C-anca also positive, rhuem consulted, presentation unlikely due to vasculitis, will rpt cindy once acute illness resolves.     3/9: Patient's WBC and PMNs have been up-trending for the past 4 days despite steroids being tapered even before that. Will d/c heredia, take u/a, start Unasyn empirically. Patient not eating well, had hypoglycemia -> NG inserted (consent from son). Polypectomy pathology showing adenocarcinoma -> CT chest w/ IV contrast for staging. Had diarrhea in am -> CT abdomen to f/u pancolitis. AMS -> MR Brain per old neuro note. Spoke w/ son concerning IV contrast and MR contraindications. Son reports patient's mental status to be poor at home as she did not ambulate on her home or answer direct questions.  3/10: Diarrhea 5/24 hrs, started feeds on previous day, will check infectious causes, stopped Unasyn  3/11: 2 BM/ 24 hrs, improved diarrhea consistency as per patient, C diff -ve, GI PCR -ve  3/13: More interactive, continuing calorie count, had episode of low glucose in am, no AMS. No shortness of breath. Happy her son is coming again  3/14: No complaints. Active. Not eating much, drinking though. Spoke with son about PEG, he is amenable. Consulted Surgery and contacted Heme/Onc for Cancer evaluation. Consulted GI, recommending nutrition consult for evaluation of appetite stimulants.  3/15: Asymptomatic hypoglycemia, started dronabinol. Aware of cancer diagnosis. Given Mg for high QTc. Borderline BP & HR -> Increase levothyroxine to 125 mcg QD (recently switched from IV to PO). Will f/u Surgery final recommendations.  3/16: Asymptomatic hypoglycemia, pending GI for PEG evaluation    Overnight events     Subjective complaints     Present Today:   - Heredia:  No [  ], Yes [   ] : Indication:     - Type of IV Access:       .. CVC/Piccline:  No [  ], Yes [   ] : Indication:       .. Midline: No [  ], Yes [   ] : Indication:                                              OBJECTIVE  PAST MEDICAL & SURGICAL HISTORY                                              ALLERGIES:  No Known Allergies                           HOME MEDICATIONS  Home Medications:                           MEDICATIONS:  STANDING MEDICATIONS  benzocaine 20% Spray 2 Spray(s) Mucosal four times a day  buMETAnide Injectable 1 milliGRAM(s) IV Push two times a day  chlorhexidine 4% Liquid 1 Application(s) Topical daily  cholecalciferol 2000 Unit(s) Oral daily  enoxaparin Injectable 60 milliGRAM(s) SubCutaneous every 12 hours  folic acid 1 milliGRAM(s) Oral daily  levothyroxine 125 MICROGram(s) Oral daily  magnesium oxide 400 milliGRAM(s) Oral two times a day with meals  metolazone 5 milliGRAM(s) Oral daily  midodrine. 10 milliGRAM(s) Oral every 8 hours  multivitamin/minerals 1 Tablet(s) Oral daily  nystatin Cream 1 Application(s) Topical two times a day  pantoprazole    Tablet 40 milliGRAM(s) Oral before breakfast  thiamine 100 milliGRAM(s) Oral daily  triamcinolone 0.1% Cream 1 Application(s) Topical two times a day  vitamin A &amp; D Ointment 1 Application(s) Topical daily    PRN MEDICATIONS  acetaminophen     Tablet .. 650 milliGRAM(s) Oral every 6 hours PRN  ibuprofen  Tablet. 400 milliGRAM(s) Oral every 6 hours PRN                                            ------------------------------------------------------------  VITAL SIGNS: Last 24 Hours  T(C): 35.8 (16 Mar 2022 04:48), Max: 35.8 (15 Mar 2022 12:29)  T(F): 96.5 (16 Mar 2022 04:48), Max: 96.5 (15 Mar 2022 12:29)  HR: 75 (16 Mar 2022 04:48) (68 - 75)  BP: 93/58 (16 Mar 2022 04:48) (90/55 - 96/58)  BP(mean): --  RR: 18 (15 Mar 2022 20:34) (16 - 18)  SpO2: 100% (16 Mar 2022 08:27) (100% - 100%)                                               LABS:                        9.1    5.21  )-----------( 208      ( 16 Mar 2022 06:48 )             27.3     03-15    139  |  102  |  4<L>  ----------------------------<  44<LL>  4.0   |  26  |  0.6<L>    Ca    7.8<L>      15 Mar 2022 06:00  Phos  4.8     03-15  Mg     2.1     03-15    TPro  5.6<L>  /  Alb  1.8<L>  /  TBili  0.3  /  DBili  x   /  AST  16  /  ALT  20  /  AlkPhos  89  03-15    PT/INR - ( 15 Mar 2022 06:00 )   PT: 16.70 sec;   INR: 1.46 ratio         PTT - ( 15 Mar 2022 06:00 )  PTT:53.2 sec                                                          RADIOLOGY:        PHYSICAL EXAM:  GENERAL: NAD, lying in bed comfortably  HEAD:  Atraumatic, Normocephalic  EYES: conjunctiva and sclera clear  ENT: Moist mucous membranes  NECK: Supple  CHEST/LUNG: Soft crackles. Unlabored respirations  HEART: Regular rate and rhythm; No murmurs, rubs, or gallops  ABDOMEN: BSx4; Soft, diffuse nonspecific tenderness, negative [place], nondistended  EXTREMITIES:  No LE edema  NERVOUS SYSTEM:  Alert, interactive, not oriented to time, oriented partially to place. oriented to person  SKIN: Multiple lesions and excoriations on the upper and lower extremities and perineal area. No erythema or tenderness

## 2022-03-16 NOTE — CONSULT NOTE ADULT - CONSULT REQUESTED DATE/TIME
20-Feb-2022 10:15
21-Feb-2022 10:36
08-Mar-2022 11:38
14-Mar-2022 10:53
14-Mar-2022 17:42
21-Feb-2022 14:30
24-Feb-2022 13:44
24-Feb-2022 15:27
18-Feb-2022 15:13
21-Feb-2022 11:56
04-Mar-2022 07:27
17-Feb-2022 12:52
17-Feb-2022 08:36
22-Feb-2022 09:44
16-Mar-2022 16:21
20-Feb-2022 11:25

## 2022-03-16 NOTE — CONSULT NOTE ADULT - ASSESSMENT
61yFemale being evaluated for support with GOC. Did not overstimulate during encounter    Hospital day 30.      MEDD (morphine equivalent daily dose): na      See Recs below. d/w Primary and Palliative teams   NOT COMPLETE    Please call [phone] with questions or concerns 24/7.   We will continue to follow.    61yFemale being evaluated for support with GOC. Did not overstimulate during encounter.    Hospital day 30.      MEDD (morphine equivalent daily dose): na      See Recs below. d/w Primary and Palliative teams       Please call [phone] with questions or concerns 24/7.   We will continue to follow.

## 2022-03-16 NOTE — CONSULT NOTE ADULT - SUBJECTIVE AND OBJECTIVE BOX
SANTIAGO CALIXTO          MRN-[id number]              HPI:  Pt is a 62 yo F with PMH of GIB, Bowel and Bladder incontinence, Smoking (3-4 cigarettes/day), ETOH abuse, hypothyroidism anemia transferred from [facility] for Polypectomy. Pt is from home living with her son and bedbound at baseline. Pt was admitted to [facility] on  for AMS, weakness and decreased appetite. She was found to have UTI, NSTEMI, and electrolyte imbalance. During her stay at [facility] pt had colonoscopy done which revealed 1 large 3-4 cm sigmoid pedunculated polyp and 2 polyps (7, 14 mm) in descending colon s/p bx. Pt was transferred to [facility] for polyp removal.  (2022 23:24)      PAST MEDICAL & SURGICAL HISTORY:      FAMILY HISTORY:   Reviewed and found non contributory in mother or father    SOCIAL HISTORY:   Tobacco/etoh/illicit drug use use reported. Yes [x ]  _________  No [ ]  Pt resides at: home [x ]  facility [ ]  other [ ] _______        ROS:	    Unable to attain due to:  did not over-stimulate patient at this time                    Dyspnea (Jimbo 0-10): 0                       N/V (Y/N): No                             Secretions (Y/N) : No                                          Agitation(Y/N): No                              Pain (Y/N): No                                 -Provocation/Palliation: N/A  -Quality/Quantity: N/A  -Radiating: N/A  -Severity: No pain  -Timing/Frequency: N/A  -Impact on ADLs: N/A    General:  Denied  HEENT:    Denied  Neck:  Denied  CVS:  Denied  Resp:  Denied  GI:  Denied    :  Denied  Musc:  Denied  Neuro:  Denied  Psych:  Denied  Skin:  Denied  Lymph:  Denied    Last BM: 3/15 per emr    Allergies    No Known Allergies    Intolerances      Opiate Naive (Y/N):   -iStop reviewed (Y/N):   Ref#:          cannot access at this time        Labs:	    CBC:                        9.1    5.21  )-----------( 208      ( 16 Mar 2022 06:48 )             27.3     CMP:    -    138  |  100  |  5<L>  ----------------------------<  87  3.8   |  28  |  0.8    Ca    7.8<L>      16 Mar 2022 06:48  Phos  5.0     03-16  Mg     2.2     03-16    TPro  5.8<L>  /  Alb  1.9<L>  /  TBili  0.3  /  DBili  x   /  AST  14  /  ALT  19  /  AlkPhos  96  03-16       PT/INR - ( 16 Mar 2022 06:48 )   PT: 16.60 sec;   INR: 1.45 ratio         PTT - ( 16 Mar 2022 06:48 )  PTT:56.2 sec           Radiology:	   < from: Xray Chest 1 View- PORTABLE-Urgent (Xray Chest 1 View- PORTABLE-Urgent .) (22 @ 09:13) >    ACC: [id number] EXAM:  XR CHEST PORTABLE URGENT 1V                          PROCEDURE DATE:  2022          INTERPRETATION:  Clinical History / Reason for exam: Effusion.    Comparison : Chest radiograph 3/14/2022.    Technique/Positioning: Single frontal view the chest.    Findings:    Support devices: None.    Cardiac/mediastinum/hilum: Stable.    Lung parenchyma/Pleura: Stable bilateral opacities, right greater than   left. No pneumothorax.    Skeleton/soft tissues: Stable.    Impression:    Stable bilateral opacities, right greater than left.        --- End of Report ---            LESVIA JACKSON MD; Attending Radiologist  This document has been electronically signed. Mar 16 2022  3:39PM    < end of copied text >    < from: CT Abdomen and Pelvis w/ IV Cont (22 @ 15:54) >  ACC: [id number] EXAM:  CT ABDOMEN AND PELVIS IC                          PROCEDURE DATE:  2022          INTERPRETATION:  CLINICAL HISTORY: Diarrhea. Hypotension.    TECHNIQUE: Contiguous axial CT images were obtained from the lower chest   to the pubic symphysis following administration of Optiray intravenous   contrast. Oral contrast was not administered. Reformatted images in the   coronal and sagittal planes were acquired.    COMPARISON: CT abdomen pelvis dated 2022.      FINDINGS:    Study is limited by beam hardening artifact from patient arm positioning.    HEPATOBILIARY: Cholelithiasis    SPLEEN: Unremarkable.    PANCREAS: Unremarkable.    ADRENAL GLANDS: Unremarkable.    KIDNEYS: No hydronephrosis. Right renal vascular calcifications.    ABDOMINOPELVIC NODES: Unremarkable    PELVIC ORGANS: Bulky fibroid uterus redemonstrated. Focus of gas in the   urinary bladder.    PERITONEUM/MESENTERY/BOWEL:  Metallic clip in the sigmoid colon. No   evidence of bowel obstruction or free air. Small ascites. Resolution of   previously seen colitis. Unremarkable appendix. Feeding tube tip in the   stomach.    BONES/SOFT TISSUES: Extensive anasarca, increased from prior.   Degenerative changes of the spine. Chronic T12 vertebral bodycompression   fracture.    OTHER: Atherosclerotic calcifications      IMPRESSION:    1.  Resolution of previously seen colitis. No evidence of bowel   obstruction.  2.  Extensive anasarca, increased from prior.  3.  Small ascites.  4.  Focus of gas in the urinary bladder. Correlate for recent   instrumentation.  5.  See body of the report for additional findings.  6.  See separately dictated CT chest report for thoracic findings.    --- End of Report ---            KAY FREEDMAN MD; AttendingRadiologist  This document has been electronically signed. Mar  9 2022  5:03PM    < end of copied text >    EK Lead ECG:   Ventricular Rate 77 BPM    Atrial Rate 77 BPM    P-R Interval 128 ms    QRS Duration 70 ms    Q-T Interval 474 ms    QTC Calculation(Bazett) 536 ms    P Axis 79 degrees    R Axis 86 degrees    T Axis -51 degrees    Diagnosis Line Normal sinus rhythm  ST & T wave abnormality, consider inferior ischemia  Abnormal ECG    Confirmed by Hudson Malin ([id number]) on 3/15/2022 11:15:36 AM (03-15-22 @ 07:53)      Imaging Personally Reviewed:  [ ] YES  [ ] NO    Consultant(s) Notes Reviewed:  [ ] YES  [ ] NO  Care Discussed with Consultants/Other Providers [ ] YES  [ ] NO    PEx:	  T(C): 36.1 (22 @ 13:17), Max: 36.1 (22 @ 13:17)  HR: 75 (22 @ 13:17) (72 - 75)  BP: 97/54 (22 @ 13:17) (90/55 - 97/54)  RR: 19 (22 @ 13:17) (18 - 19)  SpO2: 100% (22 @ 08:27) (100% - 100%)  Wt(kg): --  Daily     Daily     General:  found in bed in NAD - roused to verbal stimuli and returned to resting state  ENMT: no external oral ulcers,   CVS: not tachy  Resp: Unlabored Non tachypneic No increased WOB      Preadmit Karnofsky:  %           Current Karnofsky:   30  %  http://www.npcrc.org/files/news/karnofsky_performance_scale.pdf   http://www.npcrc.org/files/news/palliative_performance_scale_PPSv2.pdf  Cachexia (Y/N): y  BMI: BMI (kg/m2): 19.5 (22 @ 17:07)      Medications:	      MEDICATIONS  (STANDING):  benzocaine 20% Spray 2 Spray(s) Mucosal four times a day  buMETAnide Injectable 1 milliGRAM(s) IV Push two times a day  chlorhexidine 4% Liquid 1 Application(s) Topical daily  cholecalciferol 2000 Unit(s) Oral daily  enoxaparin Injectable 60 milliGRAM(s) SubCutaneous every 12 hours  folic acid 1 milliGRAM(s) Oral daily  levothyroxine 125 MICROGram(s) Oral daily  magnesium oxide 400 milliGRAM(s) Oral two times a day with meals  metolazone 5 milliGRAM(s) Oral daily  midodrine. 10 milliGRAM(s) Oral every 8 hours  multivitamin/minerals 1 Tablet(s) Oral daily  nystatin Cream 1 Application(s) Topical two times a day  pantoprazole    Tablet 40 milliGRAM(s) Oral before breakfast  thiamine 100 milliGRAM(s) Oral daily  triamcinolone 0.1% Cream 1 Application(s) Topical two times a day  vitamin A &amp; D Ointment 1 Application(s) Topical daily    MEDICATIONS  (PRN):  acetaminophen     Tablet .. 650 milliGRAM(s) Oral every 6 hours PRN Mild Pain (1 - 3), Moderate Pain (4 - 6)  ibuprofen  Tablet. 400 milliGRAM(s) Oral every 6 hours PRN Severe Pain (7 - 10)        Advanced Directives:	     Full Code     DNR/DNI     MOLST     HCP     Living Will     Decision maker: The patient is able to participate in complex medical decision making conversations.   Legal surrogate:    GOALS OF CARE DISCUSSION	       Palliative care info/counseling provided	           Family meeting scheduled         Documentation of GOC/Advanced Care Planning:       See previous Palliative Medicine Note    PSYCHOSOCIAL-SPIRITUAL ASSESSMENT:       Reviewed       See Palliative Care SW/ documentation        	    REFERRALS       Palliative Med        Unit SW/Case Mgmt              Hospice       Speech/Swallow       Nutrition       PT/OT SANTIAGO CALIXTO          MRN-[id number]              CC: transferred from [facility] for poly[facility]omy    HPI:  Pt is a 60 yo F with PMH of GIB, Bowel and Bladder incontinence, Smoking (3-4 cigarettes/day), ETOH abuse, hypothyroidism anemia transferred from [facility] for Polypectomy. Pt is from home living with her son and bedbound at baseline. Pt was admitted to [facility] on  for AMS, weakness and decreased appetite. She was found to have UTI, NSTEMI, and electrolyte imbalance. During her stay at [facility] pt had colonoscopy done which revealed 1 large 3-4 cm sigmoid pedunculated polyp and 2 polyps (7, 14 mm) in descending colon s/p bx. Pt was transferred to [facility] for polyp removal.  (2022 23:24)      PAST MEDICAL & SURGICAL HISTORY:  ETOH abuse  Hypothyroidism  Hx GIB    FAMILY HISTORY:   Reviewed and found non contributory in mother or father    SOCIAL HISTORY:   Tobacco/etoh/illicit drug use use reported. Yes [x ]  _________  No [ ]  Pt resides at: home [x ]  facility [ ]  other [ ] _______        ROS:	    Unable to attain due to:  did not over-stimulate patient at this time. Patient did not respond to questions.                    Dyspnea (Jimbo 0-10): 0                       N/V (Y/N): No                             Secretions (Y/N) : No                                          Agitation(Y/N): No                              Pain (Y/N): No                                 -Provocation/Palliation: N/A  -Quality/Quantity: N/A  -Radiating: N/A  -Severity: No pain  -Timing/Frequency: N/A  -Impact on ADLs: N/A    General:  Denied  HEENT:    Denied  Neck:  Denied  CVS:  Denied  Resp:  Denied  GI:  Denied    :  Denied  Musc:  Denied  Neuro:  Denied  Psych:  Denied  Skin:  Denied  Lymph:  Denied    Last BM: 3/15 per emr    Allergies  No Known Allergies      Opiate Naive (Y/N): Y  -iStop reviewed (Y/N):  Y  Reference #: [id number]      Labs:	    CBC:                        9.1    5.21  )-----------( 208      ( 16 Mar 2022 06:48 )             27.3     CMP:    03-16    138  |  100  |  5<L>  ----------------------------<  87  3.8   |  28  |  0.8    Ca    7.8<L>      16 Mar 2022 06:48  Phos  5.0     03-16  Mg     2.2     03-16    TPro  5.8<L>  /  Alb  1.9<L>  /  TBili  0.3  /  DBili  x   /  AST  14  /  ALT  19  /  AlkPhos  96  03-16       PT/INR - ( 16 Mar 2022 06:48 )   PT: 16.60 sec;   INR: 1.45 ratio         PTT - ( 16 Mar 2022 06:48 )  PTT:56.2 sec           Radiology:	   < from: Xray Chest 1 View- PORTABLE-Urgent (Xray Chest 1 View- PORTABLE-Urgent .) (22 @ 09:13) >    ACC: [id number] EXAM:  XR CHEST PORTABLE URGENT 1V                          PROCEDURE DATE:  2022          INTERPRETATION:  Clinical History / Reason for exam: Effusion.    Comparison : Chest radiograph 3/14/2022.    Technique/Positioning: Single frontal view the chest.    Findings:    Support devices: None.    Cardiac/mediastinum/hilum: Stable.    Lung parenchyma/Pleura: Stable bilateral opacities, right greater than   left. No pneumothorax.    Skeleton/soft tissues: Stable.    Impression:    Stable bilateral opacities, right greater than left.        --- End of Report ---            LESVIA JACKSON MD; Attending Radiologist  This document has been electronically signed. Mar 16 2022  3:39PM    < end of copied text >    < from: CT Abdomen and Pelvis w/ IV Cont (22 @ 15:54) >  ACC: [id number] EXAM:  CT ABDOMEN AND PELVIS IC                          PROCEDURE DATE:  2022          INTERPRETATION:  CLINICAL HISTORY: Diarrhea. Hypotension.    TECHNIQUE: Contiguous axial CT images were obtained from the lower chest   to the pubic symphysis following administration of Optiray intravenous   contrast. Oral contrast was not administered. Reformatted images in the   coronal and sagittal planes were acquired.    COMPARISON: CT abdomen pelvis dated 2022.      FINDINGS:    Study is limited by beam hardening artifact from patient arm positioning.    HEPATOBILIARY: Cholelithiasis    SPLEEN: Unremarkable.    PANCREAS: Unremarkable.    ADRENAL GLANDS: Unremarkable.    KIDNEYS: No hydronephrosis. Right renal vascular calcifications.    ABDOMINOPELVIC NODES: Unremarkable    PELVIC ORGANS: Bulky fibroid uterus redemonstrated. Focus of gas in the   urinary bladder.    PERITONEUM/MESENTERY/BOWEL:  Metallic clip in the sigmoid colon. No   evidence of bowel obstruction or free air. Small ascites. Resolution of   previously seen colitis. Unremarkable appendix. Feeding tube tip in the   stomach.    BONES/SOFT TISSUES: Extensive anasarca, increased from prior.   Degenerative changes of the spine. Chronic T12 vertebral bodycompression   fracture.    OTHER: Atherosclerotic calcifications      IMPRESSION:    1.  Resolution of previously seen colitis. No evidence of bowel   obstruction.  2.  Extensive anasarca, increased from prior.  3.  Small ascites.  4.  Focus of gas in the urinary bladder. Correlate for recent   instrumentation.  5.  See body of the report for additional findings.  6.  See separately dictated CT chest report for thoracic findings.    --- End of Report ---            KAY FREEDMAN MD; AttendingRadiologist  This document has been electronically signed. Mar  9 2022  5:03PM    < end of copied text >    EK Lead ECG:   Ventricular Rate 77 BPM    Atrial Rate 77 BPM    P-R Interval 128 ms    QRS Duration 70 ms    Q-T Interval 474 ms    QTC Calculation(Bazett) 536 ms    P Axis 79 degrees    R Axis 86 degrees    T Axis -51 degrees    Diagnosis Line Normal sinus rhythm  ST & T wave abnormality, consider inferior ischemia  Abnormal ECG    Confirmed by Hudson Malin ([id number]) on 3/15/2022 11:15:36 AM (03-15-22 @ 07:53)      Imaging Personally Reviewed:  [x] YES  [ ] NO    Consultant(s) Notes Reviewed:  [x ] YES  [ ] NO  Care Discussed with Consultants/Other Providers [x] YES  [ ] NO    PEx:	  T(C): 36.1 (22 @ 13:17), Max: 36.1 (22 @ 13:17)  HR: 75 (22 @ 13:17) (72 - 75)  BP: 97/54 (22 @ 13:17) (90/55 - 97/54)  RR: 19 (22 @ 13:17) (18 - 19)  SpO2: 100% (22 @ 08:27) (100% - 100%)  Wt(kg): --  Daily     Daily     General:  found in bed in NAD - roused to verbal stimuli and returned to resting state  Eyes: no scleral icterus  ENMT: no external oral ulcers,   CVS: not tachy  Resp: Unlabored Non tachypneic No increased WOB  Neuro: opened eyes to voice, but otherwise did not participate in exam  Psych: AAOx0  Skin: exfoliative skin lesions on arms noted      Preadmit Karnofsky:  Unknown%           Current Karnofsky:   30  %  http://www.npcrc.org/files/news/karnofsky_performance_scale.pdf   http://www.npcrc.org/files/news/palliative_performance_scale_PPSv2.pdf  Cachexia (Y/N): y  BMI: BMI (kg/m2): 19.5 (22 @ 17:07)      Medications:	      MEDICATIONS  (STANDING):  benzocaine 20% Spray 2 Spray(s) Mucosal four times a day  buMETAnide Injectable 1 milliGRAM(s) IV Push two times a day  chlorhexidine 4% Liquid 1 Application(s) Topical daily  cholecalciferol 2000 Unit(s) Oral daily  enoxaparin Injectable 60 milliGRAM(s) SubCutaneous every 12 hours  folic acid 1 milliGRAM(s) Oral daily  levothyroxine 125 MICROGram(s) Oral daily  magnesium oxide 400 milliGRAM(s) Oral two times a day with meals  metolazone 5 milliGRAM(s) Oral daily  midodrine. 10 milliGRAM(s) Oral every 8 hours  multivitamin/minerals 1 Tablet(s) Oral daily  nystatin Cream 1 Application(s) Topical two times a day  pantoprazole    Tablet 40 milliGRAM(s) Oral before breakfast  thiamine 100 milliGRAM(s) Oral daily  triamcinolone 0.1% Cream 1 Application(s) Topical two times a day  vitamin A &amp; D Ointment 1 Application(s) Topical daily    MEDICATIONS  (PRN):  acetaminophen     Tablet .. 650 milliGRAM(s) Oral every 6 hours PRN Mild Pain (1 - 3), Moderate Pain (4 - 6)  ibuprofen  Tablet. 400 milliGRAM(s) Oral every 6 hours PRN Severe Pain (7 - 10)        Advanced Directives:	     Full Code     Decision maker: The patient is unable to participate in complex medical decision making conversations.   Legal surrogate: Family    GOALS OF CARE DISCUSSION	       Palliative care info/counseling provided	        REFERRALS       Palliative Med        Unit SW/Case Mgmt

## 2022-03-16 NOTE — CONSULT NOTE ADULT - ATTENDING COMMENTS
Patient seen at bedside above  She did not participate in exam  Patient with lengthy hospital stay and multiple comorbidities  Plan for family meeting on 3/18  Will follow

## 2022-03-16 NOTE — CONSULT NOTE ADULT - REASON FOR ADMISSION
Polypectomy

## 2022-03-16 NOTE — CONSULT NOTE ADULT - PROBLEM SELECTOR RECOMMENDATION 2
not taking enough PO in setting of multiple comorbidities and prolonged hospital stay  need to have family meeting about GOC

## 2022-03-16 NOTE — CONSULT NOTE ADULT - PROVIDER SPECIALTY LIST ADULT
OMFS
Cardiology
Critical Care
Endocrinology
Surgery
Heme/Onc
Neurology
Burn
Dermatology
Gastroenterology
Rheumatology
Surgery
Gastroenterology
Infectious Disease
Burn
Palliative Care

## 2022-03-16 NOTE — CHART NOTE - NSCHARTNOTEFT_GEN_A_CORE
Patient was observed to be resting comfortably and not arousable.  T/C to son, Александр Mckeon:  unable to leave message, as voicemail is not set up.

## 2022-03-17 LAB
ALBUMIN SERPL ELPH-MCNC: 1.9 G/DL — LOW (ref 3.5–5.2)
ALP SERPL-CCNC: 112 U/L — SIGNIFICANT CHANGE UP (ref 30–115)
ALT FLD-CCNC: 22 U/L — SIGNIFICANT CHANGE UP (ref 0–41)
ANION GAP SERPL CALC-SCNC: 11 MMOL/L — SIGNIFICANT CHANGE UP (ref 7–14)
AST SERPL-CCNC: 23 U/L — SIGNIFICANT CHANGE UP (ref 0–41)
BASOPHILS # BLD AUTO: 0.03 K/UL — SIGNIFICANT CHANGE UP (ref 0–0.2)
BASOPHILS NFR BLD AUTO: 0.5 % — SIGNIFICANT CHANGE UP (ref 0–1)
BILIRUB SERPL-MCNC: 0.4 MG/DL — SIGNIFICANT CHANGE UP (ref 0.2–1.2)
BLD GP AB SCN SERPL QL: SIGNIFICANT CHANGE UP
BUN SERPL-MCNC: 5 MG/DL — LOW (ref 10–20)
CALCIUM SERPL-MCNC: 8 MG/DL — LOW (ref 8.5–10.1)
CHLORIDE SERPL-SCNC: 98 MMOL/L — SIGNIFICANT CHANGE UP (ref 98–110)
CO2 SERPL-SCNC: 28 MMOL/L — SIGNIFICANT CHANGE UP (ref 17–32)
CREAT SERPL-MCNC: 0.8 MG/DL — SIGNIFICANT CHANGE UP (ref 0.7–1.5)
EGFR: 84 ML/MIN/1.73M2 — SIGNIFICANT CHANGE UP
EOSINOPHIL # BLD AUTO: 0.15 K/UL — SIGNIFICANT CHANGE UP (ref 0–0.7)
EOSINOPHIL NFR BLD AUTO: 2.7 % — SIGNIFICANT CHANGE UP (ref 0–8)
GLUCOSE BLDC GLUCOMTR-MCNC: 127 MG/DL — HIGH (ref 70–99)
GLUCOSE BLDC GLUCOMTR-MCNC: 65 MG/DL — LOW (ref 70–99)
GLUCOSE BLDC GLUCOMTR-MCNC: 75 MG/DL — SIGNIFICANT CHANGE UP (ref 70–99)
GLUCOSE BLDC GLUCOMTR-MCNC: 79 MG/DL — SIGNIFICANT CHANGE UP (ref 70–99)
GLUCOSE SERPL-MCNC: 92 MG/DL — SIGNIFICANT CHANGE UP (ref 70–99)
HCT VFR BLD CALC: 30.3 % — LOW (ref 37–47)
HGB BLD-MCNC: 10.1 G/DL — LOW (ref 12–16)
HIV 1+2 AB+HIV1 P24 AG SERPL QL IA: SIGNIFICANT CHANGE UP
IMM GRANULOCYTES NFR BLD AUTO: 0.4 % — HIGH (ref 0.1–0.3)
LYMPHOCYTES # BLD AUTO: 1.23 K/UL — SIGNIFICANT CHANGE UP (ref 1.2–3.4)
LYMPHOCYTES # BLD AUTO: 22.5 % — SIGNIFICANT CHANGE UP (ref 20.5–51.1)
MAGNESIUM SERPL-MCNC: 2.1 MG/DL — SIGNIFICANT CHANGE UP (ref 1.8–2.4)
MCHC RBC-ENTMCNC: 30 PG — SIGNIFICANT CHANGE UP (ref 27–31)
MCHC RBC-ENTMCNC: 33.3 G/DL — SIGNIFICANT CHANGE UP (ref 32–37)
MCV RBC AUTO: 89.9 FL — SIGNIFICANT CHANGE UP (ref 81–99)
MONOCYTES # BLD AUTO: 0.35 K/UL — SIGNIFICANT CHANGE UP (ref 0.1–0.6)
MONOCYTES NFR BLD AUTO: 6.4 % — SIGNIFICANT CHANGE UP (ref 1.7–9.3)
NEUTROPHILS # BLD AUTO: 3.68 K/UL — SIGNIFICANT CHANGE UP (ref 1.4–6.5)
NEUTROPHILS NFR BLD AUTO: 67.5 % — SIGNIFICANT CHANGE UP (ref 42.2–75.2)
NRBC # BLD: 0 /100 WBCS — SIGNIFICANT CHANGE UP (ref 0–0)
PLATELET # BLD AUTO: 213 K/UL — SIGNIFICANT CHANGE UP (ref 130–400)
POTASSIUM SERPL-MCNC: 3.8 MMOL/L — SIGNIFICANT CHANGE UP (ref 3.5–5)
POTASSIUM SERPL-SCNC: 3.8 MMOL/L — SIGNIFICANT CHANGE UP (ref 3.5–5)
PROT SERPL-MCNC: 6.4 G/DL — SIGNIFICANT CHANGE UP (ref 6–8)
RBC # BLD: 3.37 M/UL — LOW (ref 4.2–5.4)
RBC # FLD: 15.9 % — HIGH (ref 11.5–14.5)
SODIUM SERPL-SCNC: 137 MMOL/L — SIGNIFICANT CHANGE UP (ref 135–146)
WBC # BLD: 5.46 K/UL — SIGNIFICANT CHANGE UP (ref 4.8–10.8)
WBC # FLD AUTO: 5.46 K/UL — SIGNIFICANT CHANGE UP (ref 4.8–10.8)

## 2022-03-17 PROCEDURE — 99231 SBSQ HOSP IP/OBS SF/LOW 25: CPT

## 2022-03-17 PROCEDURE — 99233 SBSQ HOSP IP/OBS HIGH 50: CPT

## 2022-03-17 RX ORDER — DEXTROSE 50 % IN WATER 50 %
15 SYRINGE (ML) INTRAVENOUS ONCE
Refills: 0 | Status: COMPLETED | OUTPATIENT
Start: 2022-03-17 | End: 2022-03-17

## 2022-03-17 RX ORDER — DEXTROSE 50 % IN WATER 50 %
15 SYRINGE (ML) INTRAVENOUS ONCE
Refills: 0 | Status: DISCONTINUED | OUTPATIENT
Start: 2022-03-17 | End: 2022-03-17

## 2022-03-17 RX ORDER — ALPRAZOLAM 0.25 MG
1 TABLET ORAL ONCE
Refills: 0 | Status: DISCONTINUED | OUTPATIENT
Start: 2022-03-17 | End: 2022-03-17

## 2022-03-17 RX ADMIN — NYSTATIN CREAM 1 APPLICATION(S): 100000 CREAM TOPICAL at 17:10

## 2022-03-17 RX ADMIN — Medication 1 MILLIGRAM(S): at 12:22

## 2022-03-17 RX ADMIN — MAGNESIUM OXIDE 400 MG ORAL TABLET 400 MILLIGRAM(S): 241.3 TABLET ORAL at 17:09

## 2022-03-17 RX ADMIN — Medication 15 GRAM(S): at 12:15

## 2022-03-17 RX ADMIN — Medication 1 TABLET(S): at 12:17

## 2022-03-17 RX ADMIN — Medication 2 SPRAY(S): at 12:16

## 2022-03-17 RX ADMIN — MIDODRINE HYDROCHLORIDE 10 MILLIGRAM(S): 2.5 TABLET ORAL at 22:30

## 2022-03-17 RX ADMIN — Medication 1 MILLIGRAM(S): at 23:24

## 2022-03-17 RX ADMIN — Medication 1 APPLICATION(S): at 17:10

## 2022-03-17 RX ADMIN — Medication 1 APPLICATION(S): at 12:15

## 2022-03-17 RX ADMIN — Medication 100 MILLIGRAM(S): at 12:16

## 2022-03-17 RX ADMIN — Medication 2000 UNIT(S): at 12:16

## 2022-03-17 RX ADMIN — MIDODRINE HYDROCHLORIDE 10 MILLIGRAM(S): 2.5 TABLET ORAL at 15:40

## 2022-03-17 RX ADMIN — ENOXAPARIN SODIUM 60 MILLIGRAM(S): 100 INJECTION SUBCUTANEOUS at 17:09

## 2022-03-17 RX ADMIN — Medication 2 SPRAY(S): at 17:09

## 2022-03-17 RX ADMIN — Medication 2 SPRAY(S): at 00:27

## 2022-03-17 NOTE — PROGRESS NOTE ADULT - SUBJECTIVE AND OBJECTIVE BOX
SANTIAGO CALIXTO             MRN-[id number]      Patient is a 61y old Female who presents with a chief complaint of Polypectomy (17 Mar 2022 08:34)    Currently admitted with the primary diagnosis of: During her stay at [facility] pt had colonoscopy done which revealed 1 large 3-4 cm sigmoid pedunculated polyp and 2 polyps (7, 14 mm) in descending colon s/p bx. Pt was transferred to [facility] for polyp removal.        SUBJECTIVE:  "get me my puzzle book" on window sill  called me Philly (had never met me)  nurses reported OOB to commode without BM    ROS:  UNABLE TO OBTAIN  due to: does not consistently engage/answer questions    DYSPNEA: N	  NAUS/VOM: N	  SECRETIONS: N	  AGITATION:  N  Pain (Y/N):    N   -Provocation/Palliation:  -Quality/Quantity:  -Radiating:  -Severity:  -Timing/Frequency:  -Impact on ADLs:  General:   no nonverbal indications  HEENT:   no nonverbal indications    Neck:   no nonverbal indications  CVS:   no nonverbal indications  Resp:   no nonverbal indications  GI:   no nonverbal indications  :   no nonverbal indications  Musc:   no nonverbal indications  Neuro:   no nonverbal indications  Psych:  no nonverbal indications  Skin:   no nonverbal indications  Lymph:   no nonverbal indications      PEx:   T(C): 36.1 (03-17-22 @ 12:10), Max: 36.1 (03-17-22 @ 12:10)  HR: 84 (03-17-22 @ 12:10) (81 - 84)  BP: 96/61 (03-17-22 @ 12:10) (87/55 - 104/53)  RR: 18 (03-17-22 @ 12:10) (18 - 18)  SpO2: --  Wt(kg): --            General:  found in bed in NAD; not allowing full exam  Eyes:  PER Non icteric   ENMT: no external oral ulcers,  CVS: not tachy  Resp: Unlabored Non tachypneic No increased WOB  Skin: Non jaundiced ,  gnereral leisons - see nursing documentation    Last BM: fecal incontinence emr today    ALLERGIES: No Known Allergies            Labs:	    CBC:                        10.1   5.46  )-----------( 213      ( 17 Mar 2022 11:00 )             30.3     CMP:    03-17    137  |  98  |  5<L>  ----------------------------<  92  3.8   |  28  |  0.8    Ca    8.0<L>      17 Mar 2022 11:00  Phos  5.0     03-16  Mg     2.1     03-17    TPro  6.4  /  Alb  1.9<L>  /  TBili  0.4  /  DBili  x   /  AST  23  /  ALT  22  /  AlkPhos  112  03-17       PT/INR - ( 16 Mar 2022 06:48 )   PT: 16.60 sec;   INR: 1.45 ratio         PTT - ( 16 Mar 2022 06:48 )  PTT:56.2 sec       RADIOLOGY  < from: Xray Chest 1 View- PORTABLE-Urgent (Xray Chest 1 View- PORTABLE-Urgent .) (03.16.22 @ 09:13) >    ACC: [id number] EXAM:  XR CHEST PORTABLE URGENT 1V                          PROCEDURE DATE:  03/16/2022          INTERPRETATION:  Clinical History / Reason for exam: Effusion.    Comparison : Chest radiograph 3/14/2022.    Technique/Positioning: Single frontal view the chest.    Findings:    Support devices: None.    Cardiac/mediastinum/hilum: Stable.    Lung parenchyma/Pleura: Stable bilateral opacities, right greater than   left. No pneumothorax.    Skeleton/soft tissues: Stable.    Impression:    Stable bilateral opacities, right greater than left.        --- End of Report ---            LESVIA JACKSON MD; Attending Radiologist  This document has been electronically signed. Mar 16 2022  3:39PM    < end of copied text >      EKG  12 Lead ECG:   Ventricular Rate 77 BPM    Atrial Rate 77 BPM    P-R Interval 128 ms    QRS Duration 70 ms    Q-T Interval 474 ms    QTC Calculation(Bazett) 536 ms    P Axis 79 degrees    R Axis 86 degrees    T Axis -51 degrees    Diagnosis Line Normal sinus rhythm  ST & T wave abnormality, consider inferior ischemia  Abnormal ECG    Confirmed by Hudson Malin ([id number]) on 3/15/2022 11:15:36 AM (03-15-22 @ 07:53)      Imaging Personally Reviewed:  [x ] YES  [ ] NO    Consultant(s) Notes Reviewed:  [x ] YES  [ ] NO  Care Discussed with Consultants/Other Providers [ x] YES  [ ] NO    Medications:	      MEDICATIONS  (STANDING):  benzocaine 20% Spray 2 Spray(s) Mucosal four times a day  chlorhexidine 4% Liquid 1 Application(s) Topical daily  cholecalciferol 2000 Unit(s) Oral daily  enoxaparin Injectable 60 milliGRAM(s) SubCutaneous every 12 hours  folic acid 1 milliGRAM(s) Oral daily  levothyroxine 125 MICROGram(s) Oral daily  magnesium oxide 400 milliGRAM(s) Oral two times a day with meals  midodrine. 10 milliGRAM(s) Oral every 8 hours  multivitamin/minerals 1 Tablet(s) Oral daily  nystatin Cream 1 Application(s) Topical two times a day  pantoprazole    Tablet 40 milliGRAM(s) Oral before breakfast  thiamine 100 milliGRAM(s) Oral daily  triamcinolone 0.1% Cream 1 Application(s) Topical two times a day  vitamin A &amp; D Ointment 1 Application(s) Topical daily    MEDICATIONS  (PRN):  acetaminophen     Tablet .. 650 milliGRAM(s) Oral every 6 hours PRN Mild Pain (1 - 3), Moderate Pain (4 - 6)  ibuprofen  Tablet. 400 milliGRAM(s) Oral every 6 hours PRN Severe Pain (7 - 10)        ADVANCED DIRECTIVES:            FULL CODE                DECISION MAKER: Patient [  ]  Family [x]  Other [  ] _______  at present not exhibiting decisional making capacity  LEGAL SURROGATE: georgette Fountain      GOALS OF CARE DISCUSSION       Palliative care counseling provided	            PSYCHOSOCIAL-SPIRITUAL ASSESSMENT:       Reviewed       See Palliative Care SW/ documentation      CURRENT DISPO PLAN:         WILL REMAIN IN HOSPITAL            REFERRALS	        Palliative Med        Unit SW/Case Mgmt

## 2022-03-17 NOTE — CHART NOTE - NSCHARTNOTEFT_GEN_A_CORE
PALLIATIVE MEDICINE INTERDISCIPLINARY TEAM NOTE    Provider:  [  x ]Social Work   [   ]          [   ] Initial visit [ x  ] Follow up    Family or contact name / phone #   Met with: [   x] Patient  [   ] Family  [   ] Other:    Primary Language: [   x] English [   ] Other*:                      *Interpretation provided by:    SUPPORT DIAGNOSES            (Check all that apply)  [   ] Psychosocial spiritual assessment (PSSA)  [   ] EOL issues  [   ] Cultural / spiritual concerns  [ x  ] Pain / suffering  [   ] Dementia / AMS  [   ] Other:  [   ] AD issues  [   ] Grief / loss / sadness  [   ] Discharge issues  [ x  ] Distress / coping    PSYCHOSOCIAL ASSESSMENT OF PATIENT         (Check all that apply)  [ x  ] Initial Assessment            [   ] Reassessment          [   ] Not Applicable this visit    Pain/suffering acuity:  [   ] None to mild (0-3)           [ x  ] Moderate (4-6)        [   ] High (7-10)    Mental Status:  [   ] Alert/oriented (x3)          [ x  ] Confused/Altered(x2/x1)         [   ] Non-resp    Functional status:  [   ] Independent w ADLs      [   ] Needs Assistance             [ x  ] Bedbound/Full Care    Coping:  [   ] Coping well                     [  x ] Coping w/difficulty            [   ] Poor coping    Support system:  unable to assess  [   ] Strong                              [   ] Adequate                        [   ] Inadequate      Past history and medications for:   unknown    [ ] Anxiety       [ ] Depression    [ ] Sleep disorders     SPIRITUAL ASSESSMENT  Jew/Spiritual practice: ___________________________    Role of organized Hindu:  [   ] Important                     [   ] Some (fam tradition, cultural)               [   ] None    Effects on medical care:  [   ] Yes, _____________________________________                         [   ] None    Cultural/Zoroastrian need:  [   ] Yes, _____________________________________                         [   ] None    Refer to Pastoral Care:  [   ] Yes           [   ] No, not at this time    SERVICE PROVIDED  [   ]PSSA                                                                             [   ]Discharge support / facilitation  [   ]AD / goals of care counseling                                  [   ]EOL / death / bereavement counseling  [  x ]Counseling / support                                                [   ] Family meeting  [   ]Prayer / sacrament / ritual                                      [   ] Referral   [   ]Other                                                                       NOTE and Plan of Care (PoC):    Patient is a 61 year old female.  Patient was admitted on 2/16/22, dx:  advanced colon cancer.    Patient was pleasant when approached and easily engaged.  Patient presented confused at times.  Writer attempted to reach sonАлекасндр for the second time.  Unable to leave message, as voicemail was not set up.  Patient expressed frustration with being hospitalized.  Support rendered.

## 2022-03-17 NOTE — PROGRESS NOTE ADULT - ASSESSMENT
61F PMH GIB, Bowel and Bladder incontinence, Smoking (3-4 cigarettes/day), ETOH abuse, hypothyroidism, and ongoing anemia, admitted to [facility] 1/31 for ams found to have UTI. Dev GIB, colonoscopy showing polyps, transferred to [facility] for polypectomy upgraded to CCU for hypotension, likely septic shock 2/2 aspiration pna. While in the hospital colonoscopy was performed  with polypectomy 2/18, biopsy showed adenocarcinoma   CT Chest/A/P negative for metastatic disease. Pt is anorexic, refusing PEG tube wit waxing and waiting MS, her son is agreeable, she was started on appetite stimulants, will count calories and consult palliative care for GOB conversation ( pt has a very poor prognosis)      A/P   # Acute on chronic HFpEF / Bilateral Pleural Effusions   - fluid restriction 1200 ml in 24 hours  - intake and output monitoring, daily weight   - ECHO with preserved EF, grade 2 diastolic dysfunction  - hold diuretics, pt is  euvolemic now       # Metabolic encephalopathy   - resolved  - Seroquel held   - supportive care     #  Adenocarcinoma of the colon   - s/p colonoscopy with polypectomy 2/18, biopsy showed adenocarcinoma   - CT Chest/A/P negative for metastatic disease   - Surgery consult once clinical status improves   - medical oncology is following     #  R IJ DVT   - c/w therapeutic Lovenox     # Rash   -evaluated by Burn, rheumatology and dermatology  - f/u biopsy report - dermatitis vs drug eruption vs viral exanthem vs psoriasiform   on steroid cream    # Thrombocytopenia and anemia/ suspected  autoimmune hemolytic anemia   - stable for now   - s/p prednisone taper per Hem Onc recommendation    # Anorexia   -  failed calorie count, c/w Marinol TIDPC, needs PEG tube     Overall prognosis is very poor,  palliative care arranged a family meeting for tomorrow      Pending: palliative care f/u , will consider  PEG placement    Updated son Александр on plan of care, he is agreeable for PEG   # Dispo: pt needs STR when cleared .

## 2022-03-17 NOTE — PROGRESS NOTE ADULT - SUBJECTIVE AND OBJECTIVE BOX
SANTIAGO CALIXTO 61y Female  MRN#: [id number]   CODE STATUS:________    Hospital Day: 29d    Pt is currently admitted with the primary diagnosis of     SUBJECTIVE  Hospital Course  Patient is a 62 y/o female with PMHx of GIB, Bowel and Bladder incontinence, Smoking (3-4 cigarettes/day), ETOH abuse, hypothyroidism, and ongoing anemia transferred from [facility] for Polypectomy. Patient was admitted to [facility] on 01/31 for AMS, weakness and decreased appetite. She was found to have UTI, NSTEMI, and electrolyte imbalance. During her stay at [facility] she had a colonoscopy done which revealed 1 large 3-4 cm sigmoid pedunculated polyp and 2 polyps (7, 14 mm) in descending colon. Patient was transferred as they are not able to perform large Polypectomies there. She was not able to be discharged as ongoing anemia and concern that the cause are these large polyps.    Patient had a colonoscopy and polypectomy done on 2/18/2022. Polyp (13 mm) in the descending colon. (hot snare Polypectomy).  Polyp (25 mm to 30 mm) in the rectosigmoid junction at 20 cm from the anal verge. (Endoloop, Polypectomy, Endoclip). -> Currently Bx result shows adenocarcinoma    After the colonoscopy patient became hypothermic and hypotensive. Over night patient was started to Levo 2/20 and warming blanket.   2/20 AM patient had RRT for desaturation and hypotension. Patient was placed on 50% O2, patient had good response. Levophed 2/20 also started to raise BP. Patient remained altered, not speaking, but remained somewhat awake and just makes grunting sounds. Patient is not following commands. Left central line was placed on 3/2. Spoke to ICU fellow, patient was upgraded to ICU for closer monitoring.     In CCU pt had abdominal tenderness, KUB ordered, concern for RP free air. CTAP with PO/IV contrast ordered, no perforation or pneumo-peritoneum/retroperitoneum however showing findings c/w pancolitis. Surgery following, ID consulted. Pt was on vanc/zosyn (2/20-22 & 2/21-23 respectively). In CCU, pt developed coagulopathy (low plt, anemia, increasing INR despite not on AC). Treated with pRBC, plt transfusion as well as vitamin K. Heme onc on board, thought to be due to possible DIC 2/2 sepsis. Started on solumedrol 40mg for positive cindy however possibly positive due to transfusion at [facility]. Plt stabilizing so steroids tapered (completed taper). Derm consulted for diffuse skin rash- bullous lesions with desquamation and areas of healing with hyperpigmentation. Burn consulted for skin bx (done 2/28, results pending). C-anca also positive, rhuem consulted, presentation unlikely due to vasculitis, will rpt cindy once acute illness resolves.     3/9: Patient's WBC and PMNs have been up-trending for the past 4 days despite steroids being tapered even before that. Will d/c heredia, take u/a, start Unasyn empirically. Patient not eating well, had hypoglycemia -> NG inserted (consent from son). Polypectomy pathology showing adenocarcinoma -> CT chest w/ IV contrast for staging. Had diarrhea in am -> CT abdomen to f/u pancolitis. AMS -> MR Brain per old neuro note. Spoke w/ son concerning IV contrast and MR contraindications. Son reports patient's mental status to be poor at home as she did not ambulate on her home or answer direct questions.  3/10: Diarrhea 5/24 hrs, started feeds on previous day, will check infectious causes, stopped Unasyn  3/11: 2 BM/ 24 hrs, improved diarrhea consistency as per patient, C diff -ve, GI PCR -ve  3/13: More interactive, continuing calorie count, had episode of low glucose in am, no AMS. No shortness of breath. Happy her son is coming again  3/14: No complaints. Active. Not eating much, drinking though. Spoke with son about PEG, he is amenable. Consulted Surgery and contacted Heme/Onc for Cancer evaluation. Consulted GI, recommending nutrition consult for evaluation of appetite stimulants.  3/15: Asymptomatic hypoglycemia, started dronabinol. Aware of cancer diagnosis. Given Mg for high QTc. Borderline BP & HR -> Increase levothyroxine to 125 mcg QD (recently switched from IV to PO). Will f/u Surgery final recommendations.  3/16: Asymptomatic hypoglycemia, pending GI for PEG evaluation  3/17: Asymptomatic hypoglycemia, pending Palliative for GOC discussion w/ son for need for surgery and nutrition    Overnight events     Subjective complaints     Present Today:   - Heredia:  No [  ], Yes [   ] : Indication:     - Type of IV Access:       .. CVC/Piccline:  No [  ], Yes [   ] : Indication:       .. Midline: No [  ], Yes [   ] : Indication:                                              OBJECTIVE  PAST MEDICAL & SURGICAL HISTORY                                              ALLERGIES:  No Known Allergies                           HOME MEDICATIONS  Home Medications:                           MEDICATIONS:  STANDING MEDICATIONS  benzocaine 20% Spray 2 Spray(s) Mucosal four times a day  buMETAnide Injectable 1 milliGRAM(s) IV Push two times a day  chlorhexidine 4% Liquid 1 Application(s) Topical daily  cholecalciferol 2000 Unit(s) Oral daily  enoxaparin Injectable 60 milliGRAM(s) SubCutaneous every 12 hours  folic acid 1 milliGRAM(s) Oral daily  levothyroxine 125 MICROGram(s) Oral daily  magnesium oxide 400 milliGRAM(s) Oral two times a day with meals  metolazone 5 milliGRAM(s) Oral daily  midodrine. 10 milliGRAM(s) Oral every 8 hours  multivitamin/minerals 1 Tablet(s) Oral daily  nystatin Cream 1 Application(s) Topical two times a day  pantoprazole    Tablet 40 milliGRAM(s) Oral before breakfast  thiamine 100 milliGRAM(s) Oral daily  triamcinolone 0.1% Cream 1 Application(s) Topical two times a day  vitamin A &amp; D Ointment 1 Application(s) Topical daily    PRN MEDICATIONS  acetaminophen     Tablet .. 650 milliGRAM(s) Oral every 6 hours PRN  ibuprofen  Tablet. 400 milliGRAM(s) Oral every 6 hours PRN                                            ------------------------------------------------------------  VITAL SIGNS: Last 24 Hours  T(C): 35.9 (17 Mar 2022 05:20), Max: 36.1 (16 Mar 2022 13:17)  T(F): 96.7 (17 Mar 2022 05:20), Max: 96.9 (16 Mar 2022 13:17)  HR: 81 (17 Mar 2022 05:20) (75 - 82)  BP: 104/53 (17 Mar 2022 05:20) (87/55 - 104/53)  BP(mean): --  RR: 18 (17 Mar 2022 05:20) (18 - 19)  SpO2: --                                               LABS:                        9.1    5.21  )-----------( 208      ( 16 Mar 2022 06:48 )             27.3     03-16    138  |  100  |  5<L>  ----------------------------<  87  3.8   |  28  |  0.8    Ca    7.8<L>      16 Mar 2022 06:48  Phos  5.0     03-16  Mg     2.2     03-16    TPro  5.8<L>  /  Alb  1.9<L>  /  TBili  0.3  /  DBili  x   /  AST  14  /  ALT  19  /  AlkPhos  96  03-16    PT/INR - ( 16 Mar 2022 06:48 )   PT: 16.60 sec;   INR: 1.45 ratio         PTT - ( 16 Mar 2022 06:48 )  PTT:56.2 sec                                                          RADIOLOGY:        PHYSICAL EXAM:  GENERAL: NAD, lying in bed comfortably  HEAD:  Atraumatic, Normocephalic  EYES: conjunctiva and sclera clear  ENT: Moist mucous membranes  NECK: Supple  CHEST/LUNG: Soft crackles. Unlabored respirations  HEART: Regular rate and rhythm; No murmurs, rubs, or gallops  ABDOMEN: BSx4; Soft, diffuse nonspecific tenderness, negative [place], nondistended  EXTREMITIES:  No LE edema  NERVOUS SYSTEM:  Alert, interactive, not oriented to time, oriented partially to place. oriented to person  SKIN: Multiple lesions and excoriations on the upper and lower extremities and perineal area. No erythema or tenderness

## 2022-03-17 NOTE — PROGRESS NOTE ADULT - ASSESSMENT
61F PMH GIB, Bowel and Bladder incontinence, Smoking (3-4 cigarettes/day), ETOH abuse, hypothyroidism, and ongoing anemia, admitted to [facility] 1/31 for ams found to have UTI. Dev GIB, colonoscopy showing polyps, transferred to [facility] for polypectomy upgraded to CCU for hypotension, likely septic shock 2/2 aspiration pna.     #AMS with unclear etiology     - Pt upgraded from floor on 2/20 after RRT for hypotension and desaturation. RIJ CVC placed, started on levo, venti mask, o2 and hypotension subsequently improved   - CTH 2/20: no ICH, mass effect, or midline shift   - EEG 2/20: generalized slowing    - UCx 2/18 negative   - BCx 2/17, 2/19: NGTD  - COVID negative 2/20  - MRSA nares 2/20 negative  - LE duplex 2/21: no DVT however b/l peroneal veins not visualized   - ammonia elevated to 59 on 2/22, started on lactulose, possibly contributing to ams -> 42 (3/1/22)  - s.p aztreonam 2g q8h (stopped 3/4/22)  - s.p clinda 600mg q8h (stopped 3/4/22)    # Malnutrition  - Calorie count x 3 d, started 3/11/22  - f/u hypoglycemia, not on D5 due to CHF (taking Lasix)  - Consider PEG tube if failed calorie count  - start standing Magnesium Oxide bid  - F/U Nutrition for suplements/stimulants -> ensure enlive + clear + prosource gelatein + Dronabinol 2.5 tid (stopped dronabinol due to AMS)  - Could benefit from PEG (currently trial of supplements & appetite stimulants. If fail -> reassess PEG, son was previously agreeable but wanted stimulant/supplement trial 1st. Will need consent, patient's capacity is questionable, waxing and waning memory, noticeable by son as well) -> Not eating well, concern about dronabinol's effect on mental status. Will not give Megace due to malignancy. -> Son agreeable with PEG, patient's capacity does not seem well (lacks insight) -> Reconsult GI for PEG    #Colonic Polyp  #Diarrhea in setting of recent abx use  #Hx of GIB   #Pneumoretroperitoneum   #Pancolitis   s/p colonoscopy with polypectomy 2/18  ct abd without free air but showed pancolitis  pt now passed for pureed diet with thin liquids  1 to 1 feeds, monitor FS  nutrition and S&S f/u  Bx -> Adenocarcinoma w/ lymphovascular invasion -> Surgery evaluation [CT Chest abdomen pelvis did not show metastasis] -> Outpatient follow up  - Heme/Onc -> Pulm C/S for pleural effusion malignancy studies  - Colonoscopy 2/4: 1 large 3-4 cm sigmoid pedunculated polyp and 2 polyps (7, 14 mm) in descending colon s/p bx  - Negative for C-Diff infection, negative GI PCR  - CEA elevated 5.6  - S/P colonoscopy and polypectomy 2/18/2022. Polyp (13 mm) in the descending colon. (hot snare Polypectomy).  Polyp (25 mm to 30 mm) in the rectosigmoid junction at 20 cm from the anal verge. (Endoloop, Polypectomy, Endoclip).   - Bx -> Adenocarcinoma with lymphovascular invasion  - CTAP PO/IV contrast 2/21: findings c/w pancolitis, no intra or retroperitoneal free air  - CT Chest w/ IV contrast for staging -> small- moderate B/L Pleural effusions; Right IJ DVT  - CT Abdomen Pelvis w/ IV contrast for evaluation of pancolitis -> resolved colitis, increased anasarca, cholelithiasis, T12 chronic fracture, T6 fracture  - Will not pursue thoracocentesis for cytology unless symptomatic    #Cholelithiasis  - Downtrending AST/ALT/ALP, within normal limits  - Asymptomatic  - Monitor for now    #T6/T12 Fracture  - Monitor for pain    #Exfoliative skin lesions- possibly externally induced (e.g scratching), r/o TEN given vesicles and desquamation   #+ve C-ANCA  evaluated by Burn, rheumatology and dermatology -> unlikely vasculitis  on steroid cream  Sent PR4/MPO antibody assay, will repeat c-anca once acute illness resolves.  - pt with superficial scabs diffusely over body, most prominent over chest, as well as areas of desquamation over arms, legs, and hips   - Burn recs appreciated; dx with incontinence dermatitis    - derm recs appreciated, recalled burn for skin bx (done 2/28), f/u path   - rheum recs appreciated, no clear evidence for ANCA associated vasculitis could be elevated due to sepsis. Agree with skin bx. Sent PR4/MPO antibody assay, will rpt c-anca once acute illness resolves.  - arsenic negative   - s/p skin bx by burn 2/28 -> Psoriasiform dermatitis with suppurative scale crust The differential diagnosis includes infection, drug eruption, viral exanthem, irritant contact dermatitis, and psoriasis.  - Parakeratosis with hyperpigmentation, hypergranulosis, focal necrotic keratinocytes, mild perivascular lymphohistiocytic infiltrate, dermal melanophages, focal extravasated erythrocytes and telangiectatic vessels. The differential diagnosis includes a drug eruption and viral exanthem. Resolving Woo-Yash syndrome is less likely. No fungal hyphae seen    #Prolonged qtc  - active  - qtc 613 initially -> 492 (3/10) -> 526 (3/11) -> 542 (3/13) -> 508 (3/14) -> 536 (3/15)  - repleting mg PRN, keep Mg>2, K>4.   - on no qtc prolonging medications   - arsenic levels negative   - daily EKG  - continue to monitor, Mg PRN    #Normocytic anemia   #Acute thrombocytopenia- resolved   #Elevated INR/PTT not on a/c- resolved   #presumed hemolytic anemia, with coagulopathy, possible DIC - resolved  completed prednisone taper per HemOnc recommendation  daily CBC  - cindy positive, possibly due to transfusion (not documented but likely received in [facility])  - HIV, hep B negative   - monitor INR     #Hypotension - monitoring  unclear etiology, possible septic shock  ct abd with pancolitis - resolved  s/p course of abx  BP now stable  - Changed midodrine 5mg TID to standing 3/12/22  - off levophed since 3/2    #Hypernatremia- improving   - c/w free water - now on pureed diet with thin liquids    #Hypothyroidism - active, improved  - TSH 15.6  - Patient will need to repeat TSH and FT4 in 6 wks (End of March)  - hypotensive with bradycardia 3/15/22 -> increased levothyroxine to 125mcg PO q24h (recently adjusted medication from IV to PO)    #Hx of UTI- resolved   - Suspected repeat UTI -> Repeat UA (3/9) -> stable  - D/C Tapia -> passed ToV    # Leukocytosis - resolved  # Neutrophilia - resolved  - Unasyn 3/9/22-3/10/22  - F/u BCx -> -ve  - UA -> decreased WBC from prior & same large LE, no nitrites like before  - UCx -ve  - Stool studies -ve    #Right lower face cellulitis- resolved  - CT maxillofacial with C 2/12: Mild soft tissue inflammation suggesting cellulitis of the right lower face  - pt was on cefepime, vancomycin (end date 2/15)  - Started on Unasyn 2/16,/c 2/20 started on broad spectrum Antibiotics  - BCx 2/17 -ve, repeat -ve     #Hx NSTEMI   #Hx of Elevated Troponin  - in setting of hypotension   - Normal Lexiscan at [facility]    #Hx of ETOH abuse  #Possible Thiamine deficiency   - last drink  6 months ago  - c/w Folic acid and Thiamine    #Acute on chronic HFpEF - active  ECHO with preserved EF, grade 2 diastolic dysfunction  - Currently on Bumex 1 mg bid + metolazone 5 mg QD  - CXR every few days  daily wts, I's and O's  stable on room air                                                                              ----------------------------------------------------  # DVT prophylaxis Lovenox (improved Platelets, Patient, PTT, & Hb)    # GI prophylaxis Protonix    # Diet DASH/TLC Pureed  -> Patient removed NG, attempt oral feeds, calorie count -> <25% of needs    # Activity Score (AM-PAC)    # Code status     # Disposition                                                                              --------------------------------------------------------    # Handoff    61F PMH GIB, Bowel and Bladder incontinence, Smoking (3-4 cigarettes/day), ETOH abuse, hypothyroidism, and ongoing anemia, admitted to [facility] 1/31 for ams found to have UTI. Dev GIB, colonoscopy showing polyps, transferred to [facility] for polypectomy upgraded to CCU for hypotension, likely septic shock 2/2 aspiration pna.     #AMS with unclear etiology     - Pt upgraded from floor on 2/20 after RRT for hypotension and desaturation. RIJ CVC placed, started on levo, venti mask, o2 and hypotension subsequently improved   - CTH 2/20: no ICH, mass effect, or midline shift   - EEG 2/20: generalized slowing    - UCx 2/18 negative   - BCx 2/17, 2/19: NGTD  - COVID negative 2/20  - MRSA nares 2/20 negative  - LE duplex 2/21: no DVT however b/l peroneal veins not visualized   - ammonia elevated to 59 on 2/22, started on lactulose, possibly contributing to ams -> 42 (3/1/22)  - s.p aztreonam 2g q8h (stopped 3/4/22)  - s.p clinda 600mg q8h (stopped 3/4/22)    # Malnutrition  - Calorie count x 3 d, started 3/11/22  - f/u hypoglycemia, not on D5 due to CHF (taking Lasix)  - Consider PEG tube if failed calorie count  - start standing Magnesium Oxide bid  - F/U Nutrition for suplements/stimulants -> ensure enlive + clear + prosource gelatein + Dronabinol 2.5 tid (stopped dronabinol due to AMS)  - Could benefit from PEG (currently trial of supplements & appetite stimulants. If fail -> reassess PEG, son was previously agreeable but wanted stimulant/supplement trial 1st. Will need consent, patient's capacity is questionable, waxing and waning memory, noticeable by son as well) -> Not eating well, concern about dronabinol's effect on mental status. Will not give Megace due to malignancy. -> Son agreeable with PEG, patient's capacity does not seem well (lacks insight) -> Reconsult GI for PEG    #Colonic Polyp  #Diarrhea in setting of recent abx use  #Hx of GIB   #Pneumoretroperitoneum   #Pancolitis   s/p colonoscopy with polypectomy 2/18  ct abd without free air but showed pancolitis  pt now passed for pureed diet with thin liquids  1 to 1 feeds, monitor FS  nutrition and S&S f/u  Bx -> Adenocarcinoma w/ lymphovascular invasion -> Surgery evaluation [CT Chest abdomen pelvis did not show metastasis] -> Outpatient follow up  - Heme/Onc -> Pulm C/S for pleural effusion malignancy studies  - Colonoscopy 2/4: 1 large 3-4 cm sigmoid pedunculated polyp and 2 polyps (7, 14 mm) in descending colon s/p bx  - Negative for C-Diff infection, negative GI PCR  - CEA elevated 5.6  - S/P colonoscopy and polypectomy 2/18/2022. Polyp (13 mm) in the descending colon. (hot snare Polypectomy).  Polyp (25 mm to 30 mm) in the rectosigmoid junction at 20 cm from the anal verge. (Endoloop, Polypectomy, Endoclip).   - Bx -> Adenocarcinoma with lymphovascular invasion  - CTAP PO/IV contrast 2/21: findings c/w pancolitis, no intra or retroperitoneal free air  - CT Chest w/ IV contrast for staging -> small- moderate B/L Pleural effusions; Right IJ DVT  - CT Abdomen Pelvis w/ IV contrast for evaluation of pancolitis -> resolved colitis, increased anasarca, cholelithiasis, T12 chronic fracture, T6 fracture  - Will not pursue thoracocentesis for cytology unless symptomatic    #Cholelithiasis  - Downtrending AST/ALT/ALP, within normal limits  - Asymptomatic  - Monitor for now    #T6/T12 Fracture  - Monitor for pain    #Exfoliative skin lesions- possibly externally induced (e.g scratching), r/o TEN given vesicles and desquamation   #+ve C-ANCA  evaluated by Burn, rheumatology and dermatology -> unlikely vasculitis  on steroid cream  Sent PR4/MPO antibody assay, will repeat c-anca once acute illness resolves.  - pt with superficial scabs diffusely over body, most prominent over chest, as well as areas of desquamation over arms, legs, and hips   - Burn recs appreciated; dx with incontinence dermatitis    - derm recs appreciated, recalled burn for skin bx (done 2/28), f/u path   - rheum recs appreciated, no clear evidence for ANCA associated vasculitis could be elevated due to sepsis. Agree with skin bx. Sent PR4/MPO antibody assay, will rpt c-anca once acute illness resolves.  - arsenic negative   - s/p skin bx by burn 2/28 -> Psoriasiform dermatitis with suppurative scale crust The differential diagnosis includes infection, drug eruption, viral exanthem, irritant contact dermatitis, and psoriasis.  - Parakeratosis with hyperpigmentation, hypergranulosis, focal necrotic keratinocytes, mild perivascular lymphohistiocytic infiltrate, dermal melanophages, focal extravasated erythrocytes and telangiectatic vessels. The differential diagnosis includes a drug eruption and viral exanthem. Resolving Woo-Yash syndrome is less likely. No fungal hyphae seen    #Prolonged qtc  - active  - qtc 613 initially -> 492 (3/10) -> 526 (3/11) -> 542 (3/13) -> 508 (3/14) -> 536 (3/15)  - repleting mg PRN, keep Mg>2, K>4.   - on no qtc prolonging medications   - arsenic levels negative   - daily EKG  - continue to monitor, Mg PRN    #Normocytic anemia   #Acute thrombocytopenia- resolved   #Elevated INR/PTT not on a/c- resolved   #presumed hemolytic anemia, with coagulopathy, possible DIC - resolved  completed prednisone taper per HemOnc recommendation  daily CBC  - cindy positive, possibly due to transfusion (not documented but likely received in [facility])  - HIV, hep B negative   - monitor INR     #Hypotension - monitoring  unclear etiology, possible septic shock  ct abd with pancolitis - resolved  s/p course of abx  BP now stable  - Changed midodrine 5mg TID to standing 3/12/22  - off levophed since 3/2    #Hypernatremia- improving   - c/w free water - now on pureed diet with thin liquids    #Hypothyroidism - active, improved  - TSH 15.6  - Patient will need to repeat TSH and FT4 in 6 wks (End of March)  - hypotensive with bradycardia 3/15/22 -> increased levothyroxine to 125mcg PO q24h (recently adjusted medication from IV to PO)    #Hx of UTI- resolved   - Suspected repeat UTI -> Repeat UA (3/9) -> stable  - D/C Tapia -> passed ToV    # Leukocytosis - resolved  # Neutrophilia - resolved  - Unasyn 3/9/22-3/10/22  - F/u BCx -> -ve  - UA -> decreased WBC from prior & same large LE, no nitrites like before  - UCx -ve  - Stool studies -ve    #Right lower face cellulitis- resolved  - CT maxillofacial with C 2/12: Mild soft tissue inflammation suggesting cellulitis of the right lower face  - pt was on cefepime, vancomycin (end date 2/15)  - Started on Unasyn 2/16,/c 2/20 started on broad spectrum Antibiotics  - BCx 2/17 -ve, repeat -ve     #Hx NSTEMI   #Hx of Elevated Troponin  - in setting of hypotension   - Normal Lexiscan at [facility]    #Hx of ETOH abuse  #Possible Thiamine deficiency   - last drink  6 months ago  - c/w Folic acid and Thiamine    #Acute on chronic HFpEF - active  ECHO with preserved EF, grade 2 diastolic dysfunction  - Currently on Bumex 1 mg bid + metolazone 5 mg QD -> hold for 48 hrs 3/17/22  - CXR every few days  daily wts, I's and O's  stable on room air                                                                              ----------------------------------------------------  # DVT prophylaxis Lovenox (improved Platelets, Patient, PTT, & Hb)    # GI prophylaxis Protonix    # Diet DASH/TLC Pureed  -> Patient removed NG, attempt oral feeds, calorie count -> <25% of needs    # Activity Score (AM-PAC)    # Code status     # Disposition                                                                              --------------------------------------------------------    # Handoff

## 2022-03-17 NOTE — PROGRESS NOTE ADULT - ASSESSMENT
61yFemale being evaluated for support with GOC. Patient alert and enagaged during encounter, but focused on wants/needs and was not demonstrating orientation to place/time/situation - this is consistent with baseline from primary team     Hospital day 31      MEDD (morphine equivalent daily dose): na      See Recs below. d/w Primary and Palliative teams       Please call [phone] with questions or concerns 24/7.   We will continue to follow.

## 2022-03-17 NOTE — PROGRESS NOTE ADULT - SUBJECTIVE AND OBJECTIVE BOX
61F PMH GIB, Bowel and Bladder incontinence, Smoking (3-4 cigarettes/day), ETOH abuse, hypothyroidism, and ongoing anemia, admitted to [facility] 1/31 for ams found to have UTI. Dev GIB, colonoscopy showing polyps, transferred to [facility] for polypectomy upgraded to CCU for hypotension, likely septic shock 2/2 aspiration pna. While in the hospital colonoscopy was performed  with polypectomy 2/18, biopsy showed adenocarcinoma   CT Chest/A/P negative for metastatic disease. Pt is anorexic, refusing PEG tube wit waxing and waiting MS, her son is agreeable, she was started on appetite stimulants, will count calories and consult palliative care for GOB conversation ( pt has a very poor prognosis).  Today pt is comfortable, denies any specific complaints, has very poor insight, refusing to eat, very malnourished.     OBJECTIVE  PAST MEDICAL & SURGICAL HISTORY                                              ALLERGIES:  No Known Allergies                           HOME MEDICATIONS  Home Medications:      VITAL SIGNS: Last 24 Hours  T(C): 36.1 (17 Mar 2022 12:10), Max: 36.1 (17 Mar 2022 12:10)  T(F): 96.9 (17 Mar 2022 12:10), Max: 96.9 (17 Mar 2022 12:10)  HR: 84 (17 Mar 2022 12:10) (81 - 84)  BP: 96/61 (17 Mar 2022 12:10) (87/55 - 104/53)  BP(mean): --  RR: 18 (17 Mar 2022 12:10) (18 - 18)       PHYSICAL EXAM:  GENERAL: NAD, cachectic with temporal muscle waisting   HEAD:  Atraumatic, Normocephalic  EYES: conjunctiva and sclera clear  ENT: Moist mucous membranes  NECK: Supple  CHEST/LUNG: decreased BS at bases   HEART: Regular rate and rhythm; No murmurs, rubs, or gallops  ABDOMEN: BSx4; Soft, diffuse nonspecific tenderness, negative [place], nondistended  EXTREMITIES:  No LE edema  NERVOUS SYSTEM:  Alert, interactive, not oriented to time, oriented partially to place. oriented to person  SKIN: Multiple lesions and excoriations on the upper and lower extremities and perineal area. No erythema or tenderness                    LABS:                                   10.1   5.46  )-----------( 213      ( 17 Mar 2022 11:00 )             30.3   03-17    137  |  98  |  5<L>  ----------------------------<  92  3.8   |  28  |  0.8    Ca    8.0<L>      17 Mar 2022 11:00  Phos  5.0     03-16  Mg     2.1     03-17    TPro  6.4  /  Alb  1.9<L>  /  TBili  0.4  /  DBili  x   /  AST  23  /  ALT  22  /  AlkPhos  112  03-17  GI PCR Panel, Stool (03.10.22 @ 12:38)   Culture Results:   GI PCR Results: NOT detected Culture Results:   10,000 - 49,000 CFU/mL Yeast   50,000 - 99,000 CFU/mL Enterococcus faecium (vancomycin resistant)   Organism Identification: Enterococcus faecium (vancomycin resistant)   Organism: Enterococcus faecium (vancomycin resistant)   Method Type: QUETA Culture - Blood (03.09.22 @ 11:15)   Specimen Source: .Blood None   Culture Results:   No Growth Final     RADIOLOGY:  < from: Xray Chest 1 View- PORTABLE-Urgent (Xray Chest 1 View- PORTABLE-Urgent .) (03.16.22 @ 09:13) >  Impression:    Stable bilateral opacities, right greater than left.    < end of copied text >  < from: CT Abdomen and Pelvis w/ IV Cont (03.09.22 @ 15:54) >  IMPRESSION:    1.  Resolution of previously seen colitis. No evidence of bowel   obstruction.  2.  Extensive anasarca, increased from prior.  3.  Small ascites.  4.  Focus of gas in the urinary bladder. Correlate for recent   instrumentation.  5.  See body of the report for additional findings.  6.  See separately dictated CT chest report for thoracic findings.    < end of copied text >  < from: TTE Echo Complete w/o Contrast w/ Doppler (02.24.22 @ 09:22) >  Summary:   1. Moderate to severe left atrial enlargement.   2. LV Ejection Fraction by Smith's Method with a biplane EF of 64 %.   3. Mildly increased LV wall thickness.   4. Spectral Doppler shows pseudonormal pattern of left ventricular   myocardial filling (Grade II diastolic dysfunction).   5. Normal right atrial size.   6. Mild mitral annular calcification.   7. Mild to moderate mitral valve regurgitation.   8. Moderate tricuspid regurgitation.   9. Sclerotic aortic valve with normal opening.    < end of copied text >    MEDICATIONS  (STANDING):  benzocaine 20% Spray 2 Spray(s) Mucosal four times a day  chlorhexidine 4% Liquid 1 Application(s) Topical daily  cholecalciferol 2000 Unit(s) Oral daily  enoxaparin Injectable 60 milliGRAM(s) SubCutaneous every 12 hours  folic acid 1 milliGRAM(s) Oral daily  levothyroxine 125 MICROGram(s) Oral daily  magnesium oxide 400 milliGRAM(s) Oral two times a day with meals  midodrine. 10 milliGRAM(s) Oral every 8 hours  multivitamin/minerals 1 Tablet(s) Oral daily  nystatin Cream 1 Application(s) Topical two times a day  pantoprazole    Tablet 40 milliGRAM(s) Oral before breakfast  thiamine 100 milliGRAM(s) Oral daily  triamcinolone 0.1% Cream 1 Application(s) Topical two times a day  vitamin A &amp; D Ointment 1 Application(s) Topical daily    MEDICATIONS  (PRN):  acetaminophen     Tablet .. 650 milliGRAM(s) Oral every 6 hours PRN Mild Pain (1 - 3), Moderate Pain (4 - 6)  ibuprofen  Tablet. 400 milliGRAM(s) Oral every 6 hours PRN Severe Pain (7 - 10)

## 2022-03-18 DIAGNOSIS — Z71.89 OTHER SPECIFIED COUNSELING: ICD-10-CM

## 2022-03-18 LAB
ALBUMIN SERPL ELPH-MCNC: 1.9 G/DL — LOW (ref 3.5–5.2)
ALP SERPL-CCNC: 106 U/L — SIGNIFICANT CHANGE UP (ref 30–115)
ALT FLD-CCNC: 23 U/L — SIGNIFICANT CHANGE UP (ref 0–41)
ANION GAP SERPL CALC-SCNC: 8 MMOL/L — SIGNIFICANT CHANGE UP (ref 7–14)
AST SERPL-CCNC: 31 U/L — SIGNIFICANT CHANGE UP (ref 0–41)
BASOPHILS # BLD AUTO: 0.01 K/UL — SIGNIFICANT CHANGE UP (ref 0–0.2)
BASOPHILS NFR BLD AUTO: 0.2 % — SIGNIFICANT CHANGE UP (ref 0–1)
BILIRUB SERPL-MCNC: 0.4 MG/DL — SIGNIFICANT CHANGE UP (ref 0.2–1.2)
BUN SERPL-MCNC: 4 MG/DL — LOW (ref 10–20)
CALCIUM SERPL-MCNC: 7.9 MG/DL — LOW (ref 8.5–10.1)
CHLORIDE SERPL-SCNC: 99 MMOL/L — SIGNIFICANT CHANGE UP (ref 98–110)
CO2 SERPL-SCNC: 29 MMOL/L — SIGNIFICANT CHANGE UP (ref 17–32)
CREAT SERPL-MCNC: 0.7 MG/DL — SIGNIFICANT CHANGE UP (ref 0.7–1.5)
EGFR: 99 ML/MIN/1.73M2 — SIGNIFICANT CHANGE UP
EOSINOPHIL # BLD AUTO: 0.2 K/UL — SIGNIFICANT CHANGE UP (ref 0–0.7)
EOSINOPHIL NFR BLD AUTO: 4.9 % — SIGNIFICANT CHANGE UP (ref 0–8)
GLUCOSE BLDC GLUCOMTR-MCNC: 108 MG/DL — HIGH (ref 70–99)
GLUCOSE BLDC GLUCOMTR-MCNC: 137 MG/DL — HIGH (ref 70–99)
GLUCOSE BLDC GLUCOMTR-MCNC: 70 MG/DL — SIGNIFICANT CHANGE UP (ref 70–99)
GLUCOSE BLDC GLUCOMTR-MCNC: 77 MG/DL — SIGNIFICANT CHANGE UP (ref 70–99)
GLUCOSE SERPL-MCNC: 67 MG/DL — LOW (ref 70–99)
HCT VFR BLD CALC: 29.1 % — LOW (ref 37–47)
HGB BLD-MCNC: 9.6 G/DL — LOW (ref 12–16)
IMM GRANULOCYTES NFR BLD AUTO: 0.5 % — HIGH (ref 0.1–0.3)
LYMPHOCYTES # BLD AUTO: 1.58 K/UL — SIGNIFICANT CHANGE UP (ref 1.2–3.4)
LYMPHOCYTES # BLD AUTO: 39 % — SIGNIFICANT CHANGE UP (ref 20.5–51.1)
MAGNESIUM SERPL-MCNC: 2 MG/DL — SIGNIFICANT CHANGE UP (ref 1.8–2.4)
MCHC RBC-ENTMCNC: 30.1 PG — SIGNIFICANT CHANGE UP (ref 27–31)
MCHC RBC-ENTMCNC: 33 G/DL — SIGNIFICANT CHANGE UP (ref 32–37)
MCV RBC AUTO: 91.2 FL — SIGNIFICANT CHANGE UP (ref 81–99)
MONOCYTES # BLD AUTO: 0.36 K/UL — SIGNIFICANT CHANGE UP (ref 0.1–0.6)
MONOCYTES NFR BLD AUTO: 8.9 % — SIGNIFICANT CHANGE UP (ref 1.7–9.3)
NEUTROPHILS # BLD AUTO: 1.88 K/UL — SIGNIFICANT CHANGE UP (ref 1.4–6.5)
NEUTROPHILS NFR BLD AUTO: 46.5 % — SIGNIFICANT CHANGE UP (ref 42.2–75.2)
NRBC # BLD: 0 /100 WBCS — SIGNIFICANT CHANGE UP (ref 0–0)
PLATELET # BLD AUTO: 171 K/UL — SIGNIFICANT CHANGE UP (ref 130–400)
POTASSIUM SERPL-MCNC: 4.2 MMOL/L — SIGNIFICANT CHANGE UP (ref 3.5–5)
POTASSIUM SERPL-SCNC: 4.2 MMOL/L — SIGNIFICANT CHANGE UP (ref 3.5–5)
PROT SERPL-MCNC: 6.2 G/DL — SIGNIFICANT CHANGE UP (ref 6–8)
RBC # BLD: 3.19 M/UL — LOW (ref 4.2–5.4)
RBC # FLD: 15.9 % — HIGH (ref 11.5–14.5)
SODIUM SERPL-SCNC: 136 MMOL/L — SIGNIFICANT CHANGE UP (ref 135–146)
WBC # BLD: 4.05 K/UL — LOW (ref 4.8–10.8)
WBC # FLD AUTO: 4.05 K/UL — LOW (ref 4.8–10.8)

## 2022-03-18 PROCEDURE — 99497 ADVNCD CARE PLAN 30 MIN: CPT | Mod: 25

## 2022-03-18 PROCEDURE — 99233 SBSQ HOSP IP/OBS HIGH 50: CPT

## 2022-03-18 PROCEDURE — 93010 ELECTROCARDIOGRAM REPORT: CPT

## 2022-03-18 RX ORDER — FAMOTIDINE 10 MG/ML
20 INJECTION INTRAVENOUS DAILY
Refills: 0 | Status: DISCONTINUED | OUTPATIENT
Start: 2022-03-18 | End: 2022-03-26

## 2022-03-18 RX ORDER — MAGNESIUM SULFATE 500 MG/ML
2 VIAL (ML) INJECTION ONCE
Refills: 0 | Status: COMPLETED | OUTPATIENT
Start: 2022-03-18 | End: 2022-03-18

## 2022-03-18 RX ADMIN — Medication 100 MILLIGRAM(S): at 11:43

## 2022-03-18 RX ADMIN — Medication 1 MILLIGRAM(S): at 11:44

## 2022-03-18 RX ADMIN — MAGNESIUM OXIDE 400 MG ORAL TABLET 400 MILLIGRAM(S): 241.3 TABLET ORAL at 17:37

## 2022-03-18 RX ADMIN — FAMOTIDINE 20 MILLIGRAM(S): 10 INJECTION INTRAVENOUS at 11:45

## 2022-03-18 RX ADMIN — MIDODRINE HYDROCHLORIDE 10 MILLIGRAM(S): 2.5 TABLET ORAL at 06:10

## 2022-03-18 RX ADMIN — MIDODRINE HYDROCHLORIDE 10 MILLIGRAM(S): 2.5 TABLET ORAL at 21:29

## 2022-03-18 RX ADMIN — Medication 2 SPRAY(S): at 11:42

## 2022-03-18 RX ADMIN — Medication 12.5 GRAM(S): at 11:45

## 2022-03-18 RX ADMIN — Medication 1 TABLET(S): at 11:43

## 2022-03-18 RX ADMIN — Medication 2000 UNIT(S): at 11:44

## 2022-03-18 RX ADMIN — MIDODRINE HYDROCHLORIDE 10 MILLIGRAM(S): 2.5 TABLET ORAL at 13:21

## 2022-03-18 RX ADMIN — ENOXAPARIN SODIUM 60 MILLIGRAM(S): 100 INJECTION SUBCUTANEOUS at 17:38

## 2022-03-18 RX ADMIN — Medication 1 APPLICATION(S): at 11:43

## 2022-03-18 NOTE — PROGRESS NOTE ADULT - SUBJECTIVE AND OBJECTIVE BOX
61F PMH GIB, Bowel and Bladder incontinence, Smoking (3-4 cigarettes/day), ETOH abuse, hypothyroidism, and ongoing anemia, admitted to [facility] 1/31 for ams found to have UTI. Dev GIB, colonoscopy showing polyps, transferred to [facility] for polypectomy upgraded to CCU for hypotension, likely septic shock 2/2 aspiration pna. While in the hospital colonoscopy was performed  with polypectomy 2/18, biopsy showed adenocarcinoma   CT Chest/A/P negative for metastatic disease. Pt is anorexic, refusing PEG tube wit waxing and waiting MS, her son is agreeable, she was started on appetite stimulants, will count calories and consult palliative care for GOB conversation ( pt has a very poor prognosis).  Today pt is comfortable, denies any specific complaints, agreed for PEG tube.     OBJECTIVE  PAST MEDICAL & SURGICAL HISTORY                                              ALLERGIES:  No Known Allergies                           HOME MEDICATIONS  Home Medications:      VITAL SIGNS: Last 24 Hours  T(C): 36 (18 Mar 2022 12:32), Max: 36.3 (18 Mar 2022 06:00)  T(F): 96.8 (18 Mar 2022 12:32), Max: 97.3 (18 Mar 2022 06:00)  HR: 60 (18 Mar 2022 12:32) (60 - 77)  BP: 99/66 (18 Mar 2022 12:32) (91/53 - 99/66)  BP(mean): --  RR: 19 (18 Mar 2022 12:32) (17 - 19)  SpO2: 100% (18 Mar 2022 07:55) (100% - 100%)       PHYSICAL EXAM:  GENERAL: NAD, cachectic with temporal muscle waisting   HEAD:  Atraumatic, Normocephalic  EYES: conjunctiva and sclera clear  ENT: Moist mucous membranes  NECK: Supple  CHEST/LUNG: decreased BS at bases   HEART: Regular rate and rhythm; No murmurs, rubs, or gallops  ABDOMEN: BSx4; Soft, diffuse nonspecific tenderness, negative [place], nondistended  EXTREMITIES:  No LE edema  NERVOUS SYSTEM:  Alert, interactive, not oriented to time, oriented partially to place. oriented to person  SKIN: Multiple lesions and excoriations on the upper and lower extremities and perineal area. No erythema or tenderness                    LABS:                                   9.6    4.05  )-----------( 171      ( 18 Mar 2022 04:30 )             29.1   03-18    136  |  99  |  4<L>  ----------------------------<  67<L>  4.2   |  29  |  0.7    Ca    7.9<L>      18 Mar 2022 04:30  Mg     2.0     03-18    TPro  6.2  /  Alb  1.9<L>  /  TBili  0.4  /  DBili  x   /  AST  31  /  ALT  23  /  AlkPhos  106  03-18    GI PCR Panel, Stool (03.10.22 @ 12:38)   Culture Results:   GI PCR Results: NOT detected Culture Results:   10,000 - 49,000 CFU/mL Yeast   50,000 - 99,000 CFU/mL Enterococcus faecium (vancomycin resistant)   Organism Identification: Enterococcus faecium (vancomycin resistant)   Organism: Enterococcus faecium (vancomycin resistant)   Method Type: QUETA Culture - Blood (03.09.22 @ 11:15)   Specimen Source: .Blood None   Culture Results:   No Growth Final     RADIOLOGY:  < from: Xray Chest 1 View- PORTABLE-Urgent (Xray Chest 1 View- PORTABLE-Urgent .) (03.16.22 @ 09:13) >  Impression:    Stable bilateral opacities, right greater than left.    < end of copied text >  < from: CT Abdomen and Pelvis w/ IV Cont (03.09.22 @ 15:54) >  IMPRESSION:    1.  Resolution of previously seen colitis. No evidence of bowel   obstruction.  2.  Extensive anasarca, increased from prior.  3.  Small ascites.  4.  Focus of gas in the urinary bladder. Correlate for recent   instrumentation.  5.  See body of the report for additional findings.  6.  See separately dictated CT chest report for thoracic findings.    < end of copied text >  < from: TTE Echo Complete w/o Contrast w/ Doppler (02.24.22 @ 09:22) >  Summary:   1. Moderate to severe left atrial enlargement.   2. LV Ejection Fraction by Smith's Method with a biplane EF of 64 %.   3. Mildly increased LV wall thickness.   4. Spectral Doppler shows pseudonormal pattern of left ventricular   myocardial filling (Grade II diastolic dysfunction).   5. Normal right atrial size.   6. Mild mitral annular calcification.   7. Mild to moderate mitral valve regurgitation.   8. Moderate tricuspid regurgitation.   9. Sclerotic aortic valve with normal opening.    < end of copied text >    MEDICATIONS  (STANDING):  benzocaine 20% Spray 2 Spray(s) Mucosal four times a day  chlorhexidine 4% Liquid 1 Application(s) Topical daily  cholecalciferol 2000 Unit(s) Oral daily  enoxaparin Injectable 60 milliGRAM(s) SubCutaneous every 12 hours  famotidine    Tablet 20 milliGRAM(s) Oral daily  folic acid 1 milliGRAM(s) Oral daily  levothyroxine 125 MICROGram(s) Oral daily  magnesium oxide 400 milliGRAM(s) Oral two times a day with meals  midodrine. 10 milliGRAM(s) Oral every 8 hours  multivitamin/minerals 1 Tablet(s) Oral daily  thiamine 100 milliGRAM(s) Oral daily  triamcinolone 0.1% Cream 1 Application(s) Topical two times a day  vitamin A &amp; D Ointment 1 Application(s) Topical daily    MEDICATIONS  (PRN):  acetaminophen     Tablet .. 650 milliGRAM(s) Oral every 6 hours PRN Mild Pain (1 - 3), Moderate Pain (4 - 6)  ibuprofen  Tablet. 400 milliGRAM(s) Oral every 6 hours PRN Severe Pain (7 - 10)

## 2022-03-18 NOTE — PROGRESS NOTE ADULT - ASSESSMENT
61F PMH GIB, Bowel and Bladder incontinence, Smoking (3-4 cigarettes/day), ETOH abuse, hypothyroidism, and ongoing anemia, admitted to [facility] 1/31 for ams found to have UTI. Dev GIB, colonoscopy showing polyps, transferred to [facility] for polypectomy upgraded to CCU for hypotension, likely septic shock 2/2 aspiration pna. While in the hospital colonoscopy was performed  with polypectomy 2/18, biopsy showed adenocarcinoma   CT Chest/A/P negative for metastatic disease. Pt is anorexic, refusing PEG tube wit waxing and waiting MS, her son is agreeable, she was started on appetite stimulants, will count calories and consult palliative care for GOB conversation ( pt has a very poor prognosis)      A/P   # Acute on chronic HFpEF / Bilateral Pleural Effusions   - fluid restriction 1200 ml in 24 hours  - intake and output monitoring, daily weight   - ECHO with preserved EF, grade 2 diastolic dysfunction  - off  diuretics, pt is  euvolemic now       # Metabolic encephalopathy likely with advanced underlying dementia   - mental status at baseline now   - Seroquel held due to QT prolongation   - supportive care     #  Adenocarcinoma of the colon   - s/p colonoscopy with polypectomy 2/18, biopsy showed adenocarcinoma   - CT Chest/A/P negative for metastatic disease   - Surgery consult once clinical status improves   - medical oncology is following     #  R IJ DVT   - c/w therapeutic Lovenox     # Rash   -evaluated by Burn, rheumatology and dermatology  - f/u biopsy report - dermatitis vs drug eruption vs viral exanthem vs psoriasiform   on steroid cream    # Thrombocytopenia and anemia/ suspected  autoimmune hemolytic anemia   - stable for now   - s/p prednisone taper per Hem Onc recommendation    # Anorexia   -  failed calorie count, c/w Marinol TIDPC, needs PEG tube     Overall prognosis is very poor,  palliative care arranged a family meeting for tomorrow      Pending: cardio follow up for clearance, GI for PEG   Palliative care team had a meeting with pt's son today   # Dispo: pt needs STR when cleared .

## 2022-03-18 NOTE — CHART NOTE - NSCHARTNOTEFT_GEN_A_CORE
Meeting today at bedside with son, Александр Mckeon.  Dr Guillory was present.  Discussed goals of care.  Patient/family want patient to remain a FC and to have PEG inserted.  Discussed options for care after patient is discharged.  Support rendered.

## 2022-03-18 NOTE — PROGRESS NOTE ADULT - ASSESSMENT
61yFemale being evaluated for support with GOC. Patient alert and enagaged during encounter, but focused on wants/needs and was not demonstrating orientation to place/time/situation - this is consistent with baseline from primary team     Hospital day 31      MEDD (morphine equivalent daily dose): na      See Recs below. d/w Primary and Palliative teams       Please call [phone] with questions or concerns 24/7.   We will continue to follow.

## 2022-03-18 NOTE — PROGRESS NOTE ADULT - PROBLEM SELECTOR PLAN 1
Recommend non-pharmacological interventions to prevent/treat delirium  - maintain day/night light cycles  - optimize sleep-wake cycle, minimize environmental noise  - reorientation frequently  - use verbal redirection as first line  - minimize restraints and lines  - ensure good bladder/bowel function  - ensure adequate pain control  - minimize use of anticholinergic, antihistaminic, and benzodiazepine medications.
brief introduction to son Александр - family meeting 3/18 at 1130  will keep trying to confirm with son - [phone]  full code.

## 2022-03-18 NOTE — PROGRESS NOTE ADULT - CONVERSATION DETAILS
Family meeting held with son, with Dr. Guillory (writer) and Ragini Curry (LMSW). Discussed ACP, after discussion of CPR and intubation, son (who is surrogate along with other siblings, but states being the only one actively involved) stated wanting to maintain full code status. Patient with questionable capacity, but was involved in discussion. Discussed PEG for nutritional purposes, son consented and patient assented to PEG placement.

## 2022-03-18 NOTE — PROGRESS NOTE ADULT - ASSESSMENT
61F PMH GIB, Bowel and Bladder incontinence, Smoking (3-4 cigarettes/day), ETOH abuse, hypothyroidism, and ongoing anemia, admitted to [facility] 1/31 for ams found to have UTI. Dev GIB, colonoscopy showing polyps, transferred to [facility] for polypectomy upgraded to CCU for hypotension, likely septic shock 2/2 aspiration pna.     #AMS with unclear etiology     - Pt upgraded from floor on 2/20 after RRT for hypotension and desaturation. RIJ CVC placed, started on levo, venti mask, o2 and hypotension subsequently improved   - CTH 2/20: no ICH, mass effect, or midline shift   - EEG 2/20: generalized slowing    - UCx 2/18 negative   - BCx 2/17, 2/19: NGTD  - COVID negative 2/20  - MRSA nares 2/20 negative  - LE duplex 2/21: no DVT however b/l peroneal veins not visualized   - ammonia elevated to 59 on 2/22, started on lactulose, possibly contributing to ams -> 42 (3/1/22)  - s.p aztreonam 2g q8h (stopped 3/4/22)  - s.p clinda 600mg q8h (stopped 3/4/22)    # Malnutrition  - Calorie count x 3 d, started 3/11/22  - f/u hypoglycemia, not on D5 due to CHF (taking Lasix)  - Consider PEG tube if failed calorie count  - start standing Magnesium Oxide bid  - F/U Nutrition for suplements/stimulants -> ensure enlive + clear + prosource gelatein + Dronabinol 2.5 tid (stopped dronabinol due to AMS)  - Could benefit from PEG (currently trial of supplements & appetite stimulants. If fail -> reassess PEG, son was previously agreeable but wanted stimulant/supplement trial 1st. Will need consent, patient's capacity is questionable, waxing and waning memory, noticeable by son as well) -> Not eating well, concern about dronabinol's effect on mental status. Will not give Megace due to malignancy. -> Son agreeable with PEG, patient's capacity does not seem well (lacks insight) -> Reconsult GI for PEG    #Colonic Polyp  #Diarrhea in setting of recent abx use  #Hx of GIB   #Pneumoretroperitoneum   #Pancolitis   s/p colonoscopy with polypectomy 2/18  ct abd without free air but showed pancolitis  pt now passed for pureed diet with thin liquids  1 to 1 feeds, monitor FS  nutrition and S&S f/u  Bx -> Adenocarcinoma w/ lymphovascular invasion -> Surgery evaluation [CT Chest abdomen pelvis did not show metastasis] -> Outpatient follow up  - Heme/Onc -> Pulm C/S for pleural effusion malignancy studies  - Colonoscopy 2/4: 1 large 3-4 cm sigmoid pedunculated polyp and 2 polyps (7, 14 mm) in descending colon s/p bx  - Negative for C-Diff infection, negative GI PCR  - CEA elevated 5.6  - S/P colonoscopy and polypectomy 2/18/2022. Polyp (13 mm) in the descending colon. (hot snare Polypectomy).  Polyp (25 mm to 30 mm) in the rectosigmoid junction at 20 cm from the anal verge. (Endoloop, Polypectomy, Endoclip).   - Bx -> Adenocarcinoma with lymphovascular invasion  - CTAP PO/IV contrast 2/21: findings c/w pancolitis, no intra or retroperitoneal free air  - CT Chest w/ IV contrast for staging -> small- moderate B/L Pleural effusions; Right IJ DVT  - CT Abdomen Pelvis w/ IV contrast for evaluation of pancolitis -> resolved colitis, increased anasarca, cholelithiasis, T12 chronic fracture, T6 fracture  - Will not pursue thoracocentesis for cytology unless symptomatic  - Palliative care meeting w/ family today for GOC concerning nutrition and surgery for cancer.  - Surgical Onc C/S for opinion on possibility of Surgery    #Cholelithiasis  - Downtrending AST/ALT/ALP, within normal limits  - Asymptomatic  - Monitor for now    #T6/T12 Fracture  - Monitor for pain    #Exfoliative skin lesions- possibly externally induced (e.g scratching), r/o TEN given vesicles and desquamation   #+ve C-ANCA  evaluated by Burn, rheumatology and dermatology -> unlikely vasculitis  on steroid cream  Sent PR4/MPO antibody assay, will repeat c-anca once acute illness resolves.  - pt with superficial scabs diffusely over body, most prominent over chest, as well as areas of desquamation over arms, legs, and hips   - Burn recs appreciated; dx with incontinence dermatitis    - derm recs appreciated, recalled burn for skin bx (done 2/28), f/u path   - rheum recs appreciated, no clear evidence for ANCA associated vasculitis could be elevated due to sepsis. Agree with skin bx. Sent PR4/MPO antibody assay, will rpt c-anca once acute illness resolves.  - arsenic negative   - s/p skin bx by burn 2/28 -> Psoriasiform dermatitis with suppurative scale crust The differential diagnosis includes infection, drug eruption, viral exanthem, irritant contact dermatitis, and psoriasis.  - Parakeratosis with hyperpigmentation, hypergranulosis, focal necrotic keratinocytes, mild perivascular lymphohistiocytic infiltrate, dermal melanophages, focal extravasated erythrocytes and telangiectatic vessels. The differential diagnosis includes a drug eruption and viral exanthem. Resolving Woo-Yash syndrome is less likely. No fungal hyphae seen    #Prolonged qtc  - active  - qtc 613 initially -> 492 (3/10) -> 526 (3/11) -> 542 (3/13) -> 508 (3/14) -> 536 (3/15)  - repleting mg PRN, keep Mg>2, K>4.   - on no qtc prolonging medications   - arsenic levels negative   - daily EKG  - continue to monitor, Mg PRN    #Normocytic anemia   #Acute thrombocytopenia- resolved   #Elevated INR/PTT not on a/c- resolved   #presumed hemolytic anemia, with coagulopathy, possible DIC - resolved  completed prednisone taper per HemOnc recommendation  daily CBC  - cindy positive, possibly due to transfusion (not documented but likely received in [facility])  - HIV, hep B negative   - monitor INR     #Hypotension - monitoring  unclear etiology, possible septic shock  ct abd with pancolitis - resolved  s/p course of abx  BP now stable  - Changed midodrine 5mg TID to standing 3/12/22  - off levophed since 3/2    #Hypernatremia- improving   - c/w free water - now on pureed diet with thin liquids    #Hypothyroidism - active, improved  - TSH 15.6  - Patient will need to repeat TSH and FT4 in 6 wks (End of March)  - hypotensive with bradycardia 3/15/22 -> increased levothyroxine to 125mcg PO q24h (recently adjusted medication from IV to PO)    #Hx of UTI- resolved   - Suspected repeat UTI -> Repeat UA (3/9) -> stable  - D/C Tapia -> passed ToV    # Leukocytosis - resolved  # Neutrophilia - resolved  - Unasyn 3/9/22-3/10/22  - F/u BCx -> -ve  - UA -> decreased WBC from prior & same large LE, no nitrites like before  - UCx -ve  - Stool studies -ve    #Right lower face cellulitis- resolved  - CT maxillofacial with C 2/12: Mild soft tissue inflammation suggesting cellulitis of the right lower face  - pt was on cefepime, vancomycin (end date 2/15)  - Started on Unasyn 2/16,/c 2/20 started on broad spectrum Antibiotics  - BCx 2/17 -ve, repeat -ve     #Hx NSTEMI   #Hx of Elevated Troponin  - in setting of hypotension   - Normal Lexiscan at [facility]    #Hx of ETOH abuse  #Possible Thiamine deficiency   - last drink  6 months ago  - c/w Folic acid and Thiamine    #Acute on chronic HFpEF - active  ECHO with preserved EF, grade 2 diastolic dysfunction  - Currently on Bumex 1 mg bid + metolazone 5 mg QD -> hold for 48 hrs 3/17/22  - CXR every few days  daily wts, I's and O's  stable on room air                                                                              ----------------------------------------------------  # DVT prophylaxis Lovenox (improved Platelets, Patient, PTT, & Hb)    # GI prophylaxis Protonix    # Diet DASH/TLC Pureed  -> Patient removed NG, attempt oral feeds, calorie count -> <25% of needs    # Activity Score (AM-PAC)    # Code status     # Disposition                                                                              --------------------------------------------------------    # Handoff    61F PMH GIB, Bowel and Bladder incontinence, Smoking (3-4 cigarettes/day), ETOH abuse, hypothyroidism, and ongoing anemia, admitted to [facility] 1/31 for ams found to have UTI. Dev GIB, colonoscopy showing polyps, transferred to [facility] for polypectomy upgraded to CCU for hypotension, likely septic shock 2/2 aspiration pna.     #AMS with unclear etiology     - Pt upgraded from floor on 2/20 after RRT for hypotension and desaturation. RIJ CVC placed, started on levo, venti mask, o2 and hypotension subsequently improved   - CTH 2/20: no ICH, mass effect, or midline shift   - EEG 2/20: generalized slowing    - UCx 2/18 negative   - BCx 2/17, 2/19: NGTD  - COVID negative 2/20  - MRSA nares 2/20 negative  - LE duplex 2/21: no DVT however b/l peroneal veins not visualized   - ammonia elevated to 59 on 2/22, started on lactulose, possibly contributing to ams -> 42 (3/1/22)  - s.p aztreonam 2g q8h (stopped 3/4/22)  - s.p clinda 600mg q8h (stopped 3/4/22)    # Malnutrition  - Calorie count x 3 d, started 3/11/22  - f/u hypoglycemia, not on D5 due to CHF (taking Lasix)  - Consider PEG tube if failed calorie count  - start standing Magnesium Oxide bid  - F/U Nutrition for suplements/stimulants -> ensure enlive + clear + prosource gelatein + Dronabinol 2.5 tid (stopped dronabinol due to AMS)  - Could benefit from PEG (currently trial of supplements & appetite stimulants. If fail -> reassess PEG, son was previously agreeable but wanted stimulant/supplement trial 1st. Will need consent, patient's capacity is questionable, waxing and waning memory, noticeable by son as well) -> Not eating well, concern about dronabinol's effect on mental status. Will not give Megace due to malignancy. -> Son agreeable with PEG, patient's capacity does not seem well (lacks insight) -> Reconsult GI for PEG    #Colonic Polyp  #Diarrhea in setting of recent abx use  #Hx of GIB   #Pneumoretroperitoneum   #Pancolitis   s/p colonoscopy with polypectomy 2/18  ct abd without free air but showed pancolitis  pt now passed for pureed diet with thin liquids  1 to 1 feeds, monitor FS  nutrition and S&S f/u  Bx -> Adenocarcinoma w/ lymphovascular invasion -> Surgery evaluation [CT Chest abdomen pelvis did not show metastasis] -> Outpatient follow up  - Heme/Onc -> Pulm C/S for pleural effusion malignancy studies  - Colonoscopy 2/4: 1 large 3-4 cm sigmoid pedunculated polyp and 2 polyps (7, 14 mm) in descending colon s/p bx  - Negative for C-Diff infection, negative GI PCR  - CEA elevated 5.6  - S/P colonoscopy and polypectomy 2/18/2022. Polyp (13 mm) in the descending colon. (hot snare Polypectomy).  Polyp (25 mm to 30 mm) in the rectosigmoid junction at 20 cm from the anal verge. (Endoloop, Polypectomy, Endoclip).   - Bx -> Adenocarcinoma with lymphovascular invasion  - CTAP PO/IV contrast 2/21: findings c/w pancolitis, no intra or retroperitoneal free air  - CT Chest w/ IV contrast for staging -> small- moderate B/L Pleural effusions; Right IJ DVT  - CT Abdomen Pelvis w/ IV contrast for evaluation of pancolitis -> resolved colitis, increased anasarca, cholelithiasis, T12 chronic fracture, T6 fracture  - Will not pursue thoracocentesis for cytology unless symptomatic  - Palliative care meeting w/ family today for GOC concerning nutrition and surgery for cancer.  - Surgical Onc C/S for opinion on possibility of Surgery    #Cholelithiasis  - Downtrending AST/ALT/ALP, within normal limits  - Asymptomatic  - Monitor for now    #T6/T12 Fracture  - Monitor for pain    #Exfoliative skin lesions- possibly externally induced (e.g scratching), r/o TEN given vesicles and desquamation   #+ve C-ANCA  evaluated by Burn, rheumatology and dermatology -> unlikely vasculitis  on steroid cream  Sent PR4/MPO antibody assay, will repeat c-anca once acute illness resolves.  - pt with superficial scabs diffusely over body, most prominent over chest, as well as areas of desquamation over arms, legs, and hips   - Burn recs appreciated; dx with incontinence dermatitis    - derm recs appreciated, recalled burn for skin bx (done 2/28), f/u path   - rheum recs appreciated, no clear evidence for ANCA associated vasculitis could be elevated due to sepsis. Agree with skin bx. Sent PR4/MPO antibody assay, will rpt c-anca once acute illness resolves.  - arsenic negative   - s/p skin bx by burn 2/28 -> Psoriasiform dermatitis with suppurative scale crust The differential diagnosis includes infection, drug eruption, viral exanthem, irritant contact dermatitis, and psoriasis.  - Parakeratosis with hyperpigmentation, hypergranulosis, focal necrotic keratinocytes, mild perivascular lymphohistiocytic infiltrate, dermal melanophages, focal extravasated erythrocytes and telangiectatic vessels. The differential diagnosis includes a drug eruption and viral exanthem. Resolving Woo-Yash syndrome is less likely. No fungal hyphae seen    #Prolonged qtc  - active  - qtc 613 initially -> 492 (3/10) -> 526 (3/11) -> 542 (3/13) -> 508 (3/14) -> 536 (3/15)  - repleting mg PRN, keep Mg>2, K>4.   - Switch Protonix to Pepcid  - on no qtc prolonging medications   - arsenic levels negative   - daily EKG  - continue to monitor, Mg PRN    #Normocytic anemia   #Acute thrombocytopenia- resolved   #Elevated INR/PTT not on a/c- resolved   #presumed hemolytic anemia, with coagulopathy, possible DIC - resolved  completed prednisone taper per HemOnc recommendation  daily CBC  - cindy positive, possibly due to transfusion (not documented but likely received in [facility])  - HIV, hep B negative   - monitor INR     #Hypotension - monitoring  unclear etiology, possible septic shock  ct abd with pancolitis - resolved  s/p course of abx  BP now stable  - Changed midodrine 5mg TID to standing 3/12/22  - off levophed since 3/2    #Hypernatremia- improving   - c/w free water - now on pureed diet with thin liquids    #Hypothyroidism - active, improved  - TSH 15.6  - Patient will need to repeat TSH and FT4 in 6 wks (End of March)  - hypotensive with bradycardia 3/15/22 -> increased levothyroxine to 125mcg PO q24h (recently adjusted medication from IV to PO)    #Hx of UTI- resolved   - Suspected repeat UTI -> Repeat UA (3/9) -> stable  - D/C Tapia -> passed ToV    # Leukocytosis - resolved  # Neutrophilia - resolved  - Unasyn 3/9/22-3/10/22  - F/u BCx -> -ve  - UA -> decreased WBC from prior & same large LE, no nitrites like before  - UCx -ve  - Stool studies -ve    #Right lower face cellulitis- resolved  - CT maxillofacial with C 2/12: Mild soft tissue inflammation suggesting cellulitis of the right lower face  - pt was on cefepime, vancomycin (end date 2/15)  - Started on Unasyn 2/16,/c 2/20 started on broad spectrum Antibiotics  - BCx 2/17 -ve, repeat -ve     #Hx NSTEMI   #Hx of Elevated Troponin  - in setting of hypotension   - Normal Lexiscan at [facility]    #Hx of ETOH abuse  #Possible Thiamine deficiency   - last drink  6 months ago  - c/w Folic acid and Thiamine    #Acute on chronic HFpEF - active  ECHO with preserved EF, grade 2 diastolic dysfunction  - Currently on Bumex 1 mg bid + metolazone 5 mg QD -> hold for 48 hrs 3/17/22  - CXR every few days  daily wts, I's and O's  stable on room air                                                                              ----------------------------------------------------  # DVT prophylaxis Lovenox (improved Platelets, Patient, PTT, & Hb)    # GI prophylaxis Pepcid    # Diet DASH/TLC Pureed  -> Patient removed NG, attempt oral feeds, calorie count -> <25% of needs    # Activity Score (AM-PAC)    # Code status     # Disposition                                                                              --------------------------------------------------------    # Handoff

## 2022-03-18 NOTE — PROGRESS NOTE ADULT - PROBLEM SELECTOR PLAN 4
as above    16 mins spent    ______________  Jean Claude Guillory MD  Palliative Medicine  [facility]   of Geriatric and Palliative Medicine  [phone]

## 2022-03-18 NOTE — PROGRESS NOTE ADULT - SUBJECTIVE AND OBJECTIVE BOX
SANTIAGO CALIXTO 61y Female  MRN#: [id number]   CODE STATUS:________    Hospital Day: 30d    Pt is currently admitted with the primary diagnosis of     SUBJECTIVE  Hospital Course  Patient is a 60 y/o female with PMHx of GIB, Bowel and Bladder incontinence, Smoking (3-4 cigarettes/day), ETOH abuse, hypothyroidism, and ongoing anemia transferred from [facility] for Polypectomy. Patient was admitted to [facility] on 01/31 for AMS, weakness and decreased appetite. She was found to have UTI, NSTEMI, and electrolyte imbalance. During her stay at [facility] she had a colonoscopy done which revealed 1 large 3-4 cm sigmoid pedunculated polyp and 2 polyps (7, 14 mm) in descending colon. Patient was transferred as they are not able to perform large Polypectomies there. She was not able to be discharged as ongoing anemia and concern that the cause are these large polyps.    Patient had a colonoscopy and polypectomy done on 2/18/2022. Polyp (13 mm) in the descending colon. (hot snare Polypectomy).  Polyp (25 mm to 30 mm) in the rectosigmoid junction at 20 cm from the anal verge. (Endoloop, Polypectomy, Endoclip). -> Currently Bx result shows adenocarcinoma    After the colonoscopy patient became hypothermic and hypotensive. Over night patient was started to Levo 2/20 and warming blanket.   2/20 AM patient had RRT for desaturation and hypotension. Patient was placed on 50% O2, patient had good response. Levophed 2/20 also started to raise BP. Patient remained altered, not speaking, but remained somewhat awake and just makes grunting sounds. Patient is not following commands. Left central line was placed on 3/2. Spoke to ICU fellow, patient was upgraded to ICU for closer monitoring.     In CCU pt had abdominal tenderness, KUB ordered, concern for RP free air. CTAP with PO/IV contrast ordered, no perforation or pneumo-peritoneum/retroperitoneum however showing findings c/w pancolitis. Surgery following, ID consulted. Pt was on vanc/zosyn (2/20-22 & 2/21-23 respectively). In CCU, pt developed coagulopathy (low plt, anemia, increasing INR despite not on AC). Treated with pRBC, plt transfusion as well as vitamin K. Heme onc on board, thought to be due to possible DIC 2/2 sepsis. Started on solumedrol 40mg for positive cindy however possibly positive due to transfusion at [facility]. Plt stabilizing so steroids tapered (completed taper). Derm consulted for diffuse skin rash- bullous lesions with desquamation and areas of healing with hyperpigmentation. Burn consulted for skin bx (done 2/28, results pending). C-anca also positive, rhuem consulted, presentation unlikely due to vasculitis, will rpt cindy once acute illness resolves.     3/9: Patient's WBC and PMNs have been up-trending for the past 4 days despite steroids being tapered even before that. Will d/c heredia, take u/a, start Unasyn empirically. Patient not eating well, had hypoglycemia -> NG inserted (consent from son). Polypectomy pathology showing adenocarcinoma -> CT chest w/ IV contrast for staging. Had diarrhea in am -> CT abdomen to f/u pancolitis. AMS -> MR Brain per old neuro note. Spoke w/ son concerning IV contrast and MR contraindications. Son reports patient's mental status to be poor at home as she did not ambulate on her home or answer direct questions.  3/10: Diarrhea 5/24 hrs, started feeds on previous day, will check infectious causes, stopped Unasyn  3/11: 2 BM/ 24 hrs, improved diarrhea consistency as per patient, C diff -ve, GI PCR -ve  3/13: More interactive, continuing calorie count, had episode of low glucose in am, no AMS. No shortness of breath. Happy her son is coming again  3/14: No complaints. Active. Not eating much, drinking though. Spoke with son about PEG, he is amenable. Consulted Surgery and contacted Heme/Onc for Cancer evaluation. Consulted GI, recommending nutrition consult for evaluation of appetite stimulants.  3/15: Asymptomatic hypoglycemia, started dronabinol. Aware of cancer diagnosis. Given Mg for high QTc. Borderline BP & HR -> Increase levothyroxine to 125 mcg QD (recently switched from IV to PO). Will f/u Surgery final recommendations.  3/16: Asymptomatic hypoglycemia, pending GI for PEG evaluation  3/17: Asymptomatic hypoglycemia, pending Palliative for GOC discussion w/ son for need for surgery and nutrition  3/18: GOC discussion w/ son (treatment path for cancer and nutrition)    Overnight events     Subjective complaints     Present Today:   - Heredia:  No [  ], Yes [   ] : Indication:     - Type of IV Access:       .. CVC/Piccline:  No [  ], Yes [   ] : Indication:       .. Midline: No [  ], Yes [   ] : Indication:                                              OBJECTIVE  PAST MEDICAL & SURGICAL HISTORY                                              ALLERGIES:  No Known Allergies                           HOME MEDICATIONS  Home Medications:                           MEDICATIONS:  STANDING MEDICATIONS  benzocaine 20% Spray 2 Spray(s) Mucosal four times a day  chlorhexidine 4% Liquid 1 Application(s) Topical daily  cholecalciferol 2000 Unit(s) Oral daily  enoxaparin Injectable 60 milliGRAM(s) SubCutaneous every 12 hours  folic acid 1 milliGRAM(s) Oral daily  levothyroxine 125 MICROGram(s) Oral daily  magnesium oxide 400 milliGRAM(s) Oral two times a day with meals  midodrine. 10 milliGRAM(s) Oral every 8 hours  multivitamin/minerals 1 Tablet(s) Oral daily  pantoprazole    Tablet 40 milliGRAM(s) Oral before breakfast  thiamine 100 milliGRAM(s) Oral daily  triamcinolone 0.1% Cream 1 Application(s) Topical two times a day  vitamin A &amp; D Ointment 1 Application(s) Topical daily    PRN MEDICATIONS  acetaminophen     Tablet .. 650 milliGRAM(s) Oral every 6 hours PRN  ibuprofen  Tablet. 400 milliGRAM(s) Oral every 6 hours PRN                                            ------------------------------------------------------------  VITAL SIGNS: Last 24 Hours  T(C): 36.3 (18 Mar 2022 06:00), Max: 36.3 (18 Mar 2022 06:00)  T(F): 97.3 (18 Mar 2022 06:00), Max: 97.3 (18 Mar 2022 06:00)  HR: 67 (18 Mar 2022 07:55) (66 - 84)  BP: 91/53 (18 Mar 2022 06:00) (91/53 - 96/61)  BP(mean): --  RR: 17 (18 Mar 2022 06:00) (17 - 18)  SpO2: 100% (18 Mar 2022 07:55) (100% - 100%)                                               LABS:                        9.6    4.05  )-----------( 171      ( 18 Mar 2022 04:30 )             29.1     03-18    136  |  99  |  4<L>  ----------------------------<  67<L>  4.2   |  29  |  0.7    Ca    7.9<L>      18 Mar 2022 04:30  Mg     2.0     03-18    TPro  6.2  /  Alb  1.9<L>  /  TBili  0.4  /  DBili  x   /  AST  31  /  ALT  23  /  AlkPhos  106  03-18                                                              RADIOLOGY:        PHYSICAL EXAM:  GENERAL: NAD, lying in bed comfortably  HEAD:  Atraumatic, Normocephalic  EYES: conjunctiva and sclera clear  ENT: Moist mucous membranes  NECK: Supple  CHEST/LUNG: Soft crackles. Unlabored respirations  HEART: Regular rate and rhythm; No murmurs, rubs, or gallops  ABDOMEN: BSx4; Soft, diffuse nonspecific tenderness, negative [place], nondistended  EXTREMITIES:  No LE edema  NERVOUS SYSTEM:  Alert, interactive, not oriented to time, oriented partially to place. oriented to person  SKIN: Multiple lesions and excoriations on the upper and lower extremities and perineal area. No erythema or tenderness

## 2022-03-18 NOTE — PROGRESS NOTE ADULT - SUBJECTIVE AND OBJECTIVE BOX
SANTIAGO CALIXTO             [id number]      Patient is a 61y old Female who presents with a chief complaint of Polypectomy (17 Mar 2022 08:34)    Currently admitted with the primary diagnosis of: During her stay at [facility] pt had colonoscopy done which revealed 1 large 3-4 cm sigmoid pedunculated polyp and 2 polyps (7, 14 mm) in descending colon s/p bx. Pt was transferred to [facility] for polyp removal.        SUBJECTIVE:  did not want to speak at length, but was comfortable    ROS:  UNABLE TO OBTAIN  due to: does not consistently engage/answer questions    DYSPNEA: N	  NAUS/VOM: N	  SECRETIONS: N	  AGITATION:  N  Pain (Y/N):    N   -Provocation/Palliation:  -Quality/Quantity:  -Radiating:  -Severity:  -Timing/Frequency:  -Impact on ADLs:  General:   no nonverbal indications  HEENT:   no nonverbal indications    Neck:   no nonverbal indications  CVS:   no nonverbal indications  Resp:   no nonverbal indications  GI:   no nonverbal indications  :   no nonverbal indications  Musc:   no nonverbal indications  Neuro:   no nonverbal indications  Psych:  no nonverbal indications  Skin:   no nonverbal indications  Lymph:   no nonverbal indications      PEx:   Vital Signs Last 24 Hrs  T(C): 36 (18 Mar 2022 12:32), Max: 36.3 (18 Mar 2022 06:00)  T(F): 96.8 (18 Mar 2022 12:32), Max: 97.3 (18 Mar 2022 06:00)  HR: 60 (18 Mar 2022 12:32) (60 - 77)  BP: 99/66 (18 Mar 2022 12:32) (91/53 - 99/66)  BP(mean): --  RR: 19 (18 Mar 2022 12:32) (17 - 19)  SpO2: 100% (18 Mar 2022 07:55) (100% - 100%)    General:  NAD  Eyes:  clear conjunctiva  ENMT: grossly normal   Resp: Unlabored Non tachypneic   GI:  nondistended  Neuro: grossly WNL  Psych: Calm   Skin: nonjaundiced    ALLERGIES: No Known Allergies            Labs:	                          9.6    4.05  )-----------( 171      ( 18 Mar 2022 04:30 )             29.1     03-18    136  |  99  |  4<L>  ----------------------------<  67<L>  4.2   |  29  |  0.7    Ca    7.9<L>      18 Mar 2022 04:30  Mg     2.0     03-18        RADIOLOGY  < from: Xray Chest 1 View- PORTABLE-Urgent (Xray Chest 1 View- PORTABLE-Urgent .) (03.16.22 @ 09:13) >    ACC: [id number] EXAM:  XR CHEST PORTABLE URGENT 1V                          PROCEDURE DATE:  03/16/2022          INTERPRETATION:  Clinical History / Reason for exam: Effusion.    Comparison : Chest radiograph 3/14/2022.    Technique/Positioning: Single frontal view the chest.    Findings:    Support devices: None.    Cardiac/mediastinum/hilum: Stable.    Lung parenchyma/Pleura: Stable bilateral opacities, right greater than   left. No pneumothorax.    Skeleton/soft tissues: Stable.    Impression:    Stable bilateral opacities, right greater than left.        --- End of Report ---            LESVIA JACKSON MD; Attending Radiologist  This document has been electronically signed. Mar 16 2022  3:39PM    < end of copied text >      EKG  12 Lead ECG:   Ventricular Rate 77 BPM    Atrial Rate 77 BPM    P-R Interval 128 ms    QRS Duration 70 ms    Q-T Interval 474 ms    QTC Calculation(Bazett) 536 ms    P Axis 79 degrees    R Axis 86 degrees    T Axis -51 degrees    Diagnosis Line Normal sinus rhythm  ST & T wave abnormality, consider inferior ischemia  Abnormal ECG    Confirmed by Hudson Malin ([id number]) on 3/15/2022 11:15:36 AM (03-15-22 @ 07:53)      Imaging Personally Reviewed:  [x ] YES  [ ] NO    Consultant(s) Notes Reviewed:  [x ] YES  [ ] NO  Care Discussed with Consultants/Other Providers [ x] YES  [ ] NO    Medications:	      MEDICATIONS  (STANDING):  benzocaine 20% Spray 2 Spray(s) Mucosal four times a day  chlorhexidine 4% Liquid 1 Application(s) Topical daily  cholecalciferol 2000 Unit(s) Oral daily  enoxaparin Injectable 60 milliGRAM(s) SubCutaneous every 12 hours  famotidine    Tablet 20 milliGRAM(s) Oral daily  folic acid 1 milliGRAM(s) Oral daily  levothyroxine 125 MICROGram(s) Oral daily  magnesium oxide 400 milliGRAM(s) Oral two times a day with meals  midodrine. 10 milliGRAM(s) Oral every 8 hours  multivitamin/minerals 1 Tablet(s) Oral daily  thiamine 100 milliGRAM(s) Oral daily  triamcinolone 0.1% Cream 1 Application(s) Topical two times a day  vitamin A &amp; D Ointment 1 Application(s) Topical daily    MEDICATIONS  (PRN):  acetaminophen     Tablet .. 650 milliGRAM(s) Oral every 6 hours PRN Mild Pain (1 - 3), Moderate Pain (4 - 6)  ibuprofen  Tablet. 400 milliGRAM(s) Oral every 6 hours PRN Severe Pain (7 - 10)      ADVANCED DIRECTIVES:            FULL CODE                DECISION MAKER: Patient [  ]  Family [x]  Other [  ] _______  at present not exhibiting decisional making capacity  LEGAL SURROGATE: georgette Fountain      GOALS OF CARE DISCUSSION       Palliative care counseling provided	            PSYCHOSOCIAL-SPIRITUAL ASSESSMENT:       Reviewed       See Palliative Care SW/ documentation      CURRENT DISPO PLAN:         WILL REMAIN IN HOSPITAL            REFERRALS	        Palliative Med        Unit SW/Case Mgmt

## 2022-03-19 LAB
ALBUMIN SERPL ELPH-MCNC: 2 G/DL — LOW (ref 3.5–5.2)
ALP SERPL-CCNC: 123 U/L — HIGH (ref 30–115)
ALT FLD-CCNC: 23 U/L — SIGNIFICANT CHANGE UP (ref 0–41)
ANION GAP SERPL CALC-SCNC: 9 MMOL/L — SIGNIFICANT CHANGE UP (ref 7–14)
AST SERPL-CCNC: 22 U/L — SIGNIFICANT CHANGE UP (ref 0–41)
BASOPHILS # BLD AUTO: 0.02 K/UL — SIGNIFICANT CHANGE UP (ref 0–0.2)
BASOPHILS NFR BLD AUTO: 0.6 % — SIGNIFICANT CHANGE UP (ref 0–1)
BILIRUB SERPL-MCNC: 0.4 MG/DL — SIGNIFICANT CHANGE UP (ref 0.2–1.2)
BUN SERPL-MCNC: 4 MG/DL — LOW (ref 10–20)
CALCIUM SERPL-MCNC: 7.9 MG/DL — LOW (ref 8.5–10.1)
CHLORIDE SERPL-SCNC: 100 MMOL/L — SIGNIFICANT CHANGE UP (ref 98–110)
CO2 SERPL-SCNC: 28 MMOL/L — SIGNIFICANT CHANGE UP (ref 17–32)
CREAT SERPL-MCNC: 0.6 MG/DL — LOW (ref 0.7–1.5)
EGFR: 103 ML/MIN/1.73M2 — SIGNIFICANT CHANGE UP
EOSINOPHIL # BLD AUTO: 0.15 K/UL — SIGNIFICANT CHANGE UP (ref 0–0.7)
EOSINOPHIL NFR BLD AUTO: 4.5 % — SIGNIFICANT CHANGE UP (ref 0–8)
GLUCOSE BLDC GLUCOMTR-MCNC: 105 MG/DL — HIGH (ref 70–99)
GLUCOSE BLDC GLUCOMTR-MCNC: 121 MG/DL — HIGH (ref 70–99)
GLUCOSE BLDC GLUCOMTR-MCNC: 124 MG/DL — HIGH (ref 70–99)
GLUCOSE BLDC GLUCOMTR-MCNC: 88 MG/DL — SIGNIFICANT CHANGE UP (ref 70–99)
GLUCOSE SERPL-MCNC: 84 MG/DL — SIGNIFICANT CHANGE UP (ref 70–99)
HCT VFR BLD CALC: 29.4 % — LOW (ref 37–47)
HGB BLD-MCNC: 9.5 G/DL — LOW (ref 12–16)
IMM GRANULOCYTES NFR BLD AUTO: 0.3 % — SIGNIFICANT CHANGE UP (ref 0.1–0.3)
LYMPHOCYTES # BLD AUTO: 1.08 K/UL — LOW (ref 1.2–3.4)
LYMPHOCYTES # BLD AUTO: 32 % — SIGNIFICANT CHANGE UP (ref 20.5–51.1)
MAGNESIUM SERPL-MCNC: 2.3 MG/DL — SIGNIFICANT CHANGE UP (ref 1.8–2.4)
MCHC RBC-ENTMCNC: 29.8 PG — SIGNIFICANT CHANGE UP (ref 27–31)
MCHC RBC-ENTMCNC: 32.3 G/DL — SIGNIFICANT CHANGE UP (ref 32–37)
MCV RBC AUTO: 92.2 FL — SIGNIFICANT CHANGE UP (ref 81–99)
MONOCYTES # BLD AUTO: 0.28 K/UL — SIGNIFICANT CHANGE UP (ref 0.1–0.6)
MONOCYTES NFR BLD AUTO: 8.3 % — SIGNIFICANT CHANGE UP (ref 1.7–9.3)
NEUTROPHILS # BLD AUTO: 1.83 K/UL — SIGNIFICANT CHANGE UP (ref 1.4–6.5)
NEUTROPHILS NFR BLD AUTO: 54.3 % — SIGNIFICANT CHANGE UP (ref 42.2–75.2)
NRBC # BLD: 0 /100 WBCS — SIGNIFICANT CHANGE UP (ref 0–0)
PLATELET # BLD AUTO: 148 K/UL — SIGNIFICANT CHANGE UP (ref 130–400)
POTASSIUM SERPL-MCNC: 3.5 MMOL/L — SIGNIFICANT CHANGE UP (ref 3.5–5)
POTASSIUM SERPL-SCNC: 3.5 MMOL/L — SIGNIFICANT CHANGE UP (ref 3.5–5)
PROT SERPL-MCNC: 6.6 G/DL — SIGNIFICANT CHANGE UP (ref 6–8)
RBC # BLD: 3.19 M/UL — LOW (ref 4.2–5.4)
RBC # FLD: 15.6 % — HIGH (ref 11.5–14.5)
SODIUM SERPL-SCNC: 137 MMOL/L — SIGNIFICANT CHANGE UP (ref 135–146)
WBC # BLD: 3.37 K/UL — LOW (ref 4.8–10.8)
WBC # FLD AUTO: 3.37 K/UL — LOW (ref 4.8–10.8)

## 2022-03-19 PROCEDURE — 99232 SBSQ HOSP IP/OBS MODERATE 35: CPT

## 2022-03-19 PROCEDURE — 93010 ELECTROCARDIOGRAM REPORT: CPT

## 2022-03-19 PROCEDURE — 71045 X-RAY EXAM CHEST 1 VIEW: CPT | Mod: 26

## 2022-03-19 RX ADMIN — Medication 2 SPRAY(S): at 11:46

## 2022-03-19 RX ADMIN — FAMOTIDINE 20 MILLIGRAM(S): 10 INJECTION INTRAVENOUS at 11:44

## 2022-03-19 RX ADMIN — Medication 125 MICROGRAM(S): at 05:16

## 2022-03-19 RX ADMIN — MAGNESIUM OXIDE 400 MG ORAL TABLET 400 MILLIGRAM(S): 241.3 TABLET ORAL at 10:27

## 2022-03-19 RX ADMIN — Medication 1 MILLIGRAM(S): at 11:44

## 2022-03-19 RX ADMIN — Medication 2 SPRAY(S): at 18:10

## 2022-03-19 RX ADMIN — MIDODRINE HYDROCHLORIDE 10 MILLIGRAM(S): 2.5 TABLET ORAL at 22:02

## 2022-03-19 RX ADMIN — Medication 2000 UNIT(S): at 11:45

## 2022-03-19 RX ADMIN — ENOXAPARIN SODIUM 60 MILLIGRAM(S): 100 INJECTION SUBCUTANEOUS at 18:10

## 2022-03-19 RX ADMIN — MIDODRINE HYDROCHLORIDE 10 MILLIGRAM(S): 2.5 TABLET ORAL at 05:16

## 2022-03-19 RX ADMIN — CHLORHEXIDINE GLUCONATE 1 APPLICATION(S): 213 SOLUTION TOPICAL at 11:45

## 2022-03-19 RX ADMIN — Medication 2 SPRAY(S): at 05:17

## 2022-03-19 RX ADMIN — Medication 1 APPLICATION(S): at 18:10

## 2022-03-19 RX ADMIN — ENOXAPARIN SODIUM 60 MILLIGRAM(S): 100 INJECTION SUBCUTANEOUS at 05:17

## 2022-03-19 RX ADMIN — Medication 1 APPLICATION(S): at 11:45

## 2022-03-19 RX ADMIN — Medication 1 APPLICATION(S): at 05:17

## 2022-03-19 RX ADMIN — MAGNESIUM OXIDE 400 MG ORAL TABLET 400 MILLIGRAM(S): 241.3 TABLET ORAL at 18:10

## 2022-03-19 RX ADMIN — Medication 100 MILLIGRAM(S): at 11:45

## 2022-03-19 RX ADMIN — MIDODRINE HYDROCHLORIDE 10 MILLIGRAM(S): 2.5 TABLET ORAL at 13:24

## 2022-03-19 RX ADMIN — Medication 1 TABLET(S): at 11:45

## 2022-03-19 NOTE — PROGRESS NOTE ADULT - ASSESSMENT
61F PMH GIB, Bowel and Bladder incontinence, Smoking (3-4 cigarettes/day), ETOH abuse, hypothyroidism, and ongoing anemia, admitted to [facility] 1/31 for ams found to have UTI. Dev GIB, colonoscopy showing polyps, transferred to [facility] for polypectomy upgraded to CCU for hypotension, likely septic shock 2/2 aspiration pna. While in the hospital colonoscopy was performed  with polypectomy 2/18, biopsy showed adenocarcinoma   CT Chest/A/P negative for metastatic disease. Pt is anorexic, refusing PEG tube wit waxing and waiting MS, her son is agreeable, she was started on appetite stimulants, will count calories and consult palliative care for GOB conversation ( pt has a very poor prognosis)      A/P   # Acute on chronic HFpEF / Bilateral Pleural Effusions   - fluid restriction 1200 ml in 24 hours  - intake and output monitoring, daily weight   - ECHO with preserved EF, grade 2 diastolic dysfunction  - off  diuretics, pt is  euvolemic now       # Metabolic encephalopathy likely with advanced underlying dementia   - mental status at baseline now   - Seroquel held due to QT prolongation   - supportive care     #  Adenocarcinoma of the colon   - s/p colonoscopy with polypectomy 2/18, biopsy showed adenocarcinoma   - CT Chest/A/P negative for metastatic disease   - Surgery consult once clinical status improves   - medical oncology is following     #  R IJ DVT   - c/w therapeutic Lovenox     # Rash   -evaluated by Burn, rheumatology and dermatology  - f/u biopsy report - dermatitis vs drug eruption vs viral exanthem vs psoriasiform   on steroid cream    # Thrombocytopenia and anemia/ suspected  autoimmune hemolytic anemia   - stable for now   - s/p prednisone taper per Hem Onc recommendation    # Anorexia / severe malnutrition   -  failed calorie count  - consul GI for PEG     Overall prognosis is very poor     Pending: cardio follow up for clearance, GI for PEG   Palliative pt's son is aware about the plan of care and agreeable   # Dispo: pt needs STR when cleared .

## 2022-03-19 NOTE — PROGRESS NOTE ADULT - ASSESSMENT
61F PMH GIB, Bowel and Bladder incontinence, Smoking (3-4 cigarettes/day), ETOH abuse, hypothyroidism, and ongoing anemia, admitted to [facility] 1/31 for ams found to have UTI. Dev GIB, colonoscopy showing polyps, transferred to [facility] for polypectomy upgraded to CCU for hypotension, likely septic shock 2/2 aspiration pna.     #AMS with unclear etiology     - Pt upgraded from floor on 2/20 after RRT for hypotension and desaturation. RIJ CVC placed, started on levo, venti mask, o2 and hypotension subsequently improved   - CTH 2/20: no ICH, mass effect, or midline shift   - EEG 2/20: generalized slowing    - UCx 2/18 negative   - BCx 2/17, 2/19: NGTD  - COVID negative 2/20  - MRSA nares 2/20 negative  - LE duplex 2/21: no DVT however b/l peroneal veins not visualized   - ammonia elevated to 59 on 2/22, started on lactulose, possibly contributing to ams -> 42 (3/1/22)  - s.p aztreonam 2g q8h (stopped 3/4/22)  - s.p clinda 600mg q8h (stopped 3/4/22)    # Malnutrition  - Calorie count x 3 d, started 3/11/22  - f/u hypoglycemia, not on D5 due to CHF (taking Lasix)  - Consider PEG tube if failed calorie count  - start standing Magnesium Oxide bid  - F/U Nutrition for suplements/stimulants -> ensure enlive + clear + prosource gelatein + Dronabinol 2.5 tid (stopped dronabinol due to AMS)  - Could benefit from PEG (currently trial of supplements & appetite stimulants. If fail -> reassess PEG, son was previously agreeable but wanted stimulant/supplement trial 1st. Will need consent, patient's capacity is questionable, waxing and waning memory, noticeable by son as well) -> Not eating well, concern about dronabinol's effect on mental status. Will not give Megace due to malignancy. -> Son agreeable with PEG, patient's capacity does not seem well (lacks insight), assented anyway -> Reconsult GI for PEG on Monday  - Cardio C/S for risk assessment for PEG    #Colonic Polyp  #Diarrhea in setting of recent abx use  #Hx of GIB   #Pneumoretroperitoneum   #Pancolitis   s/p colonoscopy with polypectomy 2/18  ct abd without free air but showed pancolitis  pt now passed for pureed diet with thin liquids  1 to 1 feeds, monitor FS  nutrition and S&S f/u  Bx -> Adenocarcinoma w/ lymphovascular invasion -> Surgery evaluation [CT Chest abdomen pelvis did not show metastasis] -> Outpatient follow up  - Heme/Onc -> Pulm C/S for pleural effusion malignancy studies  - Colonoscopy 2/4: 1 large 3-4 cm sigmoid pedunculated polyp and 2 polyps (7, 14 mm) in descending colon s/p bx  - Negative for C-Diff infection, negative GI PCR  - CEA elevated 5.6  - S/P colonoscopy and polypectomy 2/18/2022. Polyp (13 mm) in the descending colon. (hot snare Polypectomy).  Polyp (25 mm to 30 mm) in the rectosigmoid junction at 20 cm from the anal verge. (Endoloop, Polypectomy, Endoclip).   - Bx -> Adenocarcinoma with lymphovascular invasion  - CTAP PO/IV contrast 2/21: findings c/w pancolitis, no intra or retroperitoneal free air  - CT Chest w/ IV contrast for staging -> small- moderate B/L Pleural effusions; Right IJ DVT  - CT Abdomen Pelvis w/ IV contrast for evaluation of pancolitis -> resolved colitis, increased anasarca, cholelithiasis, T12 chronic fracture, T6 fracture  - Will not pursue thoracocentesis for cytology unless symptomatic  - Palliative care meeting w/ family today for GOC concerning nutrition and surgery for cancer -> Son consented to PEG, patient assented to PEG    #Cholelithiasis  - Downtrending AST/ALT/ALP, within normal limits  - Asymptomatic  - Monitor for now    #T6/T12 Fracture  - Monitor for pain    #Exfoliative skin lesions- possibly externally induced (e.g scratching), r/o TEN given vesicles and desquamation   #+ve C-ANCA  evaluated by Burn, rheumatology and dermatology -> unlikely vasculitis  on steroid cream  Sent PR4/MPO antibody assay, will repeat c-anca once acute illness resolves.  - pt with superficial scabs diffusely over body, most prominent over chest, as well as areas of desquamation over arms, legs, and hips   - Burn recs appreciated; dx with incontinence dermatitis    - derm recs appreciated, recalled burn for skin bx (done 2/28), f/u path   - rheum recs appreciated, no clear evidence for ANCA associated vasculitis could be elevated due to sepsis. Agree with skin bx. Sent PR4/MPO antibody assay, will rpt c-anca once acute illness resolves.  - arsenic negative   - s/p skin bx by burn 2/28 -> Psoriasiform dermatitis with suppurative scale crust The differential diagnosis includes infection, drug eruption, viral exanthem, irritant contact dermatitis, and psoriasis.  - Parakeratosis with hyperpigmentation, hypergranulosis, focal necrotic keratinocytes, mild perivascular lymphohistiocytic infiltrate, dermal melanophages, focal extravasated erythrocytes and telangiectatic vessels. The differential diagnosis includes a drug eruption and viral exanthem. Resolving Woo-Yash syndrome is less likely. No fungal hyphae seen    #Prolonged qtc  - active  - qtc 613 initially -> 492 (3/10) -> 526 (3/11) -> 542 (3/13) -> 508 (3/14) -> 536 (3/15) -> 528 (3/19)  - repleting mg PRN, keep Mg>2, K>4.   - Switch Protonix to Pepcid  - on no qtc prolonging medications   - arsenic levels negative   - daily EKG  - continue to monitor, Mg PRN    #Normocytic anemia   #Acute thrombocytopenia- resolved   #Elevated INR/PTT not on a/c- resolved   #presumed hemolytic anemia, with coagulopathy, possible DIC - resolved  completed prednisone taper per HemOnc recommendation  daily CBC  - cindy positive, possibly due to transfusion (not documented but likely received in [facility])  - HIV, hep B negative   - monitor INR     #Hypotension - monitoring  unclear etiology, possible septic shock  ct abd with pancolitis - resolved  s/p course of abx  BP now stable  - Changed midodrine 5mg TID to standing 3/12/22  - off levophed since 3/2    #Hypernatremia- improving   - c/w free water - now on pureed diet with thin liquids    #Hypothyroidism - active, improved  - TSH 15.6  - Patient will need to repeat TSH and FT4 in 6 wks (End of March)  - hypotensive with bradycardia 3/15/22 -> increased levothyroxine to 125mcg PO q24h (recently adjusted medication from IV to PO)    #Hx of UTI- resolved   - Suspected repeat UTI -> Repeat UA (3/9) -> stable  - D/C Tapia -> passed ToV    # Leukocytosis - resolved  # Neutrophilia - resolved  - Unasyn 3/9/22-3/10/22  - F/u BCx -> -ve  - UA -> decreased WBC from prior & same large LE, no nitrites like before  - UCx -ve  - Stool studies -ve    #Right lower face cellulitis- resolved  - CT maxillofacial with C 2/12: Mild soft tissue inflammation suggesting cellulitis of the right lower face  - pt was on cefepime, vancomycin (end date 2/15)  - Started on Unasyn 2/16,/c 2/20 started on broad spectrum Antibiotics  - BCx 2/17 -ve, repeat -ve     #Hx NSTEMI   #Hx of Elevated Troponin  - in setting of hypotension   - Normal Lexiscan at [facility]    #Hx of ETOH abuse  #Possible Thiamine deficiency   - last drink  6 months ago  - c/w Folic acid and Thiamine    #Acute on chronic HFpEF - active  ECHO with preserved EF, grade 2 diastolic dysfunction  - Was on Bumex 1 mg bid + metolazone 5 mg QD -> hold for 48 hrs 3/17/22 -> 3/19/22 improved CXR even though off diuretics  - CXR every few days  daily wts, I's and O's  stable on room air                                                                              ----------------------------------------------------  # DVT prophylaxis Lovenox (improved Platelets, Patient, PTT, & Hb)    # GI prophylaxis Pepcid    # Diet DASH/TLC Pureed  -> Patient removed NG, attempt oral feeds, calorie count -> <25% of needs    # Activity Score (AM-PAC)    # Code status     # Disposition                                                                              --------------------------------------------------------    # Handoff   Speak again with GI on Monday for PEG placement

## 2022-03-19 NOTE — PROGRESS NOTE ADULT - SUBJECTIVE AND OBJECTIVE BOX
61F PMH GIB, Bowel and Bladder incontinence, Smoking (3-4 cigarettes/day), ETOH abuse, hypothyroidism, and ongoing anemia, admitted to [facility] 1/31 for ams found to have UTI. Dev GIB, colonoscopy showing polyps, transferred to [facility] for polypectomy upgraded to CCU for hypotension, likely septic shock 2/2 aspiration pna. While in the hospital colonoscopy was performed  with polypectomy 2/18, biopsy showed adenocarcinoma   CT Chest/A/P negative for metastatic disease. Pt is anorexic, refusing PEG tube wit waxing and waiting MS, her son is agreeable, pt failed calories count, agreed for a PEG tube.   Today pt is comfortable, has no complaints.     OBJECTIVE  PAST MEDICAL & SURGICAL HISTORY                                              ALLERGIES:  No Known Allergies                           HOME MEDICATIONS  Home Medications:      VITAL SIGNS: Last 24 Hours  T(C): 36.1 (19 Mar 2022 12:25), Max: 36.2 (19 Mar 2022 05:54)  T(F): 96.9 (19 Mar 2022 12:25), Max: 97.2 (19 Mar 2022 05:54)  HR: 63 (19 Mar 2022 12:25) (58 - 81)  BP: 106/63 (19 Mar 2022 12:25) (105/58 - 119/74)  BP(mean): --  RR: 19 (19 Mar 2022 12:25) (16 - 19)         PHYSICAL EXAM:  GENERAL: NAD, cachectic with temporal muscle waisting   HEAD:  Atraumatic, Normocephalic  EYES: conjunctiva and sclera clear  ENT: Moist mucous membranes  NECK: Supple  CHEST/LUNG: decreased BS at bases   HEART: Regular rate and rhythm; No murmurs, rubs, or gallops  ABDOMEN: BSx4; Soft, diffuse nonspecific tenderness, negative [place], nondistended  EXTREMITIES:  No LE edema  NERVOUS SYSTEM:  Alert, interactive, not oriented to time, oriented partially to place. oriented to person  SKIN: Multiple lesions and excoriations on the upper and lower extremities and perineal area. No erythema or tenderness                    LABS:                                    9.5    3.37  )-----------( 148      ( 19 Mar 2022 08:07 )             29.4   03-19    137  |  100  |  4<L>  ----------------------------<  84  3.5   |  28  |  0.6<L>    Ca    7.9<L>      19 Mar 2022 08:07  Mg     2.3     03-19    TPro  6.6  /  Alb  2.0<L>  /  TBili  0.4  /  DBili  x   /  AST  22  /  ALT  23  /  AlkPhos  123<H>  03-19      GI PCR Panel, Stool (03.10.22 @ 12:38)   Culture Results:   GI PCR Results: NOT detected Culture Results:   10,000 - 49,000 CFU/mL Yeast   50,000 - 99,000 CFU/mL Enterococcus faecium (vancomycin resistant)   Organism Identification: Enterococcus faecium (vancomycin resistant)   Organism: Enterococcus faecium (vancomycin resistant)   Method Type: QUETA Culture - Blood (03.09.22 @ 11:15)   Specimen Source: .Blood None   Culture Results:   No Growth Final     RADIOLOGY:  < from: Xray Chest 1 View- PORTABLE-Urgent (Xray Chest 1 View- PORTABLE-Urgent .) (03.16.22 @ 09:13) >  Impression:    Stable bilateral opacities, right greater than left.    < end of copied text >  < from: CT Abdomen and Pelvis w/ IV Cont (03.09.22 @ 15:54) >  IMPRESSION:    1.  Resolution of previously seen colitis. No evidence of bowel   obstruction.  2.  Extensive anasarca, increased from prior.  3.  Small ascites.  4.  Focus of gas in the urinary bladder. Correlate for recent   instrumentation.  5.  See body of the report for additional findings.  6.  See separately dictated CT chest report for thoracic findings.    < end of copied text >  < from: TTE Echo Complete w/o Contrast w/ Doppler (02.24.22 @ 09:22) >  Summary:   1. Moderate to severe left atrial enlargement.   2. LV Ejection Fraction by Smith's Method with a biplane EF of 64 %.   3. Mildly increased LV wall thickness.   4. Spectral Doppler shows pseudonormal pattern of left ventricular   myocardial filling (Grade II diastolic dysfunction).   5. Normal right atrial size.   6. Mild mitral annular calcification.   7. Mild to moderate mitral valve regurgitation.   8. Moderate tricuspid regurgitation.   9. Sclerotic aortic valve with normal opening.    < end of copied text >    MEDICATIONS  (STANDING):  benzocaine 20% Spray 2 Spray(s) Mucosal four times a day  chlorhexidine 4% Liquid 1 Application(s) Topical daily  cholecalciferol 2000 Unit(s) Oral daily  enoxaparin Injectable 60 milliGRAM(s) SubCutaneous every 12 hours  famotidine    Tablet 20 milliGRAM(s) Oral daily  folic acid 1 milliGRAM(s) Oral daily  levothyroxine 125 MICROGram(s) Oral daily  magnesium oxide 400 milliGRAM(s) Oral two times a day with meals  midodrine. 10 milliGRAM(s) Oral every 8 hours  multivitamin/minerals 1 Tablet(s) Oral daily  thiamine 100 milliGRAM(s) Oral daily  triamcinolone 0.1% Cream 1 Application(s) Topical two times a day  vitamin A &amp; D Ointment 1 Application(s) Topical daily    MEDICATIONS  (PRN):  acetaminophen     Tablet .. 650 milliGRAM(s) Oral every 6 hours PRN Mild Pain (1 - 3), Moderate Pain (4 - 6)  ibuprofen  Tablet. 400 milliGRAM(s) Oral every 6 hours PRN Severe Pain (7 - 10)

## 2022-03-19 NOTE — PROGRESS NOTE ADULT - SUBJECTIVE AND OBJECTIVE BOX
SANTIAGO CALIXTO 61y Female  MRN#: [id number]   CODE STATUS:________    Hospital Day: 31d    Pt is currently admitted with the primary diagnosis of     SUBJECTIVE  Hospital Course  Patient is a 62 y/o female with PMHx of GIB, Bowel and Bladder incontinence, Smoking (3-4 cigarettes/day), ETOH abuse, hypothyroidism, and ongoing anemia transferred from [facility] for Polypectomy. Patient was admitted to [facility] on 01/31 for AMS, weakness and decreased appetite. She was found to have UTI, NSTEMI, and electrolyte imbalance. During her stay at [facility] she had a colonoscopy done which revealed 1 large 3-4 cm sigmoid pedunculated polyp and 2 polyps (7, 14 mm) in descending colon. Patient was transferred as they are not able to perform large Polypectomies there. She was not able to be discharged as ongoing anemia and concern that the cause are these large polyps.    Patient had a colonoscopy and polypectomy done on 2/18/2022. Polyp (13 mm) in the descending colon. (hot snare Polypectomy).  Polyp (25 mm to 30 mm) in the rectosigmoid junction at 20 cm from the anal verge. (Endoloop, Polypectomy, Endoclip). -> Currently Bx result shows adenocarcinoma    After the colonoscopy patient became hypothermic and hypotensive. Over night patient was started to Levo 2/20 and warming blanket.   2/20 AM patient had RRT for desaturation and hypotension. Patient was placed on 50% O2, patient had good response. Levophed 2/20 also started to raise BP. Patient remained altered, not speaking, but remained somewhat awake and just makes grunting sounds. Patient is not following commands. Left central line was placed on 3/2. Spoke to ICU fellow, patient was upgraded to ICU for closer monitoring.     In CCU pt had abdominal tenderness, KUB ordered, concern for RP free air. CTAP with PO/IV contrast ordered, no perforation or pneumo-peritoneum/retroperitoneum however showing findings c/w pancolitis. Surgery following, ID consulted. Pt was on vanc/zosyn (2/20-22 & 2/21-23 respectively). In CCU, pt developed coagulopathy (low plt, anemia, increasing INR despite not on AC). Treated with pRBC, plt transfusion as well as vitamin K. Heme onc on board, thought to be due to possible DIC 2/2 sepsis. Started on solumedrol 40mg for positive cindy however possibly positive due to transfusion at [facility]. Plt stabilizing so steroids tapered (completed taper). Derm consulted for diffuse skin rash- bullous lesions with desquamation and areas of healing with hyperpigmentation. Burn consulted for skin bx (done 2/28, results pending). C-anca also positive, rhuem consulted, presentation unlikely due to vasculitis, will rpt cindy once acute illness resolves.     3/9: Patient's WBC and PMNs have been up-trending for the past 4 days despite steroids being tapered even before that. Will d/c heredia, take u/a, start Unasyn empirically. Patient not eating well, had hypoglycemia -> NG inserted (consent from son). Polypectomy pathology showing adenocarcinoma -> CT chest w/ IV contrast for staging. Had diarrhea in am -> CT abdomen to f/u pancolitis. AMS -> MR Brain per old neuro note. Spoke w/ son concerning IV contrast and MR contraindications. Son reports patient's mental status to be poor at home as she did not ambulate on her home or answer direct questions.  3/10: Diarrhea 5/24 hrs, started feeds on previous day, will check infectious causes, stopped Unasyn  3/11: 2 BM/ 24 hrs, improved diarrhea consistency as per patient, C diff -ve, GI PCR -ve  3/13: More interactive, continuing calorie count, had episode of low glucose in am, no AMS. No shortness of breath. Happy her son is coming again  3/14: No complaints. Active. Not eating much, drinking though. Spoke with son about PEG, he is amenable. Consulted Surgery and contacted Heme/Onc for Cancer evaluation. Consulted GI, recommending nutrition consult for evaluation of appetite stimulants.  3/15: Asymptomatic hypoglycemia, started dronabinol. Aware of cancer diagnosis. Given Mg for high QTc. Borderline BP & HR -> Increase levothyroxine to 125 mcg QD (recently switched from IV to PO). Will f/u Surgery final recommendations.  3/16: Asymptomatic hypoglycemia, pending GI for PEG evaluation  3/17: Asymptomatic hypoglycemia, pending Palliative for GOC discussion w/ son for need for surgery and nutrition  3/18: GOC discussion w/ son (treatment path for cancer and nutrition) -> Son consented to PEG, patient assented to PEG  3/19: No complaints, comfortable    Overnight events     Subjective complaints     Present Today:   - Heredia:  No [  ], Yes [   ] : Indication:     - Type of IV Access:       .. CVC/Piccline:  No [  ], Yes [   ] : Indication:       .. Midline: No [  ], Yes [   ] : Indication:                                              OBJECTIVE  PAST MEDICAL & SURGICAL HISTORY                                              ALLERGIES:  No Known Allergies                           HOME MEDICATIONS  Home Medications:                           MEDICATIONS:  STANDING MEDICATIONS  benzocaine 20% Spray 2 Spray(s) Mucosal four times a day  chlorhexidine 4% Liquid 1 Application(s) Topical daily  cholecalciferol 2000 Unit(s) Oral daily  enoxaparin Injectable 60 milliGRAM(s) SubCutaneous every 12 hours  famotidine    Tablet 20 milliGRAM(s) Oral daily  folic acid 1 milliGRAM(s) Oral daily  levothyroxine 125 MICROGram(s) Oral daily  magnesium oxide 400 milliGRAM(s) Oral two times a day with meals  midodrine. 10 milliGRAM(s) Oral every 8 hours  multivitamin/minerals 1 Tablet(s) Oral daily  thiamine 100 milliGRAM(s) Oral daily  triamcinolone 0.1% Cream 1 Application(s) Topical two times a day  vitamin A &amp; D Ointment 1 Application(s) Topical daily    PRN MEDICATIONS  acetaminophen     Tablet .. 650 milliGRAM(s) Oral every 6 hours PRN  ibuprofen  Tablet. 400 milliGRAM(s) Oral every 6 hours PRN                                            ------------------------------------------------------------  VITAL SIGNS: Last 24 Hours  T(C): 36.2 (19 Mar 2022 05:54), Max: 36.2 (19 Mar 2022 05:54)  T(F): 97.2 (19 Mar 2022 05:54), Max: 97.2 (19 Mar 2022 05:54)  HR: 81 (19 Mar 2022 05:54) (58 - 81)  BP: 105/58 (19 Mar 2022 05:54) (99/66 - 119/74)  BP(mean): --  RR: 18 (19 Mar 2022 05:54) (16 - 19)  SpO2: --                                               LABS:                        9.6    4.05  )-----------( 171      ( 18 Mar 2022 04:30 )             29.1     03-18    136  |  99  |  4<L>  ----------------------------<  67<L>  4.2   |  29  |  0.7    Ca    7.9<L>      18 Mar 2022 04:30  Mg     2.0     03-18    TPro  6.2  /  Alb  1.9<L>  /  TBili  0.4  /  DBili  x   /  AST  31  /  ALT  23  /  AlkPhos  106  03-18                                                              RADIOLOGY:        PHYSICAL EXAM:  GENERAL: NAD, lying in bed comfortably  HEAD:  Atraumatic, Normocephalic  EYES: conjunctiva and sclera clear  ENT: Moist mucous membranes  NECK: Supple  CHEST/LUNG: Soft crackles. Unlabored respirations  HEART: Regular rate and rhythm; No murmurs, rubs, or gallops  ABDOMEN: BSx4; Soft, diffuse nonspecific tenderness, negative Hinton, nondistended  EXTREMITIES:  No LE edema  NERVOUS SYSTEM:  Alert, interactive, not oriented to time, oriented partially to place. oriented to person  SKIN: Multiple lesions and excoriations on the upper and lower extremities and perineal area. No erythema or tenderness

## 2022-03-20 LAB
GLUCOSE BLDC GLUCOMTR-MCNC: 113 MG/DL — HIGH (ref 70–99)
GLUCOSE BLDC GLUCOMTR-MCNC: 200 MG/DL — HIGH (ref 70–99)
GLUCOSE BLDC GLUCOMTR-MCNC: 68 MG/DL — LOW (ref 70–99)
GLUCOSE BLDC GLUCOMTR-MCNC: 68 MG/DL — LOW (ref 70–99)
GLUCOSE BLDC GLUCOMTR-MCNC: 69 MG/DL — LOW (ref 70–99)

## 2022-03-20 PROCEDURE — 99233 SBSQ HOSP IP/OBS HIGH 50: CPT

## 2022-03-20 RX ADMIN — Medication 2 SPRAY(S): at 01:31

## 2022-03-20 RX ADMIN — Medication 1 APPLICATION(S): at 05:19

## 2022-03-20 RX ADMIN — CHLORHEXIDINE GLUCONATE 1 APPLICATION(S): 213 SOLUTION TOPICAL at 11:25

## 2022-03-20 RX ADMIN — Medication 125 MICROGRAM(S): at 05:18

## 2022-03-20 RX ADMIN — MIDODRINE HYDROCHLORIDE 10 MILLIGRAM(S): 2.5 TABLET ORAL at 05:18

## 2022-03-20 RX ADMIN — ENOXAPARIN SODIUM 60 MILLIGRAM(S): 100 INJECTION SUBCUTANEOUS at 17:12

## 2022-03-20 RX ADMIN — Medication 2000 UNIT(S): at 11:24

## 2022-03-20 RX ADMIN — ENOXAPARIN SODIUM 60 MILLIGRAM(S): 100 INJECTION SUBCUTANEOUS at 05:18

## 2022-03-20 RX ADMIN — Medication 2 SPRAY(S): at 17:10

## 2022-03-20 RX ADMIN — Medication 100 MILLIGRAM(S): at 11:25

## 2022-03-20 RX ADMIN — FAMOTIDINE 20 MILLIGRAM(S): 10 INJECTION INTRAVENOUS at 11:24

## 2022-03-20 RX ADMIN — Medication 1 APPLICATION(S): at 11:25

## 2022-03-20 RX ADMIN — MAGNESIUM OXIDE 400 MG ORAL TABLET 400 MILLIGRAM(S): 241.3 TABLET ORAL at 17:12

## 2022-03-20 RX ADMIN — Medication 1 APPLICATION(S): at 17:12

## 2022-03-20 RX ADMIN — Medication 1 TABLET(S): at 11:25

## 2022-03-20 RX ADMIN — MIDODRINE HYDROCHLORIDE 10 MILLIGRAM(S): 2.5 TABLET ORAL at 21:52

## 2022-03-20 RX ADMIN — Medication 1 MILLIGRAM(S): at 11:24

## 2022-03-20 RX ADMIN — MAGNESIUM OXIDE 400 MG ORAL TABLET 400 MILLIGRAM(S): 241.3 TABLET ORAL at 09:24

## 2022-03-20 RX ADMIN — Medication 2 SPRAY(S): at 13:43

## 2022-03-20 RX ADMIN — Medication 2 SPRAY(S): at 05:17

## 2022-03-20 RX ADMIN — MIDODRINE HYDROCHLORIDE 10 MILLIGRAM(S): 2.5 TABLET ORAL at 13:46

## 2022-03-20 NOTE — PROGRESS NOTE ADULT - SUBJECTIVE AND OBJECTIVE BOX
61F PMH GIB, Bowel and Bladder incontinence, Smoking (3-4 cigarettes/day), ETOH abuse, hypothyroidism, and ongoing anemia, admitted to [facility] 1/31 for ams found to have UTI. Dev GIB, colonoscopy showing polyps, transferred to [facility] for polypectomy upgraded to CCU for hypotension, likely septic shock 2/2 aspiration pna. While in the hospital colonoscopy was performed  with polypectomy 2/18, biopsy showed adenocarcinoma   CT Chest/A/P negative for metastatic disease. Pt is anorexic, refusing PEG tube wit waxing and waiting MS, her son is agreeable, pt failed calories count, agreed for a PEG tube.   Today pt is comfortable, demented, refusing to eat.    OBJECTIVE  PAST MEDICAL & SURGICAL HISTORY                                              ALLERGIES:  No Known Allergies                           HOME MEDICATIONS  Home Medications:      VITAL SIGNS: Last 24 Hours  T(C): 35.5 (20 Mar 2022 12:40), Max: 37 (20 Mar 2022 05:31)  T(F): 95.9 (20 Mar 2022 12:40), Max: 98.6 (20 Mar 2022 05:31)  HR: 75 (20 Mar 2022 12:40) (74 - 80)  BP: 106/65 (20 Mar 2022 12:40) (92/56 - 106/65)  BP(mean): --  RR: 18 (20 Mar 2022 12:40) (18 - 18)      PHYSICAL EXAM:  GENERAL: NAD, cachectic with temporal muscle waisting   HEAD:  Atraumatic, Normocephalic  EYES: conjunctiva and sclera clear  ENT: Moist mucous membranes  NECK: Supple  CHEST/LUNG: decreased BS at bases   HEART: Regular rate and rhythm; No murmurs, rubs, or gallops  ABDOMEN: BSx4; Soft, diffuse nonspecific tenderness, negative Hinton, nondistended  EXTREMITIES:  No LE edema  NERVOUS SYSTEM:  Alert, interactive, not oriented to time, oriented partially to place. oriented to person  SKIN: Multiple lesions and excoriations on the upper and lower extremities and perineal area. No erythema or tenderness                    LABS:                                   9.5    3.37  )-----------( 148      ( 19 Mar 2022 08:07 )             29.4   03-19    137  |  100  |  4<L>  ----------------------------<  84  3.5   |  28  |  0.6<L>    Ca    7.9<L>      19 Mar 2022 08:07  Mg     2.3     03-19    TPro  6.6  /  Alb  2.0<L>  /  TBili  0.4  /  DBili  x   /  AST  22  /  ALT  23  /  AlkPhos  123<H>  03-19    GI PCR Panel, Stool (03.10.22 @ 12:38)   Culture Results:   GI PCR Results: NOT detected Culture Results:   10,000 - 49,000 CFU/mL Yeast   50,000 - 99,000 CFU/mL Enterococcus faecium (vancomycin resistant)   Organism Identification: Enterococcus faecium (vancomycin resistant)   Organism: Enterococcus faecium (vancomycin resistant)   Method Type: QUETA Culture - Blood (03.09.22 @ 11:15)   Specimen Source: .Blood None   Culture Results:   No Growth Final     RADIOLOGY:  < from: Xray Chest 1 View- PORTABLE-Urgent (Xray Chest 1 View- PORTABLE-Urgent .) (03.16.22 @ 09:13) >  Impression:    Stable bilateral opacities, right greater than left.    < end of copied text >  < from: CT Abdomen and Pelvis w/ IV Cont (03.09.22 @ 15:54) >  IMPRESSION:    1.  Resolution of previously seen colitis. No evidence of bowel   obstruction.  2.  Extensive anasarca, increased from prior.  3.  Small ascites.  4.  Focus of gas in the urinary bladder. Correlate for recent   instrumentation.  5.  See body of the report for additional findings.  6.  See separately dictated CT chest report for thoracic findings.    < end of copied text >  < from: TTE Echo Complete w/o Contrast w/ Doppler (02.24.22 @ 09:22) >  Summary:   1. Moderate to severe left atrial enlargement.   2. LV Ejection Fraction by Smith's Method with a biplane EF of 64 %.   3. Mildly increased LV wall thickness.   4. Spectral Doppler shows pseudonormal pattern of left ventricular   myocardial filling (Grade II diastolic dysfunction).   5. Normal right atrial size.   6. Mild mitral annular calcification.   7. Mild to moderate mitral valve regurgitation.   8. Moderate tricuspid regurgitation.   9. Sclerotic aortic valve with normal opening.    < end of copied text >    MEDICATIONS  (STANDING):  benzocaine 20% Spray 2 Spray(s) Mucosal four times a day  chlorhexidine 4% Liquid 1 Application(s) Topical daily  cholecalciferol 2000 Unit(s) Oral daily  enoxaparin Injectable 60 milliGRAM(s) SubCutaneous every 12 hours  famotidine    Tablet 20 milliGRAM(s) Oral daily  folic acid 1 milliGRAM(s) Oral daily  levothyroxine 125 MICROGram(s) Oral daily  magnesium oxide 400 milliGRAM(s) Oral two times a day with meals  midodrine. 10 milliGRAM(s) Oral every 8 hours  multivitamin/minerals 1 Tablet(s) Oral daily  thiamine 100 milliGRAM(s) Oral daily  triamcinolone 0.1% Cream 1 Application(s) Topical two times a day  vitamin A &amp; D Ointment 1 Application(s) Topical daily    MEDICATIONS  (PRN):  acetaminophen     Tablet .. 650 milliGRAM(s) Oral every 6 hours PRN Mild Pain (1 - 3), Moderate Pain (4 - 6)  ibuprofen  Tablet. 400 milliGRAM(s) Oral every 6 hours PRN Severe Pain (7 - 10)

## 2022-03-20 NOTE — PROGRESS NOTE ADULT - ASSESSMENT
61F PMH GIB, Bowel and Bladder incontinence, Smoking (3-4 cigarettes/day), ETOH abuse, hypothyroidism, and ongoing anemia, admitted to [facility] 1/31 for ams found to have UTI. Dev GIB, colonoscopy showing polyps, transferred to [facility] for polypectomy upgraded to CCU for hypotension, likely septic shock 2/2 aspiration pna. While in the hospital colonoscopy was performed  with polypectomy 2/18, biopsy showed adenocarcinoma   CT Chest/A/P negative for metastatic disease. Pt is anorexic, refusing PEG tube wit waxing and waiting MS, her son is agreeable, she was started on appetite stimulants, will count calories and consult palliative care for GOB conversation ( pt has a very poor prognosis)      A/P   # Acute on chronic HFpEF / Bilateral Pleural Effusions   - fluid restriction 1200 ml in 24 hours  - intake and output monitoring, daily weight   - ECHO with preserved EF, grade 2 diastolic dysfunction  - off  diuretics, pt is  euvolemic now       # Metabolic encephalopathy likely with advanced underlying dementia   - mental status at baseline now   - Seroquel held due to QT prolongation   - supportive care   - pt is refusing to eat, son and pt agreed for a PEG tube     #  Adenocarcinoma of the colon   - s/p colonoscopy with polypectomy 2/18, biopsy showed adenocarcinoma   - CT Chest/A/P negative for metastatic disease   - Surgical oncology follow up after PEG   - medical oncology is following     #  R IJ DVT   - c/w therapeutic Lovenox     # Rash   -evaluated by Burn, rheumatology and dermatology  - f/u biopsy report - dermatitis vs drug eruption vs viral exanthem vs psoriasiform   on steroid cream    # Thrombocytopenia and anemia/ suspected  autoimmune hemolytic anemia   - stable for now   - s/p prednisone taper per Hem Onc recommendation    # Anorexia / severe malnutrition   -  failed calorie count  -  GI  consul for PEG , will keep NPO after midnight    Overall prognosis is very poor     Pending: GI follow up to schedule  for PEG, NPO after midnight   Palliative pt's son is aware about the plan of care and agreeable   # Dispo: pt needs STR when cleared .

## 2022-03-21 LAB
ALBUMIN SERPL ELPH-MCNC: 2 G/DL — LOW (ref 3.5–5.2)
ALP SERPL-CCNC: 127 U/L — HIGH (ref 30–115)
ALT FLD-CCNC: 21 U/L — SIGNIFICANT CHANGE UP (ref 0–41)
ANION GAP SERPL CALC-SCNC: 10 MMOL/L — SIGNIFICANT CHANGE UP (ref 7–14)
APTT BLD: 53.5 SEC — HIGH (ref 27–39.2)
APTT BLD: 66.5 SEC — HIGH (ref 27–39.2)
AST SERPL-CCNC: 21 U/L — SIGNIFICANT CHANGE UP (ref 0–41)
BASOPHILS # BLD AUTO: 0.02 K/UL — SIGNIFICANT CHANGE UP (ref 0–0.2)
BASOPHILS NFR BLD AUTO: 0.5 % — SIGNIFICANT CHANGE UP (ref 0–1)
BILIRUB SERPL-MCNC: 0.4 MG/DL — SIGNIFICANT CHANGE UP (ref 0.2–1.2)
BLD GP AB SCN SERPL QL: SIGNIFICANT CHANGE UP
BUN SERPL-MCNC: 4 MG/DL — LOW (ref 10–20)
CALCIUM SERPL-MCNC: 8.3 MG/DL — LOW (ref 8.5–10.1)
CHLORIDE SERPL-SCNC: 98 MMOL/L — SIGNIFICANT CHANGE UP (ref 98–110)
CO2 SERPL-SCNC: 29 MMOL/L — SIGNIFICANT CHANGE UP (ref 17–32)
CREAT SERPL-MCNC: 0.7 MG/DL — SIGNIFICANT CHANGE UP (ref 0.7–1.5)
EGFR: 99 ML/MIN/1.73M2 — SIGNIFICANT CHANGE UP
EOSINOPHIL # BLD AUTO: 0.19 K/UL — SIGNIFICANT CHANGE UP (ref 0–0.7)
EOSINOPHIL NFR BLD AUTO: 4.4 % — SIGNIFICANT CHANGE UP (ref 0–8)
GLUCOSE BLDC GLUCOMTR-MCNC: 109 MG/DL — HIGH (ref 70–99)
GLUCOSE BLDC GLUCOMTR-MCNC: 136 MG/DL — HIGH (ref 70–99)
GLUCOSE BLDC GLUCOMTR-MCNC: 186 MG/DL — HIGH (ref 70–99)
GLUCOSE BLDC GLUCOMTR-MCNC: 53 MG/DL — CRITICAL LOW (ref 70–99)
GLUCOSE BLDC GLUCOMTR-MCNC: 59 MG/DL — LOW (ref 70–99)
GLUCOSE BLDC GLUCOMTR-MCNC: 60 MG/DL — LOW (ref 70–99)
GLUCOSE BLDC GLUCOMTR-MCNC: 70 MG/DL — SIGNIFICANT CHANGE UP (ref 70–99)
GLUCOSE SERPL-MCNC: 84 MG/DL — SIGNIFICANT CHANGE UP (ref 70–99)
HCT VFR BLD CALC: 30.1 % — LOW (ref 37–47)
HCT VFR BLD CALC: 31.4 % — LOW (ref 37–47)
HGB BLD-MCNC: 10.1 G/DL — LOW (ref 12–16)
HGB BLD-MCNC: 9.9 G/DL — LOW (ref 12–16)
IMM GRANULOCYTES NFR BLD AUTO: 0 % — LOW (ref 0.1–0.3)
INR BLD: 1.11 RATIO — SIGNIFICANT CHANGE UP (ref 0.65–1.3)
INR BLD: 1.18 RATIO — SIGNIFICANT CHANGE UP (ref 0.65–1.3)
LYMPHOCYTES # BLD AUTO: 1.39 K/UL — SIGNIFICANT CHANGE UP (ref 1.2–3.4)
LYMPHOCYTES # BLD AUTO: 32.2 % — SIGNIFICANT CHANGE UP (ref 20.5–51.1)
MAGNESIUM SERPL-MCNC: 2 MG/DL — SIGNIFICANT CHANGE UP (ref 1.8–2.4)
MCHC RBC-ENTMCNC: 29.1 PG — SIGNIFICANT CHANGE UP (ref 27–31)
MCHC RBC-ENTMCNC: 29.8 PG — SIGNIFICANT CHANGE UP (ref 27–31)
MCHC RBC-ENTMCNC: 32.2 G/DL — SIGNIFICANT CHANGE UP (ref 32–37)
MCHC RBC-ENTMCNC: 32.9 G/DL — SIGNIFICANT CHANGE UP (ref 32–37)
MCV RBC AUTO: 90.5 FL — SIGNIFICANT CHANGE UP (ref 81–99)
MCV RBC AUTO: 90.7 FL — SIGNIFICANT CHANGE UP (ref 81–99)
MONOCYTES # BLD AUTO: 0.3 K/UL — SIGNIFICANT CHANGE UP (ref 0.1–0.6)
MONOCYTES NFR BLD AUTO: 6.9 % — SIGNIFICANT CHANGE UP (ref 1.7–9.3)
NEUTROPHILS # BLD AUTO: 2.42 K/UL — SIGNIFICANT CHANGE UP (ref 1.4–6.5)
NEUTROPHILS NFR BLD AUTO: 56 % — SIGNIFICANT CHANGE UP (ref 42.2–75.2)
NRBC # BLD: 0 /100 WBCS — SIGNIFICANT CHANGE UP (ref 0–0)
NRBC # BLD: 0 /100 WBCS — SIGNIFICANT CHANGE UP (ref 0–0)
PLATELET # BLD AUTO: 133 K/UL — SIGNIFICANT CHANGE UP (ref 130–400)
PLATELET # BLD AUTO: 133 K/UL — SIGNIFICANT CHANGE UP (ref 130–400)
POTASSIUM SERPL-MCNC: 4.1 MMOL/L — SIGNIFICANT CHANGE UP (ref 3.5–5)
POTASSIUM SERPL-SCNC: 4.1 MMOL/L — SIGNIFICANT CHANGE UP (ref 3.5–5)
PROT SERPL-MCNC: 6.7 G/DL — SIGNIFICANT CHANGE UP (ref 6–8)
PROTHROM AB SERPL-ACNC: 12.7 SEC — SIGNIFICANT CHANGE UP (ref 9.95–12.87)
PROTHROM AB SERPL-ACNC: 13.5 SEC — HIGH (ref 9.95–12.87)
RBC # BLD: 3.32 M/UL — LOW (ref 4.2–5.4)
RBC # BLD: 3.47 M/UL — LOW (ref 4.2–5.4)
RBC # FLD: 15.3 % — HIGH (ref 11.5–14.5)
RBC # FLD: 15.7 % — HIGH (ref 11.5–14.5)
SARS-COV-2 RNA SPEC QL NAA+PROBE: SIGNIFICANT CHANGE UP
SODIUM SERPL-SCNC: 137 MMOL/L — SIGNIFICANT CHANGE UP (ref 135–146)
WBC # BLD: 4.32 K/UL — LOW (ref 4.8–10.8)
WBC # BLD: 4.47 K/UL — LOW (ref 4.8–10.8)
WBC # FLD AUTO: 4.32 K/UL — LOW (ref 4.8–10.8)
WBC # FLD AUTO: 4.47 K/UL — LOW (ref 4.8–10.8)

## 2022-03-21 PROCEDURE — 99232 SBSQ HOSP IP/OBS MODERATE 35: CPT

## 2022-03-21 PROCEDURE — 99221 1ST HOSP IP/OBS SF/LOW 40: CPT

## 2022-03-21 RX ORDER — DEXTROSE 10 % IN WATER 10 %
250 INTRAVENOUS SOLUTION INTRAVENOUS ONCE
Refills: 0 | Status: COMPLETED | OUTPATIENT
Start: 2022-03-21 | End: 2022-03-21

## 2022-03-21 RX ORDER — PANTOPRAZOLE SODIUM 20 MG/1
40 TABLET, DELAYED RELEASE ORAL EVERY 12 HOURS
Refills: 0 | Status: DISCONTINUED | OUTPATIENT
Start: 2022-03-21 | End: 2022-03-24

## 2022-03-21 RX ORDER — DEXTROSE 50 % IN WATER 50 %
50 SYRINGE (ML) INTRAVENOUS ONCE
Refills: 0 | Status: DISCONTINUED | OUTPATIENT
Start: 2022-03-21 | End: 2022-03-21

## 2022-03-21 RX ADMIN — Medication 2 SPRAY(S): at 00:07

## 2022-03-21 RX ADMIN — MIDODRINE HYDROCHLORIDE 10 MILLIGRAM(S): 2.5 TABLET ORAL at 13:24

## 2022-03-21 RX ADMIN — Medication 100 MILLIGRAM(S): at 11:48

## 2022-03-21 RX ADMIN — Medication 1 APPLICATION(S): at 17:28

## 2022-03-21 RX ADMIN — MIDODRINE HYDROCHLORIDE 10 MILLIGRAM(S): 2.5 TABLET ORAL at 21:07

## 2022-03-21 RX ADMIN — MAGNESIUM OXIDE 400 MG ORAL TABLET 400 MILLIGRAM(S): 241.3 TABLET ORAL at 17:29

## 2022-03-21 RX ADMIN — ENOXAPARIN SODIUM 60 MILLIGRAM(S): 100 INJECTION SUBCUTANEOUS at 05:20

## 2022-03-21 RX ADMIN — Medication 1 APPLICATION(S): at 11:50

## 2022-03-21 RX ADMIN — Medication 1 TABLET(S): at 11:49

## 2022-03-21 RX ADMIN — Medication 2 SPRAY(S): at 11:49

## 2022-03-21 RX ADMIN — Medication 2 SPRAY(S): at 05:19

## 2022-03-21 RX ADMIN — FAMOTIDINE 20 MILLIGRAM(S): 10 INJECTION INTRAVENOUS at 11:49

## 2022-03-21 RX ADMIN — Medication 2 SPRAY(S): at 17:29

## 2022-03-21 RX ADMIN — Medication 750 MILLILITER(S): at 17:11

## 2022-03-21 RX ADMIN — Medication 1000 MILLILITER(S): at 08:39

## 2022-03-21 RX ADMIN — Medication 1 APPLICATION(S): at 05:25

## 2022-03-21 RX ADMIN — MIDODRINE HYDROCHLORIDE 10 MILLIGRAM(S): 2.5 TABLET ORAL at 05:20

## 2022-03-21 RX ADMIN — Medication 2000 UNIT(S): at 11:49

## 2022-03-21 RX ADMIN — Medication 1 MILLIGRAM(S): at 11:48

## 2022-03-21 RX ADMIN — Medication 2 SPRAY(S): at 23:47

## 2022-03-21 RX ADMIN — PANTOPRAZOLE SODIUM 40 MILLIGRAM(S): 20 TABLET, DELAYED RELEASE ORAL at 17:29

## 2022-03-21 RX ADMIN — Medication 125 MICROGRAM(S): at 05:20

## 2022-03-21 NOTE — PROGRESS NOTE ADULT - SUBJECTIVE AND OBJECTIVE BOX
61F PMH GIB, Bowel and Bladder incontinence, Smoking (3-4 cigarettes/day), ETOH abuse, hypothyroidism, and ongoing anemia, admitted to [facility] 1/31 for ams found to have UTI. Dev GIB, colonoscopy showing polyps, transferred to [facility] for polypectomy upgraded to CCU for hypotension, likely septic shock 2/2 aspiration pna. While in the hospital colonoscopy was performed  with polypectomy 2/18, biopsy showed adenocarcinoma   CT Chest/A/P negative for metastatic disease. Pt is anorexic, refusing PEG tube wit waxing and waiting MS, her son is agreeable, pt failed calories count, agreed for a PEG tube.   Today pt is comfortable, has no complaints, demented.     OBJECTIVE  PAST MEDICAL & SURGICAL HISTORY                                              ALLERGIES:  No Known Allergies                           HOME MEDICATIONS  Home Medications:      VITAL SIGNS: Last 24 Hours  T(C): 36.9 (21 Mar 2022 14:04), Max: 36.9 (21 Mar 2022 14:04)  T(F): 98.5 (21 Mar 2022 14:04), Max: 98.5 (21 Mar 2022 14:04)  HR: 88 (21 Mar 2022 08:07) (80 - 88)  BP: 95/54 (21 Mar 2022 14:04) (95/54 - 108/56)  BP(mean): --  RR: 18 (21 Mar 2022 14:04) (18 - 19)  SpO2: 100% (21 Mar 2022 08:07) (100% - 100%)      PHYSICAL EXAM:  GENERAL: NAD, cachectic with temporal muscle waisting   HEAD:  Atraumatic, Normocephalic  EYES: conjunctiva and sclera clear  ENT: Moist mucous membranes  NECK: Supple  CHEST/LUNG: decreased BS at bases   HEART: Regular rate and rhythm; No murmurs, rubs, or gallops  ABDOMEN: BSx4; Soft, diffuse nonspecific tenderness, negative Hinton, nondistended  EXTREMITIES:  No LE edema  NERVOUS SYSTEM:  Alert, interactive, not oriented to time, oriented partially to place. oriented to person  SKIN: Multiple lesions and excoriations on the upper and lower extremities and perineal area. No erythema or tenderness                    LABS:                                   9.9    4.32  )-----------( 133      ( 21 Mar 2022 04:30 )             30.1   03-21    137  |  98  |  4<L>  ----------------------------<  84  4.1   |  29  |  0.7    Ca    8.3<L>      21 Mar 2022 04:30  Mg     2.0     03-21    TPro  6.7  /  Alb  2.0<L>  /  TBili  0.4  /  DBili  x   /  AST  21  /  ALT  21  /  AlkPhos  127<H>  03-21      GI PCR Panel, Stool (03.10.22 @ 12:38)   Culture Results:   GI PCR Results: NOT detected Culture Results:   10,000 - 49,000 CFU/mL Yeast   50,000 - 99,000 CFU/mL Enterococcus faecium (vancomycin resistant)   Organism Identification: Enterococcus faecium (vancomycin resistant)   Organism: Enterococcus faecium (vancomycin resistant)   Method Type: QUETA Culture - Blood (03.09.22 @ 11:15)   Specimen Source: .Blood None   Culture Results:   No Growth Final     RADIOLOGY:  < from: Xray Chest 1 View- PORTABLE-Urgent (Xray Chest 1 View- PORTABLE-Urgent .) (03.16.22 @ 09:13) >  Impression:    Stable bilateral opacities, right greater than left.    < end of copied text >  < from: CT Abdomen and Pelvis w/ IV Cont (03.09.22 @ 15:54) >  IMPRESSION:    1.  Resolution of previously seen colitis. No evidence of bowel   obstruction.  2.  Extensive anasarca, increased from prior.  3.  Small ascites.  4.  Focus of gas in the urinary bladder. Correlate for recent   instrumentation.  5.  See body of the report for additional findings.  6.  See separately dictated CT chest report for thoracic findings.    < end of copied text >  < from: TTE Echo Complete w/o Contrast w/ Doppler (02.24.22 @ 09:22) >  Summary:   1. Moderate to severe left atrial enlargement.   2. LV Ejection Fraction by Smith's Method with a biplane EF of 64 %.   3. Mildly increased LV wall thickness.   4. Spectral Doppler shows pseudonormal pattern of left ventricular   myocardial filling (Grade II diastolic dysfunction).   5. Normal right atrial size.   6. Mild mitral annular calcification.   7. Mild to moderate mitral valve regurgitation.   8. Moderate tricuspid regurgitation.   9. Sclerotic aortic valve with normal opening.      MEDICATIONS  (STANDING):  benzocaine 20% Spray 2 Spray(s) Mucosal four times a day  chlorhexidine 4% Liquid 1 Application(s) Topical daily  cholecalciferol 2000 Unit(s) Oral daily  famotidine    Tablet 20 milliGRAM(s) Oral daily  folic acid 1 milliGRAM(s) Oral daily  levothyroxine 125 MICROGram(s) Oral daily  magnesium oxide 400 milliGRAM(s) Oral two times a day with meals  midodrine. 10 milliGRAM(s) Oral every 8 hours  multivitamin/minerals 1 Tablet(s) Oral daily  pantoprazole  Injectable 40 milliGRAM(s) IV Push every 12 hours  thiamine 100 milliGRAM(s) Oral daily  triamcinolone 0.1% Cream 1 Application(s) Topical two times a day  vitamin A &amp; D Ointment 1 Application(s) Topical daily    MEDICATIONS  (PRN):  acetaminophen     Tablet .. 650 milliGRAM(s) Oral every 6 hours PRN Mild Pain (1 - 3), Moderate Pain (4 - 6)  ibuprofen  Tablet. 400 milliGRAM(s) Oral every 6 hours PRN Severe Pain (7 - 10)

## 2022-03-21 NOTE — PROGRESS NOTE ADULT - ASSESSMENT
* SUMMARY:    * Patient-based characteristics (Functional capacity)  Patient is able to achieve more than 4 MET (walk 4 blocks, climb 2 flights of stairs, etc...)          Y [] / N [X]    High-risk patient features:  - Recent (<30 days) or active MI          Y [] / N [X]  - Unstable or severe angina          Y [] / N [X]  - Decompensated heart failure, or worsening or new-onset heart failure          Y [] / N [X]  - Severe valvular disease          Y [] / N [X]  - Significant arrhythmia (Tachy- or Bradyarrhythmia)          Y [] / N [X]    * Surgery/Procedure-based characteristics (Type of surgery)  - Low-risk procedure (outpatient procedure, elective, endoscopy, etc...)          Y [] / N []  - Elevated or Moderate-risk procedure (Inpatient)          Y [X] / N []  - High-risk procedure (urgent/emergent procedure, Intrathoracic, vascular, etc...)          Y [] / N []    * Revised Cardiac Risk Index (RCRI)  1- History of ischemic heart disease          Y [] / N [X]  2- History of congestive heart failure          Y [X] / N []  3- History of stroke/TIA          Y [] / N [X]  4- History of insulin-dependent diabetes          Y [] / N [X]  5- Chronic kidney disease (Cr >2mg/dL)          Y [] / N [X]  6- Undergoing suprainguinal vascular, intraperitoneal, or intrathoracic surgery          Y [] / N [X]    Class II risk (One factor) --> 6% risk (30-day risk of death, MI, or cardiac arrest)    * IMPRESSION & RECOMMENDATIONS:  Moderate -risk patient for a Moderate -risk surgery/procedure  No further cardiac work-up is needed at the moment. There are no current cardiac contraindications to prevent from proceeding with the scheduled surgery/procedure.    - This consult serves only as a hector-operative cardiac risk stratification and evaluation to predict 30-days cardiac complications risk and mortality. The decision to proceed with the surgery/procedure is made by the performing physician and the patient -

## 2022-03-21 NOTE — CHART NOTE - NSCHARTNOTEFT_GEN_A_CORE
Palliative care sign off not    As goals are clear at this time, palliative care will sign off.    Please call back [phone] PRUNA Valera MD Palliative care sign off note    As goals are clear at this time, palliative care will sign off.    Please call back [phone] PRN  Discussed with Primary team    Fred Valera MD

## 2022-03-21 NOTE — PROGRESS NOTE ADULT - SUBJECTIVE AND OBJECTIVE BOX
HPI:  Pt is a 62 yo F with PMH of GIB, Bowel and Bladder incontinence, Smoking (3-4 cigarettes/day), ETOH abuse, hypothyroidism anemia transferred from [facility] for Polypectomy. Pt is from home living with her son and bedbound at baseline. Pt was admitted to [facility] on 01/31 for AMS, weakness and decreased appetite. She was found to have UTI, NSTEMI, and electrolyte imbalance. During her stay at [facility] pt had colonoscopy done which revealed 1 large 3-4 cm sigmoid pedunculated polyp and 2 polyps (7, 14 mm) in descending colon s/p bx. Pt was transferred to [facility] for polyp removal.  (16 Feb 2022 23:24)      SUBJECTIVE:  Pt seen and examined at bedside. Pt s/p colonscopy found to have adenocarcinoma, Pt also upgraded to ICU for septic shock for aspiration PNA. Cardiology consulted for pre-op risk assessment for PEG tube placement    PAST MEDICAL & SURGICAL HISTORY      ALLERGIES:  No Known Allergies      MEDICATIONS:  MEDICATIONS  (STANDING):  benzocaine 20% Spray 2 Spray(s) Mucosal four times a day  chlorhexidine 4% Liquid 1 Application(s) Topical daily  cholecalciferol 2000 Unit(s) Oral daily  enoxaparin Injectable 60 milliGRAM(s) SubCutaneous every 12 hours  famotidine    Tablet 20 milliGRAM(s) Oral daily  folic acid 1 milliGRAM(s) Oral daily  levothyroxine 125 MICROGram(s) Oral daily  magnesium oxide 400 milliGRAM(s) Oral two times a day with meals  midodrine. 10 milliGRAM(s) Oral every 8 hours  multivitamin/minerals 1 Tablet(s) Oral daily  thiamine 100 milliGRAM(s) Oral daily  triamcinolone 0.1% Cream 1 Application(s) Topical two times a day  vitamin A &amp; D Ointment 1 Application(s) Topical daily    MEDICATIONS  (PRN):  acetaminophen     Tablet .. 650 milliGRAM(s) Oral every 6 hours PRN Mild Pain (1 - 3), Moderate Pain (4 - 6)  ibuprofen  Tablet. 400 milliGRAM(s) Oral every 6 hours PRN Severe Pain (7 - 10)      HOME MEDICATIONS:  Home Medications:      VITALS:   T(F): 98.1 (03-21 @ 06:03), Max: 98.6 (03-20 @ 05:31)  HR: 88 (03-21 @ 08:07) (58 - 88)  BP: 108/56 (03-21 @ 06:03) (92/56 - 119/74)  BP(mean): --  RR: 19 (03-21 @ 08:07) (16 - 19)  SpO2: 100% (03-21 @ 08:07) (100% - 100%)    I&O's Summary        PHYSICAL EXAM:  NEURO: patient is awake , agitated and confused  GEN: Not in acute distress  NECK: no thyroid enlargement, no JVD  LUNGS: Clear to auscultation bilaterally   CARDIOVASCULAR: S1/S2 present, RRR , no murmurs or rubs, no carotid bruits,  + PP bilaterally  ABD: Soft, non-tender, non-distended, +BS  EXT: No IRENE  SKIN: Intact    LABS:                        9.9    4.32  )-----------( 133      ( 21 Mar 2022 04:30 )             30.1     03-21    137  |  98  |  4<L>  ----------------------------<  84  4.1   |  29  |  0.7    Ca    8.3<L>      21 Mar 2022 04:30  Mg     2.0     03-21    TPro  6.7  /  Alb  2.0<L>  /  TBili  0.4  /  DBili  x   /  AST  21  /  ALT  21  /  AlkPhos  127<H>  03-21    PT/INR - ( 21 Mar 2022 04:30 )   PT: 13.50 sec;   INR: 1.18 ratio         PTT - ( 21 Mar 2022 04:30 )  PTT:66.5 sec          Troponin trend:            RADIOLOGY:  -CXR:  -TTE:  < from: TTE Echo Complete w/o Contrast w/ Doppler (02.24.22 @ 09:22) >   1. Moderate to severe left atrial enlargement.   2. LV Ejection Fraction by Smith's Method with a biplane EF of 64 %.   3. Mildly increased LV wall thickness.   4. Spectral Doppler shows pseudonormal pattern of left ventricular   myocardial filling (Grade II diastolic dysfunction).   5. Normal right atrial size.   6. Mild mitral annular calcification.   7. Mild to moderate mitral valve regurgitation.   8. Moderate tricuspid regurgitation.   9. Sclerotic aortic valve with normal opening.    < end of copied text >    -CCTA:  -STRESS TEST:  -CATHETERIZATION:    ECG:  NSR with prolong QTC    TELEMETRY EVENTS:

## 2022-03-21 NOTE — PROGRESS NOTE ADULT - ASSESSMENT
61F PMH GIB, Bowel and Bladder incontinence, Smoking (3-4 cigarettes/day), ETOH abuse, hypothyroidism, and ongoing anemia, admitted to [facility] 1/31 for ams found to have UTI. Dev GIB, colonoscopy showing polyps, transferred to [facility] for polypectomy upgraded to CCU for hypotension, likely septic shock 2/2 aspiration pna. While in the hospital colonoscopy was performed  with polypectomy 2/18, biopsy showed adenocarcinoma   CT Chest/A/P negative for metastatic disease. Pt is anorexic, refusing PEG tube wit waxing and waiting MS, her son is agreeable, she was started on appetite stimulants, will count calories and consult palliative care for GOB conversation ( pt has a very poor prognosis)      A/P   # Acute on chronic HFpEF / Bilateral Pleural Effusions   - fluid restriction 1200 ml in 24 hours  - intake and output monitoring, daily weight   - ECHO with preserved EF, grade 2 diastolic dysfunction  - off  diuretics, pt is  euvolemic now   - consulted by cardiology, pt has a moderate risk for Sx       # Metabolic encephalopathy likely with advanced underlying dementia   - mental status at baseline now   - Seroquel held due to QT prolongation   - supportive care   - pt is refusing to eat, son and pt agreed for a PEG tube     #  Adenocarcinoma of the colon   - s/p colonoscopy with polypectomy 2/18, biopsy showed adenocarcinoma   - CT Chest/A/P negative for metastatic disease   - Surgical oncology follow up after PEG   - medical oncology is following     #  R IJ DVT   - c/w therapeutic Lovenox     # Rash   -evaluated by Burn, rheumatology and dermatology  - f/u biopsy report - dermatitis vs drug eruption vs viral exanthem vs psoriasiform   on steroid cream    # Thrombocytopenia and anemia/ suspected  autoimmune hemolytic anemia   - stable for now   - s/p prednisone taper per Hem Onc recommendation    # Anorexia / severe malnutrition   -  failed calorie count  -  GI  consul for PEG , will keep NPO after midnight    Overall prognosis is very poor     Pending: GI follow up to schedule  for PEG, NPO after midnight   Palliative pt's son is aware about the plan of care and agreeable   # Dispo: pt needs STR when cleared .

## 2022-03-21 NOTE — PROGRESS NOTE ADULT - SUBJECTIVE AND OBJECTIVE BOX
[facility] day: 33    HPI:   61F PMH GIB, Bowel and Bladder incontinence, Smoking (3-4 cigarettes/day), ETOH abuse, hypothyroidism, and ongoing anemia, admitted to [facility] 1/31 for ams found to have UTI. Dev GIB, colonoscopy showing polyps, transferred to [facility] for polypectomy upgraded to CCU for hypotension, likely septic shock 2/2 aspiration pna. While in the hospital colonoscopy was performed  with polypectomy 2/18, biopsy showed adenocarcinoma     Subjective:   Complaints: None   Overnight: none     Objective:   Vital Signs Last 24 Hrs  T(C): 36.7 (21 Mar 2022 06:03), Max: 36.7 (21 Mar 2022 06:03)  T(F): 98.1 (21 Mar 2022 06:03), Max: 98.1 (21 Mar 2022 06:03)  HR: 88 (21 Mar 2022 08:07) (75 - 88)  BP: 108/56 (21 Mar 2022 06:03) (106/60 - 108/56)  BP(mean): --  RR: 19 (21 Mar 2022 08:07) (18 - 19)  SpO2: 100% (21 Mar 2022 08:07) (100% - 100%)    GENERAL: NAD, cachectic with temporal muscle waisting   HEAD:  Atraumatic, Normocephalic  CHEST/LUNG: decreased BS at bases   HEART: Regular rate and rhythm; No murmurs, rubs, or gallops  ABDOMEN: BSx4; Soft, diffuse nonspecific tenderness, negative Hinton, nondistended  EXTREMITIES:  No LE edema  NERVOUS SYSTEM:  Alert, interactive, not oriented to time, oriented partially to place. oriented to person  SKIN: Multiple lesions and excoriations on the upper and lower extremities and perineal area. No erythema or tenderness    MEDICATIONS  (STANDING):  benzocaine 20% Spray 2 Spray(s) Mucosal four times a day  chlorhexidine 4% Liquid 1 Application(s) Topical daily  cholecalciferol 2000 Unit(s) Oral daily  enoxaparin Injectable 60 milliGRAM(s) SubCutaneous every 12 hours  famotidine    Tablet 20 milliGRAM(s) Oral daily  folic acid 1 milliGRAM(s) Oral daily  levothyroxine 125 MICROGram(s) Oral daily  magnesium oxide 400 milliGRAM(s) Oral two times a day with meals  midodrine. 10 milliGRAM(s) Oral every 8 hours  multivitamin/minerals 1 Tablet(s) Oral daily  thiamine 100 milliGRAM(s) Oral daily  triamcinolone 0.1% Cream 1 Application(s) Topical two times a day  vitamin A &amp; D Ointment 1 Application(s) Topical daily    MEDICATIONS  (PRN):  acetaminophen     Tablet .. 650 milliGRAM(s) Oral every 6 hours PRN Mild Pain (1 - 3), Moderate Pain (4 - 6)  ibuprofen  Tablet. 400 milliGRAM(s) Oral every 6 hours PRN Severe Pain (7 - 10)    Labs:                           9.9    4.32  )-----------( 133      ( 21 Mar 2022 04:30 )             30.1   03-21    137  |  98  |  4<L>  ----------------------------<  84  4.1   |  29  |  0.7    Ca    8.3<L>      21 Mar 2022 04:30  Mg     2.0     03-21    TPro  6.7  /  Alb  2.0<L>  /  TBili  0.4  /  DBili  x   /  AST  21  /  ALT  21  /  AlkPhos  127<H>  03-21    Assessment and plan:   61F PMH GIB, Bowel and Bladder incontinence, Smoking (3-4 cigarettes/day), ETOH abuse, hypothyroidism, and ongoing anemia, admitted to [facility] 1/31 for ams found to have UTI. Dev GIB, colonoscopy showing polyps, transferred to [facility] for polypectomy upgraded to CCU for hypotension, likely septic shock 2/2 aspiration pna. While in the hospital colonoscopy was performed  with polypectomy 2/18, biopsy showed adenocarcinoma     # Acute on chronic HFpEF / Bilateral Pleural Effusions   - fluid restriction 1200 ml in 24 hours  - intake and output monitoring, daily weight   - ECHO with preserved EF, grade 2 diastolic dysfunction  - off  diuretics, pt is  euvolemic now     # Metabolic encephalopathy likely with advanced underlying dementia   - mental status at baseline now   - Seroquel held due to QT prolongation   - supportive care   - pt is refusing to eat, son and pt agreed for a PEG tube     #  Adenocarcinoma of the colon   - s/p colonoscopy with polypectomy 2/18, biopsy showed adenocarcinoma   - CT Chest/A/P negative for metastatic disease   - Surgical oncology follow up after PEG   - medical oncology is following     #  R IJ DVT   - c/w therapeutic Lovenox     # Rash   -evaluated by Burn, rheumatology and dermatology  - f/u biopsy report - dermatitis vs drug eruption vs viral exanthem vs psoriasiform   on steroid cream    # Thrombocytopenia and anemia/ suspected  autoimmune hemolytic anemia   - stable for now   - s/p prednisone taper per Hem Onc recommendation    # Anorexia / severe malnutrition   -  failed calorie count  -  GI  consul for PEG , will keep NPO after midnight    # Dispo: pt needs STR when cleared .    [facility] day: 33    HPI:   61F PMH GIB, Bowel and Bladder incontinence, Smoking (3-4 cigarettes/day), ETOH abuse, hypothyroidism, and ongoing anemia, admitted to [facility] 1/31 for ams found to have UTI. Dev GIB, colonoscopy showing polyps, transferred to [facility] for polypectomy upgraded to CCU for hypotension, likely septic shock 2/2 aspiration pna. While in the hospital colonoscopy was performed  with polypectomy 2/18, biopsy showed adenocarcinoma     Subjective:   Complaints: None   Overnight: none     Objective:   Vital Signs Last 24 Hrs  T(C): 36.7 (21 Mar 2022 06:03), Max: 36.7 (21 Mar 2022 06:03)  T(F): 98.1 (21 Mar 2022 06:03), Max: 98.1 (21 Mar 2022 06:03)  HR: 88 (21 Mar 2022 08:07) (75 - 88)  BP: 108/56 (21 Mar 2022 06:03) (106/60 - 108/56)  BP(mean): --  RR: 19 (21 Mar 2022 08:07) (18 - 19)  SpO2: 100% (21 Mar 2022 08:07) (100% - 100%)    GENERAL: NAD, cachectic with temporal muscle waisting   HEAD:  Atraumatic, Normocephalic  CHEST/LUNG: decreased BS at bases   HEART: Regular rate and rhythm; No murmurs, rubs, or gallops  ABDOMEN: BSx4; Soft, diffuse nonspecific tenderness, negative Hinton, nondistended  EXTREMITIES:  No LE edema  NERVOUS SYSTEM:  Alert, interactive, not oriented to time, oriented partially to place. oriented to person  SKIN: Multiple lesions and excoriations on the upper and lower extremities and perineal area. No erythema or tenderness    MEDICATIONS  (STANDING):  benzocaine 20% Spray 2 Spray(s) Mucosal four times a day  chlorhexidine 4% Liquid 1 Application(s) Topical daily  cholecalciferol 2000 Unit(s) Oral daily  enoxaparin Injectable 60 milliGRAM(s) SubCutaneous every 12 hours  famotidine    Tablet 20 milliGRAM(s) Oral daily  folic acid 1 milliGRAM(s) Oral daily  levothyroxine 125 MICROGram(s) Oral daily  magnesium oxide 400 milliGRAM(s) Oral two times a day with meals  midodrine. 10 milliGRAM(s) Oral every 8 hours  multivitamin/minerals 1 Tablet(s) Oral daily  thiamine 100 milliGRAM(s) Oral daily  triamcinolone 0.1% Cream 1 Application(s) Topical two times a day  vitamin A &amp; D Ointment 1 Application(s) Topical daily    MEDICATIONS  (PRN):  acetaminophen     Tablet .. 650 milliGRAM(s) Oral every 6 hours PRN Mild Pain (1 - 3), Moderate Pain (4 - 6)  ibuprofen  Tablet. 400 milliGRAM(s) Oral every 6 hours PRN Severe Pain (7 - 10)    Labs:                           9.9    4.32  )-----------( 133      ( 21 Mar 2022 04:30 )             30.1   03-21    137  |  98  |  4<L>  ----------------------------<  84  4.1   |  29  |  0.7    Ca    8.3<L>      21 Mar 2022 04:30  Mg     2.0     03-21    TPro  6.7  /  Alb  2.0<L>  /  TBili  0.4  /  DBili  x   /  AST  21  /  ALT  21  /  AlkPhos  127<H>  03-21    Assessment and plan:   61F PMH GIB, Bowel and Bladder incontinence, Smoking (3-4 cigarettes/day), ETOH abuse, hypothyroidism, and ongoing anemia, admitted to [facility] 1/31 for ams found to have UTI. Dev GIB, colonoscopy showing polyps, transferred to [facility] for polypectomy upgraded to CCU for hypotension, likely septic shock 2/2 aspiration pna. While in the hospital colonoscopy was performed  with polypectomy 2/18, biopsy showed adenocarcinoma     # Acute on chronic HFpEF / Bilateral Pleural Effusions   - fluid restriction 1200 ml in 24 hours  - ECHO with preserved EF, grade 2 diastolic dysfunction  - euvolemic now     # Metabolic encephalopathy likely with advanced underlying dementia   - mental status at baseline now   - Seroquel held due to QT prolongation   - supportive care     #  Adenocarcinoma of the colon   - s/p colonoscopy with polypectomy 2/18, biopsy showed adenocarcinoma   - CT Chest/A/P negative for metastatic disease     #  R IJ DVT   - c/w therapeutic Lovenox     # Thrombocytopenia and anemia/ suspected  autoimmune hemolytic anemia   - stable for now   - s/p prednisone taper per oncology    # Anorexia / severe malnutrition   -  failed calorie count  - PEG insertion scheduled after cardiology clearance     # Dispo: pt needs STR when cleared .

## 2022-03-22 ENCOUNTER — TRANSCRIPTION ENCOUNTER (OUTPATIENT)
Age: 62
End: 2022-03-22

## 2022-03-22 LAB
GLUCOSE BLDC GLUCOMTR-MCNC: 101 MG/DL — HIGH (ref 70–99)
GLUCOSE BLDC GLUCOMTR-MCNC: 127 MG/DL — HIGH (ref 70–99)
GLUCOSE BLDC GLUCOMTR-MCNC: 60 MG/DL — LOW (ref 70–99)
GLUCOSE BLDC GLUCOMTR-MCNC: 70 MG/DL — SIGNIFICANT CHANGE UP (ref 70–99)
GLUCOSE BLDC GLUCOMTR-MCNC: 84 MG/DL — SIGNIFICANT CHANGE UP (ref 70–99)

## 2022-03-22 PROCEDURE — 99232 SBSQ HOSP IP/OBS MODERATE 35: CPT

## 2022-03-22 PROCEDURE — 93010 ELECTROCARDIOGRAM REPORT: CPT

## 2022-03-22 PROCEDURE — 43246 EGD PLACE GASTROSTOMY TUBE: CPT

## 2022-03-22 RX ORDER — DEXTROSE 10 % IN WATER 10 %
250 INTRAVENOUS SOLUTION INTRAVENOUS ONCE
Refills: 0 | Status: COMPLETED | OUTPATIENT
Start: 2022-03-22 | End: 2022-03-22

## 2022-03-22 RX ORDER — SODIUM CHLORIDE 9 MG/ML
1000 INJECTION, SOLUTION INTRAVENOUS
Refills: 0 | Status: DISCONTINUED | OUTPATIENT
Start: 2022-03-22 | End: 2022-03-22

## 2022-03-22 RX ORDER — CEFAZOLIN SODIUM 1 G
2000 VIAL (EA) INJECTION ONCE
Refills: 0 | Status: DISCONTINUED | OUTPATIENT
Start: 2022-03-22 | End: 2022-03-24

## 2022-03-22 RX ADMIN — MIDODRINE HYDROCHLORIDE 10 MILLIGRAM(S): 2.5 TABLET ORAL at 21:49

## 2022-03-22 RX ADMIN — Medication 1 MILLIGRAM(S): at 14:21

## 2022-03-22 RX ADMIN — Medication 1 APPLICATION(S): at 14:22

## 2022-03-22 RX ADMIN — MAGNESIUM OXIDE 400 MG ORAL TABLET 400 MILLIGRAM(S): 241.3 TABLET ORAL at 17:54

## 2022-03-22 RX ADMIN — Medication 2 SPRAY(S): at 17:51

## 2022-03-22 RX ADMIN — SODIUM CHLORIDE 50 MILLILITER(S): 9 INJECTION, SOLUTION INTRAVENOUS at 09:37

## 2022-03-22 RX ADMIN — MIDODRINE HYDROCHLORIDE 10 MILLIGRAM(S): 2.5 TABLET ORAL at 14:21

## 2022-03-22 RX ADMIN — Medication 2 SPRAY(S): at 14:26

## 2022-03-22 RX ADMIN — PANTOPRAZOLE SODIUM 40 MILLIGRAM(S): 20 TABLET, DELAYED RELEASE ORAL at 17:54

## 2022-03-22 RX ADMIN — Medication 125 MICROGRAM(S): at 05:41

## 2022-03-22 RX ADMIN — Medication 1 APPLICATION(S): at 17:52

## 2022-03-22 RX ADMIN — Medication 1 APPLICATION(S): at 05:50

## 2022-03-22 RX ADMIN — PANTOPRAZOLE SODIUM 40 MILLIGRAM(S): 20 TABLET, DELAYED RELEASE ORAL at 05:41

## 2022-03-22 RX ADMIN — FAMOTIDINE 20 MILLIGRAM(S): 10 INJECTION INTRAVENOUS at 14:21

## 2022-03-22 RX ADMIN — Medication 2 SPRAY(S): at 05:41

## 2022-03-22 RX ADMIN — Medication 1 TABLET(S): at 14:22

## 2022-03-22 RX ADMIN — Medication 2000 UNIT(S): at 14:21

## 2022-03-22 RX ADMIN — MIDODRINE HYDROCHLORIDE 10 MILLIGRAM(S): 2.5 TABLET ORAL at 05:41

## 2022-03-22 RX ADMIN — Medication 100 MILLIGRAM(S): at 14:20

## 2022-03-22 RX ADMIN — CHLORHEXIDINE GLUCONATE 1 APPLICATION(S): 213 SOLUTION TOPICAL at 14:14

## 2022-03-22 RX ADMIN — Medication 500 MILLILITER(S): at 08:07

## 2022-03-22 NOTE — CHART NOTE - NSCHARTNOTESELECT_GEN_ALL_CORE
Event Note
Event Note
PACU Admission/Event Note
Palliative Care SW Note/Event Note
Burn/Event Note
Event Note
Event Note
ICU upgrade/Transfer Note
Kcal Count Result/Event Note
Nutrition Support/Nutrition Services
Palliative Care SW Initial Assessment/Event Note
Palliative Care SW Note/Event Note
Palliative Care/Event Note
Transfer Note
Transfer Note

## 2022-03-22 NOTE — CHART NOTE - NSCHARTNOTEFT_GEN_A_CORE
PACU ANESTHESIA ADMISSION NOTE      Procedure: Incisional biopsy, lesion, skin  abdomen and right thigh    Incisional biopsy, lesion, skin      Post op diagnosis:  Skin rash        __x__  Patent Airway    _x___  Full return of protective reflexes    _x___  Full recovery from anesthesia / back to baseline     Vitals:  see anesthesia record      Mental Status:  __x__ Awake   _____ Alert   _____ Drowsy   _____ Sedated    Nausea/Vomiting:  __x__ NO  ______Yes,   See Post - Op Orders          Pain Scale (0-10):  _____    Treatment: ____ None    ___x_ See Post - Op/PCA Orders    Post - Operative Fluids:   ____ Oral   __x__ See Post - Op Orders    Plan: Discharge:   ____Home       __x___Floor     _____Critical Care    _____  Other:_________________    Comments:  Uneventful intraoperative course. No anesthesia issues or complications noted. Patient stable upon arrival to PACU. Report given to RN. Discharge when criteria met.

## 2022-03-22 NOTE — PROGRESS NOTE ADULT - SUBJECTIVE AND OBJECTIVE BOX
SANTIAGO CALIXTO 61y Female  MRN#: [id number]   Hospital Day: 34d    SUBJECTIVE  Patient is a 61y old Female who presents with a chief complaint of Polypectomy (21 Mar 2022 17:41)  Currently admitted to medicine with the primary diagnosis of   INTERVAL HPI AND OVERNIGHT EVENTS:  Patient was examined and seen at bedside. This morning she is resting comfortably in bed and reports no issues or overnight events.  OVERNIGHT: PATIENT HAD 1X BLOOD AND CLOTS IN HER DIAPER, WAS THOUGHT TO BE RECTAL BUT NOW UNSURE MAY BE VAGINAL     REVIEW OF SYMPTOMS:  CONSTITUTIONAL: No weakness, fevers or chills;   EYES: No visual changes, eye pain,  ENT: No vertigo; No ear pain or change in hearing; No sore throat or difficulty swallowing  NECK: No pain or stiffness  RESPIRATORY: No cough, wheezing, or hemoptysis; No shortness of breath  CARDIOVASCULAR: No chest pain or palpitations  GASTROINTESTINAL: No abdominal or epigastric pain; No nausea, vomiting, No diarrhea or constipation;  GENITOURINARY: No dysuria, frequency or hematuria  MUSCULOSKELETAL: No joint pain, no muscle pain, no weakness  NEUROLOGICAL: No numbness or weakness  SKIN: DRY SKIN     OBJECTIVE  PAST MEDICAL & SURGICAL HISTORY    ALLERGIES:  No Known Allergies    MEDICATIONS:  STANDING MEDICATIONS  benzocaine 20% Spray 2 Spray(s) Mucosal four times a day  chlorhexidine 4% Liquid 1 Application(s) Topical daily  cholecalciferol 2000 Unit(s) Oral daily  dextrose 5% + sodium chloride 0.45%. 1000 milliLiter(s) IV Continuous <Continuous>  famotidine    Tablet 20 milliGRAM(s) Oral daily  folic acid 1 milliGRAM(s) Oral daily  levothyroxine 125 MICROGram(s) Oral daily  magnesium oxide 400 milliGRAM(s) Oral two times a day with meals  midodrine. 10 milliGRAM(s) Oral every 8 hours  multivitamin/minerals 1 Tablet(s) Oral daily  pantoprazole  Injectable 40 milliGRAM(s) IV Push every 12 hours  thiamine 100 milliGRAM(s) Oral daily  triamcinolone 0.1% Cream 1 Application(s) Topical two times a day  vitamin A &amp; D Ointment 1 Application(s) Topical daily    PRN MEDICATIONS  acetaminophen     Tablet .. 650 milliGRAM(s) Oral every 6 hours PRN  ibuprofen  Tablet. 400 milliGRAM(s) Oral every 6 hours PRN      VITAL SIGNS: Last 24 Hours  T(C): 36.2 (22 Mar 2022 06:00), Max: 36.9 (21 Mar 2022 14:04)  T(F): 97.2 (22 Mar 2022 06:00), Max: 98.5 (21 Mar 2022 14:04)  HR: 85 (22 Mar 2022 06:00) (79 - 85)  BP: 112/69 (22 Mar 2022 06:00) (95/54 - 122/85)  BP(mean): --  RR: 18 (22 Mar 2022 06:00) (18 - 18)  SpO2: --    LABS:                        10.1   4.47  )-----------( 133      ( 21 Mar 2022 20:00 )             31.4     03-21    137  |  98  |  4<L>  ----------------------------<  84  4.1   |  29  |  0.7    Ca    8.3<L>      21 Mar 2022 04:30  Mg     2.0     03-21    TPro  6.7  /  Alb  2.0<L>  /  TBili  0.4  /  DBili  x   /  AST  21  /  ALT  21  /  AlkPhos  127<H>  03-21    PT/INR - ( 21 Mar 2022 20:00 )   PT: 12.70 sec;   INR: 1.11 ratio         PTT - ( 21 Mar 2022 20:00 )  PTT:53.5 sec              RADIOLOGY:  NO NEW IMAGING SINCE 3/19     PHYSICAL EXAM:  CONSTITUTIONAL: No acute distress, EMACIATED ELDERLY WOMAN APPEARS OLDER THEN STATED AGE   HEAD: Atraumatic, normocephalic,, LACK OF DENTITION   EYES: conjunctiva and sclera clear  ENT: Supple  PULMONARY: BL CRACKLES   CARDIOVASCULAR: Regular rate and rhythm; no murmurs, rubs, or gallops  GASTROINTESTINAL: Soft, non-tender, SCAFOID ; bowel sounds present  MUSCULOSKELETAL: 1+ peripheral pulses; , no edema  NEUROLOGY: A&OX4  SKIN: DIFFUSE SKIN FLAKING AND SCALING, HYPERPIGMENTED FLAKING BL LE

## 2022-03-22 NOTE — PROGRESS NOTE ADULT - ASSESSMENT
ASSESSMENT & PLAN:  Ms Mckeon is a 61 NADIR w a PMH of GI bleed, bowel&bladder incontinance, nicotiene dependence, EtOH dependence, and hypothiroidism presented to [facility] for AMS found to have a UTI. Developed a GI bleed, colonoscopy demonstrated polyps and patient transfered to [facility] where she recieved a polypectomy. Stay complicated by CCU upgrade secondary to septic shock from aspiration pneumonia. Polypectomy demonstrated ADENOCACINOMA, CT A/P negative for metastasis. Patient failed callory count. Son amendable to PEG. Patient scheduled 3/22 for PEG tube placement and Endoscopy. Patient repeatedly hypoglycemic.     Problem List:  #Acute blood loss  unclear if blood loss found in diaper was rectal or vaginal   GI to perform EGD and assess patient 3/22  -f/u EGD  - post EGD if bleeding scource still unclear will obtain vaginal US to r/o endometrial hyperplasia (endometrial carcinoma) given her age and hx    # Acute on chronic HFpEF / Bilateral Pleural Effusions   - fluid restriction 1200 ml in 24 hours  - ECHO with preserved EF, grade 2 diastolic dysfunction  - euvolemic now   - Patient started on D5 1/2 NS at 50 cc/hr to avoid hypoglycemia     # Metabolic encephalopathy likely with advanced underlying dementia   - mental status at baseline now   - Seroquel held due to QT prolongation   - supportive care   - Patient started on D5 1/2 NS at 50 cc/hr to avoid hypoglycemia     #  Adenocarcinoma of the colon   - s/p colonoscopy with polypectomy 2/18, biopsy showed adenocarcinoma   - CT Chest/A/P negative for metastatic disease   - PEG placement today  - patient repeatedly hypoglycemic, started on D5 1/2 NS. pt s/p multiple D10 pushes     #  R IJ DVT   - c/w therapeutic Lovenox     # Thrombocytopenia and anemia/ suspected  autoimmune hemolytic anemia   - stable for now   - s/p prednisone taper per oncology    # Anorexia / severe malnutrition   -  failed calorie count  - PEG insertion scheduled 3/22  -monitor POC gluc  -pt s/p multiple D10 ams        PAST MEDICAL & SURGICAL HISTORY:      #Misc  - DVT Prophylaxis:  - GI Prophylaxis:  - Diet:  - Activity:  - IV Fluids:  - Code Status:    Dispo:

## 2022-03-22 NOTE — PROGRESS NOTE ADULT - ASSESSMENT
61F PMH GIB, Bowel and Bladder incontinence, Smoking (3-4 cigarettes/day), ETOH abuse, hypothyroidism, and ongoing anemia, admitted to [facility] 1/31 for ams found to have UTI. Dev GIB, colonoscopy showing polyps, transferred to [facility] for polypectomy upgraded to CCU for hypotension, likely septic shock 2/2 aspiration pna. While in the hospital colonoscopy was performed  with polypectomy 2/18, biopsy showed adenocarcinoma   CT Chest/A/P negative for metastatic disease. Pt is anorexic, refusing PEG tube wit waxing and waiting MS, her son is agreeable, she was started on appetite stimulants, will count calories and consult palliative care for GOB conversation ( pt has a very poor prognosis)      A/P   # Suspected vaginal bleeding  - will order transvaginal US   - monitor H/H, keep Hb above 7.5     # episodes of hypoglycemia   - started on D51/2 NS at 50 ml/h while NPO  - monitor finger stick   - will start PEG tube feedings in 24 hours     # Anorexia / severe malnutrition   -  failed calorie count  - consulted by GI PEG  scheduled for today.     # Acute on chronic HFpEF / Bilateral Pleural Effusions   - fluid restriction 1200 ml in 24 hours  - intake and output monitoring, daily weight   - ECHO with preserved EF, grade 2 diastolic dysfunction  - off  diuretics, pt is  euvolemic now   - consulted by cardiology, recommendations noted       # Metabolic encephalopathy likely with advanced underlying dementia   - mental status at baseline now   - Seroquel held due to QT prolongation   - supportive care   - pt is refusing to eat, son and pt agreed for a PEG tube     #  Adenocarcinoma of the colon   - s/p colonoscopy with polypectomy 2/18, biopsy showed adenocarcinoma   - CT Chest/A/P negative for metastatic disease   - Surgical oncology follow up after PEG   - medical oncology is following     #  R IJ DVT   - c/w therapeutic Lovenox     # Rash   -evaluated by Burn, rheumatology and dermatology  - f/u biopsy report - dermatitis vs drug eruption vs viral exanthem vs psoriasiform   on steroid cream    # Thrombocytopenia and anemia/ suspected  autoimmune hemolytic anemia   - stable for now   - s/p prednisone taper per Hem Onc recommendation        Overall prognosis is very poor     Pending: D51/2 NS at 50 ml while NPO, PEG today, start feedings in 24 hours, surgical oncology follow up in 24 hours to schedule colon resection,  monitor h/H, after  transvaginal US after PEG ( vaginal bleed reported by nursing)   Palliative pt's son is aware about the plan of care and agreeable   # Dispo: pt needs STR when cleared .

## 2022-03-22 NOTE — PRE-ANESTHESIA EVALUATION ADULT - NSANTHPMHFT_GEN_ALL_CORE
Pt is a 62 yo woman with h/o GIB, Bowel and Bladder incontinence, Smoking (1/2ppd), ETOH abuse, hypothyroidism, anemia transferred from [facility] for EMR/Polypectomy. Pt was admitted to [facility] on 01/31 for AMS, weakness and decreased appetite. She was found to have UTI, Elevated troponin, and electrolyte imbalance, had a colonoscopy done, which revealed 1 large 3-4 cm sigmoid pedunculated polyp (bx=tub adenoma, but was friable) and 2 polyps (7, 14 mm) in descending colon s/p bx.
COPD, ETOH abuse, Dementia

## 2022-03-22 NOTE — PROGRESS NOTE ADULT - SUBJECTIVE AND OBJECTIVE BOX
61F PMH GIB, Bowel and Bladder incontinence, Smoking (3-4 cigarettes/day), ETOH abuse, hypothyroidism, and ongoing anemia, admitted to [facility] 1/31 for ams found to have UTI. Dev GIB, colonoscopy showing polyps, transferred to [facility] for polypectomy upgraded to CCU for hypotension, likely septic shock 2/2 aspiration pna. While in the hospital colonoscopy was performed  with polypectomy 2/18, biopsy showed adenocarcinoma   CT Chest/A/P negative for metastatic disease. Pt is anorexic, refusing PEG tube with fluctuating  MS, her son is agreeable, pt failed calories count, agreed for a PEG tube.   Today pt is comfortable, vaginal bleeding suspected by nursing staff and few episodes of hypoglycemia, pt is NPO for a PEG tube, started on D5 1/2 NS at 50 ml/h, will plan for transvaginal US after PEG.     OBJECTIVE  PAST MEDICAL & SURGICAL HISTORY                                              ALLERGIES:  No Known Allergies                           HOME MEDICATIONS  Home Medications:      VITAL SIGNS: Last 24 Hours  T(C): 36.5 (22 Mar 2022 13:18), Max: 36.5 (22 Mar 2022 13:18)  T(F): 97.7 (22 Mar 2022 13:18), Max: 97.7 (22 Mar 2022 13:18)  HR: 84 (22 Mar 2022 13:33) (74 - 99)  BP: 103/67 (22 Mar 2022 13:33) (88/58 - 129/93)  BP(mean): --  RR: 18 (22 Mar 2022 13:33) (18 - 18)  SpO2: 98% (22 Mar 2022 13:33) (98% - 99%)      PHYSICAL EXAM:  GENERAL: NAD, cachectic with temporal muscle waisting   HEAD:  Atraumatic, Normocephalic  EYES: conjunctiva and sclera clear  ENT: Moist mucous membranes  NECK: Supple  CHEST/LUNG: decreased BS at bases   HEART: Regular rate and rhythm; No murmurs, rubs, or gallops  ABDOMEN: BSx4; Soft, diffuse nonspecific tenderness, negative [place], nondistended  EXTREMITIES:  No LE edema  NERVOUS SYSTEM:  Alert, interactive, not oriented to time, oriented partially to place. oriented to person  SKIN: Multiple lesions and excoriations on the upper and lower extremities and perineal area. No erythema or tenderness                    LABS:                        10.1   4.47  )-----------( 133      ( 21 Mar 2022 20:00 )             31.4   03-21    137  |  98  |  4<L>  ----------------------------<  84  4.1   |  29  |  0.7    Ca    8.3<L>      21 Mar 2022 04:30  Mg     2.0     03-21    TPro  6.7  /  Alb  2.0<L>  /  TBili  0.4  /  DBili  x   /  AST  21  /  ALT  21  /  AlkPhos  127<H>  03-21      GI PCR Panel, Stool (03.10.22 @ 12:38)   Culture Results:   GI PCR Results: NOT detected Culture Results:   10,000 - 49,000 CFU/mL Yeast   50,000 - 99,000 CFU/mL Enterococcus faecium (vancomycin resistant)   Organism Identification: Enterococcus faecium (vancomycin resistant)   Organism: Enterococcus faecium (vancomycin resistant)   Method Type: QUETA Culture - Blood (03.09.22 @ 11:15)   Specimen Source: .Blood None   Culture Results:   No Growth Final     RADIOLOGY:  < from: Xray Chest 1 View- PORTABLE-Urgent (Xray Chest 1 View- PORTABLE-Urgent .) (03.16.22 @ 09:13) >  Impression:    Stable bilateral opacities, right greater than left.    < end of copied text >  < from: CT Abdomen and Pelvis w/ IV Cont (03.09.22 @ 15:54) >  IMPRESSION:    1.  Resolution of previously seen colitis. No evidence of bowel   obstruction.  2.  Extensive anasarca, increased from prior.  3.  Small ascites.  4.  Focus of gas in the urinary bladder. Correlate for recent   instrumentation.  5.  See body of the report for additional findings.  6.  See separately dictated CT chest report for thoracic findings.    < end of copied text >  < from: TTE Echo Complete w/o Contrast w/ Doppler (02.24.22 @ 09:22) >  Summary:   1. Moderate to severe left atrial enlargement.   2. LV Ejection Fraction by Smith's Method with a biplane EF of 64 %.   3. Mildly increased LV wall thickness.   4. Spectral Doppler shows pseudonormal pattern of left ventricular   myocardial filling (Grade II diastolic dysfunction).   5. Normal right atrial size.   6. Mild mitral annular calcification.   7. Mild to moderate mitral valve regurgitation.   8. Moderate tricuspid regurgitation.   9. Sclerotic aortic valve with normal opening.      MEDICATIONS  (STANDING):  benzocaine 20% Spray 2 Spray(s) Mucosal four times a day  ceFAZolin   IVPB 2000 milliGRAM(s) IV Intermittent once  chlorhexidine 4% Liquid 1 Application(s) Topical daily  cholecalciferol 2000 Unit(s) Oral daily  dextrose 5% + sodium chloride 0.45%. 1000 milliLiter(s) (50 mL/Hr) IV Continuous <Continuous>  famotidine    Tablet 20 milliGRAM(s) Oral daily  folic acid 1 milliGRAM(s) Oral daily  levothyroxine 125 MICROGram(s) Oral daily  magnesium oxide 400 milliGRAM(s) Oral two times a day with meals  midodrine. 10 milliGRAM(s) Oral every 8 hours  multivitamin/minerals 1 Tablet(s) Oral daily  pantoprazole  Injectable 40 milliGRAM(s) IV Push every 12 hours  thiamine 100 milliGRAM(s) Oral daily  triamcinolone 0.1% Cream 1 Application(s) Topical two times a day  vitamin A &amp; D Ointment 1 Application(s) Topical daily    MEDICATIONS  (PRN):  acetaminophen     Tablet .. 650 milliGRAM(s) Oral every 6 hours PRN Mild Pain (1 - 3), Moderate Pain (4 - 6)  ibuprofen  Tablet. 400 milliGRAM(s) Oral every 6 hours PRN Severe Pain (7 - 10)

## 2022-03-23 LAB
ALBUMIN SERPL ELPH-MCNC: 1.8 G/DL — LOW (ref 3.5–5.2)
ALP SERPL-CCNC: 120 U/L — HIGH (ref 30–115)
ALT FLD-CCNC: 13 U/L — SIGNIFICANT CHANGE UP (ref 0–41)
ANION GAP SERPL CALC-SCNC: 8 MMOL/L — SIGNIFICANT CHANGE UP (ref 7–14)
AST SERPL-CCNC: 15 U/L — SIGNIFICANT CHANGE UP (ref 0–41)
BASOPHILS # BLD AUTO: 0.01 K/UL — SIGNIFICANT CHANGE UP (ref 0–0.2)
BASOPHILS NFR BLD AUTO: 0.2 % — SIGNIFICANT CHANGE UP (ref 0–1)
BILIRUB SERPL-MCNC: 0.4 MG/DL — SIGNIFICANT CHANGE UP (ref 0.2–1.2)
BLD GP AB SCN SERPL QL: SIGNIFICANT CHANGE UP
BUN SERPL-MCNC: 4 MG/DL — LOW (ref 10–20)
CALCIUM SERPL-MCNC: 7.8 MG/DL — LOW (ref 8.5–10.1)
CHLORIDE SERPL-SCNC: 103 MMOL/L — SIGNIFICANT CHANGE UP (ref 98–110)
CO2 SERPL-SCNC: 26 MMOL/L — SIGNIFICANT CHANGE UP (ref 17–32)
CREAT SERPL-MCNC: 0.8 MG/DL — SIGNIFICANT CHANGE UP (ref 0.7–1.5)
EGFR: 84 ML/MIN/1.73M2 — SIGNIFICANT CHANGE UP
EOSINOPHIL # BLD AUTO: 0.19 K/UL — SIGNIFICANT CHANGE UP (ref 0–0.7)
EOSINOPHIL NFR BLD AUTO: 3.4 % — SIGNIFICANT CHANGE UP (ref 0–8)
GLUCOSE BLDC GLUCOMTR-MCNC: 68 MG/DL — LOW (ref 70–99)
GLUCOSE BLDC GLUCOMTR-MCNC: 69 MG/DL — LOW (ref 70–99)
GLUCOSE BLDC GLUCOMTR-MCNC: 73 MG/DL — SIGNIFICANT CHANGE UP (ref 70–99)
GLUCOSE BLDC GLUCOMTR-MCNC: 74 MG/DL — SIGNIFICANT CHANGE UP (ref 70–99)
GLUCOSE BLDC GLUCOMTR-MCNC: 87 MG/DL — SIGNIFICANT CHANGE UP (ref 70–99)
GLUCOSE BLDC GLUCOMTR-MCNC: 90 MG/DL — SIGNIFICANT CHANGE UP (ref 70–99)
GLUCOSE BLDC GLUCOMTR-MCNC: 94 MG/DL — SIGNIFICANT CHANGE UP (ref 70–99)
GLUCOSE SERPL-MCNC: 78 MG/DL — SIGNIFICANT CHANGE UP (ref 70–99)
HCT VFR BLD CALC: 26.6 % — LOW (ref 37–47)
HGB BLD-MCNC: 8.9 G/DL — LOW (ref 12–16)
IMM GRANULOCYTES NFR BLD AUTO: 0.2 % — SIGNIFICANT CHANGE UP (ref 0.1–0.3)
LACTATE SERPL-SCNC: 1.1 MMOL/L — SIGNIFICANT CHANGE UP (ref 0.7–2)
LYMPHOCYTES # BLD AUTO: 0.92 K/UL — LOW (ref 1.2–3.4)
LYMPHOCYTES # BLD AUTO: 16.5 % — LOW (ref 20.5–51.1)
MAGNESIUM SERPL-MCNC: 1.8 MG/DL — SIGNIFICANT CHANGE UP (ref 1.8–2.4)
MCHC RBC-ENTMCNC: 29.9 PG — SIGNIFICANT CHANGE UP (ref 27–31)
MCHC RBC-ENTMCNC: 33.5 G/DL — SIGNIFICANT CHANGE UP (ref 32–37)
MCV RBC AUTO: 89.3 FL — SIGNIFICANT CHANGE UP (ref 81–99)
MONOCYTES # BLD AUTO: 0.32 K/UL — SIGNIFICANT CHANGE UP (ref 0.1–0.6)
MONOCYTES NFR BLD AUTO: 5.7 % — SIGNIFICANT CHANGE UP (ref 1.7–9.3)
NEUTROPHILS # BLD AUTO: 4.14 K/UL — SIGNIFICANT CHANGE UP (ref 1.4–6.5)
NEUTROPHILS NFR BLD AUTO: 74 % — SIGNIFICANT CHANGE UP (ref 42.2–75.2)
NRBC # BLD: 0 /100 WBCS — SIGNIFICANT CHANGE UP (ref 0–0)
PLATELET # BLD AUTO: 101 K/UL — LOW (ref 130–400)
POTASSIUM SERPL-MCNC: 3.9 MMOL/L — SIGNIFICANT CHANGE UP (ref 3.5–5)
POTASSIUM SERPL-SCNC: 3.9 MMOL/L — SIGNIFICANT CHANGE UP (ref 3.5–5)
PROT SERPL-MCNC: 6.4 G/DL — SIGNIFICANT CHANGE UP (ref 6–8)
RBC # BLD: 2.98 M/UL — LOW (ref 4.2–5.4)
RBC # FLD: 15.6 % — HIGH (ref 11.5–14.5)
SODIUM SERPL-SCNC: 137 MMOL/L — SIGNIFICANT CHANGE UP (ref 135–146)
WBC # BLD: 5.59 K/UL — SIGNIFICANT CHANGE UP (ref 4.8–10.8)
WBC # FLD AUTO: 5.59 K/UL — SIGNIFICANT CHANGE UP (ref 4.8–10.8)

## 2022-03-23 PROCEDURE — 99233 SBSQ HOSP IP/OBS HIGH 50: CPT

## 2022-03-23 RX ORDER — SODIUM CHLORIDE 9 MG/ML
500 INJECTION INTRAMUSCULAR; INTRAVENOUS; SUBCUTANEOUS ONCE
Refills: 0 | Status: COMPLETED | OUTPATIENT
Start: 2022-03-23 | End: 2022-03-23

## 2022-03-23 RX ORDER — ENOXAPARIN SODIUM 100 MG/ML
60 INJECTION SUBCUTANEOUS EVERY 12 HOURS
Refills: 0 | Status: DISCONTINUED | OUTPATIENT
Start: 2022-03-23 | End: 2022-03-26

## 2022-03-23 RX ORDER — MORPHINE SULFATE 50 MG/1
2 CAPSULE, EXTENDED RELEASE ORAL ONCE
Refills: 0 | Status: DISCONTINUED | OUTPATIENT
Start: 2022-03-23 | End: 2022-03-23

## 2022-03-23 RX ADMIN — Medication 1 APPLICATION(S): at 11:56

## 2022-03-23 RX ADMIN — Medication 400 MILLIGRAM(S): at 22:16

## 2022-03-23 RX ADMIN — ENOXAPARIN SODIUM 60 MILLIGRAM(S): 100 INJECTION SUBCUTANEOUS at 16:12

## 2022-03-23 RX ADMIN — Medication 2000 UNIT(S): at 11:55

## 2022-03-23 RX ADMIN — MORPHINE SULFATE 2 MILLIGRAM(S): 50 CAPSULE, EXTENDED RELEASE ORAL at 04:46

## 2022-03-23 RX ADMIN — Medication 400 MILLIGRAM(S): at 04:30

## 2022-03-23 RX ADMIN — MAGNESIUM OXIDE 400 MG ORAL TABLET 400 MILLIGRAM(S): 241.3 TABLET ORAL at 17:32

## 2022-03-23 RX ADMIN — Medication 1 APPLICATION(S): at 17:30

## 2022-03-23 RX ADMIN — Medication 2 SPRAY(S): at 17:30

## 2022-03-23 RX ADMIN — Medication 100 MILLIGRAM(S): at 11:56

## 2022-03-23 RX ADMIN — PANTOPRAZOLE SODIUM 40 MILLIGRAM(S): 20 TABLET, DELAYED RELEASE ORAL at 17:30

## 2022-03-23 RX ADMIN — MAGNESIUM OXIDE 400 MG ORAL TABLET 400 MILLIGRAM(S): 241.3 TABLET ORAL at 09:32

## 2022-03-23 RX ADMIN — Medication 1 TABLET(S): at 11:56

## 2022-03-23 RX ADMIN — Medication 1 MILLIGRAM(S): at 11:56

## 2022-03-23 RX ADMIN — MORPHINE SULFATE 2 MILLIGRAM(S): 50 CAPSULE, EXTENDED RELEASE ORAL at 05:49

## 2022-03-23 RX ADMIN — MIDODRINE HYDROCHLORIDE 10 MILLIGRAM(S): 2.5 TABLET ORAL at 13:55

## 2022-03-23 RX ADMIN — PANTOPRAZOLE SODIUM 40 MILLIGRAM(S): 20 TABLET, DELAYED RELEASE ORAL at 05:56

## 2022-03-23 RX ADMIN — MIDODRINE HYDROCHLORIDE 10 MILLIGRAM(S): 2.5 TABLET ORAL at 05:56

## 2022-03-23 RX ADMIN — MIDODRINE HYDROCHLORIDE 10 MILLIGRAM(S): 2.5 TABLET ORAL at 22:14

## 2022-03-23 RX ADMIN — Medication 400 MILLIGRAM(S): at 03:30

## 2022-03-23 RX ADMIN — CHLORHEXIDINE GLUCONATE 1 APPLICATION(S): 213 SOLUTION TOPICAL at 11:55

## 2022-03-23 RX ADMIN — Medication 125 MICROGRAM(S): at 05:56

## 2022-03-23 RX ADMIN — SODIUM CHLORIDE 500 MILLILITER(S): 9 INJECTION INTRAMUSCULAR; INTRAVENOUS; SUBCUTANEOUS at 15:09

## 2022-03-23 RX ADMIN — Medication 2 SPRAY(S): at 11:55

## 2022-03-23 RX ADMIN — FAMOTIDINE 20 MILLIGRAM(S): 10 INJECTION INTRAVENOUS at 11:55

## 2022-03-23 NOTE — PROGRESS NOTE ADULT - ASSESSMENT
Ms Mckeon is a 61 NADIR w a PMH of GI bleed, bowel&bladder incontinance, nicotiene dependence, EtOH dependence, and hypothiroidism presented to [facility] for AMS found to have a UTI. Developed a GI bleed, colonoscopy demonstrated polyps and patient transfered to [facility] where she recieved a polypectomy. Stay complicated by CCU upgrade secondary to septic shock from aspiration pneumonia. Polypectomy demonstrated ADENOCACINOMA, CT A/P negative for metastasis. Patient failed callory count. Son amendable to PEG. Patient repeatedly hypoglycemic. Patient underwent EGD and Peg placement by GI 3/22. EGD showed non-erosive gastritis and duodenitis w/o bleed. Patient scheduled for transvaginal ultrasoud. No more blood found in diaper or on sky     Problem List:  #Acute blood loss  unclear if blood loss found in diaper was rectal or vaginal   GI performed EGD and assess patient 3/22  Hgb stable  -EGD non-errosive gastritis and duodenitis w/obleed  -bleeding scource still unclear will obtain vaginal US to r/o endometrial hyperplasia (endometrial carcinoma) given her age and hx    # Acute on chronic HFpEF / Bilateral Pleural Effusions   - fluid restriction 1200 ml in 24 hours  - ECHO with preserved EF, grade 2 diastolic dysfunction  - euvolemic now   - Patient started on D5 1/2 NS at 50 cc/hr to avoid hypoglycemia   -monitor closely for signs of volume overload    # Metabolic encephalopathy likely with advanced underlying dementia   - mental status at baseline now   - Seroquel held due to QT prolongation   - supportive care   - Patient started on D5 1/2 NS at 50 cc/hr to avoid hypoglycemia     #  Adenocarcinoma of the colon   - s/p colonoscopy with polypectomy 2/18, biopsy showed adenocarcinoma   - CT Chest/A/P negative for metastatic disease   - PEG placement today  - patient repeatedly hypoglycemic, started on D5 1/2 NS. pt s/p multiple D10 pushes     #  R IJ DVT   - c/w therapeutic Lovenox     # Thrombocytopenia and anemia/ suspected  autoimmune hemolytic anemia   - stable for now   - s/p prednisone taper per oncology    # Anorexia / severe malnutrition   -  failed calorie count  - PEG inserted 3/22  -monitor POC gluc  -pt s/p multiple D10 ams  -f/u Nutrition note for feeds, start at 1/2 rate today 3/23  -PEG IS OK TO USE  -if patient tolerates feeds 3/24 can dc        PAST MEDICAL & SURGICAL HISTORY:      #Misc  - DVT Prophylaxis:  - GI Prophylaxis:  - Diet:  - Activity:  - IV Fluids:  - Code Status:    Dispo:   Ms Mckeon is a 61 NADIR w a PMH of GI bleed, bowel&bladder incontinance, nicotiene dependence, EtOH dependence, and hypothiroidism presented to [facility] for AMS found to have a UTI. Developed a GI bleed, colonoscopy demonstrated polyps and patient transfered to [facility] where she recieved a polypectomy. Stay complicated by CCU upgrade secondary to septic shock from aspiration pneumonia. Polypectomy demonstrated ADENOCACINOMA, CT A/P negative for metastasis. Patient failed callory count. Son amendable to PEG. Patient repeatedly hypoglycemic. Patient underwent EGD and Peg placement by GI 3/22. EGD showed non-erosive gastritis and duodenitis w/o bleed. Patient scheduled for transvaginal ultrasoud. No more blood found in diaper or on sky. PATIENT HAS BEEN HYPOTHERMIC SINCE PEG PLACEMENT. PT HAS WARMING BLANKET ON (no leukocytosis, shivering, worsenig MS)     Problem List:  #Acute blood loss  unclear if blood loss found in diaper was rectal or vaginal   GI performed EGD and assess patient 3/22  Hgb stable  -EGD non-errosive gastritis and duodenitis w/obleed  -bleeding scource still unclear will obtain vaginal US to r/o endometrial hyperplasia (endometrial carcinoma) given her age and hx    # Acute on chronic HFpEF / Bilateral Pleural Effusions   - fluid restriction 1200 ml in 24 hours  - ECHO with preserved EF, grade 2 diastolic dysfunction  - euvolemic now   - Patient started on D5 1/2 NS at 50 cc/hr to avoid hypoglycemia   -monitor closely for signs of volume overload    # Metabolic encephalopathy likely with advanced underlying dementia   - mental status at baseline now   - Seroquel held due to QT prolongation   - supportive care   - Patient started on D5 1/2 NS at 50 cc/hr to avoid hypoglycemia     #  Adenocarcinoma of the colon   - s/p colonoscopy with polypectomy 2/18, biopsy showed adenocarcinoma   - CT Chest/A/P negative for metastatic disease   - PEG placement today  - patient repeatedly hypoglycemic, started on D5 1/2 NS. pt s/p multiple D10 pushes     #  R IJ DVT   - c/w therapeutic Lovenox     # Thrombocytopenia and anemia/ suspected  autoimmune hemolytic anemia   - stable for now   - s/p prednisone taper per oncology    # Anorexia / severe malnutrition   -  failed calorie count  - PEG inserted 3/22  -monitor POC gluc  -pt s/p multiple D10 ams  -f/u Nutrition note for feeds, start at 1/2 rate today 3/23  -PEG IS OK TO USE  -if patient tolerates feeds 3/24 can dc        PAST MEDICAL & SURGICAL HISTORY:      #Misc  - DVT Prophylaxis:  - GI Prophylaxis:  - Diet:  - Activity:  - IV Fluids:  - Code Status:    Dispo:

## 2022-03-23 NOTE — PROGRESS NOTE ADULT - ASSESSMENT
61F PMH GIB, Bowel and Bladder incontinence, Smoking (3-4 cigarettes/day), ETOH abuse, hypothyroidism, and ongoing anemia, admitted to [facility] 1/31 for ams found to have UTI. Dev GIB, colonoscopy showing polyps, transferred to [facility] for polypectomy upgraded to CCU for hypotension, likely septic shock 2/2 aspiration pna. pt was later downgraded to medical floor and course was complicated by toxic encephalopathy which is now resolved, prolonged qtc, Acute on chronic HfpEF, Acute RIJ DVT on therapeutic lovenox, Hemolytic anemia and DIC s/p prednisone treatment. Pt's large colonic polyp biopsy was notable for adenocarcinoma with lymphovascular invasion. pan scan did not show metastatic disease. GI was reconsulted for PEG tube placement    #Toxic encephalopathy resolved on 3/10  #Failure to thrive and severe malnutrition s/p PEG placement on 3/24  #Locally invasive adenocarcinoma of the colon w/ lymphovascular invasion and no distant mets on imaging  #Prolonged QTC and Acute on Chronic HfPEf  #RIJ DVT on therapeutic lovenox  - pt failed calorie count  - S&S - mild oral dysphagia  - cardiology cleared patient    Rec  - PEG was loosened this am  - can resume feeds  - protonix 40 qdaily  - Follow up with our [facility] located at [address] [place] [address]. Phone Number: [phone]

## 2022-03-23 NOTE — PROGRESS NOTE ADULT - ATTENDING COMMENTS
I edited the note
Patient is off Levo this am.  AAO x3, follows commands.  Tolerates tube feeds, has regular bowel movements.    Abdomen: soft, nontender    Assessment/Plan:  - continue tube feeds  - advance diet po if passes S&S  Please recall Surgery as needed
Patient looks more alert this am.  Follows simple commands.  Still on Levo but lower dose this am.  has good UOP.    Abdomen: soft, nontender    Assessment/Plan:  - continue NPO  - IV hydration  - wean Levo  - follow LA  - GI f/u  - on Zosyn - ID eval appreciated  Will follow up
The patient was seen Agree with above.
The patient was seen. Agree with above.  Anemia and thrombocytopenia, positive direct Tai and low haptoglobin.  Will give a trial of steroid with Solumedrol 40 mg iv daily. Will adjust dose according to response.  Dermatology and rheumatology consult for +C-ANCA and skin lesions.
pt had hypoxia and hypotension this morning. please obtain CT A/P given recent polypectomy. rec broad spectrum abx - possible sepsis.
1. Acute on chronic HFpEF/Bilateral Pleural Effusions   ECHO with preserved EF, grade 2 diastolic dysfunction  on Lasix 40mg BID and metolazone   daily wts, I's and O's  stable on room air    2. Metabolic encephalopathy - resolved  Hold Seroquel     3. Colon polyp  s/p colonoscopy with polypectomy 2/18  biopsy showed adenocarcinoma   CT Chest/A/P negative for metastatic disease   Surgery consult once clinical status improves     4. R IJ DVT - c/w therapeutic Lovenox     5. Rash   evaluated by Burn, rheumatology and dermatology  f/u biopsy report - dermatitis vs drug eruption vs viral exanthem vs psoriasiform   on steroid cream    6. Septic shock and acute hypoxemic respiratory failure - resolved     7. Thrombocytopenia and anemia, possible autoimmune hemolytic anemia   presumed hemolytic anemia   s/p prednisone taper per Hem Onc recommendation    8. Urinary retention - TOV 3/9    9. Poor PO Intake - failed calorie count, refusing PEG tube, start Marinol TIDPC          Pending: IV diuresis, surgery consult      Will need STR
1. Acute on chronic HFpEF/Bilateral Pleural Effusions   ECHO with preserved EF, grade 2 diastolic dysfunction  was on Lasix 40mg BID and metolazone - improving slowly, start Bumex and dc Lasix   daily wts, I's and O's  stable on room air    2. Metabolic encephalopathy - resolved  Hold Seroquel     3. Colon polyp  s/p colonoscopy with polypectomy 2/18  biopsy showed adenocarcinoma   CT Chest/A/P negative for metastatic disease   Surgery consult once clinical status improves     4. R IJ DVT - c/w therapeutic Lovenox     5. Rash   evaluated by Burn, rheumatology and dermatology  f/u biopsy report - dermatitis vs drug eruption vs viral exanthem vs psoriasiform   on steroid cream    6. Septic shock and acute hypoxemic respiratory failure - resolved     7. Thrombocytopenia and anemia, possible autoimmune hemolytic anemia   presumed hemolytic anemia   s/p prednisone taper per Hem Onc recommendation    8. Urinary retention - TOV 3/9    9. Poor PO Intake - failed calorie count, c/w Marinol TIDPC, needs PEG tube          Pending: IV diuresis, PEG placement   Updated son Александр on plan of care, he is agreeable for PEG   Will need STR
Patient S/p EMR /Polypectomy. Developed septic shock ? aspiration. Rec CT scan of abdomen .
Patient is a 61F with PMH of GIB, smoking, bladder incontinence, hypothyroidism who was transferred from [facility] for EMR of sigmoid colon polyp. 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  Pathology showed adenocarcinoma at the resection margin with LVI    Patient seen and examined.  Patient lethargic and cachectic.    Abdomen - soft, non tender, non distended    Vitals and labs reviewed    CEA 5.6    PLAN:  - no acute surgical intervention  - Clinical staging - CT chest abdomen and pelvis  - patient will require optimization prior to elective colectomy  - patient can follow up as outpatient  - colorectal surgery to sign off
Patient is alert and oriented. No complaints.     1. Acute on chronic HFpEF/Bilateral Pleural Effusions   ECHO with preserved EF, grade 2 diastolic dysfunction  on lasix 40mg BID   daily wts, I's and O's  stable on room air    2. Metabolic encephalopathy   check Brain MRI   Hold Seroquel   Neuro f/u     3. Colon polyp  s/p colonoscopy with polypectomy 2/18  biopsy showed adenocarcinoma   CT Chest/A/P negative for metastatic disease   Surgery consult once clinical status improves     4. R IJ DVT - start therapeutic Lovenox     5. Exfoliative rash   evaluated by Burn, rheumatology and dermatology  f/u biopsy report  on steroid cream    6. Septic shock and acute hypoxemic respiratory failure - resolved     7. Thrombocytopenia and anemia, possible autoimmune hemolytic anemia   presumed hemolytic anemia   s/p prednisone taper per HemOnc recommendation    8. Urinary retention - TOV 3/9    Rest of plan as above.     Pending: Brain MRI, PO intake improvement, currently NGT      Will need STR
Patient seen and examined with GI Team, agree with above plan, modified where needed
Patient seen and examined with GI Team, agree with above plan, modified where needed
The patient was seen. Agree with above.   Labs reviewed.   Will taper down Solumedrol to 30 mg iv daily x 3 day, followed by 20 mg daily for 3 days, 10 mg daily for 3 days, then discontinue.   Supportive care.
The patient was seen. Agree with above.  Labs reviewed.   Hb is 8.6 stable. s/p PLT transfusion, PLT 54K.  Continue Solumedrol 40 mg iv daily.  F/U skin biopsy report.
pt transferred from [facility] for polypectomy, now s/p polypectomy. belly soft, however pt hypothermic. cont to monitor. medical w/u for hypothermia. unlikely perforation given belly is soft.
Lungs clear no active CHF exacerbation.    Normal LV systolic function   See detailed risk assessment above. the patient is moderate risk for PEG placement. No cardiac workup is indicated prior to procedure.
Patient is alert and oriented. No complaints.     1. Acute on chronic HFpEF/Bilateral Pleural Effusions   ECHO with preserved EF, grade 2 diastolic dysfunction  on Lasix 40mg BID, add metolazone   daily wts, I's and O's  stable on room air    2. Metabolic encephalopathy - improving   Hold Seroquel     3. Colon polyp  s/p colonoscopy with polypectomy 2/18  biopsy showed adenocarcinoma   CT Chest/A/P negative for metastatic disease   Surgery consult once clinical status improves     4. R IJ DVT - c/w therapeutic Lovenox     5. Rash   evaluated by Burn, rheumatology and dermatology  f/u biopsy report - dermatitis vs drug eruption vs viral exanthem vs psoriasiform   on steroid cream    6. Septic shock and acute hypoxemic respiratory failure - resolved     7. Thrombocytopenia and anemia, possible autoimmune hemolytic anemia   presumed hemolytic anemia   s/p prednisone taper per HemOnc recommendation    8. Urinary retention - TOV 3/9    Rest of plan as above.     Pending: IV diuresis, PO intake improvement      Will need STR
Patient is more awake today, answers simple questions.  Off Levo overnight, this am on Levo 0.1  Had loose bowel movements overnight    Abdomen: +BS, soft, nontender    Assessment/Plan:  - ok to start Clear liquid diet  - wean Levo off  - continue Zosyn  - Hematology workup in progress for pancytopenia   Will follow up.
as below
I have personally seen and examined the patient. She was c/o left knee pain, very weak and deconditioned. I agree with medical residetn's fidngins, assessment and plan above with few corrections in my note.     61F PMH GIB, Bowel and Bladder incontinence, Smoking (3-4 cigarettes/day), ETOH abuse, hypothyroidism, and ongoing anemia, admitted to [facility] 1/31 for ams found to have UTI. Dev GIB, colonoscopy showing polyps, transferred to [facility] for polypectomy upgraded to CCU for hypotension, likely septic shock 2/2 aspiration pna. While in the hospital colonoscopy was performed  with polypectomy 2/18, biopsy showed adenocarcinoma   CT Chest/A/P negative for metastatic disease. Pt is anorexic, refusing PEG tube wit waxing and waiting MS, her son is agreeable, she was started on appetite stimulants, will count calories and consult palliative care for GOB conversation ( pt has a very poor prognosis)      A/P   # Acute on chronic HFpEF / Bilateral Pleural Effusions   - fluid restriction 1200 ml in 24 hours  - intake and output monitoring, daily weight   - ECHO with preserved EF, grade 2 diastolic dysfunction  - treated with  Lasix 40mg BID and metolazone, euvolemic now   - hold off with diuretics ( pt has a borderline BP)     # Metabolic encephalopathy   - resolved  - Seroquel held   - supportive care     #  Adenocarcinoma of the colon   - s/p colonoscopy with polypectomy 2/18, biopsy showed adenocarcinoma   - CT Chest/A/P negative for metastatic disease   - Surgery consult once clinical status improves   - consult medical oncology     #  R IJ DVT   - c/w therapeutic Lovenox     # Rash   -evaluated by Burn, rheumatology and dermatology  - f/u biopsy report - dermatitis vs drug eruption vs viral exanthem vs psoriasiform   on steroid cream    # Thrombocytopenia and anemia/ suspected  autoimmune hemolytic anemia   - stable for now   - s/p prednisone taper per Hem Onc recommendation    # Anorexia   -  failed calorie count, c/w Marinol TIDPC, needs PEG tube     Overall prognosis is very poor, will consul palliative care      Pending: palliative care consult, will consider  PEG placement    Updated son Александр on plan of care, he is agreeable for PEG   # Dispo: pt needs STR when cleared
Patient is alert and oriented x 3. Passed SLP, however refusing to eat and swallow meds. Started on NGT feeds.     1. Acute on chronic HFpEF  ECHO with preserved EF, grade 2 diastolic dysfunction  on lasix 40mg daily  daily wts, I's and O's  stable on room air    2. Metabolic encephalopathy   check Brain MRI   Hold Seroquel   Neuro f/u     3. Colon polyp  s/p colonoscopy with polypectomy 2/18  ct abd without free air but showed pancolitis  biopsy showed adenocarcinoma   check CT Abd/chest   Surgery consult      4. Thrombocytopenia and anemia, possible autoimmune hemolytic anemia   presumed hemolytic anemia   s/p prednisone taper per HemOnc recommendation    5. Exfoliative rash   evaluated by Burn, rheumatology and dermatology  f/u biopsy report  on steroid cream    6. Septic shock and acute hypoxemic respiratory failure - resolved     7. Prolonged QTc -resolved    8. Urinary retention - TOV today    Rest of plan as above.     Pending: Brain MRI, PO intake improvement, currently NGT   Son updated  Will need STR
Patient seen and examined.   Patient is hypothermic, asymptomatic. Check labs, blood cultures, lactate.   Start tube feeds tonight.   Rest of plan as above.     Pending: start tube feeds, hypothermia w/u
Agree with above A/P.

## 2022-03-23 NOTE — PROGRESS NOTE ADULT - SUBJECTIVE AND OBJECTIVE BOX
Gastroenterology progress note:     Patient is a 61y old  Female who presents with a chief complaint of Polypectomy (22 Mar 2022 14:15)       Admitted on: 02-16-22    We are following the patient for: PEG placement       Interval History:    No acute events overnight.   - Diet - npo  - last BM - 1 day ago  - Abdominal pain - none      PAST MEDICAL & SURGICAL HISTORY:      MEDICATIONS  (STANDING):  benzocaine 20% Spray 2 Spray(s) Mucosal four times a day  ceFAZolin   IVPB 2000 milliGRAM(s) IV Intermittent once  chlorhexidine 4% Liquid 1 Application(s) Topical daily  cholecalciferol 2000 Unit(s) Oral daily  famotidine    Tablet 20 milliGRAM(s) Oral daily  folic acid 1 milliGRAM(s) Oral daily  levothyroxine 125 MICROGram(s) Oral daily  magnesium oxide 400 milliGRAM(s) Oral two times a day with meals  midodrine. 10 milliGRAM(s) Oral every 8 hours  multivitamin/minerals 1 Tablet(s) Oral daily  pantoprazole  Injectable 40 milliGRAM(s) IV Push every 12 hours  thiamine 100 milliGRAM(s) Oral daily  triamcinolone 0.1% Cream 1 Application(s) Topical two times a day  vitamin A &amp; D Ointment 1 Application(s) Topical daily    MEDICATIONS  (PRN):  acetaminophen     Tablet .. 650 milliGRAM(s) Oral every 6 hours PRN Mild Pain (1 - 3), Moderate Pain (4 - 6)  ibuprofen  Tablet. 400 milliGRAM(s) Oral every 6 hours PRN Severe Pain (7 - 10)      Allergies  No Known Allergies      Review of Systems:   Cardiovascular:  No Chest Pain, No Palpitations  Respiratory:  No Cough, No Dyspnea  Gastrointestinal:  As described in HPI  Skin:  No Skin Lesions, No Jaundice  Neuro:  No Syncope, No Dizziness    Physical Examination:  T(C): 34.2 (03-23-22 @ 09:08), Max: 36.5 (03-22-22 @ 13:18)  HR: 66 (03-23-22 @ 07:00) (66 - 99)  BP: 101/66 (03-23-22 @ 07:00) (88/58 - 129/93)  RR: 18 (03-23-22 @ 07:00) (18 - 18)  SpO2: 98% (03-23-22 @ 08:00) (98% - 99%)  Weight (kg): 60 (03-22-22 @ 12:56)      GENERAL: AAOx2, no acute distress.  HEAD:  Atraumatic, Normocephalic  EYES: conjunctiva and sclera clear  NECK: Supple, no JVD or thyromegaly  CHEST/LUNG: Clear to auscultation bilaterally; No wheeze, rhonchi, or rales  HEART: Regular rate and rhythm; normal S1, S2, No murmurs.  ABDOMEN: Soft, nontender, nondistended; Bowel sounds present. PEG site clean and in place  NEUROLOGY: No asterixis or tremor.   SKIN: Intact, no jaundice     Data:                        10.1   4.47  )-----------( 133      ( 21 Mar 2022 20:00 )             31.4     Hgb trend:  10.1  03-21-22 @ 20:00  9.9  03-21-22 @ 04:30            Liver panel trend:  TBili 0.4   /   AST 21   /   ALT 21   /   AlkP 127   /   Tptn 6.7   /   Alb 2.0    /   DBili --      03-21  TBili 0.4   /   AST 22   /   ALT 23   /   AlkP 123   /   Tptn 6.6   /   Alb 2.0    /   DBili --      03-19  TBili 0.4   /   AST 31   /   ALT 23   /   AlkP 106   /   Tptn 6.2   /   Alb 1.9    /   DBili --      03-18  TBili 0.4   /   AST 23   /   ALT 22   /   AlkP 112   /   Tptn 6.4   /   Alb 1.9    /   DBili --      03-17  TBili 0.3   /   AST 14   /   ALT 19   /   AlkP 96   /   Tptn 5.8   /   Alb 1.9    /   DBili --      03-16  TBili 0.3   /   AST 16   /   ALT 20   /   AlkP 89   /   Tptn 5.6   /   Alb 1.8    /   DBili --      03-15  TBili 0.3   /   AST 16   /   ALT 23   /   AlkP 99   /   Tptn 6.1   /   Alb 2.2    /   DBili --      03-14      PT/INR - ( 21 Mar 2022 20:00 )   PT: 12.70 sec;   INR: 1.11 ratio         PTT - ( 21 Mar 2022 20:00 )  PTT:53.5 sec       Radiology:

## 2022-03-23 NOTE — PROGRESS NOTE ADULT - SUBJECTIVE AND OBJECTIVE BOX
SANTIAGO CALIXTO 61y Female  MRN#: [id number]   Hospital Day: 35d    SUBJECTIVE  Patient is a 61y old Female who presents with a chief complaint of Polypectomy (23 Mar 2022 09:11)  Currently admitted to medicine with the primary diagnosis of   INTERVAL HPI AND OVERNIGHT EVENTS:  Patient was examined and seen at bedside. This morning she is resting comfortably in bed and reports no issues or overnight events.    REVIEW OF SYMPTOMS:  CONSTITUTIONAL: COLD  EYES: No visual changes, eye pain, or discharge  ENT: No vertigo; No ear pain or change in hearing; No sore throat or difficulty swallowing  NECK: No pain or stiffness  RESPIRATORY: No cough, wheezing, or hemoptysis; No shortness of breath  CARDIOVASCULAR: No chest pain or palpitations  GASTROINTESTINAL: MILD ABDOMINAL PAIN AT SIGHT OF PEG PLACEMENT  GENITOURINARY: No dysuria, frequency   MUSCULOSKELETAL: No joint pain, no muscle pain, no weakness  NEUROLOGICAL: DIFFUSE WEAKNESS LE>UE  SKIN: DRY PEELING SKIN     OBJECTIVE  PAST MEDICAL & SURGICAL HISTORY    ALLERGIES:  No Known Allergies    MEDICATIONS:  STANDING MEDICATIONS  benzocaine 20% Spray 2 Spray(s) Mucosal four times a day  ceFAZolin   IVPB 2000 milliGRAM(s) IV Intermittent once  chlorhexidine 4% Liquid 1 Application(s) Topical daily  cholecalciferol 2000 Unit(s) Oral daily  famotidine    Tablet 20 milliGRAM(s) Oral daily  folic acid 1 milliGRAM(s) Oral daily  levothyroxine 125 MICROGram(s) Oral daily  magnesium oxide 400 milliGRAM(s) Oral two times a day with meals  midodrine. 10 milliGRAM(s) Oral every 8 hours  multivitamin/minerals 1 Tablet(s) Oral daily  pantoprazole  Injectable 40 milliGRAM(s) IV Push every 12 hours  thiamine 100 milliGRAM(s) Oral daily  triamcinolone 0.1% Cream 1 Application(s) Topical two times a day  vitamin A &amp; D Ointment 1 Application(s) Topical daily    PRN MEDICATIONS  acetaminophen     Tablet .. 650 milliGRAM(s) Oral every 6 hours PRN  ibuprofen  Tablet. 400 milliGRAM(s) Oral every 6 hours PRN      VITAL SIGNS: Last 24 Hours  T(C): 34.4 (23 Mar 2022 11:42), Max: 36.5 (22 Mar 2022 13:18)  T(F): 94 (23 Mar 2022 11:42), Max: 97.7 (22 Mar 2022 13:18)  HR: 66 (23 Mar 2022 07:00) (66 - 99)  BP: 101/66 (23 Mar 2022 07:00) (88/58 - 113/74)  BP(mean): --  RR: 18 (23 Mar 2022 07:00) (18 - 18)  SpO2: 98% (23 Mar 2022 08:00) (98% - 99%)    LABS:                        10.1   4.47  )-----------( 133      ( 21 Mar 2022 20:00 )             31.4           PT/INR - ( 21 Mar 2022 20:00 )   PT: 12.70 sec;   INR: 1.11 ratio         PTT - ( 21 Mar 2022 20:00 )  PTT:53.5 sec              RADIOLOGY:  Impressions:    Normal mucosa in the whole esophagus.    Erythema in the stomach compatible with non-erosive gastritis.    Erythema in the duodenum compatible with duodenitis.     Plan: Please start medications through G tube today.  Clean the PEG tube site with soap and water daily.  Pre and post flush with 50cc water with tube feedings.  Nutrition consult.  If no complications then can start tube feeding tomorrow       Attending Participation:   I was present and participated during the entire procedure, including non-key  portions.      PHYSICAL EXAM:  CONSTITUTIONAL: No acute distress, well-developed, well-groomed, AAOx3  HEAD: Atraumatic, normocephalic  EYES: EOM intact, PERRLA, conjunctiva and sclera clear  ENT: Supple, no masses, no thyromegaly, no bruits, no JVD; moist mucous membranes  PULMONARY: Clear to auscultation bilaterally; no wheezes, rales, or rhonchi  CARDIOVASCULAR: Regular rate and rhythm; no murmurs, rubs, or gallops  GASTROINTESTINAL: Soft, non-tender, non-distended; bowel sounds present  MUSCULOSKELETAL: 2+ peripheral pulses; no clubbing, no cyanosis, no edema  NEUROLOGY: non-focal  SKIN: No rashes or lesions; warm and dry     SANTIAGO CALIXTO 61y Female  MRN#: [id number]   Hospital Day: 35d    SUBJECTIVE  Patient is a 61y old Female who presents with a chief complaint of Polypectomy (23 Mar 2022 09:11)  Currently admitted to medicine with the primary diagnosis of   INTERVAL HPI AND OVERNIGHT EVENTS:  Patient was examined and seen at bedside. This morning she is resting comfortably in bed and reports no issues or overnight events.    REVIEW OF SYMPTOMS:  CONSTITUTIONAL: COLD  EYES: No visual changes, eye pain, or discharge  ENT: No vertigo; No ear pain or change in hearing; No sore throat or difficulty swallowing  NECK: No pain or stiffness  RESPIRATORY: No cough, wheezing, or hemoptysis; No shortness of breath  CARDIOVASCULAR: No chest pain or palpitations  GASTROINTESTINAL: MILD ABDOMINAL PAIN AT SIGHT OF PEG PLACEMENT  GENITOURINARY: No dysuria, frequency   MUSCULOSKELETAL: No joint pain, no muscle pain, no weakness  NEUROLOGICAL: DIFFUSE WEAKNESS LE>UE  SKIN: DRY PEELING SKIN     OBJECTIVE  PAST MEDICAL & SURGICAL HISTORY    ALLERGIES:  No Known Allergies    MEDICATIONS:  STANDING MEDICATIONS  benzocaine 20% Spray 2 Spray(s) Mucosal four times a day  ceFAZolin   IVPB 2000 milliGRAM(s) IV Intermittent once  chlorhexidine 4% Liquid 1 Application(s) Topical daily  cholecalciferol 2000 Unit(s) Oral daily  famotidine    Tablet 20 milliGRAM(s) Oral daily  folic acid 1 milliGRAM(s) Oral daily  levothyroxine 125 MICROGram(s) Oral daily  magnesium oxide 400 milliGRAM(s) Oral two times a day with meals  midodrine. 10 milliGRAM(s) Oral every 8 hours  multivitamin/minerals 1 Tablet(s) Oral daily  pantoprazole  Injectable 40 milliGRAM(s) IV Push every 12 hours  thiamine 100 milliGRAM(s) Oral daily  triamcinolone 0.1% Cream 1 Application(s) Topical two times a day  vitamin A &amp; D Ointment 1 Application(s) Topical daily    PRN MEDICATIONS  acetaminophen     Tablet .. 650 milliGRAM(s) Oral every 6 hours PRN  ibuprofen  Tablet. 400 milliGRAM(s) Oral every 6 hours PRN      VITAL SIGNS: Last 24 Hours  T(C): 34.4 (23 Mar 2022 11:42), Max: 36.5 (22 Mar 2022 13:18)  T(F): 94 (23 Mar 2022 11:42), Max: 97.7 (22 Mar 2022 13:18)  HR: 66 (23 Mar 2022 07:00) (66 - 99)  BP: 101/66 (23 Mar 2022 07:00) (88/58 - 113/74)  BP(mean): --  RR: 18 (23 Mar 2022 07:00) (18 - 18)  SpO2: 98% (23 Mar 2022 08:00) (98% - 99%)    LABS:                        10.1   4.47  )-----------( 133      ( 21 Mar 2022 20:00 )             31.4           PT/INR - ( 21 Mar 2022 20:00 )   PT: 12.70 sec;   INR: 1.11 ratio         PTT - ( 21 Mar 2022 20:00 )  PTT:53.5 sec              RADIOLOGY:  EGD:  Impressions:    Normal mucosa in the whole esophagus.    Erythema in the stomach compatible with non-erosive gastritis.    Erythema in the duodenum compatible with duodenitis.     Plan: Please start medications through G tube today.  Clean the PEG tube site with soap and water daily.  Pre and post flush with 50cc water with tube feedings.  Nutrition consult.  If no complications then can start tube feeding tomorrow             PHYSICAL EXAM:  CONSTITUTIONAL: No acute distress, EMACIATED ELDERLY WOMAN  HEAD: Atraumatic, normocephalic  EYES:  conjunctiva and sclera clear  ENT: Supple,, no JVD; DRY mucous membranes  PULMONARY: Clear to auscultation bilaterally; no wheezes, rales, or rhonchi  CARDIOVASCULAR: Regular rate and rhythm; no murmurs, rubs, or gallops  GASTROINTESTINAL: Soft, non-tender, SCAFOID; bowel sounds present  MUSCULOSKELETAL: 2+ peripheral pulses; no clubbing, no cyanosis, no edema  NEUROLOGY: A&OX3  SKIN: DIFFUSE PEELING RASH W HYPOPIGMENTED LESIONS ACROSS FOREARMS AND HYPERPIGMENTED LESION BL LE TIBIAL

## 2022-03-23 NOTE — PROGRESS NOTE ADULT - TIME BILLING
direct pt's care, communication with medical team, chart review
#Acute on chronic HFpEF  tte with preserved ef, grade ii diastolic dysfunction  cxr with worsening bl opacity, effusions  change lasix 40 po to iv daily  no hypoxia, satting well on ra  #Hypotension  unclear etiology, possible septic shock  ct abd with pancolitis  s/p course abx  off levophed  #Colon polyp  s/p colonoscopy with polypectomy 2/18  ct abd without free air  f/u gi  ngt with tf  f/u nutrition  #Thrombocytopenia, anemia  presumed hemolytic anemia, with coagulopathy, possible DIC  resolved  cont prednsione taper  appreciate heme    #Progress Note Handoff:  Pending (specify):  Consults_________, Tests________, Test Results_______, Other______iv diuresis___  Family discussion: d/w pt at bedside re: treatment plan, primary dx  Disposition: Home___/SNF___/Other________/Unknown at this time___x_____
direct pt's care, communication with medical team, chart review
discussion and coordination
examine the patient, review images and labs, discuss the plan of care.
patient's care
Coordination of care
patient's care

## 2022-03-23 NOTE — PROGRESS NOTE ADULT - ATTENDING SUPERVISION STATEMENT
Fellow
ACP/Resident
ACP/Resident
Fellow
Resident
ACP
ACP/Resident
Fellow
Resident
Fellow
Fellow
Resident

## 2022-03-24 ENCOUNTER — TRANSCRIPTION ENCOUNTER (OUTPATIENT)
Age: 62
End: 2022-03-24

## 2022-03-24 LAB
GLUCOSE BLDC GLUCOMTR-MCNC: 67 MG/DL — LOW (ref 70–99)
GLUCOSE BLDC GLUCOMTR-MCNC: 70 MG/DL — SIGNIFICANT CHANGE UP (ref 70–99)
GLUCOSE BLDC GLUCOMTR-MCNC: 85 MG/DL — SIGNIFICANT CHANGE UP (ref 70–99)
GLUCOSE BLDC GLUCOMTR-MCNC: 94 MG/DL — SIGNIFICANT CHANGE UP (ref 70–99)
GLUCOSE BLDC GLUCOMTR-MCNC: 97 MG/DL — SIGNIFICANT CHANGE UP (ref 70–99)
SARS-COV-2 RNA SPEC QL NAA+PROBE: SIGNIFICANT CHANGE UP

## 2022-03-24 PROCEDURE — 71045 X-RAY EXAM CHEST 1 VIEW: CPT | Mod: 26

## 2022-03-24 PROCEDURE — 99232 SBSQ HOSP IP/OBS MODERATE 35: CPT

## 2022-03-24 RX ORDER — PANTOPRAZOLE SODIUM 20 MG/1
40 TABLET, DELAYED RELEASE ORAL
Refills: 0 | Status: DISCONTINUED | OUTPATIENT
Start: 2022-03-24 | End: 2022-03-26

## 2022-03-24 RX ADMIN — CHLORHEXIDINE GLUCONATE 1 APPLICATION(S): 213 SOLUTION TOPICAL at 11:30

## 2022-03-24 RX ADMIN — Medication 125 MICROGRAM(S): at 05:52

## 2022-03-24 RX ADMIN — Medication 1 APPLICATION(S): at 11:29

## 2022-03-24 RX ADMIN — Medication 2 SPRAY(S): at 11:28

## 2022-03-24 RX ADMIN — MAGNESIUM OXIDE 400 MG ORAL TABLET 400 MILLIGRAM(S): 241.3 TABLET ORAL at 09:16

## 2022-03-24 RX ADMIN — ENOXAPARIN SODIUM 60 MILLIGRAM(S): 100 INJECTION SUBCUTANEOUS at 17:29

## 2022-03-24 RX ADMIN — Medication 2 SPRAY(S): at 05:52

## 2022-03-24 RX ADMIN — Medication 1 APPLICATION(S): at 17:29

## 2022-03-24 RX ADMIN — Medication 1 TABLET(S): at 11:27

## 2022-03-24 RX ADMIN — PANTOPRAZOLE SODIUM 40 MILLIGRAM(S): 20 TABLET, DELAYED RELEASE ORAL at 05:53

## 2022-03-24 RX ADMIN — MAGNESIUM OXIDE 400 MG ORAL TABLET 400 MILLIGRAM(S): 241.3 TABLET ORAL at 17:29

## 2022-03-24 RX ADMIN — Medication 2 SPRAY(S): at 17:29

## 2022-03-24 RX ADMIN — MIDODRINE HYDROCHLORIDE 10 MILLIGRAM(S): 2.5 TABLET ORAL at 05:51

## 2022-03-24 RX ADMIN — Medication 100 MILLIGRAM(S): at 11:28

## 2022-03-24 RX ADMIN — Medication 2000 UNIT(S): at 11:28

## 2022-03-24 RX ADMIN — MIDODRINE HYDROCHLORIDE 10 MILLIGRAM(S): 2.5 TABLET ORAL at 21:54

## 2022-03-24 RX ADMIN — FAMOTIDINE 20 MILLIGRAM(S): 10 INJECTION INTRAVENOUS at 11:28

## 2022-03-24 RX ADMIN — ENOXAPARIN SODIUM 60 MILLIGRAM(S): 100 INJECTION SUBCUTANEOUS at 05:54

## 2022-03-24 RX ADMIN — Medication 1 MILLIGRAM(S): at 11:27

## 2022-03-24 NOTE — PROGRESS NOTE ADULT - ASSESSMENT
61F PMH GIB, Bowel and Bladder incontinence, Smoking (3-4 cigarettes/day), ETOH abuse, hypothyroidism, and ongoing anemia, admitted to [facility] 1/31 for ams found to have UTI. Dev GIB, colonoscopy showing polyps, transferred to [facility] for polypectomy upgraded to CCU for hypotension, likely septic shock 2/2 aspiration pna. While in the hospital colonoscopy was performed  with polypectomy 2/18, biopsy showed adenocarcinoma. CT Chest/A/P negative for metastatic disease.  Patient s/p PEG for failure to thrive due to poor PO intake.       # Anorexia / severe malnutrition   - s/p PEG, tolerating feeds     # Hypothermia  - started s/p PEG placement  - sepsis ruled out  - improving    # Acute on chronic HFpEF - resolved   - fluid restriction 1200 ml in 24 hours  - intake and output monitoring, daily weight   - ECHO with preserved EF, grade 2 diastolic dysfunction  - off diuretics     # Metabolic encephalopathy   - resolved     #  Adenocarcinoma of the colon   - s/p colonoscopy with polypectomy 2/18, biopsy showed adenocarcinoma   - CT Chest/A/P negative for metastatic disease   - Surgical oncology follow up    - medical oncology is following     #  R IJ DVT   - c/w therapeutic Lovenox     # Rash   - evaluated by Burn, rheumatology and dermatology  - f/u biopsy report - dermatitis vs drug eruption vs viral exanthem vs psoriasiform   - on steroid cream    # Thrombocytopenia and anemia/ suspected  autoimmune hemolytic anemia   - stable for now   - s/p prednisone taper per Hem Onc recommendation    Prognosis guarded.      Pending: surgery f/u, hypothermia resolution, STR placement

## 2022-03-24 NOTE — PROGRESS NOTE ADULT - REASON FOR ADMISSION
Polypectomy

## 2022-03-24 NOTE — DISCHARGE NOTE PROVIDER - NPI NUMBER (FOR SYSADMIN USE ONLY) :
[[id number] [[id number]]

## 2022-03-24 NOTE — ADVANCED PRACTICE NURSE CONSULT - ASSESSMENT
Pt is a 62 yo F with PMH of GIB, Bowel and Bladder incontinence, Smoking (3-4 cigarettes/day), ETOH abuse, hypothyroidism anemia transferred from [facility] (2/16) for Polypectomy. Pt is from home living with her son and bedbound at baseline. Pt was admitted to [facility] on 01/31 for AMS, weakness and decreased appetite. She was found to have UTI, NSTEMI, and electrolyte imbalance. During her stay at [facility] pt had colonoscopy done which revealed 1 large 3-4 cm sigmoid pedunculated polyp and 2 polyps (7, 14 mm) in descending colon s/p bx. Pt was transferred to [facility] for polyp removal.      Currently admitted to medicine, today is hospital day-36d; patient initially upgraded to CCU for hypotension, likely septic shock 2/2 aspiration pna. While in the hospital colonoscopy was performed with polypectomy 2/18, biopsy showed adenocarcinoma. CT Chest/A/P negative for metastatic disease.  Patient s/p PEG for failure to thrive due to poor PO intake. Now on 3B, being managed for Anorexia / severe malnutrition -s/p PEG;  Hypothermia; Acute on chronic HFpEF - resolved;  Metabolic encephalopathy;  Adenocarcinoma of the colon; R IJ DVT; Rash - evaluated by Burn, rheumatology and dermatology; Thrombocytopenia and anemia/ suspected  autoimmune hemolytic anemia.     Received patient on 3B, laying supine in bed, HOB elevated 30 degrees. Pt awake, A&Ox3, minimally conversant during WOCN visit, made aware of purpose of WOCN visit, agreeable to consult. Patient cachetic, bones visibly protruding underneath skin. Generalized patches of dry skin throughout body.     With minimal assistance, patient turned to right side for skin assessment.     Incontinence associated dermatitis (IAD) to sacrococcygeal, b/l buttock, & perirectal area, skin denuded w/ multiple areas of partial & full thickness skin loss throughout- open areas w/ dark pink tissue & light pink tissue. No drainage, odor, induration, erythema, warmth, or c/o pain present on assessment.     Patient mostly bedbound, able to turn/position in bed w/ minimal assistance, incontinence episodes-small amount of urine and stool on bedpad underneath patient. Receiving TF via G tube.

## 2022-03-24 NOTE — PROGRESS NOTE ADULT - NUTRITIONAL ASSESSMENT
This patient has been assessed with a concern for Malnutrition and has been determined to have a diagnosis/diagnoses of Severe protein-calorie malnutrition and Underweight (BMI < 19).    This patient is being managed with:   Diet DASH/TLC-  Sodium & Cholesterol Restricted  Pureed (PUREED)  Entered: Mar 11 2022  8:51AM    
This patient has been assessed with a concern for Malnutrition and has been determined to have a diagnosis/diagnoses of Severe protein-calorie malnutrition and Underweight (BMI < 19).    This patient is being managed with:   Diet DASH/TLC-  Sodium & Cholesterol Restricted  Pureed (PUREED)  Tube Feeding Modality: Nasogastric  Peptamen A.F. Formula  Total Volume for 24 Hours (mL): 1125  Bolus  Total Volume of Bolus (mL):  375  Tube Feed Frequency: Every 8 hours   Tube Feed Start Time: 18:00  Bolus Feed Rate (mL per Hour): 500   Bolus Feed Duration (in Hours): 0.75  Bolus Feed Instructions:  give tube feed AFTER EACH ATTEMPTED PO MEAL NOT Q8H   Frequency: Every 8 Hours    Start Time: 12:00  Free Water Flush Instructions:  flush 50 ml water syringe push before and after each feeding  Entered: Mar  2 2022  5:58PM    
This patient has been assessed with a concern for Malnutrition and has been determined to have a diagnosis/diagnoses of Severe protein-calorie malnutrition and Underweight (BMI < 19).    This patient is being managed with:   Diet DASH/TLC-  Sodium & Cholesterol Restricted  Pureed (PUREED)  Tube Feeding Modality: Nasogastric  Peptamen A.F. Formula  Total Volume for 24 Hours (mL): 1500  Bolus  Total Volume of Bolus (mL):  375  Total # of Feeds: 4  Tube Feed Frequency: Every 6 hours   Tube Feed Start Time: 18:00  Bolus Feed Rate (mL per Hour): 750   Bolus Feed Duration (in Hours): 0.5  Bolus Feed Instructions:  Give 375ml 1 hour after attempted breakfast and qhs  Give 250ml 1 hour after attempted lunch and dinner meals  (not on q6hr schedule)  Free Water Flush  Bolus   Total Volume per Flush (mL): 30   Frequency: Every 6 Hours   Total Daily Volume of Flush (mL): 120    Start Time: 12:00  Free Water Flush Instructions:  flush NG after each TF  Entered: Feb 24 2022  5:58PM    
This patient has been assessed with a concern for Malnutrition and has been determined to have a diagnosis/diagnoses of Severe protein-calorie malnutrition and Underweight (BMI < 19).    This patient is being managed with:   Diet NPO with Tube Feed-  Tube Feeding Modality: Nasogastric  Peptamen A.F. Formula  Total Volume for 24 Hours (mL): 1000  Bolus  Total Volume of Bolus (mL):  250  Total # of Feeds: 4  Tube Feed Frequency: Every 6 hours   Tube Feed Start Time: 18:00  Bolus Feed Rate (mL per Hour): 250   Bolus Feed Duration (in Hours): 1  Free Water Flush  Bolus   Total Volume per Flush (mL): 30   Frequency: Every 6 Hours   Total Daily Volume of Flush (mL): 120  Free Water Flush Instructions:  30 pre/post  Entered: Feb 23 2022 12:54PM    
This patient has been assessed with a concern for Malnutrition and has been determined to have a diagnosis/diagnoses of Severe protein-calorie malnutrition and Underweight (BMI < 19).    This patient is being managed with:   Diet NPO-  Except Medications  Entered: Feb 22 2022 12:58AM    
This patient has been assessed with a concern for Malnutrition and has been determined to have a diagnosis/diagnoses of Severe protein-calorie malnutrition and Underweight (BMI < 19).    This patient is being managed with:   Diet DASH/TLC-  Sodium & Cholesterol Restricted  Pureed (PUREED)  Entered: Mar 11 2022  8:51AM    
This patient has been assessed with a concern for Malnutrition and has been determined to have a diagnosis/diagnoses of Severe protein-calorie malnutrition and Underweight (BMI < 19).    This patient is being managed with:   Diet DASH/TLC-  Sodium & Cholesterol Restricted  Pureed (PUREED)  Entered: Mar 11 2022  8:51AM    
This patient has been assessed with a concern for Malnutrition and has been determined to have a diagnosis/diagnoses of Severe protein-calorie malnutrition and Underweight (BMI < 19).    This patient is being managed with:   Diet DASH/TLC-  Sodium & Cholesterol Restricted  Pureed (PUREED)  Tube Feeding Modality: Nasogastric  Peptamen A.F. Formula  Total Volume for 24 Hours (mL): 1500  Bolus  Total Volume of Bolus (mL):  375  Total # of Feeds: 4  Tube Feed Frequency: Every 6 hours   Tube Feed Start Time: 18:00  Bolus Feed Rate (mL per Hour): 750   Bolus Feed Duration (in Hours): 0.5  Bolus Feed Instructions:  Give 375ml 1 hour after attempted breakfast and qhs  Give 250ml 1 hour after attempted lunch and dinner meals  (not on q6hr schedule)  Free Water Flush  Bolus   Total Volume per Flush (mL): 30   Frequency: Every 6 Hours   Total Daily Volume of Flush (mL): 120    Start Time: 12:00  Free Water Flush Instructions:  flush NG after each TF  Entered: Feb 24 2022  5:58PM    
This patient has been assessed with a concern for Malnutrition and has been determined to have a diagnosis/diagnoses of Severe protein-calorie malnutrition and Underweight (BMI < 19).    This patient is being managed with:   Diet NPO-  Except Medications  Entered: Feb 22 2022 12:58AM    
This patient has been assessed with a concern for Malnutrition and has been determined to have a diagnosis/diagnoses of Severe protein-calorie malnutrition and Underweight (BMI < 19).    This patient is being managed with:   Diet NPO-  Except Medications  Entered: Mar  9 2022  1:38PM    
This patient has been assessed with a concern for Malnutrition and has been determined to have a diagnosis/diagnoses of Severe protein-calorie malnutrition and Underweight (BMI < 19).    This patient is being managed with:   Diet DASH/TLC-  Sodium & Cholesterol Restricted  Pureed (PUREED)  Prosource Gelatein Plus     Qty per Day:  4 oz  Supplement Feeding Modality:  Oral  Ensure Clear Cans or Servings Per Day:  1       Frequency:  Daily  Ensure Enlive Cans or Servings Per Day:  1       Frequency:  Daily  Entered: Mar 14 2022  1:45PM    
This patient has been assessed with a concern for Malnutrition and has been determined to have a diagnosis/diagnoses of Severe protein-calorie malnutrition and Underweight (BMI < 19).    This patient is being managed with:   Diet DASH/TLC-  Sodium & Cholesterol Restricted  Pureed (PUREED)  Prosource Gelatein Plus     Qty per Day:  4 oz  Supplement Feeding Modality:  Oral  Ensure Clear Cans or Servings Per Day:  1       Frequency:  Daily  Ensure Enlive Cans or Servings Per Day:  1       Frequency:  Daily  Entered: Mar 14 2022  1:45PM    
This patient has been assessed with a concern for Malnutrition and has been determined to have a diagnosis/diagnoses of Severe protein-calorie malnutrition and Underweight (BMI < 19).    This patient is being managed with:   Diet DASH/TLC-  Sodium & Cholesterol Restricted  Pureed (PUREED)  Tube Feeding Modality: Nasogastric  Peptamen A.F. Formula  Total Volume for 24 Hours (mL): 1500  Bolus  Total Volume of Bolus (mL):  375  Total # of Feeds: 4  Tube Feed Frequency: Every 6 hours   Tube Feed Start Time: 18:00  Bolus Feed Rate (mL per Hour): 750   Bolus Feed Duration (in Hours): 0.5  Bolus Feed Instructions:  Give 375ml 1 hour after attempted breakfast and qhs  Give 250ml 1 hour after attempted lunch and dinner meals  (not on q6hr schedule)  Free Water Flush  Bolus   Total Volume per Flush (mL): 30   Frequency: Every 6 Hours   Total Daily Volume of Flush (mL): 120    Start Time: 12:00  Free Water Flush Instructions:  flush NG after each TF  Entered: Feb 24 2022  5:58PM    
This patient has been assessed with a concern for Malnutrition and has been determined to have a diagnosis/diagnoses of Severe protein-calorie malnutrition and Underweight (BMI < 19).    This patient is being managed with:   Diet NPO after Midnight-     NPO Start Date: 21-Mar-2022   NPO Start Time: 23:59  Entered: Mar 21 2022 10:40AM    Diet DASH/TLC-  Sodium & Cholesterol Restricted  Pureed (PUREED)  Prosource Gelatein Plus     Qty per Day:  4 oz  Supplement Feeding Modality:  Oral  Ensure Clear Cans or Servings Per Day:  1       Frequency:  Daily  Ensure Enlive Cans or Servings Per Day:  1       Frequency:  Daily  Entered: Mar 14 2022  1:45PM    
This patient has been assessed with a concern for Malnutrition and has been determined to have a diagnosis/diagnoses of Severe protein-calorie malnutrition and Underweight (BMI < 19).    This patient is being managed with:   Diet NPO-  Except Medications  Entered: Feb 22 2022 12:58AM    
This patient has been assessed with a concern for Malnutrition and has been determined to have a diagnosis/diagnoses of Severe protein-calorie malnutrition and Underweight (BMI < 19).    This patient is being managed with:   Diet DASH/TLC-  Sodium & Cholesterol Restricted  Pureed (PUREED)  Prosource Gelatein Plus     Qty per Day:  4 oz  Supplement Feeding Modality:  Oral  Ensure Clear Cans or Servings Per Day:  1       Frequency:  Daily  Ensure Enlive Cans or Servings Per Day:  1       Frequency:  Daily  Entered: Mar 14 2022  1:45PM    
This patient has been assessed with a concern for Malnutrition and has been determined to have a diagnosis/diagnoses of Severe protein-calorie malnutrition and Underweight (BMI < 19).    This patient is being managed with:   Diet DASH/TLC-  Sodium & Cholesterol Restricted  Pureed (PUREED)  Prosource Gelatein Plus     Qty per Day:  4 oz  Supplement Feeding Modality:  Oral  Ensure Clear Cans or Servings Per Day:  1       Frequency:  Daily  Ensure Enlive Cans or Servings Per Day:  1       Frequency:  Daily  Entered: Mar 14 2022  1:45PM    
This patient has been assessed with a concern for Malnutrition and has been determined to have a diagnosis/diagnoses of Severe protein-calorie malnutrition and Underweight (BMI < 19).    This patient is being managed with:   Diet DASH/TLC-  Sodium & Cholesterol Restricted  Pureed (PUREED)  Tube Feeding Modality: Nasogastric  Jevity 1.2 Christophe  Total Volume for 24 Hours (mL): 960  Bolus  Total Volume of Bolus (mL):  240  Total # of Feeds: 4  Tube Feed Frequency: Every 6 hours   Tube Feed Start Time: 16:00  Bolus Feed Rate (mL per Hour): 240   Bolus Feed Duration (in Hours): 1  Bolus Feed Instructions:  Meal times & QHS   Frequency: Every 8 Hours    Start Time: 12:00  Entered: Mar  9 2022  1:23PM    
This patient has been assessed with a concern for Malnutrition and has been determined to have a diagnosis/diagnoses of Severe protein-calorie malnutrition and Underweight (BMI < 19).    This patient is being managed with:   Diet DASH/TLC-  Sodium & Cholesterol Restricted  Pureed (PUREED)  Tube Feeding Modality: Nasogastric  Peptamen A.F. Formula  Total Volume for 24 Hours (mL): 1125  Bolus  Total Volume of Bolus (mL):  375  Tube Feed Frequency: Every 8 hours   Tube Feed Start Time: 18:00  Bolus Feed Rate (mL per Hour): 500   Bolus Feed Duration (in Hours): 0.75  Bolus Feed Instructions:  give tube feed AFTER EACH ATTEMPTED PO MEAL NOT Q8H   Frequency: Every 8 Hours    Start Time: 12:00  Free Water Flush Instructions:  flush 50 ml water syringe push before and after each feeding  Entered: Mar  2 2022  5:58PM    
This patient has been assessed with a concern for Malnutrition and has been determined to have a diagnosis/diagnoses of Severe protein-calorie malnutrition and Underweight (BMI < 19).    This patient is being managed with:   Diet NPO after Midnight-     NPO Start Date: 21-Mar-2022   NPO Start Time: 23:59  Entered: Mar 21 2022 10:40AM    Diet DASH/TLC-  Sodium & Cholesterol Restricted  Pureed (PUREED)  Prosource Gelatein Plus     Qty per Day:  4 oz  Supplement Feeding Modality:  Oral  Ensure Clear Cans or Servings Per Day:  1       Frequency:  Daily  Ensure Enlive Cans or Servings Per Day:  1       Frequency:  Daily  Entered: Mar 14 2022  1:45PM    
This patient has been assessed with a concern for Malnutrition and has been determined to have a diagnosis/diagnoses of Severe protein-calorie malnutrition and Underweight (BMI < 19).    This patient is being managed with:   Diet NPO with Tube Feed-  Tube Feeding Modality: Orogastric  Jevity 1.2 Christophe  Total Volume for 24 Hours (mL): 1050  Bolus  Total Volume of Bolus (mL):  350  Tube Feed Frequency: Every 8 hours   Tube Feed Start Time: 08:30  Bolus Feed Rate (mL per Hour): 175   Bolus Feed Duration (in Hours): 2  Entered: Mar 24 2022  8:08AM    
This patient has been assessed with a concern for Malnutrition and has been determined to have a diagnosis/diagnoses of Severe protein-calorie malnutrition and Underweight (BMI < 19).    This patient is being managed with:   Diet DASH/TLC-  Sodium & Cholesterol Restricted  Pureed (PUREED)  Entered: Mar 11 2022  8:51AM    
This patient has been assessed with a concern for Malnutrition and has been determined to have a diagnosis/diagnoses of Severe protein-calorie malnutrition and Underweight (BMI < 19).    This patient is being managed with:   Diet DASH/TLC-  Sodium & Cholesterol Restricted  Pureed (PUREED)  Entered: Mar 11 2022  8:51AM    
This patient has been assessed with a concern for Malnutrition and has been determined to have a diagnosis/diagnoses of Severe protein-calorie malnutrition and Underweight (BMI < 19).    This patient is being managed with:   Diet DASH/TLC-  Sodium & Cholesterol Restricted  Pureed (PUREED)  Prosource Gelatein Plus     Qty per Day:  4 oz  Supplement Feeding Modality:  Oral  Ensure Clear Cans or Servings Per Day:  1       Frequency:  Daily  Ensure Enlive Cans or Servings Per Day:  1       Frequency:  Daily  Entered: Mar 14 2022  1:45PM    
This patient has been assessed with a concern for Malnutrition and has been determined to have a diagnosis/diagnoses of Severe protein-calorie malnutrition and Underweight (BMI < 19).    This patient is being managed with:   Diet NPO after Midnight-     NPO Start Date: 20-Mar-2022   NPO Start Time: 23:59  Entered: Mar 20 2022  3:16PM    Diet DASH/TLC-  Sodium & Cholesterol Restricted  Pureed (PUREED)  Prosource Gelatein Plus     Qty per Day:  4 oz  Supplement Feeding Modality:  Oral  Ensure Clear Cans or Servings Per Day:  1       Frequency:  Daily  Ensure Enlive Cans or Servings Per Day:  1       Frequency:  Daily  Entered: Mar 14 2022  1:45PM    
This patient has been assessed with a concern for Malnutrition and has been determined to have a diagnosis/diagnoses of Severe protein-calorie malnutrition and Underweight (BMI < 19).    This patient is being managed with:   Diet NPO after Midnight-     NPO Start Date: 21-Mar-2022   NPO Start Time: 23:59  Entered: Mar 21 2022 10:40AM    Diet DASH/TLC-  Sodium & Cholesterol Restricted  Pureed (PUREED)  Prosource Gelatein Plus     Qty per Day:  4 oz  Supplement Feeding Modality:  Oral  Ensure Clear Cans or Servings Per Day:  1       Frequency:  Daily  Ensure Enlive Cans or Servings Per Day:  1       Frequency:  Daily  Entered: Mar 14 2022  1:45PM    
This patient has been assessed with a concern for Malnutrition and has been determined to have a diagnosis/diagnoses of Severe protein-calorie malnutrition and Underweight (BMI < 19).    This patient is being managed with:   Diet DASH/TLC-  Sodium & Cholesterol Restricted  Pureed (PUREED)  Prosource Gelatein Plus     Qty per Day:  4 oz  Supplement Feeding Modality:  Oral  Ensure Clear Cans or Servings Per Day:  1       Frequency:  Daily  Ensure Enlive Cans or Servings Per Day:  1       Frequency:  Daily  Entered: Mar 14 2022  1:45PM    
This patient has been assessed with a concern for Malnutrition and has been determined to have a diagnosis/diagnoses of Severe protein-calorie malnutrition and Underweight (BMI < 19).    This patient is being managed with:   Diet NPO after Midnight-     NPO Start Date: 21-Mar-2022   NPO Start Time: 23:59  Entered: Mar 21 2022 10:40AM    Diet DASH/TLC-  Sodium & Cholesterol Restricted  Pureed (PUREED)  Prosource Gelatein Plus     Qty per Day:  4 oz  Supplement Feeding Modality:  Oral  Ensure Clear Cans or Servings Per Day:  1       Frequency:  Daily  Ensure Enlive Cans or Servings Per Day:  1       Frequency:  Daily  Entered: Mar 14 2022  1:45PM

## 2022-03-24 NOTE — DISCHARGE NOTE PROVIDER - PROVIDER TOKENS
PROVIDER:[TOKEN:[[id number]],FOLLOWUP:[1 month]],PROVIDER:[TOKEN:[[id number]],FOLLOWUP:[1 month]] PROVIDER:[TOKEN:[[id number]],FOLLOWUP:[1 month]],PROVIDER:[TOKEN:[[id number]],FOLLOWUP:[1 month]],PROVIDER:[TOKEN:[[id number]]],PROVIDER:[TOKEN:[[id number]]],PROVIDER:[TOKEN:[[id number]]],PROVIDER:[TOKEN:[[id number]]],PROVIDER:[TOKEN:[[id number]]]

## 2022-03-24 NOTE — PROGRESS NOTE ADULT - PROVIDER SPECIALTY LIST ADULT
Cardiology
Critical Care
Gastroenterology
Internal Medicine
Pulmonology
Pulmonology
CCU
Critical Care
Critical Care
Heme/Onc
Hospitalist
Hospitalist
Internal Medicine
Pulmonology
Colorectal Surgery
Critical Care
Critical Care
Gastroenterology
Heme/Onc
Hospitalist
Internal Medicine
Nutrition Support
Pulmonology
Surgery
CCU
Critical Care
Critical Care
Gastroenterology
Heme/Onc
Internal Medicine
Nutrition Support
Pulmonology
Surgery
Internal Medicine
Surgery
Hospitalist
Infectious Disease
Palliative Care
Palliative Care
Infectious Disease

## 2022-03-24 NOTE — DISCHARGE NOTE PROVIDER - DETAILS OF MALNUTRITION DIAGNOSIS/DIAGNOSES
This patient has been assessed with a concern for Malnutrition and was treated during this hospitalization for the following Nutrition diagnosis/diagnoses:     -  02/21/2022: Severe protein-calorie malnutrition   -  02/21/2022: Underweight (BMI < 19)

## 2022-03-24 NOTE — DISCHARGE NOTE PROVIDER - CARE PROVIDER_API CALL
Vic Kate)  ColonRectal Surgery; Surgery  [address] [facility] [address] [facility], [facility], [address]rd [place] [address]  Phone: [phone]  Fax: [phone]  Follow Up Time: 1 month    Dee Dee Alaniz)  Internal Medicine  [address] [facility], [facility] [place] [address]  Phone: [phone]  Fax: [phone]  Follow Up Time: 1 month   Vic Kate)  ColonRectal Surgery; Surgery  [address] [facility] [place] [address]  Phone: [phone]  Fax: [phone]  Follow Up Time: 1 month    Dee Dee Alaniz)  Internal Medicine  [address] [facility], [address] [place] [address]  Phone: [phone]  Fax: [phone]  Follow Up Time: 1 month    Martin Soto)  Cardiovascular Disease; Internal Medicine  [address] [facility], [address], [address]  [place] [address]  Phone: [phone]  Fax: [phone]  Follow Up Time:     Delores Moses)  Gastroenterology  [address] [place] [address]  Phone: [phone]  Fax: [phone]  Follow Up Time:     Ramon Moya)  Hematology; Internal Medicine; Medical Oncology  [address] [place] [address]  Phone: [phone]  Fax: [phone]  Follow Up Time:     Tabby Muñoz)  Nutritional Support  [address] [place] [address]  Phone: [phone]  Fax: [phone]  Follow Up Time:     Arian Trejo)  Dermatology; Internal Medicine  [address] [place] [address]  Phone: [phone]  Fax: [phone]  Follow Up Time:

## 2022-03-24 NOTE — DISCHARGE NOTE PROVIDER - HOSPITAL COURSE
61F PMH GIB, Bowel and Bladder incontinence, Smoking (3-4 cigarettes/day), ETOH abuse, hypothyroidism, and ongoing anemia, admitted to [facility] 1/31 for ams found to have UTI. Dev GIB, colonoscopy showing polyps, transferred to [facility] for polypectomy upgraded to CCU for hypotension, likely septic shock 2/2 aspiration pna. While in the hospital colonoscopy was performed  with polypectomy 2/18, biopsy showed adenocarcinoma. CT Chest/A/P negative for metastatic disease.  Patient s/p PEG for failure to thrive due to poor PO intake.       # Anorexia / severe malnutrition   - s/p PEG, tolerating feeds     # Hypothermia  - started s/p PEG placement  - sepsis ruled out  - improving    # Acute on chronic HFpEF - resolved   - fluid restriction 1200 ml in 24 hours  - intake and output monitoring, daily weight   - ECHO with preserved EF, grade 2 diastolic dysfunction  - off diuretics     # Metabolic encephalopathy   - resolved     #  Adenocarcinoma of the colon   - s/p colonoscopy with polypectomy 2/18, biopsy showed adenocarcinoma   - CT Chest/A/P negative for metastatic disease   - Surgical oncology follow up  OUTPATIENT   - medical oncology is following OUTPATIENT     #  R IJ DVT   - c/w therapeutic Lovenox     # Rash   - evaluated by Burn, rheumatology and dermatology  - f/u biopsy report - dermatitis vs drug eruption vs viral exanthem vs psoriasiform   - on steroid cream    # Thrombocytopenia and anemia/ suspected  autoimmune hemolytic anemia   - stable for now   - s/p prednisone taper per Hem Onc recommendation     61F PMH GIB, Bowel and Bladder incontinence, Smoking (3-4 cigarettes/day), ETOH abuse, hypothyroidism, and ongoing anemia, admitted to [facility] 1/31 for ams found to have UTI. Dev GIB, colonoscopy showing polyps, transferred to [facility] for polypectomy upgraded to CCU for hypotension, likely septic shock 2/2 aspiration pna. While in the hospital colonoscopy was performed  with polypectomy 2/18, biopsy showed adenocarcinoma. CT Chest/A/P negative for metastatic disease.  Patient s/p PEG for failure to thrive due to poor PO intake. Patient reports a loose tooth      # Anorexia / severe malnutrition   - s/p PEG, tolerating feeds     # Hypothermia  - started s/p PEG placement  - sepsis ruled out  - improving    # Acute on chronic HFpEF - resolved   - fluid restriction 1200 ml in 24 hours  - intake and output monitoring, daily weight   - ECHO with preserved EF, grade 2 diastolic dysfunction  - off diuretics     # Metabolic encephalopathy   - resolved     #  Adenocarcinoma of the colon   - s/p colonoscopy with polypectomy 2/18, biopsy showed adenocarcinoma   - CT Chest/A/P negative for metastatic disease   - Surgical oncology follow up  OUTPATIENT   - medical oncology is following OUTPATIENT     #  R IJ DVT   - c/w therapeutic Lovenox     # Rash   - evaluated by Burn, rheumatology and dermatology  - f/u biopsy report - dermatitis vs drug eruption vs viral exanthem vs psoriasiform   - on steroid cream    # Thrombocytopenia and anemia/ suspected  autoimmune hemolytic anemia   - stable for now   - s/p prednisone taper per Hem Onc recommendation    # Loose tooth   -to be followed up out patient

## 2022-03-24 NOTE — PROGRESS NOTE ADULT - SUBJECTIVE AND OBJECTIVE BOX
Pt seen and examined at bedside.        VITAL SIGNS (Last 24 hrs):  T(C): 35.6 (03-24-22 @ 05:00), Max: 36.4 (03-23-22 @ 18:50)  HR: 87 (03-24-22 @ 08:00) (75 - 87)  BP: 119/65 (03-24-22 @ 05:00) (81/52 - 119/65)  RR: 18 (03-24-22 @ 05:00) (16 - 18)  SpO2: 100% (03-24-22 @ 08:00) (100% - 100%)  Wt(kg): --  Daily     Daily     I&O's Summary    24 Mar 2022 07:01  -  24 Mar 2022 12:36  --------------------------------------------------------  IN: 450 mL / OUT: 0 mL / NET: 450 mL        PHYSICAL EXAM:  GENERAL: NAD   HEAD:  Atraumatic, Normocephalic  EYES: conjunctiva and sclera clear  NECK: Supple, No JVD  CHEST/LUNG: Clear to auscultation bilaterally; No wheeze  HEART: Regular rate and rhythm; No murmurs, rubs, or gallops  ABDOMEN: Soft, Nontender, Nondistended; Bowel sounds present +PEG   EXTREMITIES:  2+ Peripheral Pulses, No clubbing, cyanosis, or edema  PSYCH: AAOx3  NEUROLOGY: non-focal       Labs Reviewed        CBC Full  -  ( 23 Mar 2022 16:29 )  WBC Count : 5.59 K/uL  Hemoglobin : 8.9 g/dL  Hematocrit : 26.6 %  Platelet Count - Automated : 101 K/uL  Mean Cell Volume : 89.3 fL  Mean Cell Hemoglobin : 29.9 pg  Mean Cell Hemoglobin Concentration : 33.5 g/dL  Auto Neutrophil # : 4.14 K/uL  Auto Lymphocyte # : 0.92 K/uL  Auto Monocyte # : 0.32 K/uL  Auto Eosinophil # : 0.19 K/uL  Auto Basophil # : 0.01 K/uL  Auto Neutrophil % : 74.0 %  Auto Lymphocyte % : 16.5 %  Auto Monocyte % : 5.7 %  Auto Eosinophil % : 3.4 %  Auto Basophil % : 0.2 %    BMP:    03-23 @ 16:29    Blood Urea Nitrogen - 4  Calcium - 7.8  Carbon Dioxide - 26  Chloride - 103  Creatinine - 0.8  Glucose - 78  Potassium - 3.9  Sodium - 137         PT/INR - ( 21 Mar 2022 20:00 )   PT: 12.70 sec;   INR: 1.11 ratio         PTT - ( 21 Mar 2022 20:00 )  PTT:53.5 sec       MEDICATIONS  (STANDING):  benzocaine 20% Spray 2 Spray(s) Mucosal four times a day  chlorhexidine 4% Liquid 1 Application(s) Topical daily  cholecalciferol 2000 Unit(s) Oral daily  enoxaparin Injectable 60 milliGRAM(s) SubCutaneous every 12 hours  famotidine    Tablet 20 milliGRAM(s) Oral daily  folic acid 1 milliGRAM(s) Oral daily  levothyroxine 125 MICROGram(s) Oral daily  magnesium oxide 400 milliGRAM(s) Oral two times a day with meals  midodrine. 10 milliGRAM(s) Oral every 8 hours  multivitamin/minerals 1 Tablet(s) Oral daily  thiamine 100 milliGRAM(s) Oral daily  triamcinolone 0.1% Cream 1 Application(s) Topical two times a day  vitamin A &amp; D Ointment 1 Application(s) Topical daily    MEDICATIONS  (PRN):  acetaminophen     Tablet .. 650 milliGRAM(s) Oral every 6 hours PRN Mild Pain (1 - 3), Moderate Pain (4 - 6)  ibuprofen  Tablet. 400 milliGRAM(s) Oral every 6 hours PRN Severe Pain (7 - 10)

## 2022-03-24 NOTE — DISCHARGE NOTE PROVIDER - NSDCCPCAREPLAN_GEN_ALL_CORE_FT
PRINCIPAL DISCHARGE DIAGNOSIS  Diagnosis: Acute UTI  Assessment and Plan of Treatment: You came to the ED  for weakness and increased urinary frequency and were treated for a UTI. During your admission you developed a gastric bleed. A colonoscopy showed polyps, and you were transferred to [facility] for polypectomy upgraded to CCU for hypotension, likely septic shock secondary to aspiration pneumonia. While in the hospital colonoscopy was performed  with polypectomy 2/18, biopsy showed adenocarcinoma (cancer in your colon). CT Chest/A/P negative for metastatic disease.  Patient s/p PEG for failure to thrive due to poor oral  intake. PLEASE BE SURE TO FOLLOW UP W DR ZUÑIGA OUT PATIENT       PRINCIPAL DISCHARGE DIAGNOSIS  Diagnosis: Acute UTI  Assessment and Plan of Treatment: You came to the ED  for weakness and increased urinary frequency and were treated for a UTI and received  a course of antibiotics . During your admission you developed a gastric bleed. You were transfered to the CCU for hypotension, likely septic shock secondary to aspiration pneumonia.      SECONDARY DISCHARGE DIAGNOSES  Diagnosis: Anemia  Assessment and Plan of Treatment: Your red blood cell levels are chronically low. You also had low platelet levels which puts you at a bleeding risk. You were given a prednisone treatment as it was likely immune mediated. Please follow up w a hematologist for further evaluation     PRINCIPAL DISCHARGE DIAGNOSIS  Diagnosis: Acute UTI  Assessment and Plan of Treatment: You came to the ED  for weakness and increased urinary frequency and were treated for a UTI and received  a course of antibiotics . During your admission you developed a gastric bleed. You were transfered to the CCU for hypotension, likely septic shock secondary to aspiration pneumonia. You are done with your treatment.      SECONDARY DISCHARGE DIAGNOSES  Diagnosis: Cancer, colon  Assessment and Plan of Treatment: During your stay you developed a gastric bleed. While in the hospital colonoscopy was performed  with polypectomy 2/18, biopsy showed adenocarcinoma (cancer in your colon). CT Chest/A/P negative for metastatic disease. PLEASE BE SURE TO FOLLOW UP W DR ZUÑIGA OUT PATIENT as well as WITH A GASTROENTEROLOGIST AND GASTRIC SURGEON.    Diagnosis: Nutritional deficiency  Assessment and Plan of Treatment: You have had poor oral intake and decreased apetite which has caused significant muscle loss. A PEG tube was placed in your stomach to ensure you are getting enough callories per day. You can still eat and take food/medications by mouth.  Please follow up with a nutritionalist to ensure you are getting adequate nutrition    Diagnosis: Skin rash  Assessment and Plan of Treatment: You have a diffuse peeling rash. You were evaluated in hospital for TEN which you do not have. However your skin continues to peel and flake despite moisturizing. Please follow up w a dermatologist    Diagnosis: Anemia  Assessment and Plan of Treatment: Your red blood cell levels are chronically low. You also had low platelet levels which puts you at a bleeding risk. You were given a prednisone treatment as it was likely immune mediated. Please follow up w a hematologist for further evaluation

## 2022-03-24 NOTE — DISCHARGE NOTE PROVIDER - NSDCMRMEDTOKEN_GEN_ALL_CORE_FT
cefpodoxime 100 mg oral tablet: 1 tab(s) orally 2 times a day    cholecalciferol oral tablet: 2000 unit(s) orally once a day  enoxaparin: 60 milligram(s) subcutaneous 2 times a day   folic acid 1 mg oral tablet: 1 tab(s) orally once a day  levothyroxine 125 mcg (0.125 mg) oral tablet: 1 tab(s) orally once a day  midodrine 10 mg oral tablet: 1 tab(s) orally every 8 hours  Multiple Vitamins with Minerals oral tablet: 1 tab(s) orally once a day  thiamine 100 mg oral tablet: 1 tab(s) orally once a day  triamcinolone 0.1% topical cream: 1 application topically 2 times a day  vitamin A and D topical ointment: 1 application topically once a day

## 2022-03-24 NOTE — ADVANCED PRACTICE NURSE CONSULT - RECOMMEDATIONS
1. IAD sacrococcygeal, b/l buttock, & perirectal area- Cleanse skin w/ perineal cleanser, gently pat dry. Apply thin layer of Coloplast Triad hydrophilic ointment & Coloplast Jamin Protective barrier cream to absorb wound exudate, provide protective layer, manage pt's incontinence & prevent further skin breakdown. To keep paste in place to wound bed. may cover w/ ABD pad. Apply Triad & Jamin and change dressing q12h and prn for strike-through drainage or soiling. If top layer of Triad/Jamin soiled, gently remove w/ perineal cleanser and re-apply ointment. Do not scrub off as this may cause further skin breakdown.   -Assess skin/wound qshift, report changes to primary provider.     Additional recs/PI prevention:  -Continue to assist patient w/ turning/positioning  from side-to-side q2h while in bed, q1h when/if OOB chair, or in accordance w/ pt's plan of care. Utilize pillows  to assist w/ turning/positioning. When/if OOB chair, utilize pillows or chair cushion to offload pressure.   -Continue to offload heels from bed surface with soft pillow under calfs.   -Continue utilizing one underpad underneath patient to contain incontinence episodes; change pad when saturated/soiled.   -If patient w/ consistent urinary incontinence, consider utilizing female incontinence device/PrimaFit/PureWick (if patient candidate) to contain urinary incontinence.  -Continue nutrition consult for optimal wound healing & nutritional status.     Plan of Care: Primary RN Maddison made aware of above recs. Spoke w/ covering/primary MD Purdy  (at [phone]) in regards to above. Signing off on patient, no further needs/recs from [facility] service at this time. Staff RN to perform routine skin/wound assessment and manage wound care. Questions or concerns or if wound worsens and reconsult needed, please contact [facility], [phone].

## 2022-03-24 NOTE — DISCHARGE NOTE PROVIDER - CARE PROVIDERS DIRECT ADDRESSES
,[email or web address] (now [email or web address].[email or web address].[email or web address],DirectAddress_Unknown,[email or web address].[employer].[facility],[email or web address].[email or web address].[email or web address],DirectAddress_Unknown,[email or web address].[employer].[facility]

## 2022-03-25 ENCOUNTER — TRANSCRIPTION ENCOUNTER (OUTPATIENT)
Age: 62
End: 2022-03-25

## 2022-03-25 VITALS — SYSTOLIC BLOOD PRESSURE: 124 MMHG | DIASTOLIC BLOOD PRESSURE: 64 MMHG | TEMPERATURE: 97 F | HEART RATE: 87 BPM

## 2022-03-25 DIAGNOSIS — R21 RASH AND OTHER NONSPECIFIC SKIN ERUPTION: ICD-10-CM

## 2022-03-25 DIAGNOSIS — C18.9 MALIGNANT NEOPLASM OF COLON, UNSPECIFIED: ICD-10-CM

## 2022-03-25 LAB
ANION GAP SERPL CALC-SCNC: 12 MMOL/L — SIGNIFICANT CHANGE UP (ref 7–14)
BUN SERPL-MCNC: 6 MG/DL — LOW (ref 10–20)
CALCIUM SERPL-MCNC: 7.9 MG/DL — LOW (ref 8.5–10.1)
CHLORIDE SERPL-SCNC: 102 MMOL/L — SIGNIFICANT CHANGE UP (ref 98–110)
CO2 SERPL-SCNC: 20 MMOL/L — SIGNIFICANT CHANGE UP (ref 17–32)
CREAT SERPL-MCNC: 0.8 MG/DL — SIGNIFICANT CHANGE UP (ref 0.7–1.5)
EGFR: 84 ML/MIN/1.73M2 — SIGNIFICANT CHANGE UP
GLUCOSE BLDC GLUCOMTR-MCNC: 65 MG/DL — LOW (ref 70–99)
GLUCOSE BLDC GLUCOMTR-MCNC: 85 MG/DL — SIGNIFICANT CHANGE UP (ref 70–99)
GLUCOSE BLDC GLUCOMTR-MCNC: 98 MG/DL — SIGNIFICANT CHANGE UP (ref 70–99)
GLUCOSE SERPL-MCNC: 56 MG/DL — LOW (ref 70–99)
HCT VFR BLD CALC: 29.8 % — LOW (ref 37–47)
HGB BLD-MCNC: 9.7 G/DL — LOW (ref 12–16)
MCHC RBC-ENTMCNC: 29.1 PG — SIGNIFICANT CHANGE UP (ref 27–31)
MCHC RBC-ENTMCNC: 32.6 G/DL — SIGNIFICANT CHANGE UP (ref 32–37)
MCV RBC AUTO: 89.5 FL — SIGNIFICANT CHANGE UP (ref 81–99)
NRBC # BLD: 0 /100 WBCS — SIGNIFICANT CHANGE UP (ref 0–0)
PLATELET # BLD AUTO: 111 K/UL — LOW (ref 130–400)
POTASSIUM SERPL-MCNC: 4.7 MMOL/L — SIGNIFICANT CHANGE UP (ref 3.5–5)
POTASSIUM SERPL-SCNC: 4.7 MMOL/L — SIGNIFICANT CHANGE UP (ref 3.5–5)
RBC # BLD: 3.33 M/UL — LOW (ref 4.2–5.4)
RBC # FLD: 15.3 % — HIGH (ref 11.5–14.5)
SODIUM SERPL-SCNC: 134 MMOL/L — LOW (ref 135–146)
WBC # BLD: 6.78 K/UL — SIGNIFICANT CHANGE UP (ref 4.8–10.8)
WBC # FLD AUTO: 6.78 K/UL — SIGNIFICANT CHANGE UP (ref 4.8–10.8)

## 2022-03-25 PROCEDURE — 99232 SBSQ HOSP IP/OBS MODERATE 35: CPT

## 2022-03-25 RX ORDER — FOLIC ACID 0.8 MG
1 TABLET ORAL
Qty: 0 | Refills: 0 | DISCHARGE
Start: 2022-03-25

## 2022-03-25 RX ORDER — MIDODRINE HYDROCHLORIDE 2.5 MG/1
1 TABLET ORAL
Qty: 0 | Refills: 0 | DISCHARGE
Start: 2022-03-25

## 2022-03-25 RX ORDER — ENOXAPARIN SODIUM 100 MG/ML
60 INJECTION SUBCUTANEOUS
Qty: 60 | Refills: 2
Start: 2022-03-25 | End: 2022-06-22

## 2022-03-25 RX ORDER — LEVOTHYROXINE SODIUM 125 MCG
1 TABLET ORAL
Qty: 0 | Refills: 0 | DISCHARGE
Start: 2022-03-25

## 2022-03-25 RX ORDER — THIAMINE MONONITRATE (VIT B1) 100 MG
1 TABLET ORAL
Qty: 0 | Refills: 0 | DISCHARGE
Start: 2022-03-25

## 2022-03-25 RX ORDER — MULTIVIT-MIN/FERROUS GLUCONATE 9 MG/15 ML
1 LIQUID (ML) ORAL
Qty: 0 | Refills: 0 | DISCHARGE
Start: 2022-03-25

## 2022-03-25 RX ORDER — SALICYLIC ACID 0.5 %
1 CLEANSER (GRAM) TOPICAL
Qty: 1 | Refills: 0
Start: 2022-03-25 | End: 2022-04-23

## 2022-03-25 RX ORDER — CHOLECALCIFEROL (VITAMIN D3) 125 MCG
2000 CAPSULE ORAL
Qty: 0 | Refills: 0 | DISCHARGE
Start: 2022-03-25

## 2022-03-25 RX ADMIN — PANTOPRAZOLE SODIUM 40 MILLIGRAM(S): 20 TABLET, DELAYED RELEASE ORAL at 05:19

## 2022-03-25 RX ADMIN — Medication 1 APPLICATION(S): at 17:16

## 2022-03-25 RX ADMIN — Medication 125 MICROGRAM(S): at 05:19

## 2022-03-25 RX ADMIN — Medication 1 APPLICATION(S): at 08:18

## 2022-03-25 RX ADMIN — Medication 1 APPLICATION(S): at 11:15

## 2022-03-25 RX ADMIN — FAMOTIDINE 20 MILLIGRAM(S): 10 INJECTION INTRAVENOUS at 11:16

## 2022-03-25 RX ADMIN — MAGNESIUM OXIDE 400 MG ORAL TABLET 400 MILLIGRAM(S): 241.3 TABLET ORAL at 08:18

## 2022-03-25 RX ADMIN — Medication 100 MILLIGRAM(S): at 11:15

## 2022-03-25 RX ADMIN — Medication 2 SPRAY(S): at 05:18

## 2022-03-25 RX ADMIN — Medication 1 MILLIGRAM(S): at 11:15

## 2022-03-25 RX ADMIN — Medication 2 SPRAY(S): at 17:15

## 2022-03-25 RX ADMIN — Medication 2 SPRAY(S): at 11:14

## 2022-03-25 RX ADMIN — ENOXAPARIN SODIUM 60 MILLIGRAM(S): 100 INJECTION SUBCUTANEOUS at 05:19

## 2022-03-25 RX ADMIN — CHLORHEXIDINE GLUCONATE 1 APPLICATION(S): 213 SOLUTION TOPICAL at 11:32

## 2022-03-25 RX ADMIN — Medication 2000 UNIT(S): at 11:15

## 2022-03-25 RX ADMIN — MIDODRINE HYDROCHLORIDE 10 MILLIGRAM(S): 2.5 TABLET ORAL at 05:19

## 2022-03-25 RX ADMIN — ENOXAPARIN SODIUM 60 MILLIGRAM(S): 100 INJECTION SUBCUTANEOUS at 17:15

## 2022-03-25 RX ADMIN — Medication 2 SPRAY(S): at 00:02

## 2022-03-25 RX ADMIN — MAGNESIUM OXIDE 400 MG ORAL TABLET 400 MILLIGRAM(S): 241.3 TABLET ORAL at 17:16

## 2022-03-25 RX ADMIN — Medication 1 TABLET(S): at 11:15

## 2022-03-25 NOTE — DISCHARGE NOTE NURSING/CASE MANAGEMENT/SOCIAL WORK - NSDCPEFALRISK_GEN_ALL_CORE
For information on Fall & Injury Prevention, visit: [email or web address] OR  [email or web address] OR  https://www.cdc.gov/steadi/patient.html

## 2022-03-25 NOTE — DISCHARGE NOTE NURSING/CASE MANAGEMENT/SOCIAL WORK - PATIENT PORTAL LINK FT
You can access the FollowMyHealth Patient Portal offered by [facility] by registering at the following website: [email or web address]. By joining [employer]’s FollowMyHealth portal, you will also be able to view your health information using other applications (apps) compatible with our system.

## 2022-03-26 PROCEDURE — 99239 HOSP IP/OBS DSCHRG MGMT >30: CPT

## 2022-03-28 LAB
CULTURE RESULTS: SIGNIFICANT CHANGE UP
SPECIMEN SOURCE: SIGNIFICANT CHANGE UP

## 2022-04-04 DIAGNOSIS — C18.9 MALIGNANT NEOPLASM OF COLON, UNSPECIFIED: ICD-10-CM

## 2022-04-04 DIAGNOSIS — G93.41 METABOLIC ENCEPHALOPATHY: ICD-10-CM

## 2022-04-04 DIAGNOSIS — I82.C11 ACUTE EMBOLISM AND THROMBOSIS OF RIGHT INTERNAL JUGULAR VEIN: ICD-10-CM

## 2022-04-04 DIAGNOSIS — K29.60 OTHER GASTRITIS WITHOUT BLEEDING: ICD-10-CM

## 2022-04-04 DIAGNOSIS — I21.4 NON-ST ELEVATION (NSTEMI) MYOCARDIAL INFARCTION: ICD-10-CM

## 2022-04-04 DIAGNOSIS — A41.9 SEPSIS, UNSPECIFIED ORGANISM: ICD-10-CM

## 2022-04-04 DIAGNOSIS — I25.10 ATHEROSCLEROTIC HEART DISEASE OF NATIVE CORONARY ARTERY WITHOUT ANGINA PECTORIS: ICD-10-CM

## 2022-04-04 DIAGNOSIS — D59.10 AUTOIMMUNE HEMOLYTIC ANEMIA, UNSPECIFIED: ICD-10-CM

## 2022-04-04 DIAGNOSIS — E87.0 HYPEROSMOLALITY AND HYPERNATREMIA: ICD-10-CM

## 2022-04-04 DIAGNOSIS — E83.42 HYPOMAGNESEMIA: ICD-10-CM

## 2022-04-04 DIAGNOSIS — D61.818 OTHER PANCYTOPENIA: ICD-10-CM

## 2022-04-04 DIAGNOSIS — E43 UNSPECIFIED SEVERE PROTEIN-CALORIE MALNUTRITION: ICD-10-CM

## 2022-04-04 DIAGNOSIS — K29.80 DUODENITIS WITHOUT BLEEDING: ICD-10-CM

## 2022-04-04 DIAGNOSIS — E03.9 HYPOTHYROIDISM, UNSPECIFIED: ICD-10-CM

## 2022-04-04 DIAGNOSIS — J96.01 ACUTE RESPIRATORY FAILURE WITH HYPOXIA: ICD-10-CM

## 2022-04-04 DIAGNOSIS — D62 ACUTE POSTHEMORRHAGIC ANEMIA: ICD-10-CM

## 2022-04-04 DIAGNOSIS — R65.21 SEVERE SEPSIS WITH SEPTIC SHOCK: ICD-10-CM

## 2022-04-04 DIAGNOSIS — E87.6 HYPOKALEMIA: ICD-10-CM

## 2022-04-04 DIAGNOSIS — R64 CACHEXIA: ICD-10-CM

## 2022-04-04 DIAGNOSIS — L03.211 CELLULITIS OF FACE: ICD-10-CM

## 2022-04-04 DIAGNOSIS — N39.0 URINARY TRACT INFECTION, SITE NOT SPECIFIED: ICD-10-CM

## 2022-04-04 DIAGNOSIS — I50.33 ACUTE ON CHRONIC DIASTOLIC (CONGESTIVE) HEART FAILURE: ICD-10-CM

## 2022-04-04 DIAGNOSIS — F17.210 NICOTINE DEPENDENCE, CIGARETTES, UNCOMPLICATED: ICD-10-CM

## 2022-04-04 DIAGNOSIS — D68.9 COAGULATION DEFECT, UNSPECIFIED: ICD-10-CM

## 2022-04-04 DIAGNOSIS — K52.89 OTHER SPECIFIED NONINFECTIVE GASTROENTERITIS AND COLITIS: ICD-10-CM

## 2022-04-04 DIAGNOSIS — L26 EXFOLIATIVE DERMATITIS: ICD-10-CM

## 2022-04-04 DIAGNOSIS — E16.2 HYPOGLYCEMIA, UNSPECIFIED: ICD-10-CM

## 2022-04-04 DIAGNOSIS — K63.5 POLYP OF COLON: ICD-10-CM

## 2022-04-04 DIAGNOSIS — E87.2 ACIDOSIS: ICD-10-CM

## 2022-04-04 DIAGNOSIS — J69.0 PNEUMONITIS DUE TO INHALATION OF FOOD AND VOMIT: ICD-10-CM

## 2022-04-11 ENCOUNTER — APPOINTMENT (OUTPATIENT)
Dept: SURGERY | Facility: CLINIC | Age: 62
End: 2022-04-11
Payer: MEDICARE

## 2022-04-11 VITALS
DIASTOLIC BLOOD PRESSURE: 78 MMHG | WEIGHT: 160 LBS | HEART RATE: 69 BPM | BODY MASS INDEX: 26.66 KG/M2 | SYSTOLIC BLOOD PRESSURE: 108 MMHG | TEMPERATURE: 95.2 F | OXYGEN SATURATION: 99 % | HEIGHT: 65 IN

## 2022-04-11 DIAGNOSIS — F17.200 NICOTINE DEPENDENCE, UNSPECIFIED, UNCOMPLICATED: ICD-10-CM

## 2022-04-11 PROCEDURE — 99213 OFFICE O/P EST LOW 20 MIN: CPT

## 2022-04-11 RX ORDER — ZINC SULFATE 50(220)MG
CAPSULE ORAL
Refills: 0 | Status: ACTIVE | COMMUNITY

## 2022-04-11 RX ORDER — LEVOTHYROXINE SODIUM 0.12 MG/1
125 TABLET ORAL
Refills: 0 | Status: ACTIVE | COMMUNITY

## 2022-04-11 NOTE — PHYSICAL EXAM
[Abdomen Masses] : No abdominal masses [Abdomen Tenderness] : ~T No ~M abdominal tenderness [No HSM] : no hepatosplenomegaly [Respiratory Effort] : normal respiratory effort [Normal Rate and Rhythm] : normal rate and rhythm [de-identified] : PEG in place.  No erythema induration or purulence [de-identified] : awake, alert and in no acute distress. Cachectic

## 2022-04-11 NOTE — HISTORY OF PRESENT ILLNESS
[FreeTextEntry1] : Patient is a 61F with PMH of bowel and bladder incontinence, EtOH abuse, hypothyroidism, anemia who presents for follow up of sigmoid colon polyp containing adenocarcinoma.  Patient underwent EMR for the polyp at 20cm with Dr. Daniels on 2/18.  Pathology showed adenocarcinoma present at the cauterized margin.  Suspicious for LVI.  there is tattoo present.  She had a complicated hospital stay due to aspiration pneumonia requiring ICU admission and intubation.  She is still in a NH. She presents now for follow up.  She is tolerating a diet and having daily BM.  She uses her PEG intermittently. Patient denies fevers, chills, nausea, vomiting, abdominal pain, constipation, diarrhea, blood in the stool or unexpected weight loss.  Patient denies a family history of colon cancer rectal cancer or inflammatory bowel disease.

## 2022-04-25 ENCOUNTER — APPOINTMENT (OUTPATIENT)
Dept: INTERNAL MEDICINE | Facility: CLINIC | Age: 62
End: 2022-04-25

## 2022-05-06 ENCOUNTER — APPOINTMENT (OUTPATIENT)
Dept: CARDIOLOGY | Facility: CLINIC | Age: 62
End: 2022-05-06
Payer: MEDICARE

## 2022-05-06 VITALS
WEIGHT: 105 LBS | SYSTOLIC BLOOD PRESSURE: 100 MMHG | BODY MASS INDEX: 17.49 KG/M2 | HEART RATE: 91 BPM | TEMPERATURE: 97.7 F | DIASTOLIC BLOOD PRESSURE: 60 MMHG | HEIGHT: 65 IN | RESPIRATION RATE: 15 BRPM

## 2022-05-06 DIAGNOSIS — D64.9 ANEMIA, UNSPECIFIED: ICD-10-CM

## 2022-05-06 PROCEDURE — 93000 ELECTROCARDIOGRAM COMPLETE: CPT

## 2022-05-06 PROCEDURE — 99214 OFFICE O/P EST MOD 30 MIN: CPT | Mod: 25

## 2022-05-07 PROBLEM — D64.9 ANEMIA: Status: ACTIVE | Noted: 2022-05-07

## 2022-05-07 RX ORDER — ACETAMINOPHEN 325 MG/1
325 TABLET, FILM COATED ORAL EVERY 6 HOURS
Refills: 0 | Status: ACTIVE | COMMUNITY

## 2022-05-07 RX ORDER — CHLORHEXIDINE GLUCONATE 4 %
325 (65 FE) LIQUID (ML) TOPICAL
Refills: 0 | Status: ACTIVE | COMMUNITY

## 2022-05-07 RX ORDER — MULTIVIT-MIN/FOLIC/VIT K/LYCOP 400-300MCG
500 TABLET ORAL DAILY
Refills: 0 | Status: ACTIVE | COMMUNITY

## 2022-05-07 RX ORDER — ENOXAPARIN SODIUM 60 MG/.6ML
60 INJECTION, SOLUTION SUBCUTANEOUS TWICE DAILY
Refills: 0 | Status: ACTIVE | COMMUNITY

## 2022-05-07 RX ORDER — MULTIVITAMIN 9 MG/15 ML
LIQUID ORAL
Refills: 0 | Status: ACTIVE | COMMUNITY

## 2022-05-07 NOTE — PHYSICAL EXAM
[Frail] : frail [Cachectic] : cachexia was observed [Soft] : abdomen soft [Normal] : normal gait [No Edema] : no edema [Moves all extremities] : moves all extremities [No Focal Deficits] : no focal deficits [Normal Speech] : normal speech [de-identified] : PEG noted clean site

## 2022-05-07 NOTE — ASSESSMENT
[FreeTextEntry1] : 61 year old female PMHx Smoking, COPD, ETOH, Hypothyroidism, anemia is here for hypotension evaluation. The patient is asymptomatic and denies orthostatic symptoms ,syncope or presyncope. The patient has chronic weakness and is uses wheel chair. She participates in physical therapy at the SNF for 1.5 hours and denies any chest pain shortness of breath during therapy. She was seen in the hospital for pre operative risk assessment prior to PEG placement. The patient reports that she stopped smoking since hospital discharge. \par \par Continue Midodrine for hypotension \par BP measurements reviewed in the hospital (similar range )\par Asymptomatic \par Discussed with the patient her last visit with surgical team, she mentioned that she do not want to undergo surgery at this point. \par If plan changes she will require pre surgical testing \par \par

## 2022-05-07 NOTE — REVIEW OF SYSTEMS
[Feeling Fatigued] : feeling fatigued [Negative] : Neurological [FreeTextEntry5] : see hpi  [FreeTextEntry6] : see HPI  [FreeTextEntry7] : see HPI

## 2022-05-07 NOTE — HISTORY OF PRESENT ILLNESS
[FreeTextEntry1] : 61 year old female PMHx Smoking, COPD, ETOH, Hypothyroidism, anemia is here for hypotension evaluation. The patient is asymptomatic and denies orthostatic symptoms ,syncope or presyncope. The patient has chronic weakness and is uses wheel chair. She participates in physical therapy at the SNF for 1.5 hours and denies any chest pain shortness of breath during therapy. She was seen in the hospital for pre operative risk assessment prior to PEG placement. The patient reports that she stopped smoking since hospital discharge. \par \par \par \par

## 2022-05-23 ENCOUNTER — APPOINTMENT (OUTPATIENT)
Dept: SURGERY | Facility: CLINIC | Age: 62
End: 2022-05-23
Payer: MEDICARE

## 2022-05-23 VITALS
DIASTOLIC BLOOD PRESSURE: 60 MMHG | HEIGHT: 65 IN | TEMPERATURE: 96.3 F | OXYGEN SATURATION: 98 % | HEART RATE: 102 BPM | SYSTOLIC BLOOD PRESSURE: 110 MMHG

## 2022-05-23 PROCEDURE — 99213 OFFICE O/P EST LOW 20 MIN: CPT

## 2022-05-23 NOTE — ASSESSMENT
[FreeTextEntry1] : 61F with sigmoid colon polyp containing adenocarcinoma present at cauterized margin\par \par I had a long conversation with the patient again.  We again discussed the need for sigmoidectomy given the positive margin.  Patient would like to hold off at this time and is unsure if she would like surgery at all.  We will see her back in 2 months.  She can be discharged from the NH from a colorectal standpoint.

## 2022-05-23 NOTE — HISTORY OF PRESENT ILLNESS
[FreeTextEntry1] : Patient is a 61F with PMH of bowel and bladder incontinence, EtOH abuse, hypothyroidism, anemia who presents for follow up of sigmoid colon polyp containing adenocarcinoma.  Patient underwent EMR for the polyp at 20cm with Dr. Daniels on 2/18.  Pathology showed adenocarcinoma present at the cauterized margin.  Suspicious for LVI.  there is tattoo present.  She had a complicated hospital stay due to aspiration pneumonia requiring ICU admission and intubation.  She is still in a NH. She presents now for follow up.  She is tolerating a diet and having daily BM.  She uses her PEG intermittently for feeds. Patient denies fevers, chills, nausea, vomiting, abdominal pain, constipation, diarrhea, blood in the stool or unexpected weight loss.  Patient denies a family history of colon cancer rectal cancer or inflammatory bowel disease.

## 2022-05-23 NOTE — PHYSICAL EXAM
[Abdomen Masses] : No abdominal masses [Abdomen Tenderness] : ~T No ~M abdominal tenderness [No HSM] : no hepatosplenomegaly [Respiratory Effort] : normal respiratory effort [Normal Rate and Rhythm] : normal rate and rhythm [de-identified] : PEG in place.  No erythema induration or purulence [de-identified] : awake, alert and in no acute distress. Cachectic

## 2022-06-10 ENCOUNTER — OUTPATIENT (OUTPATIENT)
Dept: OUTPATIENT SERVICES | Facility: HOSPITAL | Age: 62
LOS: 1 days | Discharge: HOME | End: 2022-06-10

## 2022-06-10 ENCOUNTER — NON-APPOINTMENT (OUTPATIENT)
Age: 62
End: 2022-06-10

## 2022-06-10 ENCOUNTER — APPOINTMENT (OUTPATIENT)
Age: 62
End: 2022-06-10

## 2022-06-10 VITALS
HEIGHT: 65 IN | OXYGEN SATURATION: 97 % | SYSTOLIC BLOOD PRESSURE: 107 MMHG | TEMPERATURE: 98.1 F | WEIGHT: 120 LBS | BODY MASS INDEX: 19.99 KG/M2 | HEART RATE: 111 BPM | DIASTOLIC BLOOD PRESSURE: 70 MMHG

## 2022-06-10 DIAGNOSIS — Z93.1 GASTROSTOMY STATUS: ICD-10-CM

## 2022-06-10 DIAGNOSIS — C18.7 MALIGNANT NEOPLASM OF SIGMOID COLON: ICD-10-CM

## 2022-06-10 PROCEDURE — ZZZZZ: CPT

## 2022-06-27 ENCOUNTER — EMERGENCY (EMERGENCY)
Facility: HOSPITAL | Age: 62
LOS: 0 days | Discharge: HOME | End: 2022-06-28
Attending: EMERGENCY MEDICINE | Admitting: EMERGENCY MEDICINE

## 2022-06-27 VITALS
HEIGHT: 65 IN | DIASTOLIC BLOOD PRESSURE: 76 MMHG | SYSTOLIC BLOOD PRESSURE: 126 MMHG | TEMPERATURE: 98 F | OXYGEN SATURATION: 99 % | RESPIRATION RATE: 18 BRPM | HEART RATE: 104 BPM

## 2022-06-27 DIAGNOSIS — K13.79 OTHER LESIONS OF ORAL MUCOSA: ICD-10-CM

## 2022-06-27 DIAGNOSIS — Z86.2 PERSONAL HISTORY OF DISEASES OF THE BLOOD AND BLOOD-FORMING ORGANS AND CERTAIN DISORDERS INVOLVING THE IMMUNE MECHANISM: ICD-10-CM

## 2022-06-27 DIAGNOSIS — Z93.1 GASTROSTOMY STATUS: ICD-10-CM

## 2022-06-27 DIAGNOSIS — Z86.73 PERSONAL HISTORY OF TRANSIENT ISCHEMIC ATTACK (TIA), AND CEREBRAL INFARCTION WITHOUT RESIDUAL DEFICITS: ICD-10-CM

## 2022-06-27 DIAGNOSIS — F10.10 ALCOHOL ABUSE, UNCOMPLICATED: ICD-10-CM

## 2022-06-27 PROCEDURE — 99283 EMERGENCY DEPT VISIT LOW MDM: CPT

## 2022-06-27 RX ORDER — MORPHINE SULFATE 50 MG/1
4 CAPSULE, EXTENDED RELEASE ORAL ONCE
Refills: 0 | Status: DISCONTINUED | OUTPATIENT
Start: 2022-06-27 | End: 2022-06-27

## 2022-06-27 RX ADMIN — MORPHINE SULFATE 4 MILLIGRAM(S): 50 CAPSULE, EXTENDED RELEASE ORAL at 20:18

## 2022-06-27 NOTE — ED PROVIDER NOTE - PHYSICAL EXAMINATION
Physical Exam    Vital Signs: I have reviewed the initial vital signs.  Constitutional:  no acute distress, no facial or neck swelling  Cardiovascular: S1 and S2  Respiratory: unlabored respiratory effort, speaking in full sentences, handling oral secretions, no trismus no stridor clear to auscultation bilaterally no wheezing, rales and rhonchi, no sublingual swelling.  mouth + large golf ball sized mass overlying area of teeth 30-27.  Gastrointestinal: soft, non-tender abdomen, no pulsatile mass, normal bowl sounds  Musculoskeletal: supple neck  Integumentary: warm, dry, no rashes, lacerations,  Neurologic: awake, alert,

## 2022-06-27 NOTE — ED PROVIDER NOTE - NS ED ATTENDING STATEMENT MOD
This was a shared visit with the LAUREN. I reviewed and verified the documentation and independently performed the documented:

## 2022-06-27 NOTE — ED PROVIDER NOTE - ATTENDING APP SHARED VISIT CONTRIBUTION OF CARE
60 y/o female h/o GIB, incontinence, anemia, etoh abuse p/w intra-oral lesion x several months, constant, denies modifying factors, denies systemic complaints, denies fever, chills, cough, facial swelling, sore throat, dysphagia, odynophagia, hoarseness, difficulty breathing, lymphadenopathy  or other associated complaints at present. States pain got unbearable tonight. Old chart reviewed. I have reviewed and agree with the initial nursing note, except as documented in my note.    VSS, awake, alert, non-toxic appearing, lying comfortably in stretcher, in NAD, there is a large mass on the rt adjacent to the tongue, firm, mildly ttp, no induration, fluctuance, pus, poor dentition w/o evidence acute infection, otherwise the pharynx is non-erythematous and tonsils appear normal, no exudates, there is no peritonsillar swelling, the submandibular space is neither swollen nor painful, no airway compromise, able to swallow and there is no drooling, voice is normal, mouth, lips are moist and without trauma, uvula is midline, no stridor, no anterior or posterior cervical lymphadenopathy, no skin rash or lesions, chest CTAB, no respiratory distress or retractions, no w/r/r, +S1/S2, RRR, no m/r/g, abdomen soft, NT, ND, +BS, no organomegaly appreciated, AO x 3, clear speech and steady gait.

## 2022-06-27 NOTE — ED PROVIDER NOTE - OBJECTIVE STATEMENT
61-year-old female past medical history of: CVA status post PEG placement presenting for oral mass of multiple months duration causing her some pain today.  Denies difficulty speaking change in voice difficulty swallowing difficulty handling oral secretions chest pain or shortness of breath fever chills.  Denies pus drainage paresthesias.

## 2022-06-27 NOTE — ED ADULT NURSE NOTE - OBJECTIVE STATEMENT
pt biba from [facility] for dental abcess x 1 year - pt has peg tube. AIrway patent no rresp distress noted on assessment.

## 2022-06-27 NOTE — ED PROVIDER NOTE - NSFOLLOWUPINSTRUCTIONS_ED_ALL_ED_FT
MISS CALIXTO MUST RETURN TO THE DENTAL CLINIC ON WEDNESDAY FOR EMERGENT EVALUATION BY THE ORAL SURGEON. RETURN HER TO THE ER IF SHE DEVELOPS SHORTNESS OF BREATH, DIFFICULTY SPEAKING OR HANDLING HER SALIVA. MISS CALIXTO MUST RETURN WITHOUT FAIL TO THE DENTAL CLINIC ON WEDNESDAY FOR EVALUATION BY THE ORAL SURGEON. RETURN HER TO THE ER IF SHE DEVELOPS SHORTNESS OF BREATH, DIFFICULTY SPEAKING OR HANDLING HER SALIVA.

## 2022-06-27 NOTE — ED PROVIDER NOTE - PROGRESS NOTE DETAILS
BH: intra-oral mass, analgesics offered, advised to return in AM without fail for further evaluation and management of concerning intraoral lesions

## 2022-06-27 NOTE — ED PROVIDER NOTE - PATIENT PORTAL LINK FT
You can access the FollowMyHealth Patient Portal offered by [facility] by registering at the following website: [email or web address]. By joining [employer]’s FollowMyHealth portal, you will also be able to view your health information using other applications (apps) compatible with our system.

## 2022-06-28 VITALS
SYSTOLIC BLOOD PRESSURE: 121 MMHG | HEART RATE: 95 BPM | OXYGEN SATURATION: 100 % | TEMPERATURE: 97 F | RESPIRATION RATE: 18 BRPM | DIASTOLIC BLOOD PRESSURE: 66 MMHG

## 2022-06-28 RX ORDER — MORPHINE SULFATE 50 MG/1
4 CAPSULE, EXTENDED RELEASE ORAL ONCE
Refills: 0 | Status: DISCONTINUED | OUTPATIENT
Start: 2022-06-28 | End: 2022-06-28

## 2022-06-28 RX ADMIN — MORPHINE SULFATE 4 MILLIGRAM(S): 50 CAPSULE, EXTENDED RELEASE ORAL at 01:50

## 2022-06-29 ENCOUNTER — RESULT REVIEW (OUTPATIENT)
Age: 62
End: 2022-06-29

## 2022-06-29 ENCOUNTER — EMERGENCY (EMERGENCY)
Facility: HOSPITAL | Age: 62
LOS: 0 days | Discharge: HOME | End: 2022-06-30
Attending: EMERGENCY MEDICINE | Admitting: EMERGENCY MEDICINE

## 2022-06-29 ENCOUNTER — OUTPATIENT (OUTPATIENT)
Dept: OUTPATIENT SERVICES | Facility: HOSPITAL | Age: 62
LOS: 1 days | Discharge: HOME | End: 2022-06-29

## 2022-06-29 VITALS
TEMPERATURE: 96 F | HEART RATE: 111 BPM | HEIGHT: 65 IN | OXYGEN SATURATION: 100 % | RESPIRATION RATE: 17 BRPM | SYSTOLIC BLOOD PRESSURE: 117 MMHG | DIASTOLIC BLOOD PRESSURE: 71 MMHG | WEIGHT: 160.06 LBS

## 2022-06-29 DIAGNOSIS — Z87.891 PERSONAL HISTORY OF NICOTINE DEPENDENCE: ICD-10-CM

## 2022-06-29 DIAGNOSIS — Z85.038 PERSONAL HISTORY OF OTHER MALIGNANT NEOPLASM OF LARGE INTESTINE: ICD-10-CM

## 2022-06-29 DIAGNOSIS — C49.9 MALIGNANT NEOPLASM OF CONNECTIVE AND SOFT TISSUE, UNSPECIFIED: ICD-10-CM

## 2022-06-29 DIAGNOSIS — D63.8 ANEMIA IN OTHER CHRONIC DISEASES CLASSIFIED ELSEWHERE: ICD-10-CM

## 2022-06-29 DIAGNOSIS — I10 ESSENTIAL (PRIMARY) HYPERTENSION: ICD-10-CM

## 2022-06-29 DIAGNOSIS — K05.5 OTHER PERIODONTAL DISEASES: ICD-10-CM

## 2022-06-29 DIAGNOSIS — K13.70 UNSPECIFIED LESIONS OF ORAL MUCOSA: ICD-10-CM

## 2022-06-29 LAB
ANION GAP SERPL CALC-SCNC: 11 MMOL/L — SIGNIFICANT CHANGE UP (ref 7–14)
BASOPHILS # BLD AUTO: 0.02 K/UL — SIGNIFICANT CHANGE UP (ref 0–0.2)
BASOPHILS NFR BLD AUTO: 0.3 % — SIGNIFICANT CHANGE UP (ref 0–1)
BUN SERPL-MCNC: 29 MG/DL — HIGH (ref 10–20)
CALCIUM SERPL-MCNC: 10.5 MG/DL — HIGH (ref 8.5–10.1)
CHLORIDE SERPL-SCNC: 92 MMOL/L — LOW (ref 98–110)
CO2 SERPL-SCNC: 24 MMOL/L — SIGNIFICANT CHANGE UP (ref 17–32)
CREAT SERPL-MCNC: 0.8 MG/DL — SIGNIFICANT CHANGE UP (ref 0.7–1.5)
EGFR: 84 ML/MIN/1.73M2 — SIGNIFICANT CHANGE UP
EOSINOPHIL # BLD AUTO: 0.25 K/UL — SIGNIFICANT CHANGE UP (ref 0–0.7)
EOSINOPHIL NFR BLD AUTO: 3.6 % — SIGNIFICANT CHANGE UP (ref 0–8)
GLUCOSE SERPL-MCNC: 92 MG/DL — SIGNIFICANT CHANGE UP (ref 70–99)
HCT VFR BLD CALC: 33.7 % — LOW (ref 37–47)
HGB BLD-MCNC: 11.2 G/DL — LOW (ref 12–16)
IMM GRANULOCYTES NFR BLD AUTO: 0.3 % — SIGNIFICANT CHANGE UP (ref 0.1–0.3)
LYMPHOCYTES # BLD AUTO: 1.57 K/UL — SIGNIFICANT CHANGE UP (ref 1.2–3.4)
LYMPHOCYTES # BLD AUTO: 22.4 % — SIGNIFICANT CHANGE UP (ref 20.5–51.1)
MCHC RBC-ENTMCNC: 27 PG — SIGNIFICANT CHANGE UP (ref 27–31)
MCHC RBC-ENTMCNC: 33.2 G/DL — SIGNIFICANT CHANGE UP (ref 32–37)
MCV RBC AUTO: 81.2 FL — SIGNIFICANT CHANGE UP (ref 81–99)
MONOCYTES # BLD AUTO: 0.92 K/UL — HIGH (ref 0.1–0.6)
MONOCYTES NFR BLD AUTO: 13.1 % — HIGH (ref 1.7–9.3)
NEUTROPHILS # BLD AUTO: 4.24 K/UL — SIGNIFICANT CHANGE UP (ref 1.4–6.5)
NEUTROPHILS NFR BLD AUTO: 60.3 % — SIGNIFICANT CHANGE UP (ref 42.2–75.2)
NRBC # BLD: 0 /100 WBCS — SIGNIFICANT CHANGE UP (ref 0–0)
PLATELET # BLD AUTO: 453 K/UL — HIGH (ref 130–400)
POTASSIUM SERPL-MCNC: 4.9 MMOL/L — SIGNIFICANT CHANGE UP (ref 3.5–5)
POTASSIUM SERPL-SCNC: 4.9 MMOL/L — SIGNIFICANT CHANGE UP (ref 3.5–5)
RBC # BLD: 4.15 M/UL — LOW (ref 4.2–5.4)
RBC # FLD: 14.6 % — HIGH (ref 11.5–14.5)
SODIUM SERPL-SCNC: 127 MMOL/L — LOW (ref 135–146)
WBC # BLD: 7.02 K/UL — SIGNIFICANT CHANGE UP (ref 4.8–10.8)
WBC # FLD AUTO: 7.02 K/UL — SIGNIFICANT CHANGE UP (ref 4.8–10.8)

## 2022-06-29 PROCEDURE — 70491 CT SOFT TISSUE NECK W/DYE: CPT | Mod: 26,MA

## 2022-06-29 PROCEDURE — 71045 X-RAY EXAM CHEST 1 VIEW: CPT | Mod: 26

## 2022-06-29 PROCEDURE — 99285 EMERGENCY DEPT VISIT HI MDM: CPT

## 2022-06-29 NOTE — ED ADULT NURSE NOTE - OBJECTIVE STATEMENT
PT presented to the ED with complaints of being sent in from Dental clinic for CT scans of face for suspicious oral cancer

## 2022-06-29 NOTE — ED PROVIDER NOTE - PATIENT PORTAL LINK FT
You can access the FollowMyHealth Patient Portal offered by [facility] by registering at the following website: [email or web address]. By joining [facility]’s FollowMyHealth portal, you will also be able to view your health information using other applications (apps) compatible with our system.

## 2022-06-29 NOTE — ED PROVIDER NOTE - PROGRESS NOTE DETAILS
Authored by Dr. Aguilar: d/w dental - already had a biopsy. can be dc'd post imaging Authored by Dr. Aguilar: imaging done but pending report. pt wants to leave.  rad will be followed by her OMFS dr hein.  pt is aware.

## 2022-06-29 NOTE — ED PROVIDER NOTE - PHYSICAL EXAMINATION
VITAL SIGNS: I have reviewed nursing notes and confirm.  CONSTITUTIONAL: non-toxic, no respiratory distress, thin female,  SKIN: no rash, no petechiae.  EYES: Pale conjunctiva, anicteric  ENT: tongue midline, moist mucous membranes, right intraoral mandibular fungating mass no bleeding  NECK: Supple;   CARD: RRR, no murmurs, equal radial pulses bilaterally 2+  RESP: CTAB, no respiratory distress  ABD: Soft, non-tender  EXT: Normal ROM x4. No edema. No calves tenderness  NEURO: Alert, oriented. CN2-12 intact, equal strength bilaterally,   PSYCH: Cooperative, appropriate.

## 2022-06-29 NOTE — ED PROVIDER NOTE - NSFOLLOWUPINSTRUCTIONS_ED_ALL_ED_FT
FOLLOW UP WITH YOUR ORAL SURGEON AS SCHEDULE    RETURN TO ED IF SHORTNESS OF BREATH, BLEEDING, DIFFICULTY SWALLOWING OR ANY OTHER CONCERNS.

## 2022-06-29 NOTE — ED ADULT NURSE NOTE - NSIMPLEMENTINTERV_GEN_ALL_ED
Implemented All Fall with Harm Risk Interventions:  [place] to call system. Call bell, personal items and telephone within reach. Instruct patient to call for assistance. Room bathroom lighting operational. Non-slip footwear when patient is off stretcher. Physically safe environment: no spills, clutter or unnecessary equipment. Stretcher in lowest position, wheels locked, appropriate side rails in place. Provide visual cue, wrist band, yellow gown, etc. Monitor gait and stability. Monitor for mental status changes and reorient to person, place, and time. Review medications for side effects contributing to fall risk. Reinforce activity limits and safety measures with patient and family. Provide visual clues: red socks.

## 2022-06-29 NOTE — ED ADULT NURSE NOTE - NSFALLRSKASSESSDT_ED_ALL_ED
CC:  Julieth Flower is here today for establish care.    Medications: medications verified and updated  Refills needed today?  NO      Patient would like communication of their results via:      Cell Phone:   Telephone Information:   Mobile [phone]     Okay to leave a message containing results? Yes     Health Maintenance Due   Topic Date Due   • Depression Screening  01/31/1976   • Colorectal Cancer Screening-Colonoscopy  01/31/2014   • Breast Cancer Screening  09/21/2016   • Influenza Vaccine (1) 08/01/2018     Patient is due for topics as listed above but is not proceeding with Immunization(s) Influenza at this time. .               29-Jun-2022 23:38

## 2022-07-12 NOTE — PHYSICAL EXAM
[General Appearance - Alert] : alert [General Appearance - In No Acute Distress] : in no acute distress [Sclera] : the sclera and conjunctiva were normal [PERRL With Normal Accommodation] : pupils were equal in size, round, and reactive to light [Extraocular Movements] : extraocular movements were intact [Outer Ear] : the ears and nose were normal in appearance [Hearing Threshold Finger Rub Not [place]] : hearing was normal [Both Tympanic Membranes Were Examined] : both tympanic membranes were normal [Neck Appearance] : the appearance of the neck was normal [Respiration, Rhythm And Depth] : normal respiratory rhythm and effort [Auscultation Breath Sounds / Voice Sounds] : lungs were clear to auscultation bilaterally [Heart Rate And Rhythm] : heart rate was normal and rhythm regular [Heart Sounds] : normal S1 and S2 [Murmurs] : no murmurs [Heart Sounds Pericardial Friction Rub] : no pericardial rub [Edema] : there was no peripheral edema [Bowel Sounds] : normal bowel sounds [Abdomen Soft] : soft [Abdomen Tenderness] : non-tender [Abdomen Mass (___ Cm)] : no abdominal mass palpated [Abnormal Walk] : normal gait [Skin Color & Pigmentation] : normal skin color and pigmentation [] : no rash [Affect] : the affect was normal [FreeTextEntry1] : peg tube in place

## 2022-07-12 NOTE — HISTORY OF PRESENT ILLNESS
[Heartburn] : denies heartburn [Nausea] : denies nausea [Vomiting] : denies vomiting [Diarrhea] : denies diarrhea [Constipation] : denies constipation [Yellow Skin Or Eyes (Jaundice)] : denies jaundice [Abdominal Pain] : denies abdominal pain [Abdominal Swelling] : denies abdominal swelling [Rectal Pain] : denies rectal pain [de-identified] : 61F PMH GIB, Bowel and Bladder incontinence, Smoking (3-4 cigarettes/day), ETOH\par abuse, hypothyroidism, and ongoing anemia, admitted to [facility] 1/31 for ams found\par to have UTI. Dev GIB, colonoscopy showing polyps, transferred to [facility] for\par polypectomy complicated by septic shock 2/2 aspiration pna. While in the hospital colonoscopy was performed with polypectomy 2/18, biopsy showed adenocarcinoma. CT Chest/A/P negative for metastatic disease. Patient s/p PEG for failure to thrive due to poor PO intake.\par patient followed with colorectal surgery who recommended need for sigmoidectomy given the positive margin. However, patient refused at this time.\par \par Patient denies any complaints or symptoms. No abdominal pain, rectal bleeding, N/V, D/C. \par \par Patient requests to remove PEG tube. Endorses eating 3 full puree meals a day. \par

## 2022-07-12 NOTE — ASSESSMENT
[FreeTextEntry1] : #PEG Tube\par - plan for endoscopic removal as patient tolerating PO intake \par \par #adenocarcinoma of sigmoid colon \par - strongly recommend sigmoidectomy with colorectal surgery \par - emphasized high risk of recurrence given positive margins \par - to follow up with Dr. Kate

## 2022-07-13 ENCOUNTER — OUTPATIENT (OUTPATIENT)
Dept: OUTPATIENT SERVICES | Facility: HOSPITAL | Age: 62
LOS: 1 days | Discharge: HOME | End: 2022-07-13

## 2022-07-22 ENCOUNTER — APPOINTMENT (OUTPATIENT)
Dept: OTOLARYNGOLOGY | Facility: CLINIC | Age: 62
End: 2022-07-22

## 2022-07-22 DIAGNOSIS — E46 UNSPECIFIED PROTEIN-CALORIE MALNUTRITION: ICD-10-CM

## 2022-07-22 DIAGNOSIS — R13.12 DYSPHAGIA, OROPHARYNGEAL PHASE: ICD-10-CM

## 2022-07-22 PROCEDURE — 31575 DIAGNOSTIC LARYNGOSCOPY: CPT

## 2022-07-22 PROCEDURE — 99205 OFFICE O/P NEW HI 60 MIN: CPT | Mod: 25

## 2022-07-22 PROCEDURE — 40808 BIOPSY OF MOUTH LESION: CPT

## 2022-07-22 NOTE — PROCEDURE
[Dysphagia] : dysphagia not clearly evaluated by indirect laryngoscopy [None] : none [Flexible Endoscope] : examined with the flexible endoscope [de-identified] : Flexible laryngoscopy performed. Nasal cavity, nasopharynx, oropharynx, hypopharynx, and larynx evaluated. No masses or lesions, bilateral true vocal folds symmetric and mobile.\par

## 2022-07-22 NOTE — PHYSICAL EXAM
[FreeTextEntry1] : thin [de-identified] : palpable 1.5cm nodes right level 1B [Midline] : trachea located in midline position [de-identified] : mixed dentation [de-identified] : fungating mass right mandibular alveolus with minimal extension to FOM or buccal mucosa, tongue is clear, no fragmentation [Normal] : no rashes

## 2022-07-22 NOTE — HISTORY OF PRESENT ILLNESS
[de-identified] : Patient presents today with right mandible mass. Referred from dental clinic. Mass has been present for 3-4 months, not painful or bleeding. She is G tube user after episode of aspiration pneumonia/shock treated at [facility] in April. Had a colonoscopy at that time showing adenocarcinoma of colon, no treatment yet.\par \par No dyspnea. Bx at dental clinic non diagnostic

## 2022-07-22 NOTE — DATA REVIEWED
[de-identified] : CT neck 6/2022 reviewed showing enhancing mass right mandibular body with mucosal extension to buccal mucosa with pathologic level 1B adenopathy

## 2022-07-22 NOTE — ASSESSMENT
[FreeTextEntry1] : Biopsy of right mandible performed today\par PET/CT and MRI for staging\par CTA lower extremities\par Tumor board discussion\par CT imaging interpreted\par Discussed surgical plan with Dr. Farr\par RV with family

## 2022-08-03 ENCOUNTER — APPOINTMENT (OUTPATIENT)
Dept: OTOLARYNGOLOGY | Facility: CLINIC | Age: 62
End: 2022-08-03

## 2022-08-03 LAB — CORE LAB BIOPSY: NORMAL

## 2022-08-10 ENCOUNTER — APPOINTMENT (OUTPATIENT)
Dept: SURGERY | Facility: CLINIC | Age: 62
End: 2022-08-10

## 2022-08-10 VITALS
HEART RATE: 127 BPM | DIASTOLIC BLOOD PRESSURE: 70 MMHG | BODY MASS INDEX: 19.33 KG/M2 | WEIGHT: 116 LBS | OXYGEN SATURATION: 99 % | HEIGHT: 65 IN | SYSTOLIC BLOOD PRESSURE: 120 MMHG

## 2022-08-10 DIAGNOSIS — C18.7 MALIGNANT NEOPLASM OF SIGMOID COLON: ICD-10-CM

## 2022-08-10 PROCEDURE — 99213 OFFICE O/P EST LOW 20 MIN: CPT

## 2022-08-12 ENCOUNTER — APPOINTMENT (OUTPATIENT)
Dept: CARDIOLOGY | Facility: CLINIC | Age: 62
End: 2022-08-12

## 2022-08-12 VITALS
HEIGHT: 65 IN | HEART RATE: 83 BPM | WEIGHT: 112 LBS | DIASTOLIC BLOOD PRESSURE: 60 MMHG | BODY MASS INDEX: 18.66 KG/M2 | TEMPERATURE: 97.6 F | SYSTOLIC BLOOD PRESSURE: 90 MMHG

## 2022-08-12 DIAGNOSIS — I51.9 HEART DISEASE, UNSPECIFIED: ICD-10-CM

## 2022-08-12 DIAGNOSIS — R94.31 ABNORMAL ELECTROCARDIOGRAM [ECG] [EKG]: ICD-10-CM

## 2022-08-12 DIAGNOSIS — I95.89 OTHER HYPOTENSION: ICD-10-CM

## 2022-08-12 PROCEDURE — 99214 OFFICE O/P EST MOD 30 MIN: CPT

## 2022-08-12 PROCEDURE — 93000 ELECTROCARDIOGRAM COMPLETE: CPT

## 2022-08-13 PROBLEM — I95.89 HYPOTENSION, CHRONIC: Status: ACTIVE | Noted: 2022-05-07

## 2022-08-13 PROBLEM — I51.9 DIASTOLIC DYSFUNCTION, LEFT VENTRICLE: Status: ACTIVE | Noted: 2022-05-07

## 2022-08-13 NOTE — CARDIOLOGY SUMMARY
[de-identified] : 812/22 NSR NSST \par 5/6/2 NSR non specific ST changes  [de-identified] : 2/24/22 EF 64 G2DD

## 2022-08-13 NOTE — ASSESSMENT
Physical Exam  Mallampati: I  TM Distance: <3 FB  Neck ROM: Full  cardiovascular exam normal  pulmonary exam normal  abdominal exam normal  dental exam normal      Anesthesia Plan  ASA Status: 1  Anesthesia Type: General  Induction: Inhalational  Reviewed: Pre-Induction Reassessment, NPO Status, Medications, Past Med History, Patient Summary, Allergies and Problem List  The proposed anesthetic plan, including its risks and benefits, have been discussed with the Father and Mother - along with the risks and benefits of alternatives.  Questions were encouraged and answered and the patient and/or representative agrees to proceed.  Blood Products: Not Anticipated      Anesthesia ROS/Med Hx        Pulmonary Review:    Negative for pulmonary    Neuro/Psych Review:    Negative for all neuro/psych ROS    Cardiovascular Review:    Pt. negative for all cardio ROS    GI/HEPATIC/RENAL Review:    Pt. negative for GI/Hepatic/Renal ROS    End/Other Review:    Pt. negative for endo/other ROS  Overall Review of Systems Comments:  History of RSV infection as child     [FreeTextEntry1] : 62 year old female PMHx Smoking, COPD, ETOH, Hypothyroidism, anemia is here for hypotension evaluation. The patient is asymptomatic and denies orthostatic symptoms ,syncope or presyncope. The patient has chronic weakness and is uses wheel chair. She participates in physical therapy at the SNF for 1.5 hours and denies any chest pain shortness of breath during therapy. She was seen in the hospital for pre operative risk assessment prior to PEG placement. The patient reports that she stopped smoking since hospital discharge. She refused colon surgery. 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 \par \par Continue Midodrine for hypotension asymptomatic and feels better \par SPECT MPI GARO scan for prio evaluation \par Followup after testing.\par

## 2022-08-13 NOTE — PHYSICAL EXAM
[Frail] : frail [Cachectic] : cachexia was observed [Soft] : abdomen soft [Normal] : normal gait [No Edema] : no edema [Moves all extremities] : moves all extremities [No Focal Deficits] : no focal deficits [Normal Speech] : normal speech [de-identified] : PEG noted clean site

## 2022-08-13 NOTE — HISTORY OF PRESENT ILLNESS
[FreeTextEntry1] : 62 year old female PMHx Smoking, COPD, ETOH, Hypothyroidism, anemia is here for hypotension evaluation. The patient is asymptomatic and denies orthostatic symptoms ,syncope or presyncope. The patient has chronic weakness and is uses wheel chair. She participates in physical therapy at the SNF for 1.5 hours and denies any chest pain shortness of breath during therapy. She was seen in the hospital for pre operative risk assessment prior to PEG placement. The patient reports that she stopped smoking since hospital discharge. She refused colon surgery. 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 \par \par \par \par

## 2022-08-15 ENCOUNTER — OUTPATIENT (OUTPATIENT)
Dept: OUTPATIENT SERVICES | Facility: HOSPITAL | Age: 62
LOS: 1 days | Discharge: HOME | End: 2022-08-15

## 2022-08-15 ENCOUNTER — RESULT REVIEW (OUTPATIENT)
Age: 62
End: 2022-08-15

## 2022-08-15 DIAGNOSIS — C03.1 MALIGNANT NEOPLASM OF LOWER GUM: ICD-10-CM

## 2022-08-15 PROCEDURE — 73706 CT ANGIO LWR EXTR W/O&W/DYE: CPT | Mod: 26,50

## 2022-08-16 PROBLEM — C18.7 PRIMARY MALIGNANT NEOPLASM OF SIGMOID COLON: Status: ACTIVE | Noted: 2022-04-11

## 2022-08-16 NOTE — PHYSICAL EXAM
[Abdomen Masses] : No abdominal masses [Abdomen Tenderness] : ~T No ~M abdominal tenderness [No HSM] : no hepatosplenomegaly [Respiratory Effort] : normal respiratory effort [Normal Rate and Rhythm] : normal rate and rhythm [de-identified] : PEG in place.  No erythema induration or purulence [de-identified] : awake, alert and in no acute distress. Cachectic

## 2022-08-16 NOTE — ASSESSMENT
[FreeTextEntry1] : 62F with sigmoid colon polyp containing adenocarcinoma present at cauterized margin\par \par We again discussed the need for sigmoidectomy.  The patient states that she would like to hold off and continue her treatment for her jaw lesion.  She states she has no intention of undergoing a colectomy. We will see her back as needed.

## 2022-08-16 NOTE — HISTORY OF PRESENT ILLNESS
[FreeTextEntry1] : Patient is a 61F with PMH of bowel and bladder incontinence, EtOH abuse, hypothyroidism, anemia who presents for follow up of sigmoid colon polyp containing adenocarcinoma.  Patient underwent EMR for the polyp at 20cm with Dr. Daniels on 2/18.  Pathology showed adenocarcinoma present at the cauterized margin.  Suspicious for LVI.  there is tattoo present.  She had a complicated hospital stay due to aspiration pneumonia requiring ICU admission and intubation.  She is still in a NH. She presents now for follow up.  She is tolerating a diet and having daily BM.  She no longer uses her PEG and states it is to be removed soon. She is being worked up for a jaw mass and is pending MRI today. Patient denies fevers, chills, nausea, vomiting, abdominal pain, constipation, diarrhea, blood in the stool or unexpected weight loss.  Patient denies a family history of colon cancer rectal cancer or inflammatory bowel disease.

## 2022-08-18 ENCOUNTER — APPOINTMENT (OUTPATIENT)
Dept: OTOLARYNGOLOGY | Facility: CLINIC | Age: 62
End: 2022-08-18

## 2022-08-18 DIAGNOSIS — R59.0 LOCALIZED ENLARGED LYMPH NODES: ICD-10-CM

## 2022-08-18 DIAGNOSIS — C77.0 SECONDARY AND UNSPECIFIED MALIGNANT NEOPLASM OF LYMPH NODES OF HEAD, FACE AND NECK: ICD-10-CM

## 2022-08-18 DIAGNOSIS — C03.1 MALIGNANT NEOPLASM OF LOWER GUM: ICD-10-CM

## 2022-08-18 PROCEDURE — 99215 OFFICE O/P EST HI 40 MIN: CPT

## 2022-08-18 NOTE — HISTORY OF PRESENT ILLNESS
[FreeTextEntry1] : Patient returns today following up on right mandible mass.   Patient had tissue biopsy done on 07/22/22 She is here too discuss results .  Patient states mass is the same size. No pain. Discussed at tumor board.

## 2022-08-18 NOTE — ASSESSMENT
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

## 2022-08-18 NOTE — DATA REVIEWED
[de-identified] : CT lower extremity reviewed and interpreted with multifocal atherosclerotic disease involving popiliteal ciruclation

## 2022-08-18 NOTE — PHYSICAL EXAM
[FreeTextEntry1] : thin [de-identified] : palpable 1.5cm nodes right level 1B [Midline] : trachea located in midline position [de-identified] : mixed dentation [de-identified] : fungating mass right mandibular alveolus with minimal extension to FOM or buccal mucosa, tongue is clear, no fragmentation [Normal] : no rashes

## 2022-08-25 ENCOUNTER — OUTPATIENT (OUTPATIENT)
Dept: OUTPATIENT SERVICES | Facility: HOSPITAL | Age: 62
LOS: 1 days | Discharge: HOME | End: 2022-08-25

## 2022-08-25 ENCOUNTER — RESULT REVIEW (OUTPATIENT)
Age: 62
End: 2022-08-25

## 2022-08-25 DIAGNOSIS — C77.0 SECONDARY AND UNSPECIFIED MALIGNANT NEOPLASM OF LYMPH NODES OF HEAD, FACE AND NECK: ICD-10-CM

## 2022-08-25 DIAGNOSIS — C18.7 MALIGNANT NEOPLASM OF SIGMOID COLON: ICD-10-CM

## 2022-08-25 LAB — GLUCOSE BLDC GLUCOMTR-MCNC: 101 MG/DL — HIGH (ref 70–99)

## 2022-08-25 PROCEDURE — 78815 PET IMAGE W/CT SKULL-THIGH: CPT | Mod: 26,PI

## 2022-08-29 ENCOUNTER — RESULT REVIEW (OUTPATIENT)
Age: 62
End: 2022-08-29

## 2022-08-29 ENCOUNTER — OUTPATIENT (OUTPATIENT)
Dept: OUTPATIENT SERVICES | Facility: HOSPITAL | Age: 62
LOS: 1 days | Discharge: HOME | End: 2022-08-29

## 2022-08-29 ENCOUNTER — LABORATORY RESULT (OUTPATIENT)
Age: 62
End: 2022-08-29

## 2022-08-29 DIAGNOSIS — C03.1 MALIGNANT NEOPLASM OF LOWER GUM: ICD-10-CM

## 2022-08-29 PROCEDURE — 70543 MRI ORBT/FAC/NCK W/O &W/DYE: CPT | Mod: 26,MH

## 2022-08-31 ENCOUNTER — OUTPATIENT (OUTPATIENT)
Dept: OUTPATIENT SERVICES | Facility: HOSPITAL | Age: 62
LOS: 1 days | Discharge: HOME | End: 2022-08-31

## 2022-08-31 ENCOUNTER — TRANSCRIPTION ENCOUNTER (OUTPATIENT)
Age: 62
End: 2022-08-31

## 2022-08-31 VITALS
SYSTOLIC BLOOD PRESSURE: 139 MMHG | TEMPERATURE: 97 F | DIASTOLIC BLOOD PRESSURE: 83 MMHG | WEIGHT: 119.93 LBS | HEART RATE: 101 BPM | RESPIRATION RATE: 18 BRPM | HEIGHT: 65 IN

## 2022-08-31 NOTE — CHART NOTE - NSCHARTNOTEFT_GEN_A_CORE
61-year-old female history of hypertension, chronic anemia, sigmoid invasive adenocarcinoma, s/p peg in 3/2022 for failure to thrive presenting for removal the peg tube. patient was seen and examined in the endo suite. labs and imaging reviewed. medical charts from [facility] reviewed. Bumper peg was removed at the bedside with no complications. dry dressing applied. patient was monitored after. patient to be discharged to follow up in [facility] clinic.

## 2022-08-31 NOTE — ASU DISCHARGE PLAN (ADULT/PEDIATRIC) - CARE PROVIDER_API CALL
Marky Pagan)  Gastroenterology; Internal Medicine  [address] [place] [address]  Phone: [phone]  Fax: [phone]  Follow Up Time: 1 month

## 2022-08-31 NOTE — ASU DISCHARGE PLAN (ADULT/PEDIATRIC) - NS MD DC FALL RISK RISK
For information on Fall & Injury Prevention, visit: [email or web address] OR  [email or web address] OR  https://www.cdc.gov/steadi/patient.html

## 2022-08-31 NOTE — ASU DISCHARGE PLAN (ADULT/PEDIATRIC) - MODE OF TRANSPORTATION
Tucks wipes        BMI for Adults  Body mass index (BMI) is a number that is calculated from a person's weight and height. In most adults, the number is used to find how much of an adult's weight is made up of fat. BMI is not as accurate as a direct measure of body fat.  How is BMI calculated?  BMI is calculated by dividing weight in kilograms by height in meters squared. It can also be calculated by dividing weight in pounds by height in inches squared, then multiplying the resulting number by 703. Charts are available to help you find your BMI quickly and easily without doing this calculation.  How is BMI interpreted?  Health care professionals use BMI charts to identify whether an adult is underweight, at a normal weight, or overweight based on the following guidelines:  · Underweight: BMI less than 18.5.  · Normal weight: BMI between 18.5 and 24.9.  · Overweight: BMI between 25 and 29.9.  · Obese: BMI of 30 and above.    BMI is usually interpreted the same for males and females.  Weight includes both fat and muscle, so someone with a muscular build, such as an athlete, may have a BMI that is higher than 24.9. In cases like these, BMI may not accurately depict body fat. To determine if excess body fat is the cause of a BMI of 25 or higher, further assessments may need to be done by a health care provider.  Why is BMI a useful tool?  BMI is used to identify a possible weight problem that may be related to a medical problem or may increase the risk for medical problems. BMI can also be used to promote changes to reach a healthy weight.  This information is not intended to replace advice given to you by your health care provider. Make sure you discuss any questions you have with your health care provider.  Document Released: 08/29/2005 Document Revised: 04/27/2017 Document Reviewed: 05/15/2015  [employer] Interactive Patient Education © 2018 [employer]    
walker

## 2022-08-31 NOTE — ASU PATIENT PROFILE, ADULT - FALL HARM RISK - RISK INTERVENTIONS

## 2022-09-02 DIAGNOSIS — Z43.1 ENCOUNTER FOR ATTENTION TO GASTROSTOMY: ICD-10-CM

## 2022-09-02 DIAGNOSIS — I10 ESSENTIAL (PRIMARY) HYPERTENSION: ICD-10-CM

## 2022-09-08 ENCOUNTER — OUTPATIENT (OUTPATIENT)
Dept: OUTPATIENT SERVICES | Facility: HOSPITAL | Age: 62
LOS: 1 days | Discharge: HOME | End: 2022-09-08

## 2022-09-08 DIAGNOSIS — R06.02 SHORTNESS OF BREATH: ICD-10-CM

## 2022-09-08 PROCEDURE — 78452 HT MUSCLE IMAGE SPECT MULT: CPT | Mod: 26

## 2022-10-04 ENCOUNTER — RESULT REVIEW (OUTPATIENT)
Age: 62
End: 2022-10-04

## 2022-10-04 ENCOUNTER — OUTPATIENT (OUTPATIENT)
Dept: OUTPATIENT SERVICES | Facility: HOSPITAL | Age: 62
LOS: 1 days | Discharge: HOME | End: 2022-10-04

## 2022-10-04 ENCOUNTER — LABORATORY RESULT (OUTPATIENT)
Age: 62
End: 2022-10-04

## 2022-10-04 VITALS
WEIGHT: 149.91 LBS | RESPIRATION RATE: 20 BRPM | HEART RATE: 108 BPM | HEIGHT: 65 IN | DIASTOLIC BLOOD PRESSURE: 82 MMHG | OXYGEN SATURATION: 99 % | SYSTOLIC BLOOD PRESSURE: 159 MMHG | TEMPERATURE: 97 F

## 2022-10-04 DIAGNOSIS — D48.0 NEOPLASM OF UNCERTAIN BEHAVIOR OF BONE AND ARTICULAR CARTILAGE: ICD-10-CM

## 2022-10-04 DIAGNOSIS — Z93.1 GASTROSTOMY STATUS: Chronic | ICD-10-CM

## 2022-10-04 DIAGNOSIS — Z01.818 ENCOUNTER FOR OTHER PREPROCEDURAL EXAMINATION: ICD-10-CM

## 2022-10-04 LAB
ALBUMIN SERPL ELPH-MCNC: 3.9 G/DL — SIGNIFICANT CHANGE UP (ref 3.5–5.2)
ALP SERPL-CCNC: 294 U/L — HIGH (ref 30–115)
ALT FLD-CCNC: 37 U/L — SIGNIFICANT CHANGE UP (ref 0–41)
ANION GAP SERPL CALC-SCNC: 12 MMOL/L — SIGNIFICANT CHANGE UP (ref 7–14)
APTT BLD: 27.3 SEC — SIGNIFICANT CHANGE UP (ref 27–39.2)
AST SERPL-CCNC: 13 U/L — SIGNIFICANT CHANGE UP (ref 0–41)
BASOPHILS # BLD AUTO: 0.03 K/UL — SIGNIFICANT CHANGE UP (ref 0–0.2)
BASOPHILS NFR BLD AUTO: 0.3 % — SIGNIFICANT CHANGE UP (ref 0–1)
BILIRUB SERPL-MCNC: <0.2 MG/DL — SIGNIFICANT CHANGE UP (ref 0.2–1.2)
BUN SERPL-MCNC: 15 MG/DL — SIGNIFICANT CHANGE UP (ref 10–20)
CALCIUM SERPL-MCNC: 9.4 MG/DL — SIGNIFICANT CHANGE UP (ref 8.4–10.5)
CHLORIDE SERPL-SCNC: 104 MMOL/L — SIGNIFICANT CHANGE UP (ref 98–110)
CO2 SERPL-SCNC: 24 MMOL/L — SIGNIFICANT CHANGE UP (ref 17–32)
CREAT SERPL-MCNC: 0.8 MG/DL — SIGNIFICANT CHANGE UP (ref 0.7–1.5)
EGFR: 84 ML/MIN/1.73M2 — SIGNIFICANT CHANGE UP
EOSINOPHIL # BLD AUTO: 0.33 K/UL — SIGNIFICANT CHANGE UP (ref 0–0.7)
EOSINOPHIL NFR BLD AUTO: 3.2 % — SIGNIFICANT CHANGE UP (ref 0–8)
GLUCOSE SERPL-MCNC: 77 MG/DL — SIGNIFICANT CHANGE UP (ref 70–99)
HCT VFR BLD CALC: 33.6 % — LOW (ref 37–47)
HGB BLD-MCNC: 10.7 G/DL — LOW (ref 12–16)
IMM GRANULOCYTES NFR BLD AUTO: 0.8 % — HIGH (ref 0.1–0.3)
INR BLD: 1.04 RATIO — SIGNIFICANT CHANGE UP (ref 0.65–1.3)
LYMPHOCYTES # BLD AUTO: 1.95 K/UL — SIGNIFICANT CHANGE UP (ref 1.2–3.4)
LYMPHOCYTES # BLD AUTO: 18.7 % — LOW (ref 20.5–51.1)
MCHC RBC-ENTMCNC: 26.6 PG — LOW (ref 27–31)
MCHC RBC-ENTMCNC: 31.8 G/DL — LOW (ref 32–37)
MCV RBC AUTO: 83.4 FL — SIGNIFICANT CHANGE UP (ref 81–99)
MONOCYTES # BLD AUTO: 0.62 K/UL — HIGH (ref 0.1–0.6)
MONOCYTES NFR BLD AUTO: 6 % — SIGNIFICANT CHANGE UP (ref 1.7–9.3)
NEUTROPHILS # BLD AUTO: 7.4 K/UL — HIGH (ref 1.4–6.5)
NEUTROPHILS NFR BLD AUTO: 71 % — SIGNIFICANT CHANGE UP (ref 42.2–75.2)
NRBC # BLD: 0 /100 WBCS — SIGNIFICANT CHANGE UP (ref 0–0)
PLATELET # BLD AUTO: 484 K/UL — HIGH (ref 130–400)
POTASSIUM SERPL-MCNC: 4.5 MMOL/L — SIGNIFICANT CHANGE UP (ref 3.5–5)
POTASSIUM SERPL-SCNC: 4.5 MMOL/L — SIGNIFICANT CHANGE UP (ref 3.5–5)
PROT SERPL-MCNC: 8.7 G/DL — HIGH (ref 6–8)
PROTHROM AB SERPL-ACNC: 11.9 SEC — SIGNIFICANT CHANGE UP (ref 9.95–12.87)
RBC # BLD: 4.03 M/UL — LOW (ref 4.2–5.4)
RBC # FLD: 14.7 % — HIGH (ref 11.5–14.5)
SODIUM SERPL-SCNC: 140 MMOL/L — SIGNIFICANT CHANGE UP (ref 135–146)
WBC # BLD: 10.41 K/UL — SIGNIFICANT CHANGE UP (ref 4.8–10.8)
WBC # FLD AUTO: 10.41 K/UL — SIGNIFICANT CHANGE UP (ref 4.8–10.8)

## 2022-10-04 PROCEDURE — 71046 X-RAY EXAM CHEST 2 VIEWS: CPT | Mod: 26

## 2022-10-04 PROCEDURE — 93010 ELECTROCARDIOGRAM REPORT: CPT

## 2022-10-04 RX ORDER — ASCORBIC ACID 60 MG
1 TABLET,CHEWABLE ORAL
Qty: 0 | Refills: 0 | DISCHARGE

## 2022-10-04 RX ORDER — FERROUS SULFATE 325(65) MG
1 TABLET ORAL
Qty: 0 | Refills: 0 | DISCHARGE

## 2022-10-04 RX ORDER — LANOLIN ALCOHOL/MO/W.PET/CERES
1 CREAM (GRAM) TOPICAL
Qty: 0 | Refills: 0 | DISCHARGE

## 2022-10-04 RX ORDER — ACETAMINOPHEN 500 MG
2 TABLET ORAL
Qty: 0 | Refills: 0 | DISCHARGE

## 2022-10-04 RX ORDER — CHOLECALCIFEROL (VITAMIN D3) 125 MCG
1 CAPSULE ORAL
Qty: 0 | Refills: 0 | DISCHARGE

## 2022-10-04 NOTE — H&P PST ADULT - HISTORY OF PRESENT ILLNESS
seen by dental noted to have mandibular lesion( had for 8 months )  now for planned procedure     PATIENT CURRENTLY DENIES CHEST PAIN  SHORTNESS OF BREATH  PALPITATIONS,  DYSURIA, OR UPPER RESPIRATORY INFECTION IN PAST 2 WEEKS  EXERCISE  TOLERANCE  1-2 FLIGHT OF STAIRS  WITHOUT SHORTNESS OF BREATH  denies travel outside the [place] in the past 30 days  Patient denies any signs or symptoms of COVID 19 and denies contact with known positive individuals.  They have an appointment for repeat COVID testing pre-procedure and acknowledge its time and place.  They were instructed to quarantine pre-procedure, practice exposure control measures, continue to self-monitor and report any concerns to their proceduralist.  pt advised self quarantine till day of procedure  Anesthesia Alert  NO--Difficult Airway  NO--History of neck surgery or radiation  NO--Limited ROM of neck  NO--History of Malignant hyperthermia  NO--No personal or family history of Pseudocholinesterase deficiency.  NO--Prior Anesthesia Complication  NO--Latex Allergy  NO--Loose teeth  NO--History of Rheumatoid Arthritis  NO--Bleeding risk  NO--ANGEL  NO--Other_____    PT DENIES ANY RASHES, ABRASION, OR OPEN WOUNDS OR CUTS    AS PER THE PT, THIS IS HIS/HER COMPLETE MEDICAL AND SURGICAL HX, INCLUDING MEDICATIONS PRESCRIBED AND OVER THE COUNTER    Patient verbalized understanding of instructions and was given the opportunity to ask questions and have them answered.    pt denies any suicidal ideation or thoughts, pt states not a threat to self or others    D48.0 44733    Neoplasm of uncertain behavior of bone and articular cartilage    Encounter for other preprocedural examination    ^D48.0 02710    Neoplasm of uncertain behavior of bone and articular cartilage    Encounter for other preprocedural examination    HTN (hypertension)

## 2022-10-04 NOTE — H&P PST ADULT - REASON FOR ADMISSION
Patient is a 62  year old  female presenting to PAST in preparation for EXCISIONAL BIOPSY RIGHT MANDIBLE LESION  on    10/7/22 under general anesthesia by Dr. boateng

## 2022-10-04 NOTE — H&P PST ADULT - PRO ANTICIPATED DISCH DISP
.  VITAL SIGNS:  T(C): 36.8 (08-21-19 @ 05:02), Max: 38.2 (08-20-19 @ 21:34)  T(F): 98.3 (08-21-19 @ 05:02), Max: 100.8 (08-20-19 @ 21:34)  HR: 75 (08-21-19 @ 05:02) (75 - 115)  BP: 94/54 (08-21-19 @ 05:02) (94/54 - 144/99)  BP(mean): --  RR: 18 (08-21-19 @ 05:02) (16 - 18)  SpO2: 100% (08-21-19 @ 05:02) (95% - 100%)  Wt(kg): --    PHYSICAL EXAM:    Constitutional:  female, WDWN resting comfortably in bed; NAD  Head: NC/AT  Eyes: PERRL, EOMI, anicteric sclera  ENT: no nasal discharge; uvula midline, no oropharyngeal erythema or exudates; MMM  Neck: supple; no JVD or thyromegaly  Respiratory: rales at bases b/l, remainder CTA B/L  Cardiac: +S1/S2; RRR; no M/R/G; PMI non-displaced  Gastrointestinal: abdomen soft, NT/ND; no rebound or guarding; +BSx4  Genitourinary: normal external genitalia  Back: spine midline, no bony tenderness or step-offs; no CVAT B/L  Extremities: WWP, no clubbing or cyanosis; no peripheral edema  Musculoskeletal: NROM x4; no joint swelling, tenderness or erythema  Vascular: 2+ radial, femoral, DP/PT pulses B/L  Dermatologic: skin warm, dry and intact; no rashes, wounds, or scars  Lymphatic: no submandibular or cervical LAD  Neurologic: AAOx3; CNII-XII grossly intact; no focal deficits  Psychiatric: affect and characteristics of appearance, verbalizations, behaviors are appropriate home

## 2022-10-04 NOTE — H&P PST ADULT - NSICDXPASTSURGICALHX_GEN_ALL_CORE_FT
PAST SURGICAL HISTORY:  Status post insertion of percutaneous endoscopic gastrostomy (PEG) tube removed 8/22

## 2022-10-07 ENCOUNTER — APPOINTMENT (OUTPATIENT)
Dept: OTOLARYNGOLOGY | Facility: HOSPITAL | Age: 62
End: 2022-10-07

## 2022-10-07 ENCOUNTER — TRANSCRIPTION ENCOUNTER (OUTPATIENT)
Age: 62
End: 2022-10-07

## 2022-10-07 ENCOUNTER — RESULT REVIEW (OUTPATIENT)
Age: 62
End: 2022-10-07

## 2022-10-07 ENCOUNTER — OUTPATIENT (OUTPATIENT)
Dept: OUTPATIENT SERVICES | Facility: HOSPITAL | Age: 62
LOS: 1 days | Discharge: HOME | End: 2022-10-07

## 2022-10-07 VITALS
RESPIRATION RATE: 15 BRPM | SYSTOLIC BLOOD PRESSURE: 169 MMHG | DIASTOLIC BLOOD PRESSURE: 85 MMHG | OXYGEN SATURATION: 100 % | HEART RATE: 80 BPM

## 2022-10-07 VITALS — HEIGHT: 65 IN | WEIGHT: 160.06 LBS

## 2022-10-07 DIAGNOSIS — Z93.1 GASTROSTOMY STATUS: Chronic | ICD-10-CM

## 2022-10-07 DIAGNOSIS — L98.0 PYOGENIC GRANULOMA: ICD-10-CM

## 2022-10-07 PROCEDURE — 21044 REMOVAL OF JAW BONE LESION: CPT | Mod: GC

## 2022-10-07 PROCEDURE — 88342 IMHCHEM/IMCYTCHM 1ST ANTB: CPT | Mod: 26

## 2022-10-07 PROCEDURE — 88341 IMHCHEM/IMCYTCHM EA ADD ANTB: CPT | Mod: 26

## 2022-10-07 RX ORDER — MORPHINE SULFATE 50 MG/1
4 CAPSULE, EXTENDED RELEASE ORAL
Refills: 0 | Status: DISCONTINUED | OUTPATIENT
Start: 2022-10-07 | End: 2022-10-07

## 2022-10-07 RX ORDER — SODIUM CHLORIDE 9 MG/ML
1000 INJECTION, SOLUTION INTRAVENOUS
Refills: 0 | Status: DISCONTINUED | OUTPATIENT
Start: 2022-10-07 | End: 2022-10-07

## 2022-10-07 RX ORDER — CHLORHEXIDINE GLUCONATE 213 G/1000ML
15 SOLUTION TOPICAL
Qty: 630 | Refills: 0
Start: 2022-10-07 | End: 2022-10-20

## 2022-10-07 NOTE — ASU DISCHARGE PLAN (ADULT/PEDIATRIC) - NS MD DC FALL RISK RISK
For information on Fall & Injury Prevention, visit: [email or web address] OR  [email or web address] OR  https://www.cdc.gov/steadi/patient.html

## 2022-10-07 NOTE — ASU DISCHARGE PLAN (ADULT/PEDIATRIC) - CARE PROVIDER_API CALL
Fred Rivers)  [facility]  [address] [place] [address]  Phone: [phone]  Fax: [phone]  Follow Up Time:

## 2022-10-07 NOTE — BRIEF OPERATIVE NOTE - NSICDXBRIEFPROCEDURE_GEN_ALL_CORE_FT
PROCEDURES:  Excisional biopsy, lesion, mandible, oral approach 07-Oct-2022 08:22:18  Zach Jiménez

## 2022-10-12 PROBLEM — E03.9 HYPOTHYROIDISM, UNSPECIFIED: Chronic | Status: ACTIVE | Noted: 2022-10-04

## 2022-10-12 PROBLEM — Z86.79 PERSONAL HISTORY OF OTHER DISEASES OF THE CIRCULATORY SYSTEM: Chronic | Status: ACTIVE | Noted: 2022-10-04

## 2022-10-12 PROBLEM — F10.10 ALCOHOL ABUSE, UNCOMPLICATED: Chronic | Status: ACTIVE | Noted: 2022-10-04

## 2022-11-10 NOTE — ED PROVIDER NOTE - OBJECTIVE STATEMENT
61-year-old female history of hypertension, chronic anemia, colon CA, now presents from dental clinic for imaging for right mandibular intraoral mass.  Already seen at dental clinic and had a biopsy done.  Sent here for CT imaging.  Patient denies any pain.  No fever. no

## 2022-11-14 ENCOUNTER — APPOINTMENT (OUTPATIENT)
Dept: OTOLARYNGOLOGY | Facility: CLINIC | Age: 62
End: 2022-11-14

## 2022-11-14 VITALS — WEIGHT: 112 LBS | BODY MASS INDEX: 18.66 KG/M2 | HEIGHT: 65 IN

## 2022-11-14 DIAGNOSIS — D48.0 NEOPLASM OF UNCERTAIN BEHAVIOR OF BONE AND ARTICULAR CARTILAGE: ICD-10-CM

## 2022-11-14 LAB — SURGICAL PATHOLOGY STUDY: SIGNIFICANT CHANGE UP

## 2022-11-14 PROCEDURE — 99024 POSTOP FOLLOW-UP VISIT: CPT

## 2022-11-14 NOTE — PHYSICAL EXAM
[FreeTextEntry1] : thin [Midline] : trachea located in midline position [de-identified] : mixed dentation [de-identified] : Healed gingiva [Normal] : no rashes Doxycycline Pregnancy And Lactation Text: This medication is Pregnancy Category D and not consider safe during pregnancy. It is also excreted in breast milk but is considered safe for shorter treatment courses.

## 2022-11-14 NOTE — REASON FOR VISIT
[Post-Operative Visit] : a post-operative visit [FreeTextEntry2] : s/p excisional biopsy right mandible lesion

## 2022-11-14 NOTE — HISTORY OF PRESENT ILLNESS
[FreeTextEntry1] : Patient here today s/p excisional biopsy right mandible lesion.  Patient states she is feeling better and has no complaints.

## 2022-11-18 ENCOUNTER — APPOINTMENT (OUTPATIENT)
Dept: CARDIOLOGY | Facility: CLINIC | Age: 62
End: 2022-11-18

## 2023-05-15 ENCOUNTER — APPOINTMENT (OUTPATIENT)
Dept: OTOLARYNGOLOGY | Facility: CLINIC | Age: 63
End: 2023-05-15

## 2023-11-28 NOTE — CHART NOTE - NSCHARTNOTEFT_GEN_A_CORE
You have had lesions removed today under local anesthesia. You may have some pain at the surgery site after the procedure. You should avoid aspirin products and over the counter products  for 3 days. Keep areas clean and dry. No strenuous activity for 24  HOURS     No swimming, no tub baths,no hot tubs    Eat lightly at first, advance diet as tolerated. Drink plenty of fluids. Keep it covered with a sterile bandage until follow up appointment     Steri-strips  will fall off on their own in about a week. You may shower 24 hours after the procedure. Pat the wound dry after you have washed it with a mild soap. Do not submerge the wound in water until it is well-healed. Take pain medication as directed, if necessary per instructions. Complete ALL antibiotics as directed for entire duration of therapy. Stitches will be left in the skin until your follow up appointment.      Call Your Doctor if any of the following occurs:     Signs of infection, including fever and chills   Redness, swelling, increasing pain, excessive bleeding, or discharge from the incision site   Pain that you cannot control with the medicines you have been given   Any new symptoms patient had large BM w blood and clots, otherwise hemodynamiocally stable   -GI consult placed  -LOVENOX HELD (discussed w senior regarding IVC DVT)   -PPI 40mg BOD  -CT AP  - Type and screen  -cbc    also hypoglycemic (D50 push ordered) patient had large BM w blood and clots, otherwise hemodynamiocally stable   -GI consult placed, their recs: hold lovenox, keep NPO>midnight, will still PEG placement 3/22 but will also do endoscopy at the same time, if another large bloody bm get CTangio of pelvis   -LOVENOX HELD (discussed w senior regarding IVC DVT)   -PPI 40mg BOD  -CT AP  -Type and screen  -cbc    also hypoglycemic (D50 push ordered)

## 2025-03-14 NOTE — PROGRESS NOTE ADULT - ASSESSMENT
Telehealth Visit  [facility]      The patient location is: home in [place]   The chief complaint leading to consultation is: weight loss   Visit type: audiovisual  Face to Face time with patient: 11 mins      HPI: Isaura Mancini is a 53 y.o. year old female who presents for weight loss management     History of Present Illness    CHIEF COMPLAINT:  Isaura presents today for weight loss concerns.     Presents with concerns of weight gain. Would like to try injectable. Stated that she recently initiated a low sodium diet and is working to reduce sugar intake while increasing vegetable consumption. She drinks water and green tea, and denies soda consumption. She gets walking activity throughout her 8-hour night shift despite back problems. Hard to exercise with chronic back pain. She has a history of total thyroidectomy in 2022 for hyperthyroid disease. Denies family hx of thyroid cancer. Denies hx of pancreatitis       ROS:  Constitutional: -chills, -fever  Respiratory: -cough, -shortness of breath  Cardiovascular: -chest pain  Gastrointestinal: -abdominal pain, -constipation, -diarrhea, -nausea, -vomiting, +bloating  Neurological: -dizziness, -lightheadedness, -headaches  Musculoskeletal: +back pain       Physical Exam:     Gen- NAD, appears stated age  Resp- Breathing comfortably on RA  Neuro- Alert, spontaneous movements  Psych- Calm, cooperative, logical thought process    Assessment and plan:    Assessment & Plan    IMPRESSION:  - Considered weight loss medication options based on insurance coverage and medical history.  - Evaluated contraindications for weight loss medications, particularly concerning history of thyroidectomy.  - Will consult with Dr. Braden regarding appropriateness of prescribing weight loss medication given thyroid history.  - If weight loss medication is contraindicated, will consider referral to [facility] for specialized management.    WEIGHT  MANAGEMENT:  - Demonstrated the administration of Ozempic injection using a practice pen.    - Pending further review, Ozempic prescription details are as follows: - Initial dose: 0.25 mg subcutaneously once weekly for the first month - Increase to 0.5 mg subcutaneously once weekly for the second month - Administer in thigh or abdomen  - Scheduled a follow-up visit in 2 months to assess weight and medication efficacy if Ozempic is prescribed.    DIET AND EATING HABITS:  - Isaura reports recent dietary changes, including reducing salt and sugar intake, and increasing vegetable consumption.  - Isaura to continue current efforts to reduce salt and sugar intake, maintain increased vegetable consumption, and continue current walking routine during work shifts.      FOLLOW-UP:  - Ordered annual labs to be scheduled before next annual visit.  - Follow up in 6 months (around August) for annual appointment.         Follow-up: FU in in 6 months for annual     20 minutes of total time spent on the encounter, which includes face to face time and non-face to face time preparing to see the patient (eg, review of tests), Obtaining and/or reviewing separately obtained history, Documenting clinical information in the electronic or other health record, Independently interpreting results (not separately reported) and communicating results to the patient/family/caregiver, or Care coordination (not separately reported).       This service was not originating from a related E/M service provided within the previous 7 days nor will  to an E/M service or procedure within the next 24 hours or my soonest available appointment.  Prevailing standard of care was able to be met in this audio-only visit.      This note was generated with the assistance of ambient listening technology. Verbal consent was obtained by the patient and accompanying visitor(s) for the recording of patient appointment to facilitate this note. I attest to having  Pt is a 60 yo F with PMH of GIB, Bowel and Bladder incontinence, Smoking (1/2ppd), ETOH abuse, hypothyroidism, anemia transferred from [facility] for EMR/Polypectomy. Pt is from home living with her son (43yo). Pt was admitted to [facility] on 01/31 for AMS, weakness and decreased appetite. She was found to have UTI, Elevated troponin, and electrolyte imbalance. During her stay at [facility] pt had colonoscopy done, which revealed 1 large 3-4 cm sigmoid pedunculated polyp (bx=tub adenoma, but was friable) and 2 polyps (7, 14 mm) in descending colon s/p bx.     # 3-4 cm sigmoid colonic Polyp; hx GI bleed 2/2 polyp  Colonoscopy 2/4 @ [facility]: 1 large 3-4 cm sigmoid pedunculated polyp and 2 polyps (7, 14 mm) in descending colon s/p bx  GI consult (Dr. Daniels) for EMR/polypectomy - trying to do today  diet: NPO x meds  will need bowel prep today  INR 1.5; Cr 0.6  CEA 5.6    # loose BMs - pt says she always has loose stool  not enough stool per day to call diarrhea per se  C diff result neg  no laxatives  can use imodium 2mg po q6 prn loose BM  consider d/c PPI    # Hx of UTI - seems tx'd    # Rt jaw swelling - r/o dental infection vs facial cellulitis; HypoTN  pt was on abx at [facility] - not sure of details  CT maxillofacial with C 2/12 @ [facility]: Mild soft tissue inflammation suggesting cellulitis of the right lower face  abx: Unasyn 3gm iv q6  obtain dental eval  consider ID eval  c/w mido 15mg po q8  IVFs: D5NS 75/hr  f/u Ucx, Bcx    # Hx tiny L pleural effusion on CXR 2/8  CT Chest with Cont 2/9 obtained: Moderate b/l pleural effusions resulting in complete atelectasis of both lower lobes  current CXR: tiny pl eff  no further w/u    # anemia of chr dz and acute GI blood loss  check iron studies, B12, Fol, TSH  check SPEP  pt on folate 1mg po q24 for now    # Hx of Elevated Troponin - prob transient demand isch when she was anemic  Normal Lexiscan at [facility]  cardio eval noted: may proceed w/ c-scope    # Hx of ETOH abuse; malnutrition; severe hypoalbuminemia  last drink months ago  d/c Thiamine and use MVI po q24  dietician eval  add Ensure 3x/day  check U TP:Cr ratio    # hypothyroidism  c/w synth 25mcg po q24  check TSH    # GI ppx: PPI    # DVT ppx: SCDs (no pharm - going for polypectomy today)    # Activity: pt says she used to walk at home; lives on 2nd fl of apartment w/ son; says she uses a walker; PT eval    Dispo: bowel prep; NPO; IVFs; PT eval; abx; dental eval; dietician eval; supplements; labs as above  eventually, pt will likely need SNF upon d/c (vs home w/ home PT) - not ready for d/c yet reviewed and edited the generated note for accuracy, though some syntax or spelling errors may persist. Please contact the author of this note for any clarification.      [facility]

## 2025-04-22 NOTE — H&P ADULT - NSHPRISKHIVSCREEN_GEN_ALL_CORE
Lab Results   Component Value Date    HGBA1C 5.9 (H) 04/11/2025     Diet/exercise    Orders:    CBC and Differential; Future    Hemoglobin A1c; Future     Not applicable (known HIV negative status in last year)